# Patient Record
Sex: MALE | Race: WHITE | Employment: OTHER | URBAN - METROPOLITAN AREA
[De-identification: names, ages, dates, MRNs, and addresses within clinical notes are randomized per-mention and may not be internally consistent; named-entity substitution may affect disease eponyms.]

---

## 2017-01-16 DIAGNOSIS — I48.19 PERSISTENT ATRIAL FIBRILLATION (HCC): ICD-10-CM

## 2017-01-16 LAB
INR BLD: 1.9 (ref 0.85–1.16)
PROTHROMBIN TIME: 22 SEC (ref 9.8–13)

## 2017-01-17 ENCOUNTER — ANTI-COAG VISIT (OUTPATIENT)
Dept: INTERNAL MEDICINE | Age: 62
End: 2017-01-17

## 2017-02-08 ENCOUNTER — TELEPHONE (OUTPATIENT)
Dept: INTERNAL MEDICINE | Age: 62
End: 2017-02-08

## 2017-02-08 DIAGNOSIS — I48.19 PERSISTENT ATRIAL FIBRILLATION (HCC): ICD-10-CM

## 2017-02-08 LAB
INR BLD: 1.77 (ref 0.85–1.16)
PROTHROMBIN TIME: 20.4 SEC (ref 9.8–13)

## 2017-02-09 ENCOUNTER — ANTI-COAG VISIT (OUTPATIENT)
Dept: INTERNAL MEDICINE | Age: 62
End: 2017-02-09

## 2017-02-20 ENCOUNTER — OFFICE VISIT (OUTPATIENT)
Dept: CARDIOLOGY CLINIC | Age: 62
End: 2017-02-20

## 2017-02-20 VITALS
SYSTOLIC BLOOD PRESSURE: 118 MMHG | WEIGHT: 251 LBS | BODY MASS INDEX: 31.37 KG/M2 | HEART RATE: 68 BPM | DIASTOLIC BLOOD PRESSURE: 70 MMHG

## 2017-02-20 DIAGNOSIS — R00.2 PALPITATIONS: ICD-10-CM

## 2017-02-20 DIAGNOSIS — I27.20 PULMONARY HYPERTENSION (HCC): ICD-10-CM

## 2017-02-20 DIAGNOSIS — I48.0 PAROXYSMAL ATRIAL FIBRILLATION (HCC): Primary | ICD-10-CM

## 2017-02-20 PROCEDURE — 99214 OFFICE O/P EST MOD 30 MIN: CPT | Performed by: INTERNAL MEDICINE

## 2017-02-28 DIAGNOSIS — I48.19 PERSISTENT ATRIAL FIBRILLATION (HCC): ICD-10-CM

## 2017-02-28 LAB
INR BLD: 1.57 (ref 0.85–1.15)
PROTHROMBIN TIME: 17.7 SEC (ref 9.6–13)

## 2017-03-10 ENCOUNTER — HOSPITAL ENCOUNTER (OUTPATIENT)
Dept: NON INVASIVE DIAGNOSTICS | Age: 62
Discharge: HOME | End: 2017-03-10
Attending: INTERNAL MEDICINE | Admitting: INTERNAL MEDICINE

## 2017-03-10 DIAGNOSIS — I27.29 OTHER SECONDARY PULMONARY HYPERTENSION (HCC): ICD-10-CM

## 2017-03-10 LAB
LV EF: 58 %
LVEF MODALITY: NORMAL

## 2017-03-25 DIAGNOSIS — I48.19 PERSISTENT ATRIAL FIBRILLATION (HCC): ICD-10-CM

## 2017-03-25 LAB
INR BLD: 1.14
INR BLD: 1.14 (ref 0.85–1.15)
PROTHROMBIN TIME: 12.9 SEC (ref 9.6–13)

## 2017-03-28 ENCOUNTER — ANTI-COAG VISIT (OUTPATIENT)
Dept: INTERNAL MEDICINE | Age: 62
End: 2017-03-28

## 2017-03-29 ENCOUNTER — ANTI-COAG VISIT (OUTPATIENT)
Dept: INTERNAL MEDICINE | Age: 62
End: 2017-03-29

## 2017-04-08 LAB
INR BLD: 2.32 (ref 0.85–1.15)
PROTHROMBIN TIME: 26.2 SEC (ref 9.6–13)

## 2017-04-10 ENCOUNTER — ANTI-COAG VISIT (OUTPATIENT)
Dept: INTERNAL MEDICINE | Age: 62
End: 2017-04-10

## 2017-04-27 ENCOUNTER — TELEPHONE (OUTPATIENT)
Dept: INTERNAL MEDICINE | Age: 62
End: 2017-04-27

## 2017-04-27 DIAGNOSIS — Z01.89 ROUTINE LAB DRAW: Primary | ICD-10-CM

## 2017-05-01 DIAGNOSIS — Z01.89 ROUTINE LAB DRAW: ICD-10-CM

## 2017-05-01 LAB
INR BLD: 2 (ref 0.85–1.15)
PROTHROMBIN TIME: 22.6 SEC (ref 9.6–13)

## 2017-05-02 ENCOUNTER — ANTI-COAG VISIT (OUTPATIENT)
Dept: INTERNAL MEDICINE | Age: 62
End: 2017-05-02

## 2017-05-02 LAB
ESTIMATED AVERAGE GLUCOSE: 134.1 MG/DL
HBA1C MFR BLD: 6.3 %

## 2017-05-31 DIAGNOSIS — Z01.89 ROUTINE LAB DRAW: ICD-10-CM

## 2017-05-31 LAB
INR BLD: 3.47 (ref 0.85–1.15)
PROTHROMBIN TIME: 39.2 SEC (ref 9.6–13)

## 2017-06-01 ENCOUNTER — ANTI-COAG VISIT (OUTPATIENT)
Dept: INTERNAL MEDICINE | Age: 62
End: 2017-06-01

## 2017-06-29 ENCOUNTER — TELEPHONE (OUTPATIENT)
Dept: INTERNAL MEDICINE | Age: 62
End: 2017-06-29

## 2017-06-29 DIAGNOSIS — I26.99 OTHER PULMONARY EMBOLISM WITHOUT ACUTE COR PULMONALE, UNSPECIFIED CHRONICITY (HCC): ICD-10-CM

## 2017-06-29 DIAGNOSIS — E78.5 HYPERLIPIDEMIA, UNSPECIFIED HYPERLIPIDEMIA TYPE: ICD-10-CM

## 2017-06-29 DIAGNOSIS — R73.9 HYPERGLYCEMIA: Primary | ICD-10-CM

## 2017-06-29 DIAGNOSIS — Z00.00 ROUTINE GENERAL MEDICAL EXAMINATION AT A HEALTH CARE FACILITY: ICD-10-CM

## 2017-08-04 ENCOUNTER — ANTI-COAG VISIT (OUTPATIENT)
Dept: INTERNAL MEDICINE | Age: 62
End: 2017-08-04

## 2017-08-04 DIAGNOSIS — Z01.89 ROUTINE LAB DRAW: ICD-10-CM

## 2017-08-04 LAB
A/G RATIO: 1.7 (ref 1.1–2.2)
ALBUMIN SERPL-MCNC: 4.4 G/DL (ref 3.4–5)
ALP BLD-CCNC: 38 U/L (ref 40–129)
ALT SERPL-CCNC: 26 U/L (ref 10–40)
ANION GAP SERPL CALCULATED.3IONS-SCNC: 16 MMOL/L (ref 3–16)
AST SERPL-CCNC: 22 U/L (ref 15–37)
BASOPHILS ABSOLUTE: 0 K/UL (ref 0–0.2)
BASOPHILS RELATIVE PERCENT: 0.7 %
BILIRUB SERPL-MCNC: 0.6 MG/DL (ref 0–1)
BUN BLDV-MCNC: 25 MG/DL (ref 7–20)
CALCIUM SERPL-MCNC: 9.8 MG/DL (ref 8.3–10.6)
CHLORIDE BLD-SCNC: 102 MMOL/L (ref 99–110)
CHOLESTEROL, TOTAL: 164 MG/DL (ref 0–199)
CO2: 25 MMOL/L (ref 21–32)
CREAT SERPL-MCNC: 1.1 MG/DL (ref 0.8–1.3)
CREATININE URINE: 378.7 MG/DL (ref 39–259)
EOSINOPHILS ABSOLUTE: 0.2 K/UL (ref 0–0.6)
EOSINOPHILS RELATIVE PERCENT: 2.8 %
ESTIMATED AVERAGE GLUCOSE: 114 MG/DL
GFR AFRICAN AMERICAN: >60
GFR NON-AFRICAN AMERICAN: >60
GLOBULIN: 2.6 G/DL
GLUCOSE BLD-MCNC: 93 MG/DL (ref 70–99)
HBA1C MFR BLD: 5.6 %
HCT VFR BLD CALC: 45.1 % (ref 40.5–52.5)
HDLC SERPL-MCNC: 43 MG/DL (ref 40–60)
HEMOGLOBIN: 14.7 G/DL (ref 13.5–17.5)
INR BLD: 2.19 (ref 0.85–1.15)
LDL CHOLESTEROL CALCULATED: 94 MG/DL
LYMPHOCYTES ABSOLUTE: 1.7 K/UL (ref 1–5.1)
LYMPHOCYTES RELATIVE PERCENT: 26.4 %
MCH RBC QN AUTO: 28.2 PG (ref 26–34)
MCHC RBC AUTO-ENTMCNC: 32.7 G/DL (ref 31–36)
MCV RBC AUTO: 86.2 FL (ref 80–100)
MICROALBUMIN UR-MCNC: 8.1 MG/DL
MICROALBUMIN/CREAT UR-RTO: 21.4 MG/G (ref 0–30)
MONOCYTES ABSOLUTE: 0.6 K/UL (ref 0–1.3)
MONOCYTES RELATIVE PERCENT: 9.6 %
NEUTROPHILS ABSOLUTE: 4 K/UL (ref 1.7–7.7)
NEUTROPHILS RELATIVE PERCENT: 60.5 %
PDW BLD-RTO: 15.2 % (ref 12.4–15.4)
PLATELET # BLD: 251 K/UL (ref 135–450)
PMV BLD AUTO: 9.5 FL (ref 5–10.5)
POTASSIUM SERPL-SCNC: 4.7 MMOL/L (ref 3.5–5.1)
PROTHROMBIN TIME: 24.7 SEC (ref 9.6–13)
RBC # BLD: 5.22 M/UL (ref 4.2–5.9)
SODIUM BLD-SCNC: 143 MMOL/L (ref 136–145)
TOTAL PROTEIN: 7 G/DL (ref 6.4–8.2)
TRIGL SERPL-MCNC: 136 MG/DL (ref 0–150)
TSH SERPL DL<=0.05 MIU/L-ACNC: 2.4 UIU/ML (ref 0.27–4.2)
VLDLC SERPL CALC-MCNC: 27 MG/DL
WBC # BLD: 6.6 K/UL (ref 4–11)

## 2017-08-07 ENCOUNTER — OFFICE VISIT (OUTPATIENT)
Dept: CARDIOLOGY CLINIC | Age: 62
End: 2017-08-07

## 2017-08-07 VITALS
SYSTOLIC BLOOD PRESSURE: 130 MMHG | DIASTOLIC BLOOD PRESSURE: 60 MMHG | BODY MASS INDEX: 30.62 KG/M2 | HEART RATE: 60 BPM | WEIGHT: 245 LBS

## 2017-08-07 DIAGNOSIS — I27.20 PULMONARY HTN (HCC): ICD-10-CM

## 2017-08-07 DIAGNOSIS — I48.0 PAROXYSMAL ATRIAL FIBRILLATION (HCC): Primary | ICD-10-CM

## 2017-08-07 DIAGNOSIS — R00.2 PALPITATIONS: ICD-10-CM

## 2017-08-07 PROCEDURE — 99214 OFFICE O/P EST MOD 30 MIN: CPT | Performed by: INTERNAL MEDICINE

## 2017-08-21 ENCOUNTER — OFFICE VISIT (OUTPATIENT)
Dept: INTERNAL MEDICINE | Age: 62
End: 2017-08-21

## 2017-08-21 VITALS
DIASTOLIC BLOOD PRESSURE: 80 MMHG | WEIGHT: 244 LBS | HEIGHT: 75 IN | SYSTOLIC BLOOD PRESSURE: 132 MMHG | BODY MASS INDEX: 30.34 KG/M2

## 2017-08-21 DIAGNOSIS — I26.99 OTHER PULMONARY EMBOLISM WITHOUT ACUTE COR PULMONALE, UNSPECIFIED CHRONICITY (HCC): ICD-10-CM

## 2017-08-21 DIAGNOSIS — R25.1 TREMOR: ICD-10-CM

## 2017-08-21 DIAGNOSIS — R73.9 HYPERGLYCEMIA: ICD-10-CM

## 2017-08-21 DIAGNOSIS — E78.5 HYPERLIPIDEMIA, UNSPECIFIED HYPERLIPIDEMIA TYPE: ICD-10-CM

## 2017-08-21 DIAGNOSIS — M54.41 CHRONIC RIGHT-SIDED LOW BACK PAIN WITH RIGHT-SIDED SCIATICA: ICD-10-CM

## 2017-08-21 DIAGNOSIS — Z00.00 WELL ADULT EXAM: Primary | ICD-10-CM

## 2017-08-21 DIAGNOSIS — G89.29 CHRONIC RIGHT-SIDED LOW BACK PAIN WITH RIGHT-SIDED SCIATICA: ICD-10-CM

## 2017-08-21 PROCEDURE — 99396 PREV VISIT EST AGE 40-64: CPT | Performed by: INTERNAL MEDICINE

## 2017-08-21 RX ORDER — FENOFIBRATE 145 MG/1
TABLET ORAL
Qty: 90 TABLET | Refills: 3 | Status: SHIPPED | OUTPATIENT
Start: 2017-08-21 | End: 2018-11-21 | Stop reason: SDUPTHER

## 2017-08-21 RX ORDER — METHOCARBAMOL 500 MG/1
500 TABLET, FILM COATED ORAL 4 TIMES DAILY
Qty: 40 TABLET | Refills: 2 | Status: SHIPPED | OUTPATIENT
Start: 2017-08-21 | End: 2017-08-31

## 2017-08-21 RX ORDER — METHOCARBAMOL 500 MG/1
TABLET, FILM COATED ORAL
Qty: 40 TABLET | Refills: 3 | Status: ON HOLD | OUTPATIENT
Start: 2017-08-21 | End: 2018-12-06

## 2017-08-21 RX ORDER — WARFARIN SODIUM 1 MG/1
TABLET ORAL
Qty: 180 TABLET | Refills: 3 | Status: ON HOLD | OUTPATIENT
Start: 2017-08-21 | End: 2018-12-06 | Stop reason: CLARIF

## 2017-08-21 RX ORDER — SUMATRIPTAN 100 MG/1
TABLET, FILM COATED ORAL
Qty: 27 TABLET | Refills: 3 | Status: SHIPPED | OUTPATIENT
Start: 2017-08-21 | End: 2019-11-23 | Stop reason: SDUPTHER

## 2017-08-21 RX ORDER — WARFARIN SODIUM 3 MG/1
TABLET ORAL
Qty: 180 TABLET | Refills: 3 | Status: SHIPPED | OUTPATIENT
Start: 2017-08-21 | End: 2018-09-23 | Stop reason: SDUPTHER

## 2017-08-21 RX ORDER — FLECAINIDE ACETATE 100 MG/1
TABLET ORAL
Qty: 180 TABLET | Refills: 3 | Status: SHIPPED | OUTPATIENT
Start: 2017-08-21 | End: 2018-11-08 | Stop reason: SDUPTHER

## 2017-08-21 RX ORDER — ROSUVASTATIN CALCIUM 40 MG/1
TABLET, COATED ORAL
Qty: 90 TABLET | Refills: 3 | Status: SHIPPED | OUTPATIENT
Start: 2017-08-21 | End: 2018-10-22 | Stop reason: SDUPTHER

## 2017-08-21 RX ORDER — DIVALPROEX SODIUM 250 MG/1
TABLET, DELAYED RELEASE ORAL
Qty: 270 TABLET | Refills: 3 | Status: SHIPPED | OUTPATIENT
Start: 2017-08-21 | End: 2018-07-13 | Stop reason: SDUPTHER

## 2017-08-21 RX ORDER — WARFARIN SODIUM 5 MG/1
TABLET ORAL
Qty: 180 TABLET | Refills: 3 | Status: ON HOLD | OUTPATIENT
Start: 2017-08-21 | End: 2018-12-06 | Stop reason: CLARIF

## 2017-08-21 RX ORDER — METOPROLOL SUCCINATE 50 MG/1
TABLET, EXTENDED RELEASE ORAL
Qty: 90 TABLET | Refills: 3 | Status: SHIPPED | OUTPATIENT
Start: 2017-08-21 | End: 2018-11-08 | Stop reason: SDUPTHER

## 2017-08-21 ASSESSMENT — ENCOUNTER SYMPTOMS
RESPIRATORY NEGATIVE: 1
ABDOMINAL PAIN: 0
SHORTNESS OF BREATH: 0
CHEST TIGHTNESS: 0

## 2017-09-01 ENCOUNTER — OFFICE VISIT (OUTPATIENT)
Dept: INTERNAL MEDICINE | Age: 62
End: 2017-09-01

## 2017-09-01 VITALS
SYSTOLIC BLOOD PRESSURE: 130 MMHG | TEMPERATURE: 98.1 F | HEIGHT: 75 IN | BODY MASS INDEX: 29.97 KG/M2 | WEIGHT: 241 LBS | DIASTOLIC BLOOD PRESSURE: 80 MMHG

## 2017-09-01 DIAGNOSIS — J01.00 ACUTE NON-RECURRENT MAXILLARY SINUSITIS: ICD-10-CM

## 2017-09-01 PROCEDURE — 99213 OFFICE O/P EST LOW 20 MIN: CPT | Performed by: INTERNAL MEDICINE

## 2017-09-01 RX ORDER — CEPHALEXIN 500 MG/1
500 CAPSULE ORAL 3 TIMES DAILY
Qty: 21 CAPSULE | Refills: 0 | Status: SHIPPED | OUTPATIENT
Start: 2017-09-01 | End: 2017-10-19

## 2017-09-06 PROBLEM — J01.00 ACUTE NON-RECURRENT MAXILLARY SINUSITIS: Status: ACTIVE | Noted: 2017-09-06

## 2017-09-06 ASSESSMENT — ENCOUNTER SYMPTOMS
NAUSEA: 0
VOMITING: 0
BACK PAIN: 0
RHINORRHEA: 1
SINUS PRESSURE: 1
SHORTNESS OF BREATH: 0
ABDOMINAL PAIN: 0

## 2017-10-19 ENCOUNTER — OFFICE VISIT (OUTPATIENT)
Dept: INTERNAL MEDICINE | Age: 62
End: 2017-10-19

## 2017-10-19 VITALS
SYSTOLIC BLOOD PRESSURE: 126 MMHG | HEART RATE: 67 BPM | HEIGHT: 75 IN | RESPIRATION RATE: 16 BRPM | WEIGHT: 242 LBS | BODY MASS INDEX: 30.09 KG/M2 | DIASTOLIC BLOOD PRESSURE: 72 MMHG | TEMPERATURE: 98 F

## 2017-10-19 DIAGNOSIS — Z01.89 ROUTINE LAB DRAW: ICD-10-CM

## 2017-10-19 DIAGNOSIS — E78.5 HYPERLIPIDEMIA, UNSPECIFIED HYPERLIPIDEMIA TYPE: ICD-10-CM

## 2017-10-19 DIAGNOSIS — G43.909 MIGRAINE WITHOUT STATUS MIGRAINOSUS, NOT INTRACTABLE, UNSPECIFIED MIGRAINE TYPE: ICD-10-CM

## 2017-10-19 DIAGNOSIS — Z01.818 PREOP EXAM FOR INTERNAL MEDICINE: Primary | ICD-10-CM

## 2017-10-19 DIAGNOSIS — R73.9 HYPERGLYCEMIA: ICD-10-CM

## 2017-10-19 DIAGNOSIS — I48.0 PAROXYSMAL ATRIAL FIBRILLATION (HCC): ICD-10-CM

## 2017-10-19 DIAGNOSIS — H26.9 CATARACT, UNSPECIFIED CATARACT TYPE, UNSPECIFIED LATERALITY: ICD-10-CM

## 2017-10-19 LAB
INR BLD: 4.67 (ref 0.85–1.15)
PROTHROMBIN TIME: 52.8 SEC (ref 9.6–13)

## 2017-10-19 PROCEDURE — 99244 OFF/OP CNSLTJ NEW/EST MOD 40: CPT | Performed by: INTERNAL MEDICINE

## 2017-10-19 ASSESSMENT — ENCOUNTER SYMPTOMS
ABDOMINAL PAIN: 0
SHORTNESS OF BREATH: 0
CHEST TIGHTNESS: 0
STRIDOR: 0
RESPIRATORY NEGATIVE: 1

## 2017-10-19 NOTE — ASSESSMENT & PLAN NOTE
Controlled w current regimen- continue and monitor closely  Needs pt/inr checked before surgery!!  Discussed need to do regularly

## 2017-10-19 NOTE — LETTER
REPEAT IN 2 HOURS IF NEEDED. MAX OF 2 TABLETS PER 24 HOURS** 27 tablet 3    Terbinafine HCl (LAMISIL PO) Take by mouth Indications: Pt unaware of dose      Bimatoprost (LUMIGAN OP) Apply  to eye.  ALLEGRA 180 MG tablet Take one tablet(s) by mouth daily 90 tablet 1    timolol (TIMOPTIC) 0.25 % ophthalmic solution 1 drop 2 times daily.  [DISCONTINUED] cephALEXin (KEFLEX) 500 MG capsule Take 1 capsule by mouth 3 times daily 21 capsule 0     No facility-administered encounter medications on file as of 10/19/2017. Past Medical History:   Diagnosis Date    Allergic rhinitis due to other allergen     Atrial fibrillation ([facility] [address]     Cataract, right 11/30/2016    Hx of blood clots     Hypopotassemia     Other abnormal blood chemistry     Other and unspecified hyperlipidemia     Other pulmonary embolism and infarction     Screening PSA (prostate specific antigen) 12/5/2009    <0.1 ng/mL    Unspecified sleep apnea     Vitreous degeneration         Past Surgical History:   Procedure Laterality Date    ABLATION OF DYSRHYTHMIC FOCUS      2002    COLONOSCOPY  06-    Bharathi Martin / Dr. Sanchez [place]  10/20/2014    recheck 4-5 yrs w ++ h/o adenomatous plyps         Family and Social History non- contributory-see EPIC for details- reviewed and updated    Review of Systems   Constitutional: Negative for appetite change, fatigue and fever. HENT: Negative. Respiratory: Negative. Negative for chest tightness, shortness of breath and stridor. Cardiovascular: Negative for chest pain and palpitations. Gastrointestinal: Negative for abdominal pain. Genitourinary: Negative. Skin: Negative. Neurological: Positive for headaches. Negative for dizziness, facial asymmetry and weakness. Psychiatric/Behavioral: Negative for dysphoric mood and sleep disturbance. The patient is not nervous/anxious.       Vitals:    10/19/17 1512   BP: 126/72   Pulse: 67   Resp: 16

## 2017-10-19 NOTE — COMMUNICATION BODY
Subjective:      Patient ID: Sully Gayle is a 60 y.o. male. In for preop prior to cataract surgery. Feeling well otherwise. UTD w cardiology. PT/INR not checked for more than 2 months but no active bleeding. bp's well controlled. Headaches still sporatic but sometimes severe in the middle of the night. Sugar count has been down and watching carbs carefully. Trying to work o n weight loss also and exercising regularly. Outpatient Encounter Prescriptions as of 10/19/2017   Medication Sig Dispense Refill    metoprolol succinate (TOPROL XL) 50 MG extended release tablet TAKE ONE TABLET BY MOUTH EVERY DAY 90 tablet 3    flecainide (TAMBOCOR) 100 MG tablet TAKE ONE TABLET BY MOUTH TWICE A  tablet 3    TRICOR 145 MG tablet TAKE ONE TABLET BY MOUTH EVERY DAY 90 tablet 3    divalproex (DEPAKOTE) 250 MG DR tablet TAKE ONE TABLET BY MOUTH THREE TIMES A  tablet 3    warfarin (COUMADIN) 5 MG tablet TAKE UP TO 2 TABLETS DAILY AS DIRECTED 180 tablet 3    warfarin (COUMADIN) 3 MG tablet TAKE UP TO TWO TABLETS BY MOUTH DAILY AS DIRECTED 180 tablet 3    warfarin (COUMADIN) 1 MG tablet TAKE ONE TABLET BY MOUTH TWICE A  tablet 3    methocarbamol (ROBAXIN) 500 MG tablet TAKE ONE TABLET BY MOUTH FOUR TIMES A DAY FOR 10 DAYS 40 tablet 3    rosuvastatin (CRESTOR) 40 MG tablet TAKE ONE TABLET BY MOUTH DAILY 90 tablet 3    SUMAtriptan (IMITREX) 100 MG tablet TAKE ONE TABLET ONCE AS NEEDED **MAY REPEAT IN 2 HOURS IF NEEDED. MAX OF 2 TABLETS PER 24 HOURS** 27 tablet 3    Terbinafine HCl (LAMISIL PO) Take by mouth Indications: Pt unaware of dose      Bimatoprost (LUMIGAN OP) Apply  to eye.  ALLEGRA 180 MG tablet Take one tablet(s) by mouth daily 90 tablet 1    timolol (TIMOPTIC) 0.25 % ophthalmic solution 1 drop 2 times daily.       [DISCONTINUED] cephALEXin (KEFLEX) 500 MG capsule Take 1 capsule by mouth 3 times daily 21 capsule 0     No facility-administered encounter medications on file as of 10/19/2017. Past Medical History:   Diagnosis Date    Allergic rhinitis due to other allergen     Atrial fibrillation ([facility] [address]     Cataract, right 11/30/2016    Hx of blood clots     Hypopotassemia     Other abnormal blood chemistry     Other and unspecified hyperlipidemia     Other pulmonary embolism and infarction     Screening PSA (prostate specific antigen) 12/5/2009    <0.1 ng/mL    Unspecified sleep apnea     Vitreous degeneration         Past Surgical History:   Procedure Laterality Date    ABLATION OF DYSRHYTHMIC FOCUS      2002    COLONOSCOPY  06-    Bharathi Martin / Dr. Sandy Nye  10/20/2014    recheck 4-5 yrs w ++ h/o adenomatous plyps         Family and Social History non- contributory-see EPIC for details- reviewed and updated    Review of Systems   Constitutional: Negative for appetite change, fatigue and fever. HENT: Negative. Respiratory: Negative. Negative for chest tightness, shortness of breath and stridor. Cardiovascular: Negative for chest pain and palpitations. Gastrointestinal: Negative for abdominal pain. Genitourinary: Negative. Skin: Negative. Neurological: Positive for headaches. Negative for dizziness, facial asymmetry and weakness. Psychiatric/Behavioral: Negative for dysphoric mood and sleep disturbance. The patient is not nervous/anxious. Vitals:    10/19/17 1512   BP: 126/72   Pulse: 67   Resp: 16   Temp: 98 °F (36.7 °C)       Objective:   Physical Exam   Constitutional: He is oriented to person, place, and time. He appears well-developed and well-nourished. HENT:   Head: Atraumatic. Mouth/Throat: No oropharyngeal exudate. Eyes: Conjunctivae and EOM are normal. Pupils are equal, round, and reactive to light. Neck: Normal range of motion. Neck supple. No thyromegaly present. Cardiovascular: Normal rate, regular rhythm and normal heart sounds. No murmur heard.   Pulmonary/Chest: Effort normal and

## 2017-10-19 NOTE — PROGRESS NOTES
Subjective:      Patient ID: Ricardo Fox is a 60 y.o. male. In for preop prior to cataract surgery. Feeling well otherwise. UTD w cardiology. PT/INR not checked for more than 2 months but no active bleeding. bp's well controlled. Headaches still sporatic but sometimes severe in the middle of the night. Sugar count has been down and watching carbs carefully. Trying to work o n weight loss also and exercising regularly. Outpatient Encounter Prescriptions as of 10/19/2017   Medication Sig Dispense Refill    metoprolol succinate (TOPROL XL) 50 MG extended release tablet TAKE ONE TABLET BY MOUTH EVERY DAY 90 tablet 3    flecainide (TAMBOCOR) 100 MG tablet TAKE ONE TABLET BY MOUTH TWICE A  tablet 3    TRICOR 145 MG tablet TAKE ONE TABLET BY MOUTH EVERY DAY 90 tablet 3    divalproex (DEPAKOTE) 250 MG DR tablet TAKE ONE TABLET BY MOUTH THREE TIMES A  tablet 3    warfarin (COUMADIN) 5 MG tablet TAKE UP TO 2 TABLETS DAILY AS DIRECTED 180 tablet 3    warfarin (COUMADIN) 3 MG tablet TAKE UP TO TWO TABLETS BY MOUTH DAILY AS DIRECTED 180 tablet 3    warfarin (COUMADIN) 1 MG tablet TAKE ONE TABLET BY MOUTH TWICE A  tablet 3    methocarbamol (ROBAXIN) 500 MG tablet TAKE ONE TABLET BY MOUTH FOUR TIMES A DAY FOR 10 DAYS 40 tablet 3    rosuvastatin (CRESTOR) 40 MG tablet TAKE ONE TABLET BY MOUTH DAILY 90 tablet 3    SUMAtriptan (IMITREX) 100 MG tablet TAKE ONE TABLET ONCE AS NEEDED **MAY REPEAT IN 2 HOURS IF NEEDED. MAX OF 2 TABLETS PER 24 HOURS** 27 tablet 3    Terbinafine HCl (LAMISIL PO) Take by mouth Indications: Pt unaware of dose      Bimatoprost (LUMIGAN OP) Apply  to eye.  ALLEGRA 180 MG tablet Take one tablet(s) by mouth daily 90 tablet 1    timolol (TIMOPTIC) 0.25 % ophthalmic solution 1 drop 2 times daily.       [DISCONTINUED] cephALEXin (KEFLEX) 500 MG capsule Take 1 capsule by mouth 3 times daily 21 capsule 0     No facility-administered encounter medications on file as of 10/19/2017. Past Medical History:   Diagnosis Date    Allergic rhinitis due to other allergen     Atrial fibrillation ([facility] [address]     Cataract, right 11/30/2016    Hx of blood clots     Hypopotassemia     Other abnormal blood chemistry     Other and unspecified hyperlipidemia     Other pulmonary embolism and infarction     Screening PSA (prostate specific antigen) 12/5/2009    <0.1 ng/mL    Unspecified sleep apnea     Vitreous degeneration         Past Surgical History:   Procedure Laterality Date    ABLATION OF DYSRHYTHMIC FOCUS      2002    COLONOSCOPY  06-    Bharathi Martin / Dr. Jennifer Quiñones  10/20/2014    recheck 4-5 yrs w ++ h/o adenomatous plyps         Family and Social History non- contributory-see EPIC for details- reviewed and updated    Review of Systems   Constitutional: Negative for appetite change, fatigue and fever. HENT: Negative. Respiratory: Negative. Negative for chest tightness, shortness of breath and stridor. Cardiovascular: Negative for chest pain and palpitations. Gastrointestinal: Negative for abdominal pain. Genitourinary: Negative. Skin: Negative. Neurological: Positive for headaches. Negative for dizziness, facial asymmetry and weakness. Psychiatric/Behavioral: Negative for dysphoric mood and sleep disturbance. The patient is not nervous/anxious. Vitals:    10/19/17 1512   BP: 126/72   Pulse: 67   Resp: 16   Temp: 98 °F (36.7 °C)       Objective:   Physical Exam   Constitutional: He is oriented to person, place, and time. He appears well-developed and well-nourished. HENT:   Head: Atraumatic. Mouth/Throat: No oropharyngeal exudate. Eyes: Conjunctivae and EOM are normal. Pupils are equal, round, and reactive to light. Neck: Normal range of motion. Neck supple. No thyromegaly present. Cardiovascular: Normal rate, regular rhythm and normal heart sounds. No murmur heard.   Pulmonary/Chest: Effort normal and breath sounds normal. No respiratory distress. He has no wheezes. Abdominal: He exhibits no distension. There is no tenderness. Musculoskeletal: He exhibits no edema. Lymphadenopathy:     He has no cervical adenopathy. Neurological: He is alert and oriented to person, place, and time. He has normal reflexes. No cranial nerve deficit. Skin: Skin is warm and dry. Psychiatric: He has a normal mood and affect. His behavior is normal. Judgment and thought content normal.         Assessment and Plan:      Migraine  Cont prn imitrex and avoid triggers altho difficult to identify    Atrial fibrillation (HCC)  Controlled w current regimen- continue and monitor closely  Needs pt/inr checked before surgery!! Discussed need to do regularly    Hyperglycemia  Cont to watch carbs in diet- to isis glyco in a few months but has been better controlled w diet    Hyperlipemia  Controlled w current regimen- continue and monitor closely      Cataract  For surgical excision      Patient has been seen-history and physical performed and is medically cleared for surgery. Encounter Diagnoses   Name Primary?  Migraine without status migrainosus, not intractable, unspecified migraine type     Paroxysmal atrial fibrillation (HCC)     Hyperglycemia     Hyperlipidemia, unspecified hyperlipidemia type     Cataract, unspecified cataract type, unspecified laterality        No orders of the defined types were placed in this encounter.

## 2017-10-20 ENCOUNTER — TELEPHONE (OUTPATIENT)
Dept: INTERNAL MEDICINE | Age: 62
End: 2017-10-20

## 2017-10-20 ENCOUNTER — ANTI-COAG VISIT (OUTPATIENT)
Dept: INTERNAL MEDICINE | Age: 62
End: 2017-10-20

## 2017-10-27 ENCOUNTER — TELEPHONE (OUTPATIENT)
Dept: INTERNAL MEDICINE | Age: 62
End: 2017-10-27

## 2017-10-27 DIAGNOSIS — Z01.89 ROUTINE LAB DRAW: ICD-10-CM

## 2017-10-27 DIAGNOSIS — I48.0 PAROXYSMAL ATRIAL FIBRILLATION (HCC): Primary | ICD-10-CM

## 2017-10-27 LAB
INR BLD: 2.65 (ref 0.85–1.15)
PROTHROMBIN TIME: 30 SEC (ref 9.6–13)

## 2017-11-01 ENCOUNTER — ANTI-COAG VISIT (OUTPATIENT)
Dept: INTERNAL MEDICINE | Age: 62
End: 2017-11-01

## 2018-01-31 ENCOUNTER — ANTI-COAG VISIT (OUTPATIENT)
Dept: INTERNAL MEDICINE | Age: 63
End: 2018-01-31

## 2018-02-07 ENCOUNTER — OFFICE VISIT (OUTPATIENT)
Dept: CARDIOLOGY CLINIC | Age: 63
End: 2018-02-07

## 2018-02-07 VITALS
BODY MASS INDEX: 30.11 KG/M2 | OXYGEN SATURATION: 98 % | WEIGHT: 242.2 LBS | HEART RATE: 60 BPM | RESPIRATION RATE: 14 BRPM | SYSTOLIC BLOOD PRESSURE: 131 MMHG | HEIGHT: 75 IN | DIASTOLIC BLOOD PRESSURE: 70 MMHG

## 2018-02-07 DIAGNOSIS — I48.0 PAROXYSMAL ATRIAL FIBRILLATION (HCC): Primary | ICD-10-CM

## 2018-02-07 DIAGNOSIS — I27.20 PULMONARY HTN (HCC): ICD-10-CM

## 2018-02-07 DIAGNOSIS — R00.2 PALPITATIONS: ICD-10-CM

## 2018-02-07 PROCEDURE — 99214 OFFICE O/P EST MOD 30 MIN: CPT | Performed by: INTERNAL MEDICINE

## 2018-02-07 NOTE — PROGRESS NOTES
Subjective:      Patient ID: Chloe Ge is a 58 y.o. male. HPI:  Ariana Espinoza is being seen for his 6 month follow up appointment for afib and palps. Continues active. No exertional sx. No tachycardia. No palps. No syncope. No edema. No pnd or orthopnea. No chest pain/sob. Past Medical History:   Diagnosis Date    Allergic rhinitis due to other allergen     Atrial fibrillation ([address]     Cataract, right 11/30/2016    Hx of blood clots     Hypopotassemia     Other abnormal blood chemistry     Other and unspecified hyperlipidemia     Other pulmonary embolism and infarction     Screening PSA (prostate specific antigen) 12/5/2009    <0.1 ng/mL    Unspecified sleep apnea     Vitreous degeneration      Past Surgical History:   Procedure Laterality Date    ABLATION OF DYSRHYTHMIC FOCUS      2002    CATARACT REMOVAL Left 2017    COLONOSCOPY  06-    Kendra Junior / Dr. Jerry Amaya    COLONOSCOPY  10/20/2014    recheck 4-5 yrs w ++ h/o adenomatous plyps        No Known Allergies     Social History     Social History    Marital status:      Spouse name: N/A    Number of children: N/A    Years of education: N/A     Occupational History    Not on file. Social History Main Topics    Smoking status: Never Smoker    Smokeless tobacco: Never Used    Alcohol use Yes      Comment: 4 drinks weekly    Drug use: No    Sexual activity: Yes     Partners: Female     Other Topics Concern    Not on file     Social History Narrative    No narrative on file        Patient has a family history includes Diabetes in his father and mother; Uterine Cancer in his mother. Patient  has a past medical history of Allergic rhinitis due to other allergen; Atrial fibrillation ([address] Cataract, right; Hx of blood clots; Hypopotassemia; Other abnormal blood chemistry; Other and unspecified hyperlipidemia; Other pulmonary embolism and infarction; Screening PSA (prostate specific antigen);  Unspecified sleep apnea; and Vitreous degeneration. Current Outpatient Prescriptions   Medication Sig Dispense Refill    metoprolol succinate (TOPROL XL) 50 MG extended release tablet TAKE ONE TABLET BY MOUTH EVERY DAY 90 tablet 3    flecainide (TAMBOCOR) 100 MG tablet TAKE ONE TABLET BY MOUTH TWICE A  tablet 3    TRICOR 145 MG tablet TAKE ONE TABLET BY MOUTH EVERY DAY 90 tablet 3    divalproex (DEPAKOTE) 250 MG DR tablet TAKE ONE TABLET BY MOUTH THREE TIMES A  tablet 3    warfarin (COUMADIN) 5 MG tablet TAKE UP TO 2 TABLETS DAILY AS DIRECTED 180 tablet 3    warfarin (COUMADIN) 3 MG tablet TAKE UP TO TWO TABLETS BY MOUTH DAILY AS DIRECTED 180 tablet 3    warfarin (COUMADIN) 1 MG tablet TAKE ONE TABLET BY MOUTH TWICE A  tablet 3    methocarbamol (ROBAXIN) 500 MG tablet TAKE ONE TABLET BY MOUTH FOUR TIMES A DAY FOR 10 DAYS 40 tablet 3    rosuvastatin (CRESTOR) 40 MG tablet TAKE ONE TABLET BY MOUTH DAILY 90 tablet 3    SUMAtriptan (IMITREX) 100 MG tablet TAKE ONE TABLET ONCE AS NEEDED **MAY REPEAT IN 2 HOURS IF NEEDED. MAX OF 2 TABLETS PER 24 HOURS** 27 tablet 3    Bimatoprost (LUMIGAN OP) Apply  to eye.  ALLEGRA 180 MG tablet Take one tablet(s) by mouth daily 90 tablet 1    timolol (TIMOPTIC) 0.25 % ophthalmic solution 1 drop 2 times daily.  Terbinafine HCl (LAMISIL PO) Take by mouth Indications: Pt unaware of dose       No current facility-administered medications for this visit. Vitals  Weight: 242 lb 3.2 oz (109.9 kg)  Blood Pressure: BP: (131)/(70)    Pulse: 60           Review of Systems   Constitutional: Negative for activity change and fatigue. Respiratory: Negative for apnea, cough, choking, chest tightness and shortness of breath. Cardiovascular: Negative for chest pain, palpitations and leg swelling. [No PND or orthopnea. No tachycardia. Gastrointestinal: Negative for abdominal distention. Musculoskeletal: Negative for myalgias.    Neurological: Negative for dizziness, syncope and light-headedness. Psychiatric/Behavioral: Negative for behavioral problems, confusion and agitation. Other systems reviewed negative as done. Objective:   Physical Exam   Constitutional: He is oriented to person, place, and time. He appears well-developed and well-nourished. No distress. HENT:   Head: Normocephalic and atraumatic. Eyes: EOM are normal. Right eye exhibits no discharge. Left eye exhibits no discharge. Neck: Normal range of motion. Neck supple. No JVD present. Cardiovascular: Normal rate, regular rhythm, S1 normal, S2 normal and normal heart sounds. Exam reveals no gallop. No murmur heard. Pulses:       Radial pulses are 2+ on the right side, and 2+ on the left side. Pulmonary/Chest: Effort normal and breath sounds normal. No respiratory distress. He has no wheezes. He has no rales. Abdominal: Soft. Bowel sounds are normal. He exhibits no distension. No tenderness. Musculoskeletal: Normal range of motion. He exhibits no edema. Neurological: He is alert and oriented to person, place, and time. Skin: Skin is warm and dry. Psychiatric: He has a normal mood and affect. His behavior is normal. Thought content normal.       Assessment:      1. Paroxysmal atrial fibrillation (HCC)     2. Palpitations     3. Pulmonary HTN                   Plan:      CV stable. Rhythm stable. BP is good. No complaints. . Encouraged diet, exercise and wt loss. No changes. Continue to monitor. Reviewed previous records and testing including echo 3/17. Follow up 6 months.

## 2018-02-19 ENCOUNTER — ANTI-COAG VISIT (OUTPATIENT)
Dept: INTERNAL MEDICINE | Age: 63
End: 2018-02-19

## 2018-02-19 DIAGNOSIS — I48.0 PAROXYSMAL ATRIAL FIBRILLATION (HCC): ICD-10-CM

## 2018-02-19 LAB
INR BLD: 2.24 (ref 0.85–1.15)
PROTHROMBIN TIME: 25.3 SEC (ref 9.6–13)

## 2018-02-23 ENCOUNTER — HOSPITAL ENCOUNTER (OUTPATIENT)
Dept: VASCULAR LAB | Age: 63
Discharge: HOME | End: 2018-02-23
Attending: EMERGENCY MEDICINE | Admitting: EMERGENCY MEDICINE

## 2018-02-23 DIAGNOSIS — M79.661 RIGHT CALF PAIN: Primary | ICD-10-CM

## 2018-02-23 DIAGNOSIS — M79.604 PAIN OF RIGHT LEG: ICD-10-CM

## 2018-03-27 ENCOUNTER — ANTI-COAG VISIT (OUTPATIENT)
Dept: INTERNAL MEDICINE | Age: 63
End: 2018-03-27

## 2018-03-27 DIAGNOSIS — R73.9 HYPERGLYCEMIA: ICD-10-CM

## 2018-03-27 DIAGNOSIS — I48.0 PAROXYSMAL ATRIAL FIBRILLATION (HCC): ICD-10-CM

## 2018-03-27 LAB
INR BLD: 3.68 (ref 0.85–1.15)
PROTHROMBIN TIME: 41.6 SEC (ref 9.6–13)

## 2018-03-28 LAB
ESTIMATED AVERAGE GLUCOSE: 119.8 MG/DL
HBA1C MFR BLD: 5.8 %

## 2018-04-17 DIAGNOSIS — I48.0 PAROXYSMAL ATRIAL FIBRILLATION (HCC): ICD-10-CM

## 2018-04-17 LAB
INR BLD: 1.42 (ref 0.85–1.15)
PROTHROMBIN TIME: 16.1 SEC (ref 9.6–13)

## 2018-04-18 ENCOUNTER — ANTI-COAG VISIT (OUTPATIENT)
Dept: INTERNAL MEDICINE | Age: 63
End: 2018-04-18

## 2018-06-01 ENCOUNTER — OFFICE VISIT (OUTPATIENT)
Dept: INTERNAL MEDICINE | Age: 63
End: 2018-06-01

## 2018-06-01 VITALS
SYSTOLIC BLOOD PRESSURE: 130 MMHG | DIASTOLIC BLOOD PRESSURE: 78 MMHG | WEIGHT: 244 LBS | BODY MASS INDEX: 30.34 KG/M2 | HEIGHT: 75 IN

## 2018-06-01 DIAGNOSIS — J06.9 URI, ACUTE: Primary | ICD-10-CM

## 2018-06-01 DIAGNOSIS — I48.0 PAROXYSMAL ATRIAL FIBRILLATION (HCC): ICD-10-CM

## 2018-06-01 LAB
INR BLD: 2.03 (ref 0.86–1.14)
PROTHROMBIN TIME: 23.1 SEC (ref 9.8–13)

## 2018-06-01 PROCEDURE — 99213 OFFICE O/P EST LOW 20 MIN: CPT | Performed by: INTERNAL MEDICINE

## 2018-06-01 RX ORDER — DEXTROMETHORPHAN HYDROBROMIDE AND PROMETHAZINE HYDROCHLORIDE 15; 6.25 MG/5ML; MG/5ML
5 SYRUP ORAL EVERY 4 HOURS PRN
Qty: 240 ML | Refills: 2 | Status: SHIPPED | OUTPATIENT
Start: 2018-06-01 | End: 2018-06-08

## 2018-06-01 RX ORDER — CEFUROXIME AXETIL 250 MG/1
250 TABLET ORAL 2 TIMES DAILY
Qty: 20 TABLET | Refills: 0 | Status: SHIPPED | OUTPATIENT
Start: 2018-06-01 | End: 2018-06-11

## 2018-06-01 ASSESSMENT — ENCOUNTER SYMPTOMS
BACK PAIN: 0
WHEEZING: 1
SHORTNESS OF BREATH: 1
EYE REDNESS: 0
SORE THROAT: 1
RHINORRHEA: 1
COUGH: 1
CHEST TIGHTNESS: 0
NAUSEA: 0
ABDOMINAL PAIN: 0

## 2018-06-01 ASSESSMENT — PATIENT HEALTH QUESTIONNAIRE - PHQ9
SUM OF ALL RESPONSES TO PHQ9 QUESTIONS 1 & 2: 0
2. FEELING DOWN, DEPRESSED OR HOPELESS: 0
SUM OF ALL RESPONSES TO PHQ QUESTIONS 1-9: 0
1. LITTLE INTEREST OR PLEASURE IN DOING THINGS: 0

## 2018-06-05 ENCOUNTER — ANTI-COAG VISIT (OUTPATIENT)
Dept: INTERNAL MEDICINE | Age: 63
End: 2018-06-05

## 2018-06-18 ENCOUNTER — TELEPHONE (OUTPATIENT)
Dept: INTERNAL MEDICINE | Age: 63
End: 2018-06-18

## 2018-07-13 ENCOUNTER — TELEPHONE (OUTPATIENT)
Dept: INTERNAL MEDICINE | Age: 63
End: 2018-07-13

## 2018-07-13 RX ORDER — CIPROFLOXACIN 500 MG/1
500 TABLET, FILM COATED ORAL 2 TIMES DAILY
Qty: 20 TABLET | Refills: 0 | Status: SHIPPED | OUTPATIENT
Start: 2018-07-13 | End: 2018-07-23

## 2018-07-14 ENCOUNTER — HOSPITAL ENCOUNTER (EMERGENCY)
Age: 63
Discharge: HOME | End: 2018-07-14
Attending: EMERGENCY MEDICINE
Payer: COMMERCIAL

## 2018-07-14 ENCOUNTER — APPOINTMENT (OUTPATIENT)
Dept: CT IMAGING | Age: 63
End: 2018-07-14
Payer: COMMERCIAL

## 2018-07-14 VITALS
TEMPERATURE: 97.9 F | HEART RATE: 80 BPM | RESPIRATION RATE: 20 BRPM | DIASTOLIC BLOOD PRESSURE: 76 MMHG | SYSTOLIC BLOOD PRESSURE: 147 MMHG | OXYGEN SATURATION: 99 %

## 2018-07-14 DIAGNOSIS — N30.00 ACUTE CYSTITIS WITHOUT HEMATURIA: ICD-10-CM

## 2018-07-14 DIAGNOSIS — R10.9 LEFT FLANK PAIN: Primary | ICD-10-CM

## 2018-07-14 LAB
ANION GAP SERPL CALCULATED.3IONS-SCNC: 14 MMOL/L (ref 3–16)
BACTERIA: ABNORMAL /HPF
BASOPHILS ABSOLUTE: 0 K/UL (ref 0–0.2)
BASOPHILS RELATIVE PERCENT: 0.5 %
BILIRUBIN URINE: NEGATIVE MG/DL
BLOOD, URINE: NEGATIVE
BUN BLDV-MCNC: 22 MG/DL (ref 7–20)
CALCIUM SERPL-MCNC: 9.6 MG/DL (ref 8.3–10.6)
CHLORIDE BLD-SCNC: 100 MMOL/L (ref 99–110)
CLARITY: ABNORMAL
CO2: 26 MMOL/L (ref 21–32)
COLOR: ABNORMAL
CREAT SERPL-MCNC: 1 MG/DL (ref 0.8–1.3)
EOSINOPHILS ABSOLUTE: 0.2 K/UL (ref 0–0.6)
EOSINOPHILS RELATIVE PERCENT: 4.2 %
GFR AFRICAN AMERICAN: >60
GFR NON-AFRICAN AMERICAN: >60
GLUCOSE BLD-MCNC: 174 MG/DL (ref 70–99)
GLUCOSE URINE: NEGATIVE MG/DL
HCT VFR BLD CALC: 39.8 % (ref 40.5–52.5)
HEMOGLOBIN: 13.3 G/DL (ref 13.5–17.5)
KETONES, URINE: NEGATIVE MG/DL
LEUKOCYTE ESTERASE, URINE: NEGATIVE
LYMPHOCYTES ABSOLUTE: 1.5 K/UL (ref 1–5.1)
LYMPHOCYTES RELATIVE PERCENT: 25.7 %
MCH RBC QN AUTO: 28.7 PG (ref 26–34)
MCHC RBC AUTO-ENTMCNC: 33.5 G/DL (ref 31–36)
MCV RBC AUTO: 85.7 FL (ref 80–100)
MICROSCOPIC EXAMINATION: YES
MONOCYTES ABSOLUTE: 0.6 K/UL (ref 0–1.3)
MONOCYTES RELATIVE PERCENT: 9.5 %
MUCUS: ABNORMAL /LPF
NEUTROPHILS ABSOLUTE: 3.5 K/UL (ref 1.7–7.7)
NEUTROPHILS RELATIVE PERCENT: 60.1 %
NITRITE, URINE: NEGATIVE
PDW BLD-RTO: 14.8 % (ref 12.4–15.4)
PH UA: 7.5
PLATELET # BLD: 251 K/UL (ref 135–450)
PMV BLD AUTO: 9 FL (ref 5–10.5)
POTASSIUM REFLEX MAGNESIUM: 4.3 MMOL/L (ref 3.5–5.1)
PROTEIN UA: ABNORMAL MG/DL
RBC # BLD: 4.65 M/UL (ref 4.2–5.9)
RBC UA: ABNORMAL /HPF (ref 0–2)
SODIUM BLD-SCNC: 140 MMOL/L (ref 136–145)
SPECIFIC GRAVITY UA: 1.02
UROBILINOGEN, URINE: 1 E.U./DL
WBC # BLD: 5.8 K/UL (ref 4–11)
WBC UA: ABNORMAL /HPF (ref 0–5)

## 2018-07-14 PROCEDURE — 99284 EMERGENCY DEPT VISIT MOD MDM: CPT

## 2018-07-14 PROCEDURE — 80048 BASIC METABOLIC PNL TOTAL CA: CPT

## 2018-07-14 PROCEDURE — 85025 COMPLETE CBC W/AUTO DIFF WBC: CPT

## 2018-07-14 PROCEDURE — 74176 CT ABD & PELVIS W/O CONTRAST: CPT

## 2018-07-14 PROCEDURE — 81001 URINALYSIS AUTO W/SCOPE: CPT

## 2018-07-14 ASSESSMENT — PAIN SCALES - GENERAL: PAINLEVEL_OUTOF10: 4

## 2018-07-14 ASSESSMENT — PAIN DESCRIPTION - PAIN TYPE: TYPE: ACUTE PAIN

## 2018-07-14 NOTE — ED PROVIDER NOTES
rhythm today. Pulmonary/Chest: Effort normal and breath sounds normal.   Abdominal: Soft. Bowel sounds are normal. There is no tenderness. Musculoskeletal:   Very mild left-sided flank discomfort. Neurological: He is alert and oriented to person, place, and time. Skin: Skin is warm and dry. Psychiatric: He has a normal mood and affect. Nursing note and vitals reviewed. Diagnostic Results     EKG   n/a    RADIOLOGY:  CT ABDOMEN PELVIS WO CONTRAST Additional Contrast? None   Final Result      1. No acute abnormality in the abdomen or pelvis. Specifically,   no evidence of renal stones. 2.  Hepatic steatosis. 3.  Partially calcified 2 x 2.5 cm lesion in the anterior mid   right kidney, indeterminate.  Recommend follow-up renal mass   protocol CT with and without contrast.             LABS:   Results for orders placed or performed during the hospital encounter of 07/14/18   Microscopic Urinalysis   Result Value Ref Range    Mucus, UA 2+ (A) /LPF    WBC, UA 0-2 0 - 5 /HPF    RBC, UA 0-2 0 - 2 /HPF    Bacteria, UA 1+ (A) /HPF   Basic Metabolic Panel w/ Reflex to MG   Result Value Ref Range    Sodium 140 136 - 145 mmol/L    Potassium reflex Magnesium 4.3 3.5 - 5.1 mmol/L    Chloride 100 99 - 110 mmol/L    CO2 26 21 - 32 mmol/L    Anion Gap 14 3 - 16    Glucose 174 (H) 70 - 99 mg/dL    BUN 22 (H) 7 - 20 mg/dL    CREATININE 1.0 0.8 - 1.3 mg/dL    GFR Non-African American >60 >60    GFR African American >60 >60    Calcium 9.6 8.3 - 10.6 mg/dL   CBC auto differential   Result Value Ref Range    WBC 5.8 4.0 - 11.0 K/uL    RBC 4.65 4.20 - 5.90 M/uL    Hemoglobin 13.3 (L) 13.5 - 17.5 g/dL    Hematocrit 39.8 (L) 40.5 - 52.5 %    MCV 85.7 80.0 - 100.0 fL    MCH 28.7 26.0 - 34.0 pg    MCHC 33.5 31.0 - 36.0 g/dL    RDW 14.8 12.4 - 15.4 %    Platelets 522 828 - 898 K/uL    MPV 9.0 5.0 - 10.5 fL    Neutrophils % 60.1 %    Lymphocytes % 25.7 %    Monocytes % 9.5 %    Eosinophils % 4.2 %    Basophils % 0.5 %

## 2018-07-15 NOTE — ED NOTES
Patient prepared for and ready to be discharged. Patient discharged at this time in no acute distress after verbalizing understanding of discharge instructions. Patient left after receiving After Visit Summary instructions.         Luis F Moore RN  07/14/18 1973

## 2018-07-16 RX ORDER — DIVALPROEX SODIUM 250 MG/1
TABLET, DELAYED RELEASE ORAL
Qty: 270 TABLET | Refills: 2 | Status: SHIPPED | OUTPATIENT
Start: 2018-07-16 | End: 2019-07-04 | Stop reason: SDUPTHER

## 2018-07-30 ENCOUNTER — OFFICE VISIT (OUTPATIENT)
Dept: CARDIOLOGY CLINIC | Age: 63
End: 2018-07-30

## 2018-07-30 VITALS
WEIGHT: 255 LBS | BODY MASS INDEX: 31.87 KG/M2 | SYSTOLIC BLOOD PRESSURE: 160 MMHG | HEART RATE: 60 BPM | DIASTOLIC BLOOD PRESSURE: 90 MMHG

## 2018-07-30 DIAGNOSIS — I48.0 PAROXYSMAL ATRIAL FIBRILLATION (HCC): Primary | ICD-10-CM

## 2018-07-30 DIAGNOSIS — R00.2 PALPITATIONS: ICD-10-CM

## 2018-07-30 DIAGNOSIS — I27.20 PULMONARY HTN (HCC): ICD-10-CM

## 2018-07-30 PROCEDURE — 99214 OFFICE O/P EST MOD 30 MIN: CPT | Performed by: INTERNAL MEDICINE

## 2018-07-30 NOTE — PROGRESS NOTES
Subjective:      Patient ID: Rocky Garcia is a 58 y.o. male. HPI:  Romeo Freedman is being seen for his 6 month follow up appointment for afib and palps. No complaints. Rhythm stable. No exertional sx. No tachycardia. No palps. No syncope. No edema. No pnd or orthopnea. No chest pain/sob. Past Medical History:   Diagnosis Date    Allergic rhinitis due to other allergen     Atrial fibrillation ([address]     Cataract, right 11/30/2016    Hx of blood clots     Hypopotassemia     Other abnormal blood chemistry     Other and unspecified hyperlipidemia     Other pulmonary embolism and infarction     Screening PSA (prostate specific antigen) 12/5/2009    <0.1 ng/mL    Unspecified sleep apnea     Vitreous degeneration      Past Surgical History:   Procedure Laterality Date    ABLATION OF DYSRHYTHMIC FOCUS      2002    CATARACT REMOVAL Left 2017    COLONOSCOPY  06-    Bharathi Martin / Dr. Wendy Hickey    COLONOSCOPY  10/20/2014    recheck 4-5 yrs w ++ h/o adenomatous plyps        No Known Allergies     Social History     Social History    Marital status:      Spouse name: N/A    Number of children: N/A    Years of education: N/A     Occupational History    Not on file. Social History Main Topics    Smoking status: Never Smoker    Smokeless tobacco: Never Used    Alcohol use Yes      Comment: 4 drinks weekly    Drug use: No    Sexual activity: Yes     Partners: Female     Other Topics Concern    Not on file     Social History Narrative    No narrative on file        Patient has a family history includes Diabetes in his father and mother; Uterine Cancer in his mother. Patient  has a past medical history of Allergic rhinitis due to other allergen; Atrial fibrillation ([address] Cataract, right; Hx of blood clots; Hypopotassemia; Other abnormal blood chemistry; Other and unspecified hyperlipidemia;  Other pulmonary embolism and infarction; Screening PSA (prostate specific Negative for dizziness, syncope and light-headedness. Psychiatric/Behavioral: Negative for behavioral problems, confusion and agitation. Other systems reviewed negative as done. Objective:   Physical Exam   Constitutional: He is oriented to person, place, and time. He appears well-developed and well-nourished. No distress. HENT:   Head: Normocephalic and atraumatic. Eyes: EOM are normal. Right eye exhibits no discharge. Left eye exhibits no discharge. Neck: Normal range of motion. Neck supple. No JVD present. Cardiovascular: Normal rate, regular rhythm, S1 normal, S2 normal and normal heart sounds. Exam reveals no gallop. No murmur heard. Pulses:       Radial pulses are 2+ on the right side, and 2+ on the left side. Pulmonary/Chest: Effort normal and breath sounds normal. No respiratory distress. He has no wheezes. He has no rales. Abdominal: Soft. Bowel sounds are normal. He exhibits no distension. No tenderness. Musculoskeletal: Normal range of motion. He exhibits no edema. Neurological: He is alert and oriented to person, place, and time. Skin: Skin is warm and dry. Psychiatric: He has a normal mood and affect. His behavior is normal. Thought content normal.       Assessment:       Diagnosis Orders   1. Paroxysmal atrial fibrillation (HCC)     2. Palpitations     3. Pulmonary HTN               Plan:      CV stable. Rhythm stable. BP is up here but ok at home. I rechecked and it was 148/80. Watch at home. No complaints. . Encouraged diet, exercise and wt loss. No changes. Continue to monitor. Reviewed previous records and testing including echo 3/17. Follow up 6 months.

## 2018-08-07 ENCOUNTER — HOSPITAL ENCOUNTER (OUTPATIENT)
Dept: CT IMAGING | Age: 63
Discharge: HOME | End: 2018-08-07
Payer: COMMERCIAL

## 2018-08-07 ENCOUNTER — ANTI-COAG VISIT (OUTPATIENT)
Dept: INTERNAL MEDICINE | Age: 63
End: 2018-08-07

## 2018-08-07 DIAGNOSIS — I48.0 PAROXYSMAL ATRIAL FIBRILLATION (HCC): ICD-10-CM

## 2018-08-07 DIAGNOSIS — N28.89 RENAL MASS: ICD-10-CM

## 2018-08-07 LAB
BUN BLDV-MCNC: 22 MG/DL (ref 7–20)
CREAT SERPL-MCNC: 0.9 MG/DL (ref 0.8–1.3)
GFR AFRICAN AMERICAN: >60
GFR NON-AFRICAN AMERICAN: >60
INR BLD: 1.39 (ref 0.86–1.14)
PROTHROMBIN TIME: 15.9 SEC (ref 9.8–13)

## 2018-08-07 PROCEDURE — 74170 CT ABD WO CNTRST FLWD CNTRST: CPT

## 2018-08-07 PROCEDURE — 6360000004 HC RX CONTRAST MEDICATION: Performed by: UROLOGY

## 2018-08-07 RX ADMIN — IOHEXOL 50 ML: 240 INJECTION, SOLUTION INTRATHECAL; INTRAVASCULAR; INTRAVENOUS; ORAL at 07:54

## 2018-08-07 RX ADMIN — IOPAMIDOL 80 ML: 755 INJECTION, SOLUTION INTRAVENOUS at 07:54

## 2018-08-20 DIAGNOSIS — I48.0 PAROXYSMAL ATRIAL FIBRILLATION (HCC): ICD-10-CM

## 2018-08-20 LAB
INR BLD: 1.97 (ref 0.86–1.14)
PROTHROMBIN TIME: 22.5 SEC (ref 9.8–13)

## 2018-08-21 ENCOUNTER — ANTI-COAG VISIT (OUTPATIENT)
Dept: INTERNAL MEDICINE | Age: 63
End: 2018-08-21

## 2018-08-23 ENCOUNTER — TELEPHONE (OUTPATIENT)
Dept: INTERNAL MEDICINE | Age: 63
End: 2018-08-23

## 2018-09-12 ENCOUNTER — OFFICE VISIT (OUTPATIENT)
Dept: INTERNAL MEDICINE | Age: 63
End: 2018-09-12

## 2018-09-12 VITALS
SYSTOLIC BLOOD PRESSURE: 116 MMHG | WEIGHT: 246 LBS | BODY MASS INDEX: 30.59 KG/M2 | HEIGHT: 75 IN | DIASTOLIC BLOOD PRESSURE: 80 MMHG

## 2018-09-12 DIAGNOSIS — R73.9 HYPERGLYCEMIA: ICD-10-CM

## 2018-09-12 DIAGNOSIS — G43.909 MIGRAINE WITHOUT STATUS MIGRAINOSUS, NOT INTRACTABLE, UNSPECIFIED MIGRAINE TYPE: ICD-10-CM

## 2018-09-12 DIAGNOSIS — R25.1 TREMOR: ICD-10-CM

## 2018-09-12 DIAGNOSIS — I26.99 OTHER PULMONARY EMBOLISM WITHOUT ACUTE COR PULMONALE, UNSPECIFIED CHRONICITY (HCC): ICD-10-CM

## 2018-09-12 DIAGNOSIS — Z11.4 ENCOUNTER FOR SCREENING FOR HIV: ICD-10-CM

## 2018-09-12 DIAGNOSIS — Z00.00 WELL ADULT EXAM: Primary | ICD-10-CM

## 2018-09-12 DIAGNOSIS — Z12.5 PROSTATE CANCER SCREENING: ICD-10-CM

## 2018-09-12 DIAGNOSIS — N28.89 RENAL MASS: ICD-10-CM

## 2018-09-12 DIAGNOSIS — E78.5 HYPERLIPIDEMIA, UNSPECIFIED HYPERLIPIDEMIA TYPE: ICD-10-CM

## 2018-09-12 DIAGNOSIS — I48.0 PAROXYSMAL ATRIAL FIBRILLATION (HCC): ICD-10-CM

## 2018-09-12 DIAGNOSIS — Z11.59 ENCOUNTER FOR HEPATITIS C SCREENING TEST FOR LOW RISK PATIENT: ICD-10-CM

## 2018-09-12 PROBLEM — J01.00 ACUTE NON-RECURRENT MAXILLARY SINUSITIS: Status: RESOLVED | Noted: 2017-09-06 | Resolved: 2018-09-12

## 2018-09-12 PROCEDURE — 99396 PREV VISIT EST AGE 40-64: CPT | Performed by: INTERNAL MEDICINE

## 2018-09-12 ASSESSMENT — ENCOUNTER SYMPTOMS
ABDOMINAL PAIN: 0
CHEST TIGHTNESS: 0
SHORTNESS OF BREATH: 0
RESPIRATORY NEGATIVE: 1

## 2018-09-12 NOTE — PROGRESS NOTES
Subjective:      Patient ID: Sherrie Shaw is a 58 y.o. male. Chief Complaint   Patient presents with    Annual Exam     yearly/ labs needed    in for physical- still working on diet- seen by urology and found lesion in kidney and going for biopsy Friday-off coumadin for the procedure- bp's have been well controlled- taking cholesterol meds regularly and trying to watch lipids in diet-     Sherrie Shaw  1955    No Known Allergies  Current Outpatient Prescriptions   Medication Sig Dispense Refill    divalproex (DEPAKOTE) 250 MG DR tablet TAKE ONE TABLET BY MOUTH THREE TIMES A  tablet 2    metoprolol succinate (TOPROL XL) 50 MG extended release tablet TAKE ONE TABLET BY MOUTH EVERY DAY 90 tablet 3    flecainide (TAMBOCOR) 100 MG tablet TAKE ONE TABLET BY MOUTH TWICE A  tablet 3    TRICOR 145 MG tablet TAKE ONE TABLET BY MOUTH EVERY DAY 90 tablet 3    warfarin (COUMADIN) 5 MG tablet TAKE UP TO 2 TABLETS DAILY AS DIRECTED 180 tablet 3    warfarin (COUMADIN) 3 MG tablet TAKE UP TO TWO TABLETS BY MOUTH DAILY AS DIRECTED 180 tablet 3    warfarin (COUMADIN) 1 MG tablet TAKE ONE TABLET BY MOUTH TWICE A  tablet 3    methocarbamol (ROBAXIN) 500 MG tablet TAKE ONE TABLET BY MOUTH FOUR TIMES A DAY FOR 10 DAYS 40 tablet 3    rosuvastatin (CRESTOR) 40 MG tablet TAKE ONE TABLET BY MOUTH DAILY 90 tablet 3    SUMAtriptan (IMITREX) 100 MG tablet TAKE ONE TABLET ONCE AS NEEDED **MAY REPEAT IN 2 HOURS IF NEEDED. MAX OF 2 TABLETS PER 24 HOURS** 27 tablet 3    Terbinafine HCl (LAMISIL PO) Take by mouth Indications: Pt unaware of dose      Bimatoprost (LUMIGAN OP) Apply  to eye.  ALLEGRA 180 MG tablet Take one tablet(s) by mouth daily 90 tablet 1    timolol (TIMOPTIC) 0.25 % ophthalmic solution 1 drop 2 times daily. No current facility-administered medications for this visit.         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

## 2018-09-14 ENCOUNTER — HOSPITAL ENCOUNTER (OUTPATIENT)
Dept: GENERAL RADIOLOGY | Age: 63
Discharge: HOME | End: 2018-09-14
Payer: COMMERCIAL

## 2018-09-14 VITALS
HEART RATE: 52 BPM | DIASTOLIC BLOOD PRESSURE: 76 MMHG | RESPIRATION RATE: 16 BRPM | SYSTOLIC BLOOD PRESSURE: 134 MMHG | HEIGHT: 75 IN | TEMPERATURE: 97.9 F | OXYGEN SATURATION: 95 % | BODY MASS INDEX: 30.59 KG/M2 | WEIGHT: 246 LBS

## 2018-09-14 DIAGNOSIS — D41.01 HEMANGIOPERICYTOMA OF KIDNEY, RIGHT: ICD-10-CM

## 2018-09-14 LAB
APTT: 27.5 SEC (ref 26–36)
INR BLD: 0.99 (ref 0.86–1.14)
PLATELET # BLD: 226 K/UL (ref 135–450)
PROTHROMBIN TIME: 11.3 SEC (ref 9.8–13)

## 2018-09-14 PROCEDURE — 88342 IMHCHEM/IMCYTCHM 1ST ANTB: CPT

## 2018-09-14 PROCEDURE — 88305 TISSUE EXAM BY PATHOLOGIST: CPT

## 2018-09-14 PROCEDURE — 7100000010 HC PHASE II RECOVERY - FIRST 15 MIN

## 2018-09-14 PROCEDURE — 6370000000 HC RX 637 (ALT 250 FOR IP): Performed by: RADIOLOGY

## 2018-09-14 PROCEDURE — 7100000011 HC PHASE II RECOVERY - ADDTL 15 MIN

## 2018-09-14 PROCEDURE — 2709999900 CT BIOPSY RENAL

## 2018-09-14 PROCEDURE — 85610 PROTHROMBIN TIME: CPT

## 2018-09-14 PROCEDURE — 88341 IMHCHEM/IMCYTCHM EA ADD ANTB: CPT

## 2018-09-14 PROCEDURE — 36415 COLL VENOUS BLD VENIPUNCTURE: CPT

## 2018-09-14 PROCEDURE — 85730 THROMBOPLASTIN TIME PARTIAL: CPT

## 2018-09-14 PROCEDURE — 85049 AUTOMATED PLATELET COUNT: CPT

## 2018-09-14 RX ORDER — MIDAZOLAM HYDROCHLORIDE 1 MG/ML
1 INJECTION INTRAMUSCULAR; INTRAVENOUS ONCE
Status: DISCONTINUED | OUTPATIENT
Start: 2018-09-14 | End: 2018-09-15 | Stop reason: HOSPADM

## 2018-09-14 RX ORDER — ACETAMINOPHEN 325 MG/1
650 TABLET ORAL EVERY 4 HOURS PRN
Status: DISCONTINUED | OUTPATIENT
Start: 2018-09-14 | End: 2018-09-15 | Stop reason: HOSPADM

## 2018-09-14 RX ORDER — FENTANYL CITRATE 50 UG/ML
50 INJECTION, SOLUTION INTRAMUSCULAR; INTRAVENOUS EVERY 5 MIN PRN
Status: DISCONTINUED | OUTPATIENT
Start: 2018-09-14 | End: 2018-09-15 | Stop reason: HOSPADM

## 2018-09-14 RX ADMIN — ACETAMINOPHEN 650 MG: 325 TABLET ORAL at 14:11

## 2018-09-14 ASSESSMENT — PAIN SCALES - GENERAL
PAINLEVEL_OUTOF10: 0
PAINLEVEL_OUTOF10: 2
PAINLEVEL_OUTOF10: 2

## 2018-09-14 NOTE — PROGRESS NOTES
Arrived SDS 7 s/p right renal biopsy. Tegaderm drsg/gauze mid back dry & intact. SR up x 2, family at bedside. Diet given. Offers n/c of pain or discomfort. Call light w/i reach.
tolerated   [x]No heavy lifting or strenuous activity     [x]No driving today or as instructed by your doctor  []Other     WOUND/DRESSING INSTRUCTIONS:     Always clean your hands before and after caring for the wound. [x]May shower  tomorrow   [x]Do not submerge biopsy site in water :avoid tub baths, swimming pools, hot tubs for 1 week to prevent inection   []Keep dressing dry        [x]Remove dressing  tomorrow or as instructed by your doctor                []Other       MEDICATION INSTRUCTIONS:  [x]Resume medications as listed:   []Prescriptions sent with you. Use as directed. When taking pain medications, you may experience dizziness or drowsiness. Do not drink alcohol or drive when taking these medications. [x]You may take a non-prescription headache remedy, preferably one that does not contain aspirin. [x]Give the list of your medications to your primary care physician on your next visit. Keep your medication list updated and carry it withyou in case of emergencies. FOLLOW-UP CARE:  [x] Call your doctor for follow up appointment and results. Physician Dr Chela García       The above instructions were reviewed with patient/significant other. The following additional patient specific information was reviewed with the patient/significant other:  [x]Procedure/physician specific instructions  []Medication information sheet(s)   []Catheter associated blood stream infections  []Other     I have read and understand the instructions given to me:       ____________________________________________   (Patient/S.O. Signature)           Nurse Reg Jay R.N. Date/time 9/14/2018 1:58 PM     Radiology Department  [phone]   SAME DAY SERVICES:  [phone]        If you smoke STOP. We care about your health!

## 2018-09-24 RX ORDER — WARFARIN SODIUM 3 MG/1
TABLET ORAL
Qty: 180 TABLET | Refills: 2 | Status: ON HOLD | OUTPATIENT
Start: 2018-09-24 | End: 2018-12-06 | Stop reason: CLARIF

## 2018-11-08 RX ORDER — FLECAINIDE ACETATE 100 MG/1
TABLET ORAL
Qty: 180 TABLET | Refills: 2 | Status: SHIPPED | OUTPATIENT
Start: 2018-11-08 | End: 2019-08-27 | Stop reason: SDUPTHER

## 2018-11-08 RX ORDER — METOPROLOL SUCCINATE 50 MG/1
TABLET, EXTENDED RELEASE ORAL
Qty: 90 TABLET | Refills: 2 | Status: SHIPPED | OUTPATIENT
Start: 2018-11-08 | End: 2019-06-05 | Stop reason: SDUPTHER

## 2018-11-13 ENCOUNTER — ANTI-COAG VISIT (OUTPATIENT)
Dept: INTERNAL MEDICINE CLINIC | Age: 63
End: 2018-11-13

## 2018-11-21 ENCOUNTER — OFFICE VISIT (OUTPATIENT)
Dept: INTERNAL MEDICINE CLINIC | Age: 63
End: 2018-11-21
Payer: COMMERCIAL

## 2018-11-21 VITALS
DIASTOLIC BLOOD PRESSURE: 76 MMHG | WEIGHT: 253 LBS | SYSTOLIC BLOOD PRESSURE: 138 MMHG | HEIGHT: 75 IN | OXYGEN SATURATION: 96 % | BODY MASS INDEX: 31.46 KG/M2 | HEART RATE: 67 BPM | RESPIRATION RATE: 14 BRPM

## 2018-11-21 DIAGNOSIS — E78.5 HYPERLIPIDEMIA, UNSPECIFIED HYPERLIPIDEMIA TYPE: ICD-10-CM

## 2018-11-21 DIAGNOSIS — I26.99 OTHER PULMONARY EMBOLISM WITHOUT ACUTE COR PULMONALE, UNSPECIFIED CHRONICITY (HCC): ICD-10-CM

## 2018-11-21 DIAGNOSIS — M54.41 CHRONIC RIGHT-SIDED LOW BACK PAIN WITH RIGHT-SIDED SCIATICA: ICD-10-CM

## 2018-11-21 DIAGNOSIS — G89.29 CHRONIC RIGHT-SIDED LOW BACK PAIN WITH RIGHT-SIDED SCIATICA: ICD-10-CM

## 2018-11-21 DIAGNOSIS — I48.0 PAROXYSMAL ATRIAL FIBRILLATION (HCC): ICD-10-CM

## 2018-11-21 DIAGNOSIS — R73.9 HYPERGLYCEMIA: ICD-10-CM

## 2018-11-21 DIAGNOSIS — N28.89 RENAL MASS: ICD-10-CM

## 2018-11-21 PROCEDURE — 99244 OFF/OP CNSLTJ NEW/EST MOD 40: CPT | Performed by: INTERNAL MEDICINE

## 2018-11-21 ASSESSMENT — PATIENT HEALTH QUESTIONNAIRE - PHQ9
SUM OF ALL RESPONSES TO PHQ QUESTIONS 1-9: 0
SUM OF ALL RESPONSES TO PHQ9 QUESTIONS 1 & 2: 0
2. FEELING DOWN, DEPRESSED OR HOPELESS: 0
1. LITTLE INTEREST OR PLEASURE IN DOING THINGS: 0
SUM OF ALL RESPONSES TO PHQ QUESTIONS 1-9: 0

## 2018-11-21 ASSESSMENT — ENCOUNTER SYMPTOMS
SHORTNESS OF BREATH: 0
ABDOMINAL PAIN: 0
RESPIRATORY NEGATIVE: 1
CHEST TIGHTNESS: 0
BACK PAIN: 1
DIARRHEA: 0

## 2018-11-21 NOTE — COMMUNICATION BODY
0851   BP: 138/76   Site: Left Upper Arm   Position: Sitting   Cuff Size: Large Adult   Pulse: 67   Resp: 14   SpO2: 96%   Weight: 253 lb (114.8 kg)   Height: 6' 3\" (1.905 m)     Body mass index is 31.62 kg/m². Wt Readings from Last 3 Encounters:   11/21/18 253 lb (114.8 kg)   09/14/18 246 lb (111.6 kg)   09/12/18 246 lb (111.6 kg)     BP Readings from Last 3 Encounters:   11/21/18 138/76   09/14/18 134/76   09/12/18 116/80         Immunization History   Administered Date(s) Administered    DTaP 02/29/2008    Hepatitis A 02/29/2008    Influenza Virus Vaccine 09/02/2012, 10/05/2017, 10/01/2018    Tdap (Boostrix, Adacel) 06/06/2014    Zoster Live (Zostavax) 06/06/2014       Past Medical History:   Diagnosis Date    Allergic rhinitis due to other allergen     [facility] [address] [address]     Cataract, right 11/30/2016    Hx of blood clots     Hypopotassemia     Other abnormal blood chemistry     Other and unspecified hyperlipidemia     Other pulmonary embolism and infarction     Screening PSA (prostate specific antigen) 12/5/2009    <0.1 ng/mL    Unspecified sleep apnea     Vitreous degeneration      Past Surgical History:   Procedure Laterality Date    ABLATION OF DYSRHYTHMIC FOCUS      2002    CATARACT REMOVAL Left 2017    COLONOSCOPY  06-    Tristen Postal / Dr. Leila Nicolas    COLONOSCOPY  10/20/2014    recheck 4-5 yrs w ++ h/o adenomatous plyps      Family History   Problem Relation Age of Onset    Diabetes Father     Diabetes Mother     Uterine Cancer Mother      Social History     Social History    Marital status:      Spouse name: N/A    Number of children: N/A    Years of education: N/A     Occupational History    Not on file.      Social History Main Topics    Smoking status: Never Smoker    Smokeless tobacco: Never Used    Alcohol use Yes      Comment: 4 drinks weekly    Drug use: No    Sexual activity: Yes     Partners: Female     Other Topics Concern    Not on file     Social History Narrative    No narrative on file             Review of Systems   Constitutional: Negative for appetite change and fatigue. HENT: Negative. Respiratory: Negative. Negative for chest tightness and shortness of breath. Cardiovascular: Negative for chest pain and palpitations. Gastrointestinal: Negative for abdominal pain and diarrhea. Genitourinary: Negative. Musculoskeletal: Positive for arthralgias and back pain. Skin: Negative. Neurological: Positive for headaches. Negative for weakness. Psychiatric/Behavioral: Negative for dysphoric mood and sleep disturbance. The patient is not nervous/anxious. Objective:   Physical Exam   Constitutional: He is oriented to person, place, and time. He appears well-developed and well-nourished. HENT:   Head: Atraumatic. Mouth/Throat: Oropharynx is clear and moist. No oropharyngeal exudate. Eyes: Pupils are equal, round, and reactive to light. Conjunctivae and EOM are normal.   Neck: Normal range of motion. Neck supple. No thyromegaly present. Cardiovascular: Normal rate, regular rhythm and normal heart sounds. No murmur heard. Pulmonary/Chest: Effort normal and breath sounds normal. No respiratory distress. Abdominal: He exhibits no distension. There is no tenderness. Musculoskeletal: He exhibits tenderness. He exhibits no edema. ++ lbp w hyperextension and decreased rom, some pt tenderness along para lumbar spine     Lymphadenopathy:     He has no cervical adenopathy. Neurological: He is alert and oriented to person, place, and time. He has normal reflexes. No cranial nerve deficit. Skin: Skin is warm and dry. Psychiatric: He has a normal mood and affect. His behavior is normal. Judgment and thought content normal.         Assessment and Plan:      Renal mass  For excision. Pulmonary embolism ([address]  Needs to find out from cardiology if bridging neccesCidra for surgery.

## 2018-11-21 NOTE — LETTER
Problem Relation Age of Onset    Diabetes Father     Diabetes Mother     Uterine Cancer Mother      Social History     Social History    Marital status:      Spouse name: N/A    Number of children: N/A    Years of education: N/A     Occupational History    Not on file. Social History Main Topics    Smoking status: Never Smoker    Smokeless tobacco: Never Used    Alcohol use Yes      Comment: 4 drinks weekly    Drug use: No    Sexual activity: Yes     Partners: Female     Other Topics Concern    Not on file     Social History Narrative    No narrative on file             Review of Systems   Constitutional: Negative for appetite change and fatigue. HENT: Negative. Respiratory: Negative. Negative for chest tightness and shortness of breath. Cardiovascular: Negative for chest pain and palpitations. Gastrointestinal: Negative for abdominal pain and diarrhea. Genitourinary: Negative. Musculoskeletal: Positive for arthralgias and back pain. Skin: Negative. Neurological: Positive for headaches. Negative for weakness. Psychiatric/Behavioral: Negative for dysphoric mood and sleep disturbance. The patient is not nervous/anxious. Objective:   Physical Exam   Constitutional: He is oriented to person, place, and time. He appears well-developed and well-nourished. HENT:   Head: Atraumatic. Mouth/Throat: Oropharynx is clear and moist. No oropharyngeal exudate. Eyes: Pupils are equal, round, and reactive to light. Conjunctivae and EOM are normal.   Neck: Normal range of motion. Neck supple. No thyromegaly present. Cardiovascular: Normal rate, regular rhythm and normal heart sounds. No murmur heard. Pulmonary/Chest: Effort normal and breath sounds normal. No respiratory distress. Abdominal: He exhibits no distension. There is no tenderness. Musculoskeletal: He exhibits tenderness. He exhibits no edema.

## 2018-11-26 RX ORDER — FENOFIBRATE 145 MG/1
TABLET, COATED ORAL
Qty: 90 TABLET | Refills: 2 | Status: SHIPPED | OUTPATIENT
Start: 2018-11-26 | End: 2019-06-05 | Stop reason: SDUPTHER

## 2018-11-27 ENCOUNTER — OFFICE VISIT (OUTPATIENT)
Dept: CARDIOLOGY CLINIC | Age: 63
End: 2018-11-27
Payer: COMMERCIAL

## 2018-11-27 VITALS
BODY MASS INDEX: 31.08 KG/M2 | RESPIRATION RATE: 16 BRPM | SYSTOLIC BLOOD PRESSURE: 135 MMHG | WEIGHT: 250 LBS | HEIGHT: 75 IN | OXYGEN SATURATION: 96 % | HEART RATE: 64 BPM | DIASTOLIC BLOOD PRESSURE: 85 MMHG

## 2018-11-27 DIAGNOSIS — I48.0 PAROXYSMAL ATRIAL FIBRILLATION (HCC): ICD-10-CM

## 2018-11-27 DIAGNOSIS — I27.20 PULMONARY HTN (HCC): ICD-10-CM

## 2018-11-27 DIAGNOSIS — R00.2 PALPITATIONS: ICD-10-CM

## 2018-11-27 DIAGNOSIS — Z01.818 PRE-OP EVALUATION: Primary | ICD-10-CM

## 2018-11-27 PROCEDURE — 99214 OFFICE O/P EST MOD 30 MIN: CPT | Performed by: INTERNAL MEDICINE

## 2018-11-27 PROCEDURE — 93000 ELECTROCARDIOGRAM COMPLETE: CPT | Performed by: INTERNAL MEDICINE

## 2018-11-27 NOTE — PROGRESS NOTES
Subjective:      Patient ID: Gail Ken is a 58 y.o. male. HPI:  Maryann Reveles is being seen for his 6 month follow up appointment for afib and palps. No complaints. Rhythm stable. No exertional sx. No tachycardia. No palps. No syncope. No edema. No pnd or orthopnea. No chest pain/sob. Past Medical History:   Diagnosis Date    Allergic rhinitis due to other allergen     Atrial fibrillation ([address]     Cataract, right 11/30/2016    Hx of blood clots     Hypopotassemia     Other abnormal blood chemistry     Other and unspecified hyperlipidemia     Other pulmonary embolism and infarction     Screening PSA (prostate specific antigen) 12/5/2009    <0.1 ng/mL    Unspecified sleep apnea     Vitreous degeneration      Past Surgical History:   Procedure Laterality Date    ABLATION OF DYSRHYTHMIC FOCUS      2002    CATARACT REMOVAL Left 2017    COLONOSCOPY  06-    Mayra Canales / Dr. Ann Hong    COLONOSCOPY  10/20/2014    recheck 4-5 yrs w ++ h/o adenomatous plyps        No Known Allergies     Social History     Social History    Marital status:      Spouse name: N/A    Number of children: N/A    Years of education: N/A     Occupational History    Not on file. Social History Main Topics    Smoking status: Never Smoker    Smokeless tobacco: Never Used    Alcohol use Yes      Comment: 4 drinks weekly    Drug use: No    Sexual activity: Yes     Partners: Female     Other Topics Concern    Not on file     Social History Narrative    No narrative on file        Patient has a family history includes Diabetes in his father and mother; Uterine Cancer in his mother. Patient  has a past medical history of Allergic rhinitis due to other allergen; Atrial fibrillation ([address] Cataract, right; Hx of blood clots; Hypopotassemia; Other abnormal blood chemistry; Other and unspecified hyperlipidemia;  Other pulmonary embolism and infarction; Screening PSA (prostate specific antigen); Unspecified sleep apnea; and Vitreous degeneration. Current Outpatient Prescriptions   Medication Sig Dispense Refill    fenofibrate (TRICOR) 145 MG tablet TAKE ONE TABLET BY MOUTH DAILY 90 tablet 2    flecainide (TAMBOCOR) 100 MG tablet TAKE ONE TABLET BY MOUTH TWICE A  tablet 2    metoprolol succinate (TOPROL XL) 50 MG extended release tablet TAKE ONE TABLET BY MOUTH DAILY 90 tablet 2    rosuvastatin (CRESTOR) 40 MG tablet TAKE ONE TABLET BY MOUTH DAILY 90 tablet 3    warfarin (COUMADIN) 3 MG tablet TAKE UP TO TWO TABLETS BY MOUTH DAILY AS DIRECTED 180 tablet 2    divalproex (DEPAKOTE) 250 MG DR tablet TAKE ONE TABLET BY MOUTH THREE TIMES A  tablet 2    warfarin (COUMADIN) 5 MG tablet TAKE UP TO 2 TABLETS DAILY AS DIRECTED 180 tablet 3    warfarin (COUMADIN) 1 MG tablet TAKE ONE TABLET BY MOUTH TWICE A  tablet 3    methocarbamol (ROBAXIN) 500 MG tablet TAKE ONE TABLET BY MOUTH FOUR TIMES A DAY FOR 10 DAYS 40 tablet 3    SUMAtriptan (IMITREX) 100 MG tablet TAKE ONE TABLET ONCE AS NEEDED **MAY REPEAT IN 2 HOURS IF NEEDED. MAX OF 2 TABLETS PER 24 HOURS** 27 tablet 3    Terbinafine HCl (LAMISIL PO) Take by mouth Indications: Pt unaware of dose      Bimatoprost (LUMIGAN OP) Apply  to eye.  ALLEGRA 180 MG tablet Take one tablet(s) by mouth daily 90 tablet 1    timolol (TIMOPTIC) 0.25 % ophthalmic solution 1 drop 2 times daily. No current facility-administered medications for this visit. Vitals:    11/27/18 1129   BP: 135/85   Pulse: 64   Resp: 16   SpO2: 96%     Wt 255      Review of Systems   Constitutional: Negative for activity change and fatigue. Respiratory: Negative for apnea, cough, choking, chest tightness and shortness of breath. Cardiovascular: Negative for chest pain, palpitations and leg swelling. [No PND or orthopnea. No tachycardia. Gastrointestinal: Negative for abdominal distention.    Musculoskeletal: Negative for

## 2018-12-05 ENCOUNTER — ANESTHESIA EVENT (OUTPATIENT)
Dept: OPERATING ROOM | Age: 63
DRG: 658 | End: 2018-12-05
Payer: COMMERCIAL

## 2018-12-06 ENCOUNTER — HOSPITAL ENCOUNTER (INPATIENT)
Age: 63
LOS: 3 days | Discharge: HOME | DRG: 658 | End: 2018-12-09
Attending: UROLOGY | Admitting: UROLOGY
Payer: COMMERCIAL

## 2018-12-06 ENCOUNTER — APPOINTMENT (OUTPATIENT)
Dept: GENERAL RADIOLOGY | Age: 63
DRG: 658 | End: 2018-12-06
Attending: UROLOGY
Payer: COMMERCIAL

## 2018-12-06 ENCOUNTER — ANESTHESIA (OUTPATIENT)
Dept: OPERATING ROOM | Age: 63
DRG: 658 | End: 2018-12-06
Payer: COMMERCIAL

## 2018-12-06 VITALS — TEMPERATURE: 96.1 F | DIASTOLIC BLOOD PRESSURE: 61 MMHG | SYSTOLIC BLOOD PRESSURE: 121 MMHG | OXYGEN SATURATION: 100 %

## 2018-12-06 DIAGNOSIS — G89.18 POST-OPERATIVE PAIN: Primary | ICD-10-CM

## 2018-12-06 PROBLEM — C64.1 RENAL CELL CARCINOMA OF RIGHT KIDNEY (HCC): Status: ACTIVE | Noted: 2018-12-06

## 2018-12-06 LAB
ABO/RH: NORMAL
ANTIBODY SCREEN: NORMAL
HCT VFR BLD CALC: 41.1 % (ref 40.5–52.5)
HCT VFR BLD CALC: 41.8 % (ref 40.5–52.5)
HEMOGLOBIN: 13.6 G/DL (ref 13.5–17.5)
HEMOGLOBIN: 13.7 G/DL (ref 13.5–17.5)
INR BLD: 0.93 (ref 0.86–1.14)
PROTHROMBIN TIME: 10.6 SEC (ref 9.8–13)

## 2018-12-06 PROCEDURE — 88307 TISSUE EXAM BY PATHOLOGIST: CPT

## 2018-12-06 PROCEDURE — 2580000003 HC RX 258: Performed by: ANESTHESIOLOGY

## 2018-12-06 PROCEDURE — 6370000000 HC RX 637 (ALT 250 FOR IP)

## 2018-12-06 PROCEDURE — 74019 RADEX ABDOMEN 2 VIEWS: CPT

## 2018-12-06 PROCEDURE — 85014 HEMATOCRIT: CPT

## 2018-12-06 PROCEDURE — 94150 VITAL CAPACITY TEST: CPT

## 2018-12-06 PROCEDURE — 6360000002 HC RX W HCPCS: Performed by: NURSE ANESTHETIST, CERTIFIED REGISTERED

## 2018-12-06 PROCEDURE — 2500000003 HC RX 250 WO HCPCS: Performed by: NURSE ANESTHETIST, CERTIFIED REGISTERED

## 2018-12-06 PROCEDURE — 6370000000 HC RX 637 (ALT 250 FOR IP): Performed by: UROLOGY

## 2018-12-06 PROCEDURE — 6360000002 HC RX W HCPCS: Performed by: UROLOGY

## 2018-12-06 PROCEDURE — 94760 N-INVAS EAR/PLS OXIMETRY 1: CPT

## 2018-12-06 PROCEDURE — 2580000003 HC RX 258: Performed by: UROLOGY

## 2018-12-06 PROCEDURE — 6360000002 HC RX W HCPCS: Performed by: ANESTHESIOLOGY

## 2018-12-06 PROCEDURE — 3600000004 HC SURGERY LEVEL 4 BASE: Performed by: UROLOGY

## 2018-12-06 PROCEDURE — 36415 COLL VENOUS BLD VENIPUNCTURE: CPT

## 2018-12-06 PROCEDURE — 86850 RBC ANTIBODY SCREEN: CPT

## 2018-12-06 PROCEDURE — C9290 INJ, BUPIVACAINE LIPOSOME: HCPCS | Performed by: UROLOGY

## 2018-12-06 PROCEDURE — 88341 IMHCHEM/IMCYTCHM EA ADD ANTB: CPT

## 2018-12-06 PROCEDURE — 3600000014 HC SURGERY LEVEL 4 ADDTL 15MIN: Performed by: UROLOGY

## 2018-12-06 PROCEDURE — 3700000001 HC ADD 15 MINUTES (ANESTHESIA): Performed by: UROLOGY

## 2018-12-06 PROCEDURE — 86901 BLOOD TYPING SEROLOGIC RH(D): CPT

## 2018-12-06 PROCEDURE — 88342 IMHCHEM/IMCYTCHM 1ST ANTB: CPT

## 2018-12-06 PROCEDURE — 85018 HEMOGLOBIN: CPT

## 2018-12-06 PROCEDURE — 2500000003 HC RX 250 WO HCPCS: Performed by: UROLOGY

## 2018-12-06 PROCEDURE — 0TB00ZZ EXCISION OF RIGHT KIDNEY, OPEN APPROACH: ICD-10-PCS | Performed by: UROLOGY

## 2018-12-06 PROCEDURE — 7100000001 HC PACU RECOVERY - ADDTL 15 MIN: Performed by: UROLOGY

## 2018-12-06 PROCEDURE — 94664 DEMO&/EVAL PT USE INHALER: CPT

## 2018-12-06 PROCEDURE — 86900 BLOOD TYPING SEROLOGIC ABO: CPT

## 2018-12-06 PROCEDURE — 1200000000 HC SEMI PRIVATE

## 2018-12-06 PROCEDURE — 85610 PROTHROMBIN TIME: CPT

## 2018-12-06 PROCEDURE — 7100000000 HC PACU RECOVERY - FIRST 15 MIN: Performed by: UROLOGY

## 2018-12-06 PROCEDURE — 2709999900 HC NON-CHARGEABLE SUPPLY: Performed by: UROLOGY

## 2018-12-06 PROCEDURE — 3700000000 HC ANESTHESIA ATTENDED CARE: Performed by: UROLOGY

## 2018-12-06 RX ORDER — SODIUM CHLORIDE, SODIUM LACTATE, POTASSIUM CHLORIDE, CALCIUM CHLORIDE 600; 310; 30; 20 MG/100ML; MG/100ML; MG/100ML; MG/100ML
INJECTION, SOLUTION INTRAVENOUS CONTINUOUS
Status: DISCONTINUED | OUTPATIENT
Start: 2018-12-06 | End: 2018-12-06

## 2018-12-06 RX ORDER — DEXTROSE, SODIUM CHLORIDE, AND POTASSIUM CHLORIDE 5; .45; .15 G/100ML; G/100ML; G/100ML
INJECTION INTRAVENOUS CONTINUOUS
Status: DISCONTINUED | OUTPATIENT
Start: 2018-12-06 | End: 2018-12-07

## 2018-12-06 RX ORDER — FEXOFENADINE HCL 180 MG/1
180 TABLET ORAL DAILY PRN
COMMUNITY

## 2018-12-06 RX ORDER — SUCCINYLCHOLINE/SOD CL,ISO/PF 100 MG/5ML
SYRINGE (ML) INTRAVENOUS PRN
Status: DISCONTINUED | OUTPATIENT
Start: 2018-12-06 | End: 2018-12-06 | Stop reason: SDUPTHER

## 2018-12-06 RX ORDER — PROPOFOL 10 MG/ML
INJECTION, EMULSION INTRAVENOUS PRN
Status: DISCONTINUED | OUTPATIENT
Start: 2018-12-06 | End: 2018-12-06 | Stop reason: SDUPTHER

## 2018-12-06 RX ORDER — ONDANSETRON 2 MG/ML
4 INJECTION INTRAMUSCULAR; INTRAVENOUS
Status: DISCONTINUED | OUTPATIENT
Start: 2018-12-06 | End: 2018-12-06 | Stop reason: HOSPADM

## 2018-12-06 RX ORDER — WARFARIN SODIUM 5 MG/1
6 TABLET ORAL SEE ADMIN INSTRUCTIONS
COMMUNITY
End: 2020-08-26

## 2018-12-06 RX ORDER — ONDANSETRON 2 MG/ML
4 INJECTION INTRAMUSCULAR; INTRAVENOUS EVERY 6 HOURS PRN
Status: DISCONTINUED | OUTPATIENT
Start: 2018-12-06 | End: 2018-12-09 | Stop reason: HOSPADM

## 2018-12-06 RX ORDER — METHOCARBAMOL 500 MG/1
500 TABLET, FILM COATED ORAL 4 TIMES DAILY PRN
COMMUNITY
End: 2019-12-31 | Stop reason: SDUPTHER

## 2018-12-06 RX ORDER — METOPROLOL SUCCINATE 50 MG/1
50 TABLET, EXTENDED RELEASE ORAL DAILY
Status: DISCONTINUED | OUTPATIENT
Start: 2018-12-06 | End: 2018-12-09 | Stop reason: HOSPADM

## 2018-12-06 RX ORDER — GLIMEPIRIDE 2 MG/1
1 TABLET ORAL DAILY
Status: DISCONTINUED | OUTPATIENT
Start: 2018-12-07 | End: 2018-12-09 | Stop reason: HOSPADM

## 2018-12-06 RX ORDER — FENTANYL CITRATE 50 UG/ML
25 INJECTION, SOLUTION INTRAMUSCULAR; INTRAVENOUS EVERY 5 MIN PRN
Status: DISCONTINUED | OUTPATIENT
Start: 2018-12-06 | End: 2018-12-06 | Stop reason: HOSPADM

## 2018-12-06 RX ORDER — DOCUSATE SODIUM 100 MG/1
100 CAPSULE, LIQUID FILLED ORAL 2 TIMES DAILY
Status: DISCONTINUED | OUTPATIENT
Start: 2018-12-06 | End: 2018-12-09 | Stop reason: HOSPADM

## 2018-12-06 RX ORDER — SODIUM CHLORIDE 0.9 % (FLUSH) 0.9 %
10 SYRINGE (ML) INJECTION EVERY 12 HOURS SCHEDULED
Status: DISCONTINUED | OUTPATIENT
Start: 2018-12-06 | End: 2018-12-06 | Stop reason: HOSPADM

## 2018-12-06 RX ORDER — MIDAZOLAM HYDROCHLORIDE 1 MG/ML
INJECTION INTRAMUSCULAR; INTRAVENOUS PRN
Status: DISCONTINUED | OUTPATIENT
Start: 2018-12-06 | End: 2018-12-06 | Stop reason: SDUPTHER

## 2018-12-06 RX ORDER — HYDROCODONE BITARTRATE AND ACETAMINOPHEN 5; 325 MG/1; MG/1
2 TABLET ORAL EVERY 4 HOURS PRN
Status: DISCONTINUED | OUTPATIENT
Start: 2018-12-06 | End: 2018-12-09 | Stop reason: HOSPADM

## 2018-12-06 RX ORDER — CETIRIZINE HYDROCHLORIDE 10 MG/1
10 TABLET ORAL DAILY
Status: DISCONTINUED | OUTPATIENT
Start: 2018-12-06 | End: 2018-12-09 | Stop reason: HOSPADM

## 2018-12-06 RX ORDER — ONDANSETRON 2 MG/ML
INJECTION INTRAMUSCULAR; INTRAVENOUS PRN
Status: DISCONTINUED | OUTPATIENT
Start: 2018-12-06 | End: 2018-12-06 | Stop reason: SDUPTHER

## 2018-12-06 RX ORDER — SODIUM CHLORIDE 0.9 % (FLUSH) 0.9 %
10 SYRINGE (ML) INJECTION PRN
Status: DISCONTINUED | OUTPATIENT
Start: 2018-12-06 | End: 2018-12-09 | Stop reason: HOSPADM

## 2018-12-06 RX ORDER — ROCURONIUM BROMIDE 10 MG/ML
INJECTION, SOLUTION INTRAVENOUS PRN
Status: DISCONTINUED | OUTPATIENT
Start: 2018-12-06 | End: 2018-12-06 | Stop reason: SDUPTHER

## 2018-12-06 RX ORDER — SODIUM CHLORIDE 0.9 % (FLUSH) 0.9 %
10 SYRINGE (ML) INJECTION EVERY 12 HOURS SCHEDULED
Status: DISCONTINUED | OUTPATIENT
Start: 2018-12-06 | End: 2018-12-09 | Stop reason: HOSPADM

## 2018-12-06 RX ORDER — LIDOCAINE HYDROCHLORIDE 20 MG/ML
INJECTION, SOLUTION INFILTRATION; PERINEURAL PRN
Status: DISCONTINUED | OUTPATIENT
Start: 2018-12-06 | End: 2018-12-06 | Stop reason: SDUPTHER

## 2018-12-06 RX ORDER — CEFAZOLIN SODIUM 2 G/50ML
2 SOLUTION INTRAVENOUS EVERY 8 HOURS
Status: COMPLETED | OUTPATIENT
Start: 2018-12-06 | End: 2018-12-07

## 2018-12-06 RX ORDER — MAGNESIUM HYDROXIDE 1200 MG/15ML
LIQUID ORAL CONTINUOUS PRN
Status: COMPLETED | OUTPATIENT
Start: 2018-12-06 | End: 2018-12-06

## 2018-12-06 RX ORDER — HYDROCODONE BITARTRATE AND ACETAMINOPHEN 5; 325 MG/1; MG/1
1 TABLET ORAL EVERY 4 HOURS PRN
Status: DISCONTINUED | OUTPATIENT
Start: 2018-12-06 | End: 2018-12-09 | Stop reason: HOSPADM

## 2018-12-06 RX ORDER — CEFAZOLIN SODIUM 2 G/50ML
2 SOLUTION INTRAVENOUS ONCE
Status: COMPLETED | OUTPATIENT
Start: 2018-12-06 | End: 2018-12-06

## 2018-12-06 RX ORDER — DIVALPROEX SODIUM 250 MG/1
250 TABLET, DELAYED RELEASE ORAL EVERY 12 HOURS SCHEDULED
Status: DISCONTINUED | OUTPATIENT
Start: 2018-12-06 | End: 2018-12-09 | Stop reason: HOSPADM

## 2018-12-06 RX ORDER — ACETAMINOPHEN 325 MG/1
650 TABLET ORAL EVERY 4 HOURS PRN
Status: DISCONTINUED | OUTPATIENT
Start: 2018-12-06 | End: 2018-12-06

## 2018-12-06 RX ORDER — HYDRALAZINE HYDROCHLORIDE 20 MG/ML
5 INJECTION INTRAMUSCULAR; INTRAVENOUS EVERY 10 MIN PRN
Status: DISCONTINUED | OUTPATIENT
Start: 2018-12-06 | End: 2018-12-06 | Stop reason: HOSPADM

## 2018-12-06 RX ORDER — BISACODYL 10 MG
10 SUPPOSITORY, RECTAL RECTAL DAILY PRN
Status: DISCONTINUED | OUTPATIENT
Start: 2018-12-06 | End: 2018-12-09 | Stop reason: HOSPADM

## 2018-12-06 RX ORDER — EPHEDRINE SULFATE 50 MG/ML
INJECTION INTRAVENOUS PRN
Status: DISCONTINUED | OUTPATIENT
Start: 2018-12-06 | End: 2018-12-06 | Stop reason: SDUPTHER

## 2018-12-06 RX ORDER — FIBRINOGEN HUMAN AND THROMBIN HUMAN 90; 500 [IU]/ML; [IU]/ML
SOLUTION TOPICAL PRN
Status: DISCONTINUED | OUTPATIENT
Start: 2018-12-06 | End: 2018-12-06 | Stop reason: HOSPADM

## 2018-12-06 RX ORDER — DEXAMETHASONE SODIUM PHOSPHATE 4 MG/ML
INJECTION, SOLUTION INTRA-ARTICULAR; INTRALESIONAL; INTRAMUSCULAR; INTRAVENOUS; SOFT TISSUE PRN
Status: DISCONTINUED | OUTPATIENT
Start: 2018-12-06 | End: 2018-12-06 | Stop reason: SDUPTHER

## 2018-12-06 RX ORDER — PROMETHAZINE HYDROCHLORIDE 25 MG/ML
6.25 INJECTION, SOLUTION INTRAMUSCULAR; INTRAVENOUS
Status: DISCONTINUED | OUTPATIENT
Start: 2018-12-06 | End: 2018-12-06 | Stop reason: HOSPADM

## 2018-12-06 RX ORDER — LABETALOL HYDROCHLORIDE 5 MG/ML
5 INJECTION, SOLUTION INTRAVENOUS EVERY 10 MIN PRN
Status: DISCONTINUED | OUTPATIENT
Start: 2018-12-06 | End: 2018-12-06 | Stop reason: HOSPADM

## 2018-12-06 RX ORDER — KETOROLAC TROMETHAMINE 15 MG/ML
15 INJECTION, SOLUTION INTRAMUSCULAR; INTRAVENOUS EVERY 6 HOURS PRN
Status: DISCONTINUED | OUTPATIENT
Start: 2018-12-06 | End: 2018-12-08

## 2018-12-06 RX ORDER — FLECAINIDE ACETATE 100 MG/1
100 TABLET ORAL 2 TIMES DAILY
Status: DISCONTINUED | OUTPATIENT
Start: 2018-12-06 | End: 2018-12-09 | Stop reason: HOSPADM

## 2018-12-06 RX ORDER — LATANOPROST 50 UG/ML
1 SOLUTION/ DROPS OPHTHALMIC NIGHTLY
Status: DISCONTINUED | OUTPATIENT
Start: 2018-12-06 | End: 2018-12-09 | Stop reason: HOSPADM

## 2018-12-06 RX ORDER — SODIUM CHLORIDE 0.9 % (FLUSH) 0.9 %
10 SYRINGE (ML) INJECTION PRN
Status: DISCONTINUED | OUTPATIENT
Start: 2018-12-06 | End: 2018-12-06 | Stop reason: HOSPADM

## 2018-12-06 RX ORDER — MORPHINE SULFATE 2 MG/ML
2 INJECTION, SOLUTION INTRAMUSCULAR; INTRAVENOUS
Status: DISCONTINUED | OUTPATIENT
Start: 2018-12-06 | End: 2018-12-07

## 2018-12-06 RX ORDER — HYDROMORPHONE HCL 110MG/55ML
PATIENT CONTROLLED ANALGESIA SYRINGE INTRAVENOUS PRN
Status: DISCONTINUED | OUTPATIENT
Start: 2018-12-06 | End: 2018-12-06 | Stop reason: SDUPTHER

## 2018-12-06 RX ORDER — LIDOCAINE HYDROCHLORIDE 10 MG/ML
1 INJECTION, SOLUTION EPIDURAL; INFILTRATION; INTRACAUDAL; PERINEURAL
Status: DISCONTINUED | OUTPATIENT
Start: 2018-12-06 | End: 2018-12-06 | Stop reason: HOSPADM

## 2018-12-06 RX ORDER — FENTANYL CITRATE 50 UG/ML
INJECTION, SOLUTION INTRAMUSCULAR; INTRAVENOUS PRN
Status: DISCONTINUED | OUTPATIENT
Start: 2018-12-06 | End: 2018-12-06 | Stop reason: SDUPTHER

## 2018-12-06 RX ORDER — MEPERIDINE HYDROCHLORIDE 25 MG/ML
12.5 INJECTION INTRAMUSCULAR; INTRAVENOUS; SUBCUTANEOUS EVERY 5 MIN PRN
Status: DISCONTINUED | OUTPATIENT
Start: 2018-12-06 | End: 2018-12-06 | Stop reason: HOSPADM

## 2018-12-06 RX ORDER — ROSUVASTATIN CALCIUM 40 MG/1
1 TABLET, COATED ORAL DAILY
Status: DISCONTINUED | OUTPATIENT
Start: 2018-12-06 | End: 2018-12-06 | Stop reason: CLARIF

## 2018-12-06 RX ORDER — ATORVASTATIN CALCIUM 80 MG/1
80 TABLET, FILM COATED ORAL NIGHTLY
Status: DISCONTINUED | OUTPATIENT
Start: 2018-12-06 | End: 2018-12-09 | Stop reason: HOSPADM

## 2018-12-06 RX ORDER — HYDRALAZINE HYDROCHLORIDE 20 MG/ML
10 INJECTION INTRAMUSCULAR; INTRAVENOUS EVERY 6 HOURS PRN
Status: DISCONTINUED | OUTPATIENT
Start: 2018-12-06 | End: 2018-12-09 | Stop reason: HOSPADM

## 2018-12-06 RX ORDER — MORPHINE SULFATE 2 MG/ML
4 INJECTION, SOLUTION INTRAMUSCULAR; INTRAVENOUS
Status: DISCONTINUED | OUTPATIENT
Start: 2018-12-06 | End: 2018-12-07

## 2018-12-06 RX ADMIN — EPHEDRINE SULFATE 5 MG: 50 INJECTION INTRAVENOUS at 08:40

## 2018-12-06 RX ADMIN — ONDANSETRON 4 MG: 2 INJECTION INTRAMUSCULAR; INTRAVENOUS at 10:15

## 2018-12-06 RX ADMIN — LIDOCAINE HYDROCHLORIDE 50 MG: 20 INJECTION, SOLUTION INFILTRATION; PERINEURAL at 07:35

## 2018-12-06 RX ADMIN — CEFAZOLIN SODIUM 2 G: 2 SOLUTION INTRAVENOUS at 07:35

## 2018-12-06 RX ADMIN — MIDAZOLAM HYDROCHLORIDE 2 MG: 1 INJECTION INTRAMUSCULAR; INTRAVENOUS at 07:28

## 2018-12-06 RX ADMIN — ROCURONIUM BROMIDE 5 MG: 10 INJECTION, SOLUTION INTRAVENOUS at 07:35

## 2018-12-06 RX ADMIN — FENTANYL CITRATE 50 MCG: 50 INJECTION INTRAMUSCULAR; INTRAVENOUS at 07:35

## 2018-12-06 RX ADMIN — SODIUM CHLORIDE, SODIUM LACTATE, POTASSIUM CHLORIDE, AND CALCIUM CHLORIDE: 600; 310; 30; 20 INJECTION, SOLUTION INTRAVENOUS at 10:00

## 2018-12-06 RX ADMIN — DOCUSATE SODIUM 100 MG: 100 CAPSULE, LIQUID FILLED ORAL at 21:15

## 2018-12-06 RX ADMIN — Medication 100 MG: at 07:35

## 2018-12-06 RX ADMIN — HYDROMORPHONE HYDROCHLORIDE 0.5 MG: 1 INJECTION, SOLUTION INTRAMUSCULAR; INTRAVENOUS; SUBCUTANEOUS at 14:41

## 2018-12-06 RX ADMIN — HYDROMORPHONE HYDROCHLORIDE 0.5 MG: 2 INJECTION, SOLUTION INTRAMUSCULAR; INTRAVENOUS; SUBCUTANEOUS at 09:20

## 2018-12-06 RX ADMIN — EPHEDRINE SULFATE 10 MG: 50 INJECTION INTRAVENOUS at 10:35

## 2018-12-06 RX ADMIN — HYDROMORPHONE HYDROCHLORIDE 0.5 MG: 1 INJECTION, SOLUTION INTRAMUSCULAR; INTRAVENOUS; SUBCUTANEOUS at 13:51

## 2018-12-06 RX ADMIN — SODIUM CHLORIDE, SODIUM LACTATE, POTASSIUM CHLORIDE, AND CALCIUM CHLORIDE: 600; 310; 30; 20 INJECTION, SOLUTION INTRAVENOUS at 06:45

## 2018-12-06 RX ADMIN — HYDROCODONE BITARTRATE AND ACETAMINOPHEN 2 TABLET: 5; 325 TABLET ORAL at 20:01

## 2018-12-06 RX ADMIN — SUGAMMADEX 400 MG: 100 INJECTION, SOLUTION INTRAVENOUS at 11:38

## 2018-12-06 RX ADMIN — EPHEDRINE SULFATE 5 MG: 50 INJECTION INTRAVENOUS at 08:20

## 2018-12-06 RX ADMIN — EPHEDRINE SULFATE 5 MG: 50 INJECTION INTRAVENOUS at 08:15

## 2018-12-06 RX ADMIN — ROCURONIUM BROMIDE 45 MG: 10 INJECTION, SOLUTION INTRAVENOUS at 07:45

## 2018-12-06 RX ADMIN — Medication 10 ML: at 22:25

## 2018-12-06 RX ADMIN — HYDROMORPHONE HYDROCHLORIDE 0.5 MG: 2 INJECTION, SOLUTION INTRAMUSCULAR; INTRAVENOUS; SUBCUTANEOUS at 09:10

## 2018-12-06 RX ADMIN — FLECAINIDE ACETATE 100 MG: 100 TABLET ORAL at 21:18

## 2018-12-06 RX ADMIN — HYDROMORPHONE HYDROCHLORIDE 0.5 MG: 1 INJECTION, SOLUTION INTRAMUSCULAR; INTRAVENOUS; SUBCUTANEOUS at 13:16

## 2018-12-06 RX ADMIN — HYDROMORPHONE HYDROCHLORIDE 0.5 MG: 1 INJECTION, SOLUTION INTRAMUSCULAR; INTRAVENOUS; SUBCUTANEOUS at 15:10

## 2018-12-06 RX ADMIN — DIVALPROEX SODIUM 250 MG: 250 TABLET, DELAYED RELEASE ORAL at 21:15

## 2018-12-06 RX ADMIN — HYDROMORPHONE HYDROCHLORIDE 0.5 MG: 2 INJECTION, SOLUTION INTRAMUSCULAR; INTRAVENOUS; SUBCUTANEOUS at 09:00

## 2018-12-06 RX ADMIN — DEXAMETHASONE SODIUM PHOSPHATE 8 MG: 4 INJECTION, SOLUTION INTRAMUSCULAR; INTRAVENOUS at 10:15

## 2018-12-06 RX ADMIN — EPHEDRINE SULFATE 10 MG: 50 INJECTION INTRAVENOUS at 10:40

## 2018-12-06 RX ADMIN — DEXTROSE MONOHYDRATE, SODIUM CHLORIDE, AND POTASSIUM CHLORIDE: 50; 4.5; 1.49 INJECTION, SOLUTION INTRAVENOUS at 18:22

## 2018-12-06 RX ADMIN — PROPOFOL 200 MG: 10 INJECTION, EMULSION INTRAVENOUS at 07:35

## 2018-12-06 RX ADMIN — ROCURONIUM BROMIDE 20 MG: 10 INJECTION, SOLUTION INTRAVENOUS at 10:20

## 2018-12-06 RX ADMIN — ATORVASTATIN CALCIUM 80 MG: 80 TABLET, FILM COATED ORAL at 21:15

## 2018-12-06 RX ADMIN — FENTANYL CITRATE 50 MCG: 50 INJECTION INTRAMUSCULAR; INTRAVENOUS at 08:50

## 2018-12-06 RX ADMIN — EPHEDRINE SULFATE 5 MG: 50 INJECTION INTRAVENOUS at 08:35

## 2018-12-06 RX ADMIN — CEFAZOLIN SODIUM 2 G: 2 SOLUTION INTRAVENOUS at 11:33

## 2018-12-06 RX ADMIN — HYDROMORPHONE HYDROCHLORIDE 0.5 MG: 2 INJECTION, SOLUTION INTRAMUSCULAR; INTRAVENOUS; SUBCUTANEOUS at 09:40

## 2018-12-06 RX ADMIN — EPHEDRINE SULFATE 5 MG: 50 INJECTION INTRAVENOUS at 08:10

## 2018-12-06 RX ADMIN — ROCURONIUM BROMIDE 10 MG: 10 INJECTION, SOLUTION INTRAVENOUS at 09:35

## 2018-12-06 RX ADMIN — EPHEDRINE SULFATE 5 MG: 50 INJECTION INTRAVENOUS at 10:10

## 2018-12-06 RX ADMIN — ROCURONIUM BROMIDE 20 MG: 10 INJECTION, SOLUTION INTRAVENOUS at 08:55

## 2018-12-06 RX ADMIN — SODIUM CHLORIDE, SODIUM LACTATE, POTASSIUM CHLORIDE, AND CALCIUM CHLORIDE: 600; 310; 30; 20 INJECTION, SOLUTION INTRAVENOUS at 08:20

## 2018-12-06 RX ADMIN — FENTANYL CITRATE 50 MCG: 50 INJECTION INTRAMUSCULAR; INTRAVENOUS at 08:28

## 2018-12-06 RX ADMIN — LATANOPROST 1 DROP: 50 SOLUTION OPHTHALMIC at 21:15

## 2018-12-06 RX ADMIN — CEFAZOLIN SODIUM 2 G: 2 SOLUTION INTRAVENOUS at 18:21

## 2018-12-06 RX ADMIN — FENTANYL CITRATE 50 MCG: 50 INJECTION INTRAMUSCULAR; INTRAVENOUS at 07:40

## 2018-12-06 RX ADMIN — KETOROLAC TROMETHAMINE 15 MG: 15 INJECTION, SOLUTION INTRAMUSCULAR; INTRAVENOUS at 14:43

## 2018-12-06 RX ADMIN — ROCURONIUM BROMIDE 20 MG: 10 INJECTION, SOLUTION INTRAVENOUS at 08:25

## 2018-12-06 ASSESSMENT — PULMONARY FUNCTION TESTS
PIF_VALUE: 19
PIF_VALUE: 18
PIF_VALUE: 19
PIF_VALUE: 18
PIF_VALUE: 18
PIF_VALUE: 19
PIF_VALUE: 19
PIF_VALUE: 18
PIF_VALUE: 19
PIF_VALUE: 18
PIF_VALUE: 17
PIF_VALUE: 19
PIF_VALUE: 15
PIF_VALUE: 18
PIF_VALUE: 18
PIF_VALUE: 19
PIF_VALUE: 21
PIF_VALUE: 16
PIF_VALUE: 20
PIF_VALUE: 19
PIF_VALUE: 19
PIF_VALUE: 18
PIF_VALUE: 17
PIF_VALUE: 19
PIF_VALUE: 17
PIF_VALUE: 18
PIF_VALUE: 19
PIF_VALUE: 18
PIF_VALUE: 19
PIF_VALUE: 17
PIF_VALUE: 19
PIF_VALUE: 18
PIF_VALUE: 19
PIF_VALUE: 18
PIF_VALUE: 19
PIF_VALUE: 18
PIF_VALUE: 17
PIF_VALUE: 8
PIF_VALUE: 17
PIF_VALUE: 19
PIF_VALUE: 18
PIF_VALUE: 1
PIF_VALUE: 18
PIF_VALUE: 18
PIF_VALUE: 19
PIF_VALUE: 15
PIF_VALUE: 19
PIF_VALUE: 17
PIF_VALUE: 21
PIF_VALUE: 18
PIF_VALUE: 17
PIF_VALUE: 18
PIF_VALUE: 18
PIF_VALUE: 19
PIF_VALUE: 17
PIF_VALUE: 23
PIF_VALUE: 22
PIF_VALUE: 1
PIF_VALUE: 19
PIF_VALUE: 18
PIF_VALUE: 19
PIF_VALUE: 18
PIF_VALUE: 19
PIF_VALUE: 17
PIF_VALUE: 18
PIF_VALUE: 19
PIF_VALUE: 18
PIF_VALUE: 18
PIF_VALUE: 16
PIF_VALUE: 17
PIF_VALUE: 19
PIF_VALUE: 17
PIF_VALUE: 18
PIF_VALUE: 19
PIF_VALUE: 18
PIF_VALUE: 19
PIF_VALUE: 19
PIF_VALUE: 18
PIF_VALUE: 17
PIF_VALUE: 19
PIF_VALUE: 22
PIF_VALUE: 19
PIF_VALUE: 19
PIF_VALUE: 17
PIF_VALUE: 18
PIF_VALUE: 17
PIF_VALUE: 19
PIF_VALUE: 20
PIF_VALUE: 0
PIF_VALUE: 15
PIF_VALUE: 19
PIF_VALUE: 19
PIF_VALUE: 18
PIF_VALUE: 2
PIF_VALUE: 19
PIF_VALUE: 18
PIF_VALUE: 18
PIF_VALUE: 17
PIF_VALUE: 19
PIF_VALUE: 19
PIF_VALUE: 17
PIF_VALUE: 19
PIF_VALUE: 18
PIF_VALUE: 17
PIF_VALUE: 18
PIF_VALUE: 19
PIF_VALUE: 18
PIF_VALUE: 19
PIF_VALUE: 19
PIF_VALUE: 17
PIF_VALUE: 18
PIF_VALUE: 18
PIF_VALUE: 17
PIF_VALUE: 18
PIF_VALUE: 18
PIF_VALUE: 16
PIF_VALUE: 19
PIF_VALUE: 17
PIF_VALUE: 19
PIF_VALUE: 18
PIF_VALUE: 19
PIF_VALUE: 19
PIF_VALUE: 17
PIF_VALUE: 20
PIF_VALUE: 18
PIF_VALUE: 17
PIF_VALUE: 19
PIF_VALUE: 18
PIF_VALUE: 16
PIF_VALUE: 18
PIF_VALUE: 19
PIF_VALUE: 19
PIF_VALUE: 26
PIF_VALUE: 18
PIF_VALUE: 18
PIF_VALUE: 19
PIF_VALUE: 18
PIF_VALUE: 18
PIF_VALUE: 19
PIF_VALUE: 19
PIF_VALUE: 15
PIF_VALUE: 18
PIF_VALUE: 14
PIF_VALUE: 20
PIF_VALUE: 17
PIF_VALUE: 21
PIF_VALUE: 17
PIF_VALUE: 18
PIF_VALUE: 18
PIF_VALUE: 19
PIF_VALUE: 17
PIF_VALUE: 19
PIF_VALUE: 20
PIF_VALUE: 18
PIF_VALUE: 19
PIF_VALUE: 19
PIF_VALUE: 18
PIF_VALUE: 19
PIF_VALUE: 16
PIF_VALUE: 18
PIF_VALUE: 18
PIF_VALUE: 19
PIF_VALUE: 17
PIF_VALUE: 19
PIF_VALUE: 17
PIF_VALUE: 18
PIF_VALUE: 19
PIF_VALUE: 18
PIF_VALUE: 0
PIF_VALUE: 18
PIF_VALUE: 17
PIF_VALUE: 18
PIF_VALUE: 18
PIF_VALUE: 19
PIF_VALUE: 17
PIF_VALUE: 18
PIF_VALUE: 19
PIF_VALUE: 16
PIF_VALUE: 18
PIF_VALUE: 18
PIF_VALUE: 17
PIF_VALUE: 18
PIF_VALUE: 17
PIF_VALUE: 0
PIF_VALUE: 33
PIF_VALUE: 21
PIF_VALUE: 17
PIF_VALUE: 18
PIF_VALUE: 19
PIF_VALUE: 18
PIF_VALUE: 19
PIF_VALUE: 19
PIF_VALUE: 21
PIF_VALUE: 16
PIF_VALUE: 19
PIF_VALUE: 1
PIF_VALUE: 18
PIF_VALUE: 18
PIF_VALUE: 16
PIF_VALUE: 19
PIF_VALUE: 19
PIF_VALUE: 20
PIF_VALUE: 18
PIF_VALUE: 18
PIF_VALUE: 19
PIF_VALUE: 18
PIF_VALUE: 22
PIF_VALUE: 19
PIF_VALUE: 20
PIF_VALUE: 20
PIF_VALUE: 19
PIF_VALUE: 20
PIF_VALUE: 19
PIF_VALUE: 18
PIF_VALUE: 18
PIF_VALUE: 19
PIF_VALUE: 18
PIF_VALUE: 19
PIF_VALUE: 21
PIF_VALUE: 19
PIF_VALUE: 19
PIF_VALUE: 18
PIF_VALUE: 18
PIF_VALUE: 0
PIF_VALUE: 17
PIF_VALUE: 17
PIF_VALUE: 19
PIF_VALUE: 19
PIF_VALUE: 18
PIF_VALUE: 17
PIF_VALUE: 18
PIF_VALUE: 19
PIF_VALUE: 22
PIF_VALUE: 16
PIF_VALUE: 19
PIF_VALUE: 19
PIF_VALUE: 18
PIF_VALUE: 19

## 2018-12-06 ASSESSMENT — PAIN DESCRIPTION - ORIENTATION: ORIENTATION: LOWER;OUTER

## 2018-12-06 ASSESSMENT — PAIN DESCRIPTION - DESCRIPTORS: DESCRIPTORS: CONSTANT;DISCOMFORT;SHARP

## 2018-12-06 ASSESSMENT — PAIN SCALES - GENERAL
PAINLEVEL_OUTOF10: 0
PAINLEVEL_OUTOF10: 8
PAINLEVEL_OUTOF10: 5
PAINLEVEL_OUTOF10: 7
PAINLEVEL_OUTOF10: 5

## 2018-12-06 ASSESSMENT — PAIN DESCRIPTION - ONSET: ONSET: GRADUAL

## 2018-12-06 ASSESSMENT — PAIN DESCRIPTION - DIRECTION: RADIATING_TOWARDS: BACK

## 2018-12-06 ASSESSMENT — PAIN DESCRIPTION - PAIN TYPE: TYPE: SURGICAL PAIN

## 2018-12-06 ASSESSMENT — PAIN DESCRIPTION - PROGRESSION: CLINICAL_PROGRESSION: GRADUALLY WORSENING

## 2018-12-06 ASSESSMENT — PAIN - FUNCTIONAL ASSESSMENT: PAIN_FUNCTIONAL_ASSESSMENT: 0-10

## 2018-12-06 ASSESSMENT — ACTIVITIES OF DAILY LIVING (ADL): EFFECT OF PAIN ON DAILY ACTIVITIES: DISCOMFORT

## 2018-12-06 ASSESSMENT — PAIN DESCRIPTION - FREQUENCY: FREQUENCY: CONTINUOUS

## 2018-12-06 ASSESSMENT — PAIN DESCRIPTION - LOCATION: LOCATION: FLANK;BACK

## 2018-12-06 NOTE — PROGRESS NOTES
Patient admitted from PACU into room 5304. Patient is alert and oriented times 4, VSS, patient denies pain and NV. IV fluids infusing, kendall draining to gravity. Bed is in lowest position, bed alarm on, patient on bed rest, call light in place, family at bedside.

## 2018-12-06 NOTE — ANESTHESIA PRE PROCEDURE
Department of Anesthesiology  Preprocedure Note       Name:  Kelly Winston   Age:  58 y.o.  :  1955                                          MRN:  [id number]         Date:  2018      Surgeon: Johnny Tejada):  Fred Gandhi MD    Procedure: OPEN RIGHT PARTIAL NEPHRECTOMY (FLANK INCISION) (Right )    Medications prior to admission:   Prior to Admission medications    Medication Sig Start Date End Date Taking? Authorizing Provider   fenofibrate (TRICOR) 145 MG tablet TAKE ONE TABLET BY MOUTH DAILY 18  Yes Miguel Ochoa MD   flecainide (TAMBOCOR) 100 MG tablet TAKE ONE TABLET BY MOUTH TWICE A DAY 18  Yes Miguel Ochoa MD   metoprolol succinate (TOPROL XL) 50 MG extended release tablet TAKE ONE TABLET BY MOUTH DAILY 18  Yes Miguel Ochoa MD   rosuvastatin (CRESTOR) 40 MG tablet TAKE ONE TABLET BY MOUTH DAILY 10/23/18  Yes Miguel Ochoa MD   divalproex (DEPAKOTE) 250 MG DR tablet TAKE ONE TABLET BY MOUTH THREE TIMES A DAY 18  Yes Miguel Ochoa MD   warfarin (COUMADIN) 1 MG tablet TAKE ONE TABLET BY MOUTH TWICE A DAY 17  Yes Miguel Ochoa MD   methocarbamol (ROBAXIN) 500 MG tablet TAKE ONE TABLET BY MOUTH FOUR TIMES A DAY FOR 10 DAYS 17  Yes Miguel Ochoa MD   SUMAtriptan (IMITREX) 100 MG tablet TAKE ONE TABLET ONCE AS NEEDED **MAY REPEAT IN 2 HOURS IF NEEDED.   MAX OF 2 TABLETS PER 24 HOURS** 17  Yes Miguel Ochoa MD   Bimatoprost (LUMIGAN OP) Place 1 drop into both eyes daily    Yes Historical Provider, MD   ALLEGRA 180 MG tablet Take one tablet(s) by mouth daily 11  Yes Miguel Ochoa MD   timolol (TIMOPTIC) 0.25 % ophthalmic solution Place 1 drop into both eyes daily    Yes Historical Provider, MD   warfarin (COUMADIN) 3 MG tablet TAKE UP TO TWO TABLETS BY MOUTH DAILY AS DIRECTED 18   Miguel Ochoa MD   warfarin (COUMADIN) 5 MG tablet TAKE UP TO 2 TABLETS DAILY AS DIRECTED 17   Miguel Ochoa MD       Current medications:    Current Facility-Administered Medications   Medication Dose Route Frequency Provider Last Rate Last Dose    ceFAZolin (ANCEF) 2 g in dextrose 3 % 50 mL IVPB (duplex)  2 g Intravenous Once Avani Michael MD        lactated ringers infusion   Intravenous Continuous Benedict Kapoor MD        sodium chloride flush 0.9 % injection 10 mL  10 mL Intravenous 2 times per day Benedict Kapoor MD        sodium chloride flush 0.9 % injection 10 mL  10 mL Intravenous PRN Benedict Kapoor MD        lidocaine PF 1 % injection 1 mL  1 mL Intradermal Once PRN Benedict Kapoor MD           Allergies:  No Known Allergies    Problem List:    Patient Active Problem List   Diagnosis Code    Atrial fibrillation ([facility] [address] I48.91    Pulmonary embolism (HCC) I26.99    Hyperlipemia E78.5    Vitreous degeneration H43.819    Sleep apnea G47.30    Migraine G43.909    Hx of colonic polyps Z86.010    Glaucoma H40.9    Chronic right-sided low back pain with sciatica M54.40, G89.29    Tremor R25.1    Cataract H26.9    Hyperglycemia R73.9    Renal mass N28.89       Past Medical History:        Diagnosis Date    Allergic rhinitis due to other allergen     Atrial fibrillation ([facility] [address]     Hx of blood clots     pulmonary embolism 2002 shortly after ablation    Hypopotassemia     Migraines     Other abnormal blood chemistry     Other and unspecified hyperlipidemia     Screening PSA (prostate specific antigen) 12/5/2009    <0.1 ng/mL    Sleep apnea     does not use cpap    Tremor     minor    Unspecified sleep apnea     Vitreous degeneration        Past Surgical History:        Procedure Laterality Date    ABLATION OF DYSRHYTHMIC FOCUS      2002    CATARACT REMOVAL Bilateral     COLONOSCOPY  06-    Bharathi Martin / Dr. Pollack Crest    COLONOSCOPY  10/20/2014    recheck 4-5 yrs w ++ h/o adenomatous plyps        Social History:    Social History   Substance Use Topics    Smoking status: Never Smoker    Smokeless Type & Screen (If Applicable):  No results found for: [place], [address]    Anesthesia Evaluation  Patient summary reviewed and Nursing notes reviewed no history of anesthetic complications:   Airway: Mallampati: II  TM distance: >3 FB   Neck ROM: full  Mouth opening: > = 3 FB Dental: normal exam         Pulmonary:normal exam    (+) sleep apnea:                             Cardiovascular:    (+) dysrhythmias: atrial fibrillation,                   Neuro/Psych:   (+) neuromuscular disease:, headaches:,             GI/Hepatic/Renal:            ROS comment: Right renal tumor. Endo/Other: Negative Endo/Other ROS                    Abdominal:           Vascular: negative vascular ROS. Anesthesia Plan      general     ASA 3       Induction: intravenous. MIPS: Postoperative opioids intended. Anesthetic plan and risks discussed with spouse and patient. Plan discussed with CRNA.     Attending anesthesiologist reviewed and agrees with Pre Eval content              Kunal Fields MD   12/6/2018

## 2018-12-06 NOTE — PROGRESS NOTES
PACU Transfer Note    Vitals:    12/06/18 1500   BP: 129/61   Pulse: 64   Resp: (!) 0   Temp: 97.2 °F (36.2 °C)   SpO2: 97%       In: 415 [I. V.:415]  Out: 240 [Drains:240]    Pain assessment: Pain Level: 5    Report given to Receiving unit RN. Family sent to room [phone]    12/6/2018 3:31 PM

## 2018-12-06 NOTE — H&P
Omari Guan    [id number]    [facility] Same Day Surgery Update H & P  Department of General Surgery   Surgical Service   Physician Assistant Pre-operative History and Physical  Last H & P within the last 30 days. 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     Past Medical History:        Diagnosis Date    Allergic rhinitis due to other allergen     Atrial fibrillation ([address]     Hx of blood clots     pulmonary embolism 2002 shortly after ablation    Hypopotassemia     Migraines     Other abnormal blood chemistry     Other and unspecified hyperlipidemia     Screening PSA (prostate specific antigen) 12/5/2009    <0.1 ng/mL    Sleep apnea     does not use cpap    Tremor     minor    Unspecified sleep apnea     Vitreous degeneration      Past Surgical History:        Procedure Laterality Date    ABLATION OF DYSRHYTHMIC FOCUS      2002    CATARACT REMOVAL Bilateral     COLONOSCOPY  06-    Allie Jung / Dr. Filomena Zazueta    COLONOSCOPY  10/20/2014    recheck 4-5 yrs w ++ h/o adenomatous plyps      Past Social History:  Social History     Social History    Marital status:      Spouse name: N/A    Number of children: N/A    Years of education: N/A     Social History Main Topics    Smoking status: Never Smoker    Smokeless tobacco: Never Used    Alcohol use Yes      Comment: 4 drinks weekly    Drug use: No    Sexual activity: Yes     Partners: Female     Other Topics Concern    None     Social History Narrative    None         Medications Prior to Admission:      Prior to Admission medications    Medication Sig Start Date End Date Taking?  Authorizing Provider   fenofibrate (TRICOR) 145 MG tablet TAKE ONE TABLET BY MOUTH DAILY exam done today- no new changes since last physical exam on 11-    2. Access to ancillary services are available per request of the provider.     Jayjay Bartholomew, [id number]     12/6/2018

## 2018-12-06 NOTE — BRIEF OP NOTE
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

## 2018-12-07 LAB
ANION GAP SERPL CALCULATED.3IONS-SCNC: 12 MMOL/L (ref 3–16)
BUN BLDV-MCNC: 21 MG/DL (ref 7–20)
CALCIUM SERPL-MCNC: 8.2 MG/DL (ref 8.3–10.6)
CHLORIDE BLD-SCNC: 100 MMOL/L (ref 99–110)
CO2: 23 MMOL/L (ref 21–32)
CREAT SERPL-MCNC: 1.2 MG/DL (ref 0.8–1.3)
GFR AFRICAN AMERICAN: >60
GFR NON-AFRICAN AMERICAN: >60
GLUCOSE BLD-MCNC: 160 MG/DL (ref 70–99)
HCT VFR BLD CALC: 37.6 % (ref 40.5–52.5)
HEMOGLOBIN: 12.2 G/DL (ref 13.5–17.5)
MCH RBC QN AUTO: 28.1 PG (ref 26–34)
MCHC RBC AUTO-ENTMCNC: 32.3 G/DL (ref 31–36)
MCV RBC AUTO: 86.7 FL (ref 80–100)
PDW BLD-RTO: 14.8 % (ref 12.4–15.4)
PLATELET # BLD: 200 K/UL (ref 135–450)
PMV BLD AUTO: 8.8 FL (ref 5–10.5)
POTASSIUM SERPL-SCNC: 4.6 MMOL/L (ref 3.5–5.1)
RBC # BLD: 4.34 M/UL (ref 4.2–5.9)
SODIUM BLD-SCNC: 135 MMOL/L (ref 136–145)
WBC # BLD: 8.9 K/UL (ref 4–11)

## 2018-12-07 PROCEDURE — 1200000000 HC SEMI PRIVATE

## 2018-12-07 PROCEDURE — 6360000002 HC RX W HCPCS: Performed by: UROLOGY

## 2018-12-07 PROCEDURE — 80048 BASIC METABOLIC PNL TOTAL CA: CPT

## 2018-12-07 PROCEDURE — 6360000002 HC RX W HCPCS: Performed by: STUDENT IN AN ORGANIZED HEALTH CARE EDUCATION/TRAINING PROGRAM

## 2018-12-07 PROCEDURE — 2580000003 HC RX 258: Performed by: STUDENT IN AN ORGANIZED HEALTH CARE EDUCATION/TRAINING PROGRAM

## 2018-12-07 PROCEDURE — 36415 COLL VENOUS BLD VENIPUNCTURE: CPT

## 2018-12-07 PROCEDURE — 6370000000 HC RX 637 (ALT 250 FOR IP): Performed by: UROLOGY

## 2018-12-07 PROCEDURE — 85027 COMPLETE CBC AUTOMATED: CPT

## 2018-12-07 PROCEDURE — 2500000003 HC RX 250 WO HCPCS: Performed by: UROLOGY

## 2018-12-07 PROCEDURE — 6370000000 HC RX 637 (ALT 250 FOR IP): Performed by: STUDENT IN AN ORGANIZED HEALTH CARE EDUCATION/TRAINING PROGRAM

## 2018-12-07 RX ORDER — SIMETHICONE 20 MG/.3ML
40 EMULSION ORAL EVERY 6 HOURS PRN
Status: DISCONTINUED | OUTPATIENT
Start: 2018-12-07 | End: 2018-12-09 | Stop reason: HOSPADM

## 2018-12-07 RX ORDER — DEXTROSE AND SODIUM CHLORIDE 5; .9 G/100ML; G/100ML
INJECTION, SOLUTION INTRAVENOUS CONTINUOUS
Status: DISCONTINUED | OUTPATIENT
Start: 2018-12-07 | End: 2018-12-09

## 2018-12-07 RX ADMIN — CEFAZOLIN SODIUM 2 G: 2 SOLUTION INTRAVENOUS at 00:09

## 2018-12-07 RX ADMIN — FLECAINIDE ACETATE 100 MG: 100 TABLET ORAL at 19:49

## 2018-12-07 RX ADMIN — DIVALPROEX SODIUM 250 MG: 250 TABLET, DELAYED RELEASE ORAL at 19:49

## 2018-12-07 RX ADMIN — ENOXAPARIN SODIUM 40 MG: 40 INJECTION SUBCUTANEOUS at 08:19

## 2018-12-07 RX ADMIN — HYDROCODONE BITARTRATE AND ACETAMINOPHEN 2 TABLET: 5; 325 TABLET ORAL at 18:42

## 2018-12-07 RX ADMIN — METOPROLOL SUCCINATE 50 MG: 50 TABLET, EXTENDED RELEASE ORAL at 08:19

## 2018-12-07 RX ADMIN — HYDROCODONE BITARTRATE AND ACETAMINOPHEN 2 TABLET: 5; 325 TABLET ORAL at 10:07

## 2018-12-07 RX ADMIN — DOCUSATE SODIUM 100 MG: 100 CAPSULE, LIQUID FILLED ORAL at 19:49

## 2018-12-07 RX ADMIN — ENOXAPARIN SODIUM 30 MG: 40 INJECTION SUBCUTANEOUS at 19:51

## 2018-12-07 RX ADMIN — DEXTROSE MONOHYDRATE, SODIUM CHLORIDE, AND POTASSIUM CHLORIDE: 50; 4.5; 1.49 INJECTION, SOLUTION INTRAVENOUS at 03:34

## 2018-12-07 RX ADMIN — DEXTROSE AND SODIUM CHLORIDE: 5; 900 INJECTION, SOLUTION INTRAVENOUS at 18:39

## 2018-12-07 RX ADMIN — HYDROCODONE BITARTRATE AND ACETAMINOPHEN 2 TABLET: 5; 325 TABLET ORAL at 14:43

## 2018-12-07 RX ADMIN — LATANOPROST 1 DROP: 50 SOLUTION OPHTHALMIC at 19:50

## 2018-12-07 RX ADMIN — DEXTROSE MONOHYDRATE, SODIUM CHLORIDE, AND POTASSIUM CHLORIDE: 50; 4.5; 1.49 INJECTION, SOLUTION INTRAVENOUS at 11:10

## 2018-12-07 RX ADMIN — HYDROCODONE BITARTRATE AND ACETAMINOPHEN 2 TABLET: 5; 325 TABLET ORAL at 06:09

## 2018-12-07 RX ADMIN — DIVALPROEX SODIUM 250 MG: 250 TABLET, DELAYED RELEASE ORAL at 08:19

## 2018-12-07 RX ADMIN — FLECAINIDE ACETATE 100 MG: 100 TABLET ORAL at 08:19

## 2018-12-07 RX ADMIN — SIMETHICONE 40 MG: 20 SUSPENSION/ DROPS ORAL at 19:49

## 2018-12-07 RX ADMIN — DOCUSATE SODIUM 100 MG: 100 CAPSULE, LIQUID FILLED ORAL at 08:18

## 2018-12-07 RX ADMIN — ATORVASTATIN CALCIUM 80 MG: 80 TABLET, FILM COATED ORAL at 19:49

## 2018-12-07 RX ADMIN — HYDROCODONE BITARTRATE AND ACETAMINOPHEN 2 TABLET: 5; 325 TABLET ORAL at 00:08

## 2018-12-07 RX ADMIN — CETIRIZINE HYDROCHLORIDE 10 MG: 10 TABLET, FILM COATED ORAL at 08:19

## 2018-12-07 RX ADMIN — HYDROCODONE BITARTRATE AND ACETAMINOPHEN 2 TABLET: 5; 325 TABLET ORAL at 23:43

## 2018-12-07 RX ADMIN — TIMOLOL MALEATE 1 DROP: 2.5 SOLUTION OPHTHALMIC at 08:18

## 2018-12-07 ASSESSMENT — PAIN DESCRIPTION - DESCRIPTORS
DESCRIPTORS: ACHING;SHARP
DESCRIPTORS: ACHING;CONSTANT
DESCRIPTORS: ACHING;SHARP
DESCRIPTORS: DISCOMFORT;CONSTANT

## 2018-12-07 ASSESSMENT — PAIN DESCRIPTION - ONSET
ONSET: AWAKENED FROM SLEEP
ONSET: ON-GOING

## 2018-12-07 ASSESSMENT — PAIN DESCRIPTION - PROGRESSION
CLINICAL_PROGRESSION: NOT CHANGED
CLINICAL_PROGRESSION: GRADUALLY WORSENING
CLINICAL_PROGRESSION: NOT CHANGED
CLINICAL_PROGRESSION: NOT CHANGED

## 2018-12-07 ASSESSMENT — PAIN DESCRIPTION - DIRECTION: RADIATING_TOWARDS: BACK

## 2018-12-07 ASSESSMENT — PAIN SCALES - GENERAL
PAINLEVEL_OUTOF10: 3
PAINLEVEL_OUTOF10: 5
PAINLEVEL_OUTOF10: 8
PAINLEVEL_OUTOF10: 7
PAINLEVEL_OUTOF10: 8
PAINLEVEL_OUTOF10: 5
PAINLEVEL_OUTOF10: 8
PAINLEVEL_OUTOF10: 5
PAINLEVEL_OUTOF10: 8
PAINLEVEL_OUTOF10: 8
PAINLEVEL_OUTOF10: 7

## 2018-12-07 ASSESSMENT — PAIN DESCRIPTION - LOCATION
LOCATION: FLANK;BACK

## 2018-12-07 ASSESSMENT — PAIN DESCRIPTION - ORIENTATION
ORIENTATION: LOWER;OUTER

## 2018-12-07 ASSESSMENT — PAIN DESCRIPTION - PAIN TYPE
TYPE: SURGICAL PAIN

## 2018-12-07 ASSESSMENT — PAIN DESCRIPTION - FREQUENCY
FREQUENCY: CONTINUOUS

## 2018-12-07 ASSESSMENT — ACTIVITIES OF DAILY LIVING (ADL): EFFECT OF PAIN ON DAILY ACTIVITIES: DISCOMFORT

## 2018-12-07 NOTE — PROGRESS NOTES
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 Denies nausea or emesis. Tolerating clears. Aldana in place. ROS: A 14 point review of systems was conducted, significant findings as noted in HPI. All other systems negative. OBJECTIVE:     Exam:   Vitals:    12/06/18 1956 12/06/18 2140 12/06/18 2343 12/07/18 0333   BP: (!) 156/78  130/76 123/69   Pulse: 71  71 69   Resp: 16 16 16 16   Temp: 97.4 °F (36.3 °C)  97 °F (36.1 °C) 98.3 °F (36.8 °C)   TempSrc: Oral  Oral Oral   SpO2: 95% 96% 97% 97%   Weight:       Height:           General appearance: alert, no acute distress, grooming appropriate  Chest/Lungs: CTAB, no crackles/rales, wheezes/rhonchi, normal effort, on room air  Cardiovascular: RRR, no murmurs/gallops/rubs  Abdomen: soft, appropriately-tender, non-distended, no guarding/rigidity, incisions - C/D/I, well approximated. EVELYN drain - serosanguinous   : Aldana in place - clear yellow urine  Extremities: no edema, no cyanosis  Neuro: A&Ox3, no focal deficits, sensation intact      ASSESSMENT/PLAN:   This is a 58 y.o. male s/p Open right partial nephrectomy POD #1.    - Diet: clears advance to general diet  - Hgb stable  - ok to get OOB, ambulate  - encourage IS and deep breathing  - ppx: SCDs and lovenox  - will discuss with staff     Roger Ji MD  Gen.  Surgery Resident PGY1  12/07/18  6:24 AM  [phone]

## 2018-12-07 NOTE — PLAN OF CARE
Problem: Pain:  Goal: Control of acute pain  Control of acute pain   Outcome: Ongoing  Patient rating his pain '7-8' out of 0-10 pain scale. Patient medicated with Norco 2 tabs per patient request and per doctor orders. Patient rates his pain down to a '5' out of 0-10 pain scale after the pain medication and states it makes it tolerable. Will continue to monitor the patient.

## 2018-12-08 LAB
ALBUMIN SERPL-MCNC: 3.1 G/DL (ref 3.4–5)
ANION GAP SERPL CALCULATED.3IONS-SCNC: 10 MMOL/L (ref 3–16)
BASOPHILS ABSOLUTE: 0 K/UL (ref 0–0.2)
BASOPHILS RELATIVE PERCENT: 0.3 %
BUN BLDV-MCNC: 17 MG/DL (ref 7–20)
CALCIUM SERPL-MCNC: 8.7 MG/DL (ref 8.3–10.6)
CHLORIDE BLD-SCNC: 103 MMOL/L (ref 99–110)
CO2: 25 MMOL/L (ref 21–32)
CREAT SERPL-MCNC: 1 MG/DL (ref 0.8–1.3)
CREATININE FLUID: 0.9 MG/DL
EOSINOPHILS ABSOLUTE: 0 K/UL (ref 0–0.6)
EOSINOPHILS RELATIVE PERCENT: 0.3 %
FLUID TYPE: NORMAL
GFR AFRICAN AMERICAN: >60
GFR NON-AFRICAN AMERICAN: >60
GLUCOSE BLD-MCNC: 125 MG/DL (ref 70–99)
HCT VFR BLD CALC: 34.9 % (ref 40.5–52.5)
HEMOGLOBIN: 11.5 G/DL (ref 13.5–17.5)
LYMPHOCYTES ABSOLUTE: 1 K/UL (ref 1–5.1)
LYMPHOCYTES RELATIVE PERCENT: 11.3 %
MAGNESIUM: 1.8 MG/DL (ref 1.8–2.4)
MCH RBC QN AUTO: 28.6 PG (ref 26–34)
MCHC RBC AUTO-ENTMCNC: 33 G/DL (ref 31–36)
MCV RBC AUTO: 86.6 FL (ref 80–100)
MONOCYTES ABSOLUTE: 0.9 K/UL (ref 0–1.3)
MONOCYTES RELATIVE PERCENT: 10.5 %
NEUTROPHILS ABSOLUTE: 6.7 K/UL (ref 1.7–7.7)
NEUTROPHILS RELATIVE PERCENT: 77.6 %
PDW BLD-RTO: 15 % (ref 12.4–15.4)
PHOSPHORUS: 2.2 MG/DL (ref 2.5–4.9)
PLATELET # BLD: 189 K/UL (ref 135–450)
PMV BLD AUTO: 8.6 FL (ref 5–10.5)
POTASSIUM SERPL-SCNC: 4.3 MMOL/L (ref 3.5–5.1)
RBC # BLD: 4.04 M/UL (ref 4.2–5.9)
SODIUM BLD-SCNC: 138 MMOL/L (ref 136–145)
WBC # BLD: 8.6 K/UL (ref 4–11)

## 2018-12-08 PROCEDURE — 6370000000 HC RX 637 (ALT 250 FOR IP): Performed by: SURGERY

## 2018-12-08 PROCEDURE — 80069 RENAL FUNCTION PANEL: CPT

## 2018-12-08 PROCEDURE — 2580000003 HC RX 258: Performed by: UROLOGY

## 2018-12-08 PROCEDURE — 1200000000 HC SEMI PRIVATE

## 2018-12-08 PROCEDURE — 6360000002 HC RX W HCPCS: Performed by: STUDENT IN AN ORGANIZED HEALTH CARE EDUCATION/TRAINING PROGRAM

## 2018-12-08 PROCEDURE — 83735 ASSAY OF MAGNESIUM: CPT

## 2018-12-08 PROCEDURE — 6370000000 HC RX 637 (ALT 250 FOR IP): Performed by: STUDENT IN AN ORGANIZED HEALTH CARE EDUCATION/TRAINING PROGRAM

## 2018-12-08 PROCEDURE — 85025 COMPLETE CBC W/AUTO DIFF WBC: CPT

## 2018-12-08 PROCEDURE — 6370000000 HC RX 637 (ALT 250 FOR IP): Performed by: UROLOGY

## 2018-12-08 PROCEDURE — 36415 COLL VENOUS BLD VENIPUNCTURE: CPT

## 2018-12-08 PROCEDURE — 2580000003 HC RX 258: Performed by: STUDENT IN AN ORGANIZED HEALTH CARE EDUCATION/TRAINING PROGRAM

## 2018-12-08 PROCEDURE — 82570 ASSAY OF URINE CREATININE: CPT

## 2018-12-08 RX ADMIN — FLECAINIDE ACETATE 100 MG: 100 TABLET ORAL at 08:14

## 2018-12-08 RX ADMIN — Medication 10 ML: at 21:04

## 2018-12-08 RX ADMIN — METOPROLOL SUCCINATE 50 MG: 50 TABLET, EXTENDED RELEASE ORAL at 08:13

## 2018-12-08 RX ADMIN — ATORVASTATIN CALCIUM 80 MG: 80 TABLET, FILM COATED ORAL at 21:04

## 2018-12-08 RX ADMIN — DEXTROSE AND SODIUM CHLORIDE: 5; 900 INJECTION, SOLUTION INTRAVENOUS at 12:52

## 2018-12-08 RX ADMIN — Medication 200 MG: at 08:12

## 2018-12-08 RX ADMIN — DEXTROSE AND SODIUM CHLORIDE: 5; 900 INJECTION, SOLUTION INTRAVENOUS at 03:55

## 2018-12-08 RX ADMIN — HYDROCODONE BITARTRATE AND ACETAMINOPHEN 2 TABLET: 5; 325 TABLET ORAL at 16:57

## 2018-12-08 RX ADMIN — TIMOLOL MALEATE 1 DROP: 2.5 SOLUTION OPHTHALMIC at 08:19

## 2018-12-08 RX ADMIN — SIMETHICONE 40 MG: 20 SUSPENSION/ DROPS ORAL at 03:56

## 2018-12-08 RX ADMIN — DOCUSATE SODIUM 100 MG: 100 CAPSULE, LIQUID FILLED ORAL at 21:04

## 2018-12-08 RX ADMIN — HYDROCODONE BITARTRATE AND ACETAMINOPHEN 2 TABLET: 5; 325 TABLET ORAL at 03:55

## 2018-12-08 RX ADMIN — DOCUSATE SODIUM 100 MG: 100 CAPSULE, LIQUID FILLED ORAL at 08:12

## 2018-12-08 RX ADMIN — HYDROCODONE BITARTRATE AND ACETAMINOPHEN 2 TABLET: 5; 325 TABLET ORAL at 12:51

## 2018-12-08 RX ADMIN — HYDROCODONE BITARTRATE AND ACETAMINOPHEN 2 TABLET: 5; 325 TABLET ORAL at 08:14

## 2018-12-08 RX ADMIN — Medication 10 ML: at 08:18

## 2018-12-08 RX ADMIN — ENOXAPARIN SODIUM 30 MG: 40 INJECTION SUBCUTANEOUS at 21:04

## 2018-12-08 RX ADMIN — LATANOPROST 1 DROP: 50 SOLUTION OPHTHALMIC at 21:12

## 2018-12-08 RX ADMIN — FLECAINIDE ACETATE 100 MG: 100 TABLET ORAL at 21:11

## 2018-12-08 RX ADMIN — ENOXAPARIN SODIUM 30 MG: 40 INJECTION SUBCUTANEOUS at 08:14

## 2018-12-08 RX ADMIN — DIBASIC SODIUM PHOSPHATE, MONOBASIC POTASSIUM PHOSPHATE AND MONOBASIC SODIUM PHOSPHATE 2 TABLET: 852; 155; 130 TABLET ORAL at 08:12

## 2018-12-08 RX ADMIN — DIVALPROEX SODIUM 250 MG: 250 TABLET, DELAYED RELEASE ORAL at 21:04

## 2018-12-08 RX ADMIN — DIVALPROEX SODIUM 250 MG: 250 TABLET, DELAYED RELEASE ORAL at 08:13

## 2018-12-08 RX ADMIN — CETIRIZINE HYDROCHLORIDE 10 MG: 10 TABLET, FILM COATED ORAL at 08:13

## 2018-12-08 RX ADMIN — HYDROCODONE BITARTRATE AND ACETAMINOPHEN 2 TABLET: 5; 325 TABLET ORAL at 21:02

## 2018-12-08 ASSESSMENT — PAIN DESCRIPTION - ORIENTATION
ORIENTATION: RIGHT
ORIENTATION: RIGHT;OUTER
ORIENTATION: RIGHT;ANTERIOR

## 2018-12-08 ASSESSMENT — PAIN SCALES - GENERAL
PAINLEVEL_OUTOF10: 7
PAINLEVEL_OUTOF10: 4
PAINLEVEL_OUTOF10: 5
PAINLEVEL_OUTOF10: 4
PAINLEVEL_OUTOF10: 7
PAINLEVEL_OUTOF10: 7
PAINLEVEL_OUTOF10: 5
PAINLEVEL_OUTOF10: 7
PAINLEVEL_OUTOF10: 7

## 2018-12-08 ASSESSMENT — PAIN DESCRIPTION - PAIN TYPE
TYPE: SURGICAL PAIN

## 2018-12-08 ASSESSMENT — PAIN DESCRIPTION - PROGRESSION: CLINICAL_PROGRESSION: NOT CHANGED

## 2018-12-08 ASSESSMENT — PAIN DESCRIPTION - FREQUENCY
FREQUENCY: CONTINUOUS

## 2018-12-08 ASSESSMENT — PAIN DESCRIPTION - DIRECTION: RADIATING_TOWARDS: BACK

## 2018-12-08 ASSESSMENT — PAIN DESCRIPTION - LOCATION
LOCATION: ABDOMEN;FLANK
LOCATION: FLANK;ABDOMEN
LOCATION: FLANK
LOCATION: ABDOMEN;FLANK
LOCATION: ABDOMEN;FLANK

## 2018-12-08 ASSESSMENT — ACTIVITIES OF DAILY LIVING (ADL): EFFECT OF PAIN ON DAILY ACTIVITIES: DISCOMFORT

## 2018-12-08 ASSESSMENT — PAIN DESCRIPTION - DESCRIPTORS
DESCRIPTORS: ACHING
DESCRIPTORS: ACHING;DISCOMFORT
DESCRIPTORS: ACHING

## 2018-12-08 ASSESSMENT — PAIN DESCRIPTION - ONSET: ONSET: ON-GOING

## 2018-12-08 NOTE — OP NOTE
[address]                [address]                                OPERATIVE REPORT    PATIENT NAME: Carlee Anne                     :        1955  MED REC NO:   [id number]                          ROOM:       [id number]  ACCOUNT NO:   [de-identified]                           ADMIT DATE: 2018  PROVIDER:     Andry Rudd MD    DATE OF PROCEDURE:  2018    PREOPERATIVE DIAGNOSIS:  Right renal cell carcinoma. POSTOPERATIVE DIAGNOSIS:  Right renal cell carcinoma. PROCEDURE PERFORMED:  Open right partial nephrectomy. SURGEON:  Andry Rudd M.D. COSURGEON:  Kelvin Ghosh M.D.    :  WALKER Morales.    INDICATION FOR PROCEDURE:  The patient is a very pleasant 58-year-old  gentleman that presented with hematuria. A CT scan was done showing a  2.5 cm right renal mass. This mass is in a very difficult position and  therefore, I sent him to Interventional Radiology to do a biopsy of this  mass, which came back consistent with renal cell carcinoma. Due to the  difficult location of the tumor, we have elected to do it as an open  procedure. Additionally, it is so complex that I have asked one of my  partners, Dr. Maxie Meigs to help me with the operation. DESCRIPTION OF PROCEDURE:  After informed consent, the patient was taken  to the operating room. He was prepped and draped in sterile fashion,  given a dose of preoperative antibiotics. We began the procedure by  injecting some Exparel into the subcutaneous area. We now approach the  mass through a flank incision. We dissect down through the layers and  probably will make an 8 to 9 cm incision. We go off the tip of 11 and  we get down as far as we can go. We do get into the intraperitoneal  cavity very briefly, which we close with a 3-0 chromic. We now spent  the next hour isolating the entire kidney.   We took down the upper lobe  and then used Tisseel on the partial  nephrectomy bed. We then copiously irrigated and dropped the kidney  back in its normal anatomic position. Again, there was absolutely no  bleeding. Some of the fat was reapproximated over the nephrectomy site. We then placed a drain inferiorly below the incision on the right side. We now closed in three separate layers with 2-0 Vicryl layers and then  the fascial layers closed with a looped 0 PDS. We then copiously  irrigated, placed the last of the Exparel solution as a block at the  eleventh rib and the patient is now awakened and taken to the recovery  room in stable condition. Due to the complex nature of the case, this  took approximately 40% longer even with the assistance of Dr. Amy Tohmas and  Dr. Neelam Greer. The incision was closed with staples and the patient is now  awakened and taken to the recovery room in stable condition. ESTIMATED BLOOD LOSS:  200 mL. COMPLICATIONS:  None.     ANESTHESIA:  General endotracheal plus local.        Ana Sears MD    D: 12/07/2018 18:04:10       T: 12/08/2018 3:26:02     ROCK/RANDALL_AGUSTIN  Job#: [id number]     Doc#: [id number]    CC:

## 2018-12-08 NOTE — PROGRESS NOTES
Surgery Post-Op Day #1 Note    CC: NEOPLASM OF UNCERTAIN BEHAVIOR OF RIGHT KIDNEY    Procedure: Open right partial nephrectomy    SUBJECTIVE:     Patient's pain well controlled. He has been in chair for much of the night. No nausea or vomiting. ROS: A 14 point review of systems was conducted, significant findings as noted in HPI. All other systems negative. OBJECTIVE:     Exam:   Vitals:    12/07/18 1559 12/07/18 1946 12/07/18 2342 12/08/18 0354   BP: 119/69 120/71 (!) 143/84 (!) 152/93   Pulse: 68 64 70 71   Resp: 16 18 18 18   Temp: 98.4 °F (36.9 °C) 98.7 °F (37.1 °C) 97.9 °F (36.6 °C) 98.7 °F (37.1 °C)   TempSrc: Oral Oral Oral Oral   SpO2: 97% 95% 94% 94%   Weight:       Height:           General appearance: alert, no acute distress, grooming appropriate  Chest/Lungs: CTAB, no crackles/rales, wheezes/rhonchi, normal effort, on room air  Cardiovascular: RRR, no murmurs/gallops/rubs  Abdomen: soft, appropriately-tender, non-distended, no guarding/rigidity, incisions - C/D/I, well approximated.  EVELYN drain - serosanguinous   Extremities: no edema, no cyanosis  Neuro: A&Ox3, no focal deficits, sensation intact      ASSESSMENT/PLAN:   This is a 58 y.o. male s/p Open right partial nephrectomy POD #2.    - Diet: general diet  - discontinue EVELYN as EVELYN creatinine is same as serum  - ok to get OOB, ambulate  - encourage IS and deep breathing  - possible discharge later today if tolerating diet, ambulating, having normal bowel function  - ppx: SCDs and lovenox  - will discuss with staff     Moody Gamble MD PGY-1  General Surgery Resident  12/08/18  7:23 AM  [phone]

## 2018-12-09 VITALS
OXYGEN SATURATION: 94 % | RESPIRATION RATE: 18 BRPM | HEIGHT: 75 IN | HEART RATE: 67 BPM | BODY MASS INDEX: 31.08 KG/M2 | TEMPERATURE: 98.5 F | DIASTOLIC BLOOD PRESSURE: 76 MMHG | SYSTOLIC BLOOD PRESSURE: 149 MMHG | WEIGHT: 250 LBS

## 2018-12-09 LAB
ALBUMIN SERPL-MCNC: 3.1 G/DL (ref 3.4–5)
ANION GAP SERPL CALCULATED.3IONS-SCNC: 11 MMOL/L (ref 3–16)
BASOPHILS ABSOLUTE: 0 K/UL (ref 0–0.2)
BASOPHILS RELATIVE PERCENT: 0.6 %
BUN BLDV-MCNC: 17 MG/DL (ref 7–20)
CALCIUM SERPL-MCNC: 8.8 MG/DL (ref 8.3–10.6)
CHLORIDE BLD-SCNC: 104 MMOL/L (ref 99–110)
CO2: 26 MMOL/L (ref 21–32)
CREAT SERPL-MCNC: 1 MG/DL (ref 0.8–1.3)
EOSINOPHILS ABSOLUTE: 0.1 K/UL (ref 0–0.6)
EOSINOPHILS RELATIVE PERCENT: 1.3 %
GFR AFRICAN AMERICAN: >60
GFR NON-AFRICAN AMERICAN: >60
GLUCOSE BLD-MCNC: 101 MG/DL (ref 70–99)
HCT VFR BLD CALC: 34.9 % (ref 40.5–52.5)
HEMOGLOBIN: 11.4 G/DL (ref 13.5–17.5)
LYMPHOCYTES ABSOLUTE: 1.3 K/UL (ref 1–5.1)
LYMPHOCYTES RELATIVE PERCENT: 17 %
MAGNESIUM: 1.8 MG/DL (ref 1.8–2.4)
MCH RBC QN AUTO: 28.4 PG (ref 26–34)
MCHC RBC AUTO-ENTMCNC: 32.8 G/DL (ref 31–36)
MCV RBC AUTO: 86.6 FL (ref 80–100)
MONOCYTES ABSOLUTE: 0.8 K/UL (ref 0–1.3)
MONOCYTES RELATIVE PERCENT: 10.4 %
NEUTROPHILS ABSOLUTE: 5.5 K/UL (ref 1.7–7.7)
NEUTROPHILS RELATIVE PERCENT: 70.7 %
PDW BLD-RTO: 14.7 % (ref 12.4–15.4)
PHOSPHORUS: 2.1 MG/DL (ref 2.5–4.9)
PLATELET # BLD: 195 K/UL (ref 135–450)
PMV BLD AUTO: 8.6 FL (ref 5–10.5)
POTASSIUM SERPL-SCNC: 4.3 MMOL/L (ref 3.5–5.1)
RBC # BLD: 4.03 M/UL (ref 4.2–5.9)
SODIUM BLD-SCNC: 141 MMOL/L (ref 136–145)
WBC # BLD: 7.8 K/UL (ref 4–11)

## 2018-12-09 PROCEDURE — 6360000002 HC RX W HCPCS: Performed by: STUDENT IN AN ORGANIZED HEALTH CARE EDUCATION/TRAINING PROGRAM

## 2018-12-09 PROCEDURE — 6370000000 HC RX 637 (ALT 250 FOR IP): Performed by: STUDENT IN AN ORGANIZED HEALTH CARE EDUCATION/TRAINING PROGRAM

## 2018-12-09 PROCEDURE — 80069 RENAL FUNCTION PANEL: CPT

## 2018-12-09 PROCEDURE — 6370000000 HC RX 637 (ALT 250 FOR IP): Performed by: UROLOGY

## 2018-12-09 PROCEDURE — 2580000003 HC RX 258: Performed by: UROLOGY

## 2018-12-09 PROCEDURE — 85025 COMPLETE CBC W/AUTO DIFF WBC: CPT

## 2018-12-09 PROCEDURE — 83735 ASSAY OF MAGNESIUM: CPT

## 2018-12-09 PROCEDURE — 36415 COLL VENOUS BLD VENIPUNCTURE: CPT

## 2018-12-09 RX ORDER — DOCUSATE SODIUM 100 MG/1
100 CAPSULE, LIQUID FILLED ORAL 2 TIMES DAILY
Qty: 30 CAPSULE | Refills: 0 | Status: SHIPPED | OUTPATIENT
Start: 2018-12-09 | End: 2018-12-09

## 2018-12-09 RX ORDER — BISACODYL 10 MG
10 SUPPOSITORY, RECTAL RECTAL ONCE
Status: COMPLETED | OUTPATIENT
Start: 2018-12-09 | End: 2018-12-09

## 2018-12-09 RX ORDER — DOCUSATE SODIUM 100 MG/1
100 CAPSULE, LIQUID FILLED ORAL 2 TIMES DAILY
Qty: 30 CAPSULE | Refills: 0 | Status: SHIPPED | OUTPATIENT
Start: 2018-12-09 | End: 2018-12-23

## 2018-12-09 RX ORDER — HYDROCODONE BITARTRATE AND ACETAMINOPHEN 5; 325 MG/1; MG/1
1 TABLET ORAL EVERY 6 HOURS PRN
Qty: 28 TABLET | Refills: 0 | Status: SHIPPED | OUTPATIENT
Start: 2018-12-09 | End: 2018-12-09

## 2018-12-09 RX ORDER — MAGNESIUM SULFATE IN WATER 40 MG/ML
2 INJECTION, SOLUTION INTRAVENOUS ONCE
Status: COMPLETED | OUTPATIENT
Start: 2018-12-09 | End: 2018-12-09

## 2018-12-09 RX ORDER — HYDROCODONE BITARTRATE AND ACETAMINOPHEN 5; 325 MG/1; MG/1
1 TABLET ORAL EVERY 6 HOURS PRN
Qty: 28 TABLET | Refills: 0 | Status: SHIPPED | OUTPATIENT
Start: 2018-12-09 | End: 2018-12-16

## 2018-12-09 RX ORDER — METHOCARBAMOL 500 MG/1
500 TABLET, FILM COATED ORAL 4 TIMES DAILY PRN
Status: DISCONTINUED | OUTPATIENT
Start: 2018-12-09 | End: 2018-12-09 | Stop reason: HOSPADM

## 2018-12-09 RX ADMIN — METOPROLOL SUCCINATE 50 MG: 50 TABLET, EXTENDED RELEASE ORAL at 08:22

## 2018-12-09 RX ADMIN — MAGNESIUM SULFATE HEPTAHYDRATE 2 G: 40 INJECTION, SOLUTION INTRAVENOUS at 07:25

## 2018-12-09 RX ADMIN — DOCUSATE SODIUM 100 MG: 100 CAPSULE, LIQUID FILLED ORAL at 08:23

## 2018-12-09 RX ADMIN — METHOCARBAMOL 500 MG: 500 TABLET ORAL at 09:24

## 2018-12-09 RX ADMIN — Medication 10 ML: at 08:27

## 2018-12-09 RX ADMIN — DIVALPROEX SODIUM 250 MG: 250 TABLET, DELAYED RELEASE ORAL at 08:22

## 2018-12-09 RX ADMIN — HYDROCODONE BITARTRATE AND ACETAMINOPHEN 2 TABLET: 5; 325 TABLET ORAL at 12:56

## 2018-12-09 RX ADMIN — BISACODYL 10 MG: 10 SUPPOSITORY RECTAL at 10:12

## 2018-12-09 RX ADMIN — HYDROCODONE BITARTRATE AND ACETAMINOPHEN 2 TABLET: 5; 325 TABLET ORAL at 06:30

## 2018-12-09 RX ADMIN — HYDROCODONE BITARTRATE AND ACETAMINOPHEN 2 TABLET: 5; 325 TABLET ORAL at 02:30

## 2018-12-09 RX ADMIN — ENOXAPARIN SODIUM 30 MG: 40 INJECTION SUBCUTANEOUS at 08:22

## 2018-12-09 RX ADMIN — FLECAINIDE ACETATE 100 MG: 100 TABLET ORAL at 08:25

## 2018-12-09 RX ADMIN — TIMOLOL MALEATE 1 DROP: 2.5 SOLUTION OPHTHALMIC at 08:23

## 2018-12-09 RX ADMIN — CETIRIZINE HYDROCHLORIDE 10 MG: 10 TABLET, FILM COATED ORAL at 08:23

## 2018-12-09 ASSESSMENT — PAIN DESCRIPTION - ORIENTATION
ORIENTATION: RIGHT;OUTER
ORIENTATION: RIGHT;OUTER

## 2018-12-09 ASSESSMENT — PAIN DESCRIPTION - LOCATION
LOCATION: ABDOMEN
LOCATION: ABDOMEN

## 2018-12-09 ASSESSMENT — PAIN DESCRIPTION - PROGRESSION
CLINICAL_PROGRESSION: NOT CHANGED
CLINICAL_PROGRESSION: NOT CHANGED

## 2018-12-09 ASSESSMENT — PAIN DESCRIPTION - FREQUENCY
FREQUENCY: CONTINUOUS
FREQUENCY: CONTINUOUS

## 2018-12-09 ASSESSMENT — PAIN SCALES - GENERAL
PAINLEVEL_OUTOF10: 8
PAINLEVEL_OUTOF10: 7
PAINLEVEL_OUTOF10: 4

## 2018-12-09 ASSESSMENT — PAIN DESCRIPTION - PAIN TYPE
TYPE: SURGICAL PAIN
TYPE: SURGICAL PAIN

## 2018-12-09 ASSESSMENT — PAIN DESCRIPTION - DESCRIPTORS
DESCRIPTORS: ACHING;DISCOMFORT
DESCRIPTORS: ACHING;DISCOMFORT

## 2018-12-09 ASSESSMENT — ACTIVITIES OF DAILY LIVING (ADL): EFFECT OF PAIN ON DAILY ACTIVITIES: DISCOMFORT

## 2018-12-09 ASSESSMENT — PAIN DESCRIPTION - DIRECTION: RADIATING_TOWARDS: BACK

## 2018-12-09 ASSESSMENT — PAIN DESCRIPTION - ONSET: ONSET: AWAKENED FROM SLEEP

## 2018-12-10 ENCOUNTER — TELEPHONE (OUTPATIENT)
Dept: INTERNAL MEDICINE CLINIC | Age: 63
End: 2018-12-10

## 2018-12-13 ENCOUNTER — HOSPITAL ENCOUNTER (EMERGENCY)
Age: 63
Discharge: HOME | End: 2018-12-13
Attending: EMERGENCY MEDICINE
Payer: COMMERCIAL

## 2018-12-13 VITALS
OXYGEN SATURATION: 97 % | TEMPERATURE: 98.4 F | HEART RATE: 59 BPM | RESPIRATION RATE: 19 BRPM | DIASTOLIC BLOOD PRESSURE: 75 MMHG | SYSTOLIC BLOOD PRESSURE: 128 MMHG

## 2018-12-13 DIAGNOSIS — S86.811A STRAIN OF CALF MUSCLE, RIGHT, INITIAL ENCOUNTER: Primary | ICD-10-CM

## 2018-12-13 LAB
ANION GAP SERPL CALCULATED.3IONS-SCNC: 14 MMOL/L (ref 3–16)
BASOPHILS ABSOLUTE: 0.1 K/UL (ref 0–0.2)
BASOPHILS RELATIVE PERCENT: 0.8 %
BUN BLDV-MCNC: 20 MG/DL (ref 7–20)
CALCIUM SERPL-MCNC: 9.7 MG/DL (ref 8.3–10.6)
CHLORIDE BLD-SCNC: 101 MMOL/L (ref 99–110)
CO2: 23 MMOL/L (ref 21–32)
CREAT SERPL-MCNC: 1.1 MG/DL (ref 0.8–1.3)
EOSINOPHILS ABSOLUTE: 0.2 K/UL (ref 0–0.6)
EOSINOPHILS RELATIVE PERCENT: 2.7 %
GFR AFRICAN AMERICAN: >60
GFR NON-AFRICAN AMERICAN: >60
GLUCOSE BLD-MCNC: 160 MG/DL (ref 70–99)
HCT VFR BLD CALC: 40.1 % (ref 40.5–52.5)
HEMOGLOBIN: 13 G/DL (ref 13.5–17.5)
INR BLD: 1.32 (ref 0.86–1.14)
LYMPHOCYTES ABSOLUTE: 1.5 K/UL (ref 1–5.1)
LYMPHOCYTES RELATIVE PERCENT: 18.2 %
MCH RBC QN AUTO: 28.1 PG (ref 26–34)
MCHC RBC AUTO-ENTMCNC: 32.6 G/DL (ref 31–36)
MCV RBC AUTO: 86.4 FL (ref 80–100)
MONOCYTES ABSOLUTE: 0.7 K/UL (ref 0–1.3)
MONOCYTES RELATIVE PERCENT: 7.7 %
NEUTROPHILS ABSOLUTE: 6 K/UL (ref 1.7–7.7)
NEUTROPHILS RELATIVE PERCENT: 70.6 %
PDW BLD-RTO: 15 % (ref 12.4–15.4)
PLATELET # BLD: 385 K/UL (ref 135–450)
PMV BLD AUTO: 7.9 FL (ref 5–10.5)
POTASSIUM REFLEX MAGNESIUM: 5.1 MMOL/L (ref 3.5–5.1)
PROTHROMBIN TIME: 15.1 SEC (ref 9.8–13)
RBC # BLD: 4.64 M/UL (ref 4.2–5.9)
SODIUM BLD-SCNC: 138 MMOL/L (ref 136–145)
WBC # BLD: 8.5 K/UL (ref 4–11)

## 2018-12-13 PROCEDURE — 85025 COMPLETE CBC W/AUTO DIFF WBC: CPT

## 2018-12-13 PROCEDURE — 96374 THER/PROPH/DIAG INJ IV PUSH: CPT

## 2018-12-13 PROCEDURE — 85610 PROTHROMBIN TIME: CPT

## 2018-12-13 PROCEDURE — 6370000000 HC RX 637 (ALT 250 FOR IP): Performed by: EMERGENCY MEDICINE

## 2018-12-13 PROCEDURE — 99283 EMERGENCY DEPT VISIT LOW MDM: CPT

## 2018-12-13 PROCEDURE — 6360000002 HC RX W HCPCS: Performed by: EMERGENCY MEDICINE

## 2018-12-13 PROCEDURE — 80048 BASIC METABOLIC PNL TOTAL CA: CPT

## 2018-12-13 RX ORDER — LIDOCAINE 4 G/G
1 PATCH TOPICAL ONCE
Status: DISCONTINUED | OUTPATIENT
Start: 2018-12-13 | End: 2018-12-13 | Stop reason: HOSPADM

## 2018-12-13 RX ADMIN — HYDROMORPHONE HYDROCHLORIDE 1 MG: 1 INJECTION, SOLUTION INTRAMUSCULAR; INTRAVENOUS; SUBCUTANEOUS at 19:55

## 2018-12-13 ASSESSMENT — ENCOUNTER SYMPTOMS
CHEST TIGHTNESS: 0
SHORTNESS OF BREATH: 0
BACK PAIN: 0

## 2018-12-13 ASSESSMENT — PAIN SCALES - GENERAL
PAINLEVEL_OUTOF10: 8
PAINLEVEL_OUTOF10: 4
PAINLEVEL_OUTOF10: 8

## 2018-12-14 NOTE — ED PROVIDER NOTES
(LUMIGAN OP)    Place 1 drop into both eyes daily     DIVALPROEX (DEPAKOTE) 250 MG DR TABLET    TAKE ONE TABLET BY MOUTH THREE TIMES A DAY    DOCUSATE SODIUM (COLACE) 100 MG CAPSULE    Take 1 capsule by mouth 2 times daily for 14 days Please take while taking narcotic pain medicine. If you develop loose or watery stools, then stop taking. FENOFIBRATE (TRICOR) 145 MG TABLET    TAKE ONE TABLET BY MOUTH DAILY    FEXOFENADINE (ALLEGRA ALLERGY) 180 MG TABLET    Take 180 mg by mouth daily as needed    FLECAINIDE (TAMBOCOR) 100 MG TABLET    TAKE ONE TABLET BY MOUTH TWICE A DAY    HYDROCODONE-ACETAMINOPHEN (NORCO) 5-325 MG PER TABLET    Take 1 tablet by mouth every 6 hours as needed for Pain for up to 7 days. Paulene Search METHOCARBAMOL (ROBAXIN) 500 MG TABLET    Take 500 mg by mouth 4 times daily as needed    METOPROLOL SUCCINATE (TOPROL XL) 50 MG EXTENDED RELEASE TABLET    TAKE ONE TABLET BY MOUTH DAILY    ROSUVASTATIN (CRESTOR) 40 MG TABLET    TAKE ONE TABLET BY MOUTH DAILY    SUMATRIPTAN (IMITREX) 100 MG TABLET    TAKE ONE TABLET ONCE AS NEEDED **MAY REPEAT IN 2 HOURS IF NEEDED. MAX OF 2 TABLETS PER 24 HOURS**    TIMOLOL (TIMOPTIC) 0.25 % OPHTHALMIC SOLUTION    Place 1 drop into both eyes daily     WARFARIN (COUMADIN) 5 MG TABLET    Take 6 mg by mouth See Admin Instructions Take 6 mg on MWF, 7 mg on all other days       Allergies     He has No Known Allergies. Physical Exam     INITIAL VITALS: BP: (!) 152/70, Temp: 98.4 °F (36.9 °C), Pulse: 59, Resp: 19, SpO2: 99 %   Physical Exam   Constitutional: He is oriented to person, place, and time. He appears well-developed and well-nourished. No distress. HENT:   Head: Normocephalic and atraumatic. Pulmonary/Chest: Effort normal. No respiratory distress. Abdominal: Soft. Large oblique scar over the right lower back and flank. Staples are in place. No drainage from the wound. No significant erythema. Musculoskeletal: Normal range of motion.  He exhibits tenderness (to the Left calf    HYDROmorphone (DILAUDID) injection 1 mg       CONSULTS:  None    MEDICAL DECISIONMAKING / ASSESSMENT / PLAN     Mignon Sicard is a 58 y.o. male who presents with acute onset of right calf pain while walking. No injuries to suggest underlying bony fracture. No bruising or edema to suggest ruptured gastrocnemius or ruptured Baker's cyst. He was treated for pain symptomatically with some relief. He was able to stand up on this leg. Likely a strain of the muscle. Incidentally he was noted to have low INR level. Advised to take a full 8 mg through the weekend and have his level rechecked on Monday. Clinical Impression     1.  Strain of calf muscle, right, initial encounter        Disposition     PATIENT REFERRED TO:  Jaquan Brantley MD  [facility] [address]  [phone]      As needed      DISCHARGE MEDICATIONS:  New Prescriptions    No medications on file       DISPOSITION Decision To Discharge 12/13/2018 08:46:37 PM         Wing Downs MD  12/13/18 0902

## 2018-12-17 DIAGNOSIS — I48.0 PAROXYSMAL ATRIAL FIBRILLATION (HCC): Primary | ICD-10-CM

## 2018-12-17 LAB
INR BLD: 2.25 (ref 0.86–1.14)
PROTHROMBIN TIME: 25.6 SEC (ref 9.8–13)

## 2018-12-18 ENCOUNTER — ANTI-COAG VISIT (OUTPATIENT)
Dept: INTERNAL MEDICINE CLINIC | Age: 63
End: 2018-12-18

## 2018-12-19 ENCOUNTER — OFFICE VISIT (OUTPATIENT)
Dept: INTERNAL MEDICINE CLINIC | Age: 63
End: 2018-12-19
Payer: COMMERCIAL

## 2018-12-19 VITALS
BODY MASS INDEX: 28.97 KG/M2 | DIASTOLIC BLOOD PRESSURE: 78 MMHG | WEIGHT: 233 LBS | SYSTOLIC BLOOD PRESSURE: 138 MMHG | HEIGHT: 75 IN

## 2018-12-19 DIAGNOSIS — R73.9 HYPERGLYCEMIA: ICD-10-CM

## 2018-12-19 DIAGNOSIS — S86.811A STRAIN OF CALF MUSCLE, RIGHT, INITIAL ENCOUNTER: ICD-10-CM

## 2018-12-19 DIAGNOSIS — I48.0 PAROXYSMAL ATRIAL FIBRILLATION (HCC): ICD-10-CM

## 2018-12-19 DIAGNOSIS — Z09 HOSPITAL DISCHARGE FOLLOW-UP: Primary | ICD-10-CM

## 2018-12-19 DIAGNOSIS — M72.2 PLANTAR FASCIITIS: ICD-10-CM

## 2018-12-19 DIAGNOSIS — C64.1 RENAL CELL CARCINOMA OF RIGHT KIDNEY (HCC): ICD-10-CM

## 2018-12-19 PROBLEM — S86.819A STRAIN OF CALF MUSCLE: Status: ACTIVE | Noted: 2018-12-19

## 2018-12-19 PROBLEM — N28.89 RENAL MASS: Status: RESOLVED | Noted: 2018-09-12 | Resolved: 2018-12-19

## 2018-12-19 PROCEDURE — 1111F DSCHRG MED/CURRENT MED MERGE: CPT | Performed by: INTERNAL MEDICINE

## 2018-12-19 PROCEDURE — 99495 TRANSJ CARE MGMT MOD F2F 14D: CPT | Performed by: INTERNAL MEDICINE

## 2018-12-19 ASSESSMENT — ENCOUNTER SYMPTOMS
RESPIRATORY NEGATIVE: 1
CHEST TIGHTNESS: 0
SHORTNESS OF BREATH: 0
ABDOMINAL PAIN: 1

## 2019-01-04 NOTE — DISCHARGE SUMMARY
Discharge Summary      Patient:  Brittany Chery Date: 12/6/2018  5:48 AM  Discharge Date: 12/9/2018     Admitting Physician: Mariaa Hauser MD     Discharge Physician: Same    Admitting Diagnosis:  Renal cell carcinoma of right kidney [facility])    Discharge Diagnosis: Same    Consults:     HPI:   Wendy Richards is a 77-year-old gentleman that presented with hematuria. A CT scan was done showing a 2.5 cm right renal mass. This mass is in a very difficult position and therefore, he was sent to Interventional Radiology to do a biopsy of this mass, which came back consistent with renal cell carcinoma. Due to the difficult location of the tumor, an open procedure was performed. Hospital Course:   Patient underwent planned open right partial nephrectomy on hospital day 1. Patient recovered well in the PACU and was transferred to the med/surg floor. Patient had no post-operative complications, only had slow return of bowel function which was marked by regular passage of flatus by POD3 with return of appetite and tolerance of PO intake. Patient was discharged home of POD3 with instructions to follow-up as an out-patient.      Disposition:  Home    Condition at discharge:  Stable    Discharge Instructions:  See separate form    Moris Elliott MD, MPH  PGY1 General Surgery  01/04/19  1:11 PM  [phone]

## 2019-01-05 DIAGNOSIS — I48.0 PAROXYSMAL ATRIAL FIBRILLATION (HCC): ICD-10-CM

## 2019-01-05 LAB
INR BLD: 2.91 (ref 0.86–1.14)
PROTHROMBIN TIME: 33.2 SEC (ref 9.8–13)

## 2019-01-08 ENCOUNTER — ANTI-COAG VISIT (OUTPATIENT)
Dept: INTERNAL MEDICINE CLINIC | Age: 64
End: 2019-01-08

## 2019-01-16 ENCOUNTER — OFFICE VISIT (OUTPATIENT)
Dept: INTERNAL MEDICINE CLINIC | Age: 64
End: 2019-01-16
Payer: COMMERCIAL

## 2019-01-16 ENCOUNTER — TELEPHONE (OUTPATIENT)
Dept: INTERNAL MEDICINE CLINIC | Age: 64
End: 2019-01-16

## 2019-01-16 VITALS
DIASTOLIC BLOOD PRESSURE: 80 MMHG | BODY MASS INDEX: 29.84 KG/M2 | SYSTOLIC BLOOD PRESSURE: 122 MMHG | HEIGHT: 75 IN | WEIGHT: 240 LBS

## 2019-01-16 DIAGNOSIS — C64.1 RENAL CELL CARCINOMA OF RIGHT KIDNEY (HCC): ICD-10-CM

## 2019-01-16 DIAGNOSIS — R10.31 CHRONIC RLQ PAIN: Primary | ICD-10-CM

## 2019-01-16 DIAGNOSIS — G89.29 CHRONIC RLQ PAIN: Primary | ICD-10-CM

## 2019-01-16 DIAGNOSIS — R10.12 LUQ PAIN: ICD-10-CM

## 2019-01-16 DIAGNOSIS — R73.9 HYPERGLYCEMIA: ICD-10-CM

## 2019-01-16 DIAGNOSIS — I26.99 OTHER PULMONARY EMBOLISM WITHOUT ACUTE COR PULMONALE, UNSPECIFIED CHRONICITY (HCC): ICD-10-CM

## 2019-01-16 DIAGNOSIS — R10.84 GENERALIZED ABDOMINAL PAIN: ICD-10-CM

## 2019-01-16 PROCEDURE — 99214 OFFICE O/P EST MOD 30 MIN: CPT | Performed by: INTERNAL MEDICINE

## 2019-01-16 ASSESSMENT — ENCOUNTER SYMPTOMS
ABDOMINAL PAIN: 1
RESPIRATORY NEGATIVE: 1
NAUSEA: 0
ABDOMINAL DISTENTION: 1
BLOOD IN STOOL: 0
CONSTIPATION: 0
CHEST TIGHTNESS: 0
SHORTNESS OF BREATH: 0
DIARRHEA: 0

## 2019-01-16 ASSESSMENT — PATIENT HEALTH QUESTIONNAIRE - PHQ9
SUM OF ALL RESPONSES TO PHQ QUESTIONS 1-9: 1
2. FEELING DOWN, DEPRESSED OR HOPELESS: 0
1. LITTLE INTEREST OR PLEASURE IN DOING THINGS: 1
SUM OF ALL RESPONSES TO PHQ QUESTIONS 1-9: 1
SUM OF ALL RESPONSES TO PHQ9 QUESTIONS 1 & 2: 1

## 2019-01-29 ENCOUNTER — HOSPITAL ENCOUNTER (OUTPATIENT)
Dept: CT IMAGING | Age: 64
Discharge: HOME | End: 2019-01-29
Payer: COMMERCIAL

## 2019-01-29 DIAGNOSIS — R10.31 CHRONIC RLQ PAIN: ICD-10-CM

## 2019-01-29 DIAGNOSIS — R10.12 LUQ PAIN: ICD-10-CM

## 2019-01-29 DIAGNOSIS — G89.29 CHRONIC RLQ PAIN: ICD-10-CM

## 2019-01-29 LAB
GFR AFRICAN AMERICAN: >60
GFR NON-AFRICAN AMERICAN: 55
PERFORMED ON: ABNORMAL
POC CREATININE: 1.3 MG/DL (ref 0.8–1.3)
POC SAMPLE TYPE: ABNORMAL

## 2019-01-29 PROCEDURE — 82565 ASSAY OF CREATININE: CPT

## 2019-01-29 PROCEDURE — 74178 CT ABD&PLV WO CNTR FLWD CNTR: CPT

## 2019-01-29 PROCEDURE — 6360000004 HC RX CONTRAST MEDICATION: Performed by: INTERNAL MEDICINE

## 2019-01-29 RX ADMIN — IOHEXOL 50 ML: 240 INJECTION, SOLUTION INTRATHECAL; INTRAVASCULAR; INTRAVENOUS; ORAL at 16:41

## 2019-01-29 RX ADMIN — IOPAMIDOL 80 ML: 755 INJECTION, SOLUTION INTRAVENOUS at 16:40

## 2019-01-31 DIAGNOSIS — I48.0 PAROXYSMAL ATRIAL FIBRILLATION (HCC): ICD-10-CM

## 2019-01-31 LAB
INR BLD: 2.31 (ref 0.86–1.14)
PROTHROMBIN TIME: 26.3 SEC (ref 9.8–13)

## 2019-02-01 ENCOUNTER — ANTI-COAG VISIT (OUTPATIENT)
Dept: INTERNAL MEDICINE CLINIC | Age: 64
End: 2019-02-01

## 2019-02-18 RX ORDER — WARFARIN SODIUM 1 MG/1
TABLET ORAL
Qty: 180 TABLET | Refills: 2 | Status: SHIPPED | OUTPATIENT
Start: 2019-02-18 | End: 2020-04-14

## 2019-04-23 ENCOUNTER — ANTI-COAG VISIT (OUTPATIENT)
Dept: INTERNAL MEDICINE CLINIC | Age: 64
End: 2019-04-23

## 2019-05-29 ENCOUNTER — OFFICE VISIT (OUTPATIENT)
Dept: CARDIOLOGY CLINIC | Age: 64
End: 2019-05-29
Payer: COMMERCIAL

## 2019-05-29 VITALS
WEIGHT: 234 LBS | DIASTOLIC BLOOD PRESSURE: 80 MMHG | SYSTOLIC BLOOD PRESSURE: 138 MMHG | BODY MASS INDEX: 29.25 KG/M2 | HEART RATE: 68 BPM

## 2019-05-29 DIAGNOSIS — R00.2 PALPITATIONS: ICD-10-CM

## 2019-05-29 DIAGNOSIS — I48.0 PAROXYSMAL ATRIAL FIBRILLATION (HCC): Primary | ICD-10-CM

## 2019-05-29 DIAGNOSIS — I27.20 PULMONARY HTN (HCC): ICD-10-CM

## 2019-05-29 PROCEDURE — 99214 OFFICE O/P EST MOD 30 MIN: CPT | Performed by: INTERNAL MEDICINE

## 2019-05-29 NOTE — PROGRESS NOTES
Comment: 4 drinks weekly    Drug use: No    Sexual activity: Yes     Partners: Female   Lifestyle    Physical activity:     Days per week: Not on file     Minutes per session: Not on file    Stress: Not on file   Relationships    Social connections:     Talks on phone: Not on file     Gets together: Not on file     Attends Rastafari service: Not on file     Active member of club or organization: Not on file     Attends meetings of clubs or organizations: Not on file     Relationship status: Not on file    Intimate partner violence:     Fear of current or ex partner: Not on file     Emotionally abused: Not on file     Physically abused: Not on file     Forced sexual activity: Not on file   Other Topics Concern    Not on file   Social History Narrative    Not on file        Patient has a family history includes Diabetes in his father and mother; Uterine Cancer in his mother. Patient  has a past medical history of Allergic rhinitis due to other allergen, Atrial fibrillation ([address] Cancer ([facility] [address] Hx of blood clots, Hypopotassemia, Migraines, Other abnormal blood chemistry, Other and unspecified hyperlipidemia, Screening PSA (prostate specific antigen), Sleep apnea, Tremor, Unspecified sleep apnea, and Vitreous degeneration.      Current Outpatient Medications   Medication Sig Dispense Refill    warfarin (COUMADIN) 1 MG tablet TAKE ONE TABLET BY MOUTH TWICE A  tablet 2    fexofenadine (ALLEGRA ALLERGY) 180 MG tablet Take 180 mg by mouth daily as needed      methocarbamol (ROBAXIN) 500 MG tablet Take 500 mg by mouth 4 times daily as needed      warfarin (COUMADIN) 5 MG tablet Take 6 mg by mouth See Admin Instructions Take 6 mg on MWF, 7 mg on all other days      fenofibrate (TRICOR) 145 MG tablet TAKE ONE TABLET BY MOUTH DAILY 90 tablet 2    flecainide (TAMBOCOR) 100 MG tablet TAKE ONE TABLET BY MOUTH TWICE A  tablet 2    metoprolol succinate (TOPROL XL) 50 MG extended release tablet TAKE ONE TABLET BY MOUTH DAILY 90 tablet 2    rosuvastatin (CRESTOR) 40 MG tablet TAKE ONE TABLET BY MOUTH DAILY 90 tablet 3    divalproex (DEPAKOTE) 250 MG DR tablet TAKE ONE TABLET BY MOUTH THREE TIMES A  tablet 2    SUMAtriptan (IMITREX) 100 MG tablet TAKE ONE TABLET ONCE AS NEEDED **MAY REPEAT IN 2 HOURS IF NEEDED. MAX OF 2 TABLETS PER 24 HOURS** 27 tablet 3    Bimatoprost (LUMIGAN OP) Place 1 drop into both eyes daily       timolol (TIMOPTIC) 0.25 % ophthalmic solution Place 1 drop into both eyes daily        No current facility-administered medications for this visit. Vitals:    05/29/19 1525   BP: 138/80   Pulse: 68     Wt 234      Review of Systems   Constitutional: Negative for activity change and fatigue. Respiratory: Negative for apnea, cough, choking, chest tightness and shortness of breath. Cardiovascular: Negative for chest pain, palpitations and leg swelling. [No PND or orthopnea. No tachycardia. Gastrointestinal: Negative for abdominal distention. Musculoskeletal: Negative for myalgias. Neurological: Negative for dizziness, syncope and light-headedness. Psychiatric/Behavioral: Negative for behavioral problems, confusion and agitation. Other systems reviewed negative as done. Objective:   Physical Exam   Constitutional: He is oriented to person, place, and time. He appears well-developed and well-nourished. No distress. HENT:   Head: Normocephalic and atraumatic. Eyes: EOM are normal. Right eye exhibits no discharge. Left eye exhibits no discharge. Neck: Normal range of motion. Neck supple. No JVD present. Cardiovascular: Normal rate, regular rhythm, S1 normal, S2 normal and normal heart sounds. Exam reveals no gallop. No murmur heard. Pulses:       Radial pulses are 2+ on the right side, and 2+ on the left side. Pulmonary/Chest: Effort normal and breath sounds normal. No respiratory distress. He has no wheezes. He has no rales. Abdominal: Soft.

## 2019-06-03 DIAGNOSIS — I48.0 PAROXYSMAL ATRIAL FIBRILLATION (HCC): ICD-10-CM

## 2019-06-03 LAB
INR BLD: 2.64 (ref 0.86–1.14)
PROTHROMBIN TIME: 30.1 SEC (ref 9.8–13)

## 2019-06-04 ENCOUNTER — ANTI-COAG VISIT (OUTPATIENT)
Dept: INTERNAL MEDICINE CLINIC | Age: 64
End: 2019-06-04

## 2019-06-06 RX ORDER — METOPROLOL SUCCINATE 50 MG/1
TABLET, EXTENDED RELEASE ORAL
Qty: 90 TABLET | Refills: 1 | Status: SHIPPED | OUTPATIENT
Start: 2019-06-06 | End: 2020-03-31

## 2019-06-06 RX ORDER — FENOFIBRATE 145 MG/1
TABLET, COATED ORAL
Qty: 90 TABLET | Refills: 1 | Status: SHIPPED | OUTPATIENT
Start: 2019-06-06 | End: 2020-03-31

## 2019-07-05 RX ORDER — DIVALPROEX SODIUM 250 MG/1
TABLET, DELAYED RELEASE ORAL
Qty: 270 TABLET | Refills: 1 | Status: SHIPPED | OUTPATIENT
Start: 2019-07-05 | End: 2020-01-16

## 2019-07-05 RX ORDER — WARFARIN SODIUM 5 MG/1
TABLET ORAL
Qty: 180 TABLET | Refills: 2 | Status: SHIPPED | OUTPATIENT
Start: 2019-07-05 | End: 2020-08-26

## 2019-08-28 RX ORDER — FLECAINIDE ACETATE 100 MG/1
TABLET ORAL
Qty: 180 TABLET | Refills: 1 | Status: SHIPPED | OUTPATIENT
Start: 2019-08-28 | End: 2020-03-13

## 2019-09-09 DIAGNOSIS — I48.0 PAROXYSMAL ATRIAL FIBRILLATION (HCC): ICD-10-CM

## 2019-09-09 LAB
INR BLD: 2.56 (ref 0.86–1.14)
PROTHROMBIN TIME: 29.2 SEC (ref 9.8–13)

## 2019-09-10 ENCOUNTER — ANTI-COAG VISIT (OUTPATIENT)
Dept: INTERNAL MEDICINE CLINIC | Age: 64
End: 2019-09-10

## 2019-10-14 ENCOUNTER — TELEPHONE (OUTPATIENT)
Dept: INTERNAL MEDICINE CLINIC | Age: 64
End: 2019-10-14

## 2019-10-14 DIAGNOSIS — E78.00 PURE HYPERCHOLESTEROLEMIA: ICD-10-CM

## 2019-10-14 DIAGNOSIS — I48.0 PAROXYSMAL ATRIAL FIBRILLATION (HCC): ICD-10-CM

## 2019-10-14 DIAGNOSIS — G43.909 MIGRAINE WITHOUT STATUS MIGRAINOSUS, NOT INTRACTABLE, UNSPECIFIED MIGRAINE TYPE: ICD-10-CM

## 2019-10-14 DIAGNOSIS — C64.1 RENAL CELL CARCINOMA OF RIGHT KIDNEY (HCC): Primary | ICD-10-CM

## 2019-10-14 DIAGNOSIS — R73.9 HYPERGLYCEMIA: ICD-10-CM

## 2019-10-14 DIAGNOSIS — E78.5 HYPERLIPIDEMIA, UNSPECIFIED HYPERLIPIDEMIA TYPE: ICD-10-CM

## 2019-10-14 DIAGNOSIS — R53.83 FATIGUE, UNSPECIFIED TYPE: ICD-10-CM

## 2019-10-14 DIAGNOSIS — C64.1 RENAL CELL CARCINOMA OF RIGHT KIDNEY (HCC): ICD-10-CM

## 2019-10-14 LAB
A/G RATIO: 2.6 (ref 1.1–2.2)
ALBUMIN SERPL-MCNC: 4.7 G/DL (ref 3.4–5)
ALP BLD-CCNC: 34 U/L (ref 40–129)
ALT SERPL-CCNC: 23 U/L (ref 10–40)
ANION GAP SERPL CALCULATED.3IONS-SCNC: 14 MMOL/L (ref 3–16)
AST SERPL-CCNC: 21 U/L (ref 15–37)
BASOPHILS ABSOLUTE: 0 K/UL (ref 0–0.2)
BASOPHILS RELATIVE PERCENT: 0.7 %
BILIRUB SERPL-MCNC: 0.4 MG/DL (ref 0–1)
BUN BLDV-MCNC: 25 MG/DL (ref 7–20)
CALCIUM SERPL-MCNC: 9.6 MG/DL (ref 8.3–10.6)
CHLORIDE BLD-SCNC: 104 MMOL/L (ref 99–110)
CHOLESTEROL, TOTAL: 159 MG/DL (ref 0–199)
CO2: 25 MMOL/L (ref 21–32)
CREAT SERPL-MCNC: 1.1 MG/DL (ref 0.8–1.3)
CREATININE URINE: 124.6 MG/DL (ref 39–259)
EOSINOPHILS ABSOLUTE: 0.1 K/UL (ref 0–0.6)
EOSINOPHILS RELATIVE PERCENT: 2.2 %
GFR AFRICAN AMERICAN: >60
GFR NON-AFRICAN AMERICAN: >60
GLOBULIN: 1.8 G/DL
GLUCOSE BLD-MCNC: 78 MG/DL (ref 70–99)
HCT VFR BLD CALC: 42 % (ref 40.5–52.5)
HDLC SERPL-MCNC: 40 MG/DL (ref 40–60)
HEMOGLOBIN: 13.8 G/DL (ref 13.5–17.5)
LDL CHOLESTEROL CALCULATED: 91 MG/DL
LYMPHOCYTES ABSOLUTE: 1.8 K/UL (ref 1–5.1)
LYMPHOCYTES RELATIVE PERCENT: 29.5 %
MCH RBC QN AUTO: 28.2 PG (ref 26–34)
MCHC RBC AUTO-ENTMCNC: 32.8 G/DL (ref 31–36)
MCV RBC AUTO: 86.1 FL (ref 80–100)
MICROALBUMIN UR-MCNC: 4.3 MG/DL
MICROALBUMIN/CREAT UR-RTO: 34.5 MG/G (ref 0–30)
MONOCYTES ABSOLUTE: 0.4 K/UL (ref 0–1.3)
MONOCYTES RELATIVE PERCENT: 6.9 %
NEUTROPHILS ABSOLUTE: 3.8 K/UL (ref 1.7–7.7)
NEUTROPHILS RELATIVE PERCENT: 60.7 %
PDW BLD-RTO: 15.3 % (ref 12.4–15.4)
PLATELET # BLD: 235 K/UL (ref 135–450)
PMV BLD AUTO: 9.7 FL (ref 5–10.5)
POTASSIUM SERPL-SCNC: 4.7 MMOL/L (ref 3.5–5.1)
RBC # BLD: 4.88 M/UL (ref 4.2–5.9)
SODIUM BLD-SCNC: 143 MMOL/L (ref 136–145)
TOTAL PROTEIN: 6.5 G/DL (ref 6.4–8.2)
TRIGL SERPL-MCNC: 138 MG/DL (ref 0–150)
TSH SERPL DL<=0.05 MIU/L-ACNC: 3.44 UIU/ML (ref 0.27–4.2)
VLDLC SERPL CALC-MCNC: 28 MG/DL
WBC # BLD: 6.2 K/UL (ref 4–11)

## 2019-10-15 LAB
ESTIMATED AVERAGE GLUCOSE: 125.5 MG/DL
HBA1C MFR BLD: 6 %

## 2019-10-16 ENCOUNTER — OFFICE VISIT (OUTPATIENT)
Dept: INTERNAL MEDICINE CLINIC | Age: 64
End: 2019-10-16
Payer: COMMERCIAL

## 2019-10-16 VITALS
DIASTOLIC BLOOD PRESSURE: 80 MMHG | HEIGHT: 75 IN | WEIGHT: 240 LBS | SYSTOLIC BLOOD PRESSURE: 124 MMHG | BODY MASS INDEX: 29.84 KG/M2

## 2019-10-16 DIAGNOSIS — H43.819 VITREOUS DEGENERATION, UNSPECIFIED LATERALITY: ICD-10-CM

## 2019-10-16 DIAGNOSIS — R73.9 HYPERGLYCEMIA: ICD-10-CM

## 2019-10-16 DIAGNOSIS — C64.1 RENAL CELL CARCINOMA OF RIGHT KIDNEY (HCC): ICD-10-CM

## 2019-10-16 DIAGNOSIS — I26.99 OTHER PULMONARY EMBOLISM WITHOUT ACUTE COR PULMONALE, UNSPECIFIED CHRONICITY (HCC): ICD-10-CM

## 2019-10-16 DIAGNOSIS — G43.909 MIGRAINE WITHOUT STATUS MIGRAINOSUS, NOT INTRACTABLE, UNSPECIFIED MIGRAINE TYPE: ICD-10-CM

## 2019-10-16 DIAGNOSIS — E78.5 HYPERLIPIDEMIA, UNSPECIFIED HYPERLIPIDEMIA TYPE: ICD-10-CM

## 2019-10-16 DIAGNOSIS — I48.0 PAROXYSMAL ATRIAL FIBRILLATION (HCC): ICD-10-CM

## 2019-10-16 DIAGNOSIS — Z12.5 PROSTATE CANCER SCREENING: Primary | ICD-10-CM

## 2019-10-16 PROBLEM — S86.819A STRAIN OF CALF MUSCLE: Status: RESOLVED | Noted: 2018-12-19 | Resolved: 2019-10-16

## 2019-10-16 PROCEDURE — 99397 PER PM REEVAL EST PAT 65+ YR: CPT | Performed by: INTERNAL MEDICINE

## 2019-10-16 ASSESSMENT — ENCOUNTER SYMPTOMS
SHORTNESS OF BREATH: 0
ABDOMINAL PAIN: 0
CHEST TIGHTNESS: 0
RESPIRATORY NEGATIVE: 1

## 2019-10-16 ASSESSMENT — PATIENT HEALTH QUESTIONNAIRE - PHQ9
SUM OF ALL RESPONSES TO PHQ QUESTIONS 1-9: 0
SUM OF ALL RESPONSES TO PHQ QUESTIONS 1-9: 0
SUM OF ALL RESPONSES TO PHQ9 QUESTIONS 1 & 2: 0
1. LITTLE INTEREST OR PLEASURE IN DOING THINGS: 0
2. FEELING DOWN, DEPRESSED OR HOPELESS: 0

## 2019-10-24 RX ORDER — ROSUVASTATIN CALCIUM 40 MG/1
TABLET, COATED ORAL
Qty: 90 TABLET | Refills: 2 | Status: SHIPPED | OUTPATIENT
Start: 2019-10-24 | End: 2020-08-12 | Stop reason: SDUPTHER

## 2019-12-03 ENCOUNTER — OFFICE VISIT (OUTPATIENT)
Dept: CARDIOLOGY CLINIC | Age: 64
End: 2019-12-03
Payer: COMMERCIAL

## 2019-12-03 VITALS
WEIGHT: 240 LBS | BODY MASS INDEX: 30 KG/M2 | DIASTOLIC BLOOD PRESSURE: 70 MMHG | SYSTOLIC BLOOD PRESSURE: 114 MMHG | HEART RATE: 60 BPM

## 2019-12-03 DIAGNOSIS — R00.2 PALPITATIONS: ICD-10-CM

## 2019-12-03 DIAGNOSIS — I27.20 PULMONARY HTN (HCC): ICD-10-CM

## 2019-12-03 DIAGNOSIS — I48.0 PAROXYSMAL ATRIAL FIBRILLATION (HCC): Primary | ICD-10-CM

## 2019-12-03 PROCEDURE — 99214 OFFICE O/P EST MOD 30 MIN: CPT | Performed by: INTERNAL MEDICINE

## 2019-12-26 ENCOUNTER — TELEPHONE (OUTPATIENT)
Dept: INTERNAL MEDICINE CLINIC | Age: 64
End: 2019-12-26

## 2019-12-26 ENCOUNTER — ANTI-COAG VISIT (OUTPATIENT)
Dept: INTERNAL MEDICINE CLINIC | Age: 64
End: 2019-12-26

## 2019-12-26 ENCOUNTER — HOSPITAL ENCOUNTER (OUTPATIENT)
Dept: NON INVASIVE DIAGNOSTICS | Age: 64
Discharge: HOME | End: 2019-12-26
Payer: COMMERCIAL

## 2019-12-26 DIAGNOSIS — I48.0 PAROXYSMAL ATRIAL FIBRILLATION (HCC): ICD-10-CM

## 2019-12-26 DIAGNOSIS — Z12.5 PROSTATE CANCER SCREENING: ICD-10-CM

## 2019-12-26 DIAGNOSIS — I27.20 PULMONARY HTN (HCC): ICD-10-CM

## 2019-12-26 LAB
INR BLD: 5.4 (ref 0.86–1.14)
LV EF: 58 %
LVEF MODALITY: NORMAL
PROSTATE SPECIFIC ANTIGEN: 1.11 NG/ML (ref 0–4)
PROTHROMBIN TIME: 63.7 SEC (ref 10–13.2)

## 2019-12-26 PROCEDURE — 93306 TTE W/DOPPLER COMPLETE: CPT

## 2019-12-31 ENCOUNTER — HOSPITAL ENCOUNTER (OUTPATIENT)
Dept: GENERAL RADIOLOGY | Age: 64
Discharge: HOME | End: 2019-12-31
Payer: COMMERCIAL

## 2019-12-31 ENCOUNTER — OFFICE VISIT (OUTPATIENT)
Dept: INTERNAL MEDICINE CLINIC | Age: 64
End: 2019-12-31
Payer: COMMERCIAL

## 2019-12-31 ENCOUNTER — HOSPITAL ENCOUNTER (OUTPATIENT)
Age: 64
Discharge: HOME | End: 2019-12-31
Payer: COMMERCIAL

## 2019-12-31 VITALS
BODY MASS INDEX: 30.46 KG/M2 | DIASTOLIC BLOOD PRESSURE: 88 MMHG | WEIGHT: 245 LBS | HEIGHT: 75 IN | SYSTOLIC BLOOD PRESSURE: 140 MMHG

## 2019-12-31 DIAGNOSIS — S67.01XA CRUSHING INJURY OF RIGHT THUMB, INITIAL ENCOUNTER: ICD-10-CM

## 2019-12-31 DIAGNOSIS — G89.29 CHRONIC LEFT SHOULDER PAIN: ICD-10-CM

## 2019-12-31 DIAGNOSIS — I26.99 OTHER PULMONARY EMBOLISM WITHOUT ACUTE COR PULMONALE, UNSPECIFIED CHRONICITY (HCC): ICD-10-CM

## 2019-12-31 DIAGNOSIS — M25.512 CHRONIC LEFT SHOULDER PAIN: ICD-10-CM

## 2019-12-31 DIAGNOSIS — I10 ESSENTIAL HYPERTENSION: ICD-10-CM

## 2019-12-31 DIAGNOSIS — I48.0 PAROXYSMAL ATRIAL FIBRILLATION (HCC): ICD-10-CM

## 2019-12-31 DIAGNOSIS — R73.9 HYPERGLYCEMIA: ICD-10-CM

## 2019-12-31 PROCEDURE — 99214 OFFICE O/P EST MOD 30 MIN: CPT | Performed by: INTERNAL MEDICINE

## 2019-12-31 PROCEDURE — 73140 X-RAY EXAM OF FINGER(S): CPT

## 2019-12-31 PROCEDURE — 73030 X-RAY EXAM OF SHOULDER: CPT

## 2019-12-31 RX ORDER — METHOCARBAMOL 500 MG/1
500 TABLET, FILM COATED ORAL 4 TIMES DAILY PRN
Qty: 60 TABLET | Refills: 3 | Status: SHIPPED | OUTPATIENT
Start: 2019-12-31 | End: 2020-08-26 | Stop reason: SDUPTHER

## 2020-01-16 RX ORDER — DIVALPROEX SODIUM 250 MG/1
TABLET, DELAYED RELEASE ORAL
Qty: 270 TABLET | Refills: 0 | Status: SHIPPED | OUTPATIENT
Start: 2020-01-16 | End: 2020-04-14

## 2020-01-27 ENCOUNTER — TELEPHONE (OUTPATIENT)
Dept: INTERNAL MEDICINE CLINIC | Age: 65
End: 2020-01-27

## 2020-01-27 NOTE — TELEPHONE ENCOUNTER
Patient called stating he went Saturday to have his pro time checked and was told he no longer had a standing order. They said he needs a new one. Please call to advise.

## 2020-01-30 ENCOUNTER — OFFICE VISIT (OUTPATIENT)
Dept: ORTHOPEDIC SURGERY | Age: 65
End: 2020-01-30
Payer: COMMERCIAL

## 2020-01-30 VITALS — WEIGHT: 240 LBS | HEIGHT: 75 IN | BODY MASS INDEX: 29.84 KG/M2

## 2020-01-30 PROCEDURE — 99203 OFFICE O/P NEW LOW 30 MIN: CPT | Performed by: ORTHOPAEDIC SURGERY

## 2020-01-30 PROCEDURE — L3924 HFO WITHOUT JOINTS PRE OTS: HCPCS | Performed by: ORTHOPAEDIC SURGERY

## 2020-02-03 NOTE — PROGRESS NOTES
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 This has become very sharp and stabbing discomfort especially when grasping objects. The patient denies any numbness or tingling. The patient denies any recent injury . Pain isintermittent, typically mild in intensity. Symptoms are slowly worsening over time. No history of gout rheumatoid arthritis or other inflammatory arthropathy  He denies any specific event or change in activity leading to the onset  No history of rheumatoid arthritis or other inflammatory arthropathy  He mainly notices this intermittently with certain pinching and gripping activities that occur quite randomly but have become more frequent     he was referred by:  Dr. Lalo Mcdaniels. ROS:  Pertinent items are noted in HPI  Review of systems reviewed from Patient History Form dated on 1/30/20 and available in the patient's chart under the Media tab. PHYSICAL EXAMINATION:   Gen/Psych: Examination reveals a pleasant individual in no acute distress. The patient is oriented to time, place and person. The patient's mood and affect are appropriate. Lymph: The lymphatic examination bilaterally reveals all areas to be without enlargement or induration. Skin intact without lymphadenopathy, discoloration, or abnormal temperature. Vascular: There is intact, symmetric circulation in both upper extremities. Musculoskeletal       Cervical spine, shoulders, elbows, wrist:  satisfactory pain-free range of motion,                                                                           strength, and stability        Right thumb(s): Mild tenderness is elicited with CMC grind          There is pain on firm pressure over the trapeziometacarpal joint.  Satisfactory stability          exists at the

## 2020-02-29 LAB
INR BLD: 2.23 (ref 0.86–1.14)
PROTHROMBIN TIME: 26.1 SEC (ref 10–13.2)

## 2020-03-02 ENCOUNTER — ANTI-COAG VISIT (OUTPATIENT)
Dept: INTERNAL MEDICINE CLINIC | Age: 65
End: 2020-03-02

## 2020-03-13 RX ORDER — FLECAINIDE ACETATE 100 MG/1
TABLET ORAL
Qty: 180 TABLET | Refills: 0 | Status: SHIPPED | OUTPATIENT
Start: 2020-03-13 | End: 2020-06-23

## 2020-03-31 RX ORDER — METOPROLOL SUCCINATE 50 MG/1
TABLET, EXTENDED RELEASE ORAL
Qty: 90 TABLET | Refills: 1 | Status: SHIPPED | OUTPATIENT
Start: 2020-03-31 | End: 2020-08-26 | Stop reason: SDUPTHER

## 2020-03-31 RX ORDER — FENOFIBRATE 145 MG/1
TABLET, COATED ORAL
Qty: 90 TABLET | Refills: 1 | Status: SHIPPED | OUTPATIENT
Start: 2020-03-31 | End: 2020-08-26 | Stop reason: SDUPTHER

## 2020-04-14 RX ORDER — DIVALPROEX SODIUM 250 MG/1
TABLET, DELAYED RELEASE ORAL
Qty: 270 TABLET | Refills: 0 | Status: SHIPPED | OUTPATIENT
Start: 2020-04-14 | End: 2020-07-28 | Stop reason: SDUPTHER

## 2020-04-14 RX ORDER — WARFARIN SODIUM 1 MG/1
TABLET ORAL
Qty: 180 TABLET | Refills: 1 | Status: SHIPPED | OUTPATIENT
Start: 2020-04-14 | End: 2020-07-28 | Stop reason: SDUPTHER

## 2020-06-23 RX ORDER — FLECAINIDE ACETATE 100 MG/1
TABLET ORAL
Qty: 180 TABLET | Refills: 0 | Status: SHIPPED | OUTPATIENT
Start: 2020-06-23 | End: 2020-08-26 | Stop reason: SDUPTHER

## 2020-07-01 ENCOUNTER — OFFICE VISIT (OUTPATIENT)
Dept: CARDIOLOGY CLINIC | Age: 65
End: 2020-07-01
Payer: MEDICARE

## 2020-07-01 VITALS
HEIGHT: 75 IN | WEIGHT: 248.2 LBS | DIASTOLIC BLOOD PRESSURE: 72 MMHG | HEART RATE: 72 BPM | TEMPERATURE: 97.1 F | BODY MASS INDEX: 30.86 KG/M2 | SYSTOLIC BLOOD PRESSURE: 130 MMHG

## 2020-07-01 PROCEDURE — G8417 CALC BMI ABV UP PARAM F/U: HCPCS | Performed by: INTERNAL MEDICINE

## 2020-07-01 PROCEDURE — 3017F COLORECTAL CA SCREEN DOC REV: CPT | Performed by: INTERNAL MEDICINE

## 2020-07-01 PROCEDURE — 99214 OFFICE O/P EST MOD 30 MIN: CPT | Performed by: INTERNAL MEDICINE

## 2020-07-01 PROCEDURE — 1036F TOBACCO NON-USER: CPT | Performed by: INTERNAL MEDICINE

## 2020-07-01 PROCEDURE — 4040F PNEUMOC VAC/ADMIN/RCVD: CPT | Performed by: INTERNAL MEDICINE

## 2020-07-01 PROCEDURE — 1123F ACP DISCUSS/DSCN MKR DOCD: CPT | Performed by: INTERNAL MEDICINE

## 2020-07-01 PROCEDURE — G8428 CUR MEDS NOT DOCUMENT: HCPCS | Performed by: INTERNAL MEDICINE

## 2020-07-01 NOTE — PROGRESS NOTES
Subjective:      Patient ID: Xochilt Chavez is a 76 y.o. male. HPI:  Nikolay Mina is being seen for his 6 month follow up for afib and palps. No complaints. Rhythm stable. Active. No exertional sx. No tachycardia. No palps. No syncope. No edema. No pnd or orthopnea. No chest pain/sob. Past Medical History:   Diagnosis Date    Allergic rhinitis due to other allergen     Atrial fibrillation ([facility] [address]     Cancer ([facility] [address]     right kidney    Cataract 11/30/2016    Hx of blood clots     pulmonary embolism 2002 shortly after ablation    Hypopotassemia     Migraines     Other abnormal blood chemistry     Other and unspecified hyperlipidemia     Screening PSA (prostate specific antigen) 12/5/2009    <0.1 ng/mL    Sleep apnea     does not use cpap    Strain of calf muscle 12/19/2018    Tremor     minor    Unspecified sleep apnea     Vitreous degeneration      Past Surgical History:   Procedure Laterality Date    ABLATION OF DYSRHYTHMIC FOCUS      2002    CATARACT REMOVAL Bilateral     COLONOSCOPY  06-    [facility] Carey / Dr. Leeanne Waldron    COLONOSCOPY  10/20/2014    recheck 4-5 yrs w ++ h/o adenomatous plyps     PARTIAL NEPHRECTOMY Right 12/6/2018    OPEN RIGHT PARTIAL NEPHRECTOMY (FLANK INCISION) WITH INTRAOPERATIVE ULTRASOUND performed by Chayito Degroot MD at [address]       No Known Allergies     Social History     Socioeconomic History    Marital status:      Spouse name: Not on file    Number of children: Not on file    Years of education: Not on file    Highest education level: Not on file   Occupational History    Not on file   Social Needs    Financial resource strain: Not on file    Food insecurity     Worry: Not on file     Inability: Not on file    Transportation needs     Medical: Not on file     Non-medical: Not on file   Tobacco Use    Smoking status: Never Smoker    Smokeless tobacco: Never Used   Substance and Sexual Activity    Alcohol use:  Yes Comment: 4 drinks weekly    Drug use: No    Sexual activity: Yes     Partners: Female   Lifestyle    Physical activity     Days per week: Not on file     Minutes per session: Not on file    Stress: Not on file   Relationships    Social connections     Talks on phone: Not on file     Gets together: Not on file     Attends Islam service: Not on file     Active member of club or organization: Not on file     Attends meetings of clubs or organizations: Not on file     Relationship status: Not on file    Intimate partner violence     Fear of current or ex partner: Not on file     Emotionally abused: Not on file     Physically abused: Not on file     Forced sexual activity: Not on file   Other Topics Concern    Not on file   Social History Narrative    Not on file        Patient has a family history includes Diabetes in his father and mother; Uterine Cancer in his mother. Patient  has a past medical history of Allergic rhinitis due to other allergen, Atrial fibrillation ([address] Cancer ([facility] [address] Cataract, Hx of blood clots, Hypopotassemia, Migraines, Other abnormal blood chemistry, Other and unspecified hyperlipidemia, Screening PSA (prostate specific antigen), Sleep apnea, Strain of calf muscle, Tremor, Unspecified sleep apnea, and Vitreous degeneration.      Current Outpatient Medications   Medication Sig Dispense Refill    flecainide (TAMBOCOR) 100 MG tablet TAKE ONE TABLET BY MOUTH TWICE A  tablet 0    divalproex (DEPAKOTE) 250 MG DR tablet TAKE ONE TABLET BY MOUTH THREE TIMES A  tablet 0    warfarin (COUMADIN) 1 MG tablet TAKE ONE TABLET BY MOUTH TWICE A  tablet 1    fenofibrate (TRICOR) 145 MG tablet TAKE ONE TABLET BY MOUTH DAILY 90 tablet 1    metoprolol succinate (TOPROL XL) 50 MG extended release tablet TAKE ONE TABLET BY MOUTH DAILY 90 tablet 1    methocarbamol (ROBAXIN) 500 MG tablet Take 1 tablet by mouth 4 times daily as needed (muscle spasm) 60 tablet 3    SUMAtriptan

## 2020-07-16 ENCOUNTER — OFFICE VISIT (OUTPATIENT)
Dept: ORTHOPEDIC SURGERY | Age: 65
End: 2020-07-16
Payer: MEDICARE

## 2020-07-16 VITALS — HEIGHT: 75 IN | BODY MASS INDEX: 30.84 KG/M2 | WEIGHT: 248 LBS

## 2020-07-16 PROCEDURE — 1123F ACP DISCUSS/DSCN MKR DOCD: CPT | Performed by: ORTHOPAEDIC SURGERY

## 2020-07-16 PROCEDURE — 3017F COLORECTAL CA SCREEN DOC REV: CPT | Performed by: ORTHOPAEDIC SURGERY

## 2020-07-16 PROCEDURE — G8427 DOCREV CUR MEDS BY ELIG CLIN: HCPCS | Performed by: ORTHOPAEDIC SURGERY

## 2020-07-16 PROCEDURE — 4040F PNEUMOC VAC/ADMIN/RCVD: CPT | Performed by: ORTHOPAEDIC SURGERY

## 2020-07-16 PROCEDURE — G8417 CALC BMI ABV UP PARAM F/U: HCPCS | Performed by: ORTHOPAEDIC SURGERY

## 2020-07-16 PROCEDURE — 99213 OFFICE O/P EST LOW 20 MIN: CPT | Performed by: ORTHOPAEDIC SURGERY

## 2020-07-16 PROCEDURE — 1036F TOBACCO NON-USER: CPT | Performed by: ORTHOPAEDIC SURGERY

## 2020-07-16 PROCEDURE — 20600 DRAIN/INJ JOINT/BURSA W/O US: CPT | Performed by: ORTHOPAEDIC SURGERY

## 2020-07-16 RX ORDER — TRIAMCINOLONE ACETONIDE 40 MG/ML
40 INJECTION, SUSPENSION INTRA-ARTICULAR; INTRAMUSCULAR ONCE
Status: COMPLETED | OUTPATIENT
Start: 2020-07-16 | End: 2020-07-16

## 2020-07-16 RX ADMIN — TRIAMCINOLONE ACETONIDE 40 MG: 40 INJECTION, SUSPENSION INTRA-ARTICULAR; INTRAMUSCULAR at 16:03

## 2020-07-17 ENCOUNTER — HOSPITAL ENCOUNTER (OUTPATIENT)
Dept: CT IMAGING | Age: 65
Discharge: HOME | End: 2020-07-17
Payer: MEDICARE

## 2020-07-17 ENCOUNTER — HOSPITAL ENCOUNTER (OUTPATIENT)
Dept: GENERAL RADIOLOGY | Age: 65
Discharge: HOME | End: 2020-07-17
Payer: MEDICARE

## 2020-07-17 LAB
GFR AFRICAN AMERICAN: >60
GFR NON-AFRICAN AMERICAN: >60
PERFORMED ON: NORMAL
POC CREATININE: 1.1 MG/DL (ref 0.8–1.3)
POC SAMPLE TYPE: NORMAL

## 2020-07-17 PROCEDURE — 74178 CT ABD&PLV WO CNTR FLWD CNTR: CPT

## 2020-07-17 PROCEDURE — 6360000004 HC RX CONTRAST MEDICATION: Performed by: UROLOGY

## 2020-07-17 PROCEDURE — 82565 ASSAY OF CREATININE: CPT

## 2020-07-17 PROCEDURE — 71046 X-RAY EXAM CHEST 2 VIEWS: CPT

## 2020-07-17 RX ADMIN — IOPAMIDOL 80 ML: 755 INJECTION, SOLUTION INTRAVENOUS at 10:11

## 2020-07-17 RX ADMIN — IOHEXOL 50 ML: 240 INJECTION, SOLUTION INTRATHECAL; INTRAVASCULAR; INTRAVENOUS; ORAL at 10:11

## 2020-07-28 ENCOUNTER — TELEPHONE (OUTPATIENT)
Dept: INTERNAL MEDICINE CLINIC | Age: 65
End: 2020-07-28

## 2020-07-28 RX ORDER — WARFARIN SODIUM 1 MG/1
TABLET ORAL
Qty: 180 TABLET | Refills: 1 | Status: SHIPPED | OUTPATIENT
Start: 2020-07-28 | End: 2020-08-12 | Stop reason: SDUPTHER

## 2020-07-28 RX ORDER — DIVALPROEX SODIUM 250 MG/1
TABLET, DELAYED RELEASE ORAL
Qty: 270 TABLET | Refills: 0 | Status: SHIPPED | OUTPATIENT
Start: 2020-07-28 | End: 2020-08-26 | Stop reason: SDUPTHER

## 2020-07-28 NOTE — TELEPHONE ENCOUNTER
Pt called to request a refill of the following meds:    divalproex (DEPAKOTE) 250 MG DR tablet  TAKE ONE TABLET BY MOUTH THREE TIMES A DAY    warfarin (COUMADIN) 5 MG tablet, TAKE UP TO TWO TABLETS BY MOUTH DAILY AS DIRECTED      Requesting that the meds be sent to:    [address]: [phone]    States they sent over faxes last week or the week before. Please advise.

## 2020-08-11 DIAGNOSIS — I48.0 PAROXYSMAL ATRIAL FIBRILLATION (HCC): ICD-10-CM

## 2020-08-11 LAB
INR BLD: 1.28 (ref 0.86–1.14)
PROTHROMBIN TIME: 14.9 SEC (ref 10–13.2)

## 2020-08-11 NOTE — TELEPHONE ENCOUNTER
med refill request     rosuvastatin (CRESTOR) 40 MG tablet     warfarin (COUMADIN) 5 MG tablet     [facility]/pharm[facility] #[id number]- [place], [place] - [address] - P [phone] - f [phone]

## 2020-08-12 RX ORDER — ROSUVASTATIN CALCIUM 40 MG/1
TABLET, COATED ORAL
Qty: 90 TABLET | Refills: 0 | Status: SHIPPED | OUTPATIENT
Start: 2020-08-12 | End: 2020-08-26 | Stop reason: SDUPTHER

## 2020-08-12 RX ORDER — WARFARIN SODIUM 1 MG/1
TABLET ORAL
Qty: 180 TABLET | Refills: 0 | Status: SHIPPED | OUTPATIENT
Start: 2020-08-12 | End: 2020-08-26 | Stop reason: SDUPTHER

## 2020-08-18 ENCOUNTER — TELEPHONE (OUTPATIENT)
Dept: INTERNAL MEDICINE CLINIC | Age: 65
End: 2020-08-18

## 2020-08-18 RX ORDER — WARFARIN SODIUM 5 MG/1
5 TABLET ORAL DAILY
Qty: 30 TABLET | Refills: 3 | Status: SHIPPED | OUTPATIENT
Start: 2020-08-18 | End: 2020-08-26 | Stop reason: SDUPTHER

## 2020-08-26 ENCOUNTER — OFFICE VISIT (OUTPATIENT)
Dept: INTERNAL MEDICINE CLINIC | Age: 65
End: 2020-08-26
Payer: MEDICARE

## 2020-08-26 ENCOUNTER — TELEPHONE (OUTPATIENT)
Dept: INTERNAL MEDICINE CLINIC | Age: 65
End: 2020-08-26

## 2020-08-26 VITALS
HEIGHT: 73 IN | BODY MASS INDEX: 33 KG/M2 | DIASTOLIC BLOOD PRESSURE: 74 MMHG | TEMPERATURE: 98.8 F | WEIGHT: 249 LBS | SYSTOLIC BLOOD PRESSURE: 126 MMHG

## 2020-08-26 PROBLEM — M18.11 ARTHRITIS OF CARPOMETACARPAL (CMC) JOINT OF RIGHT THUMB: Status: ACTIVE | Noted: 2019-12-31

## 2020-08-26 PROCEDURE — 99214 OFFICE O/P EST MOD 30 MIN: CPT | Performed by: INTERNAL MEDICINE

## 2020-08-26 PROCEDURE — 1036F TOBACCO NON-USER: CPT | Performed by: INTERNAL MEDICINE

## 2020-08-26 PROCEDURE — 90732 PPSV23 VACC 2 YRS+ SUBQ/IM: CPT | Performed by: INTERNAL MEDICINE

## 2020-08-26 PROCEDURE — 4040F PNEUMOC VAC/ADMIN/RCVD: CPT | Performed by: INTERNAL MEDICINE

## 2020-08-26 PROCEDURE — G0009 ADMIN PNEUMOCOCCAL VACCINE: HCPCS | Performed by: INTERNAL MEDICINE

## 2020-08-26 PROCEDURE — 3017F COLORECTAL CA SCREEN DOC REV: CPT | Performed by: INTERNAL MEDICINE

## 2020-08-26 PROCEDURE — G8427 DOCREV CUR MEDS BY ELIG CLIN: HCPCS | Performed by: INTERNAL MEDICINE

## 2020-08-26 PROCEDURE — G8417 CALC BMI ABV UP PARAM F/U: HCPCS | Performed by: INTERNAL MEDICINE

## 2020-08-26 PROCEDURE — 1123F ACP DISCUSS/DSCN MKR DOCD: CPT | Performed by: INTERNAL MEDICINE

## 2020-08-26 PROCEDURE — G0402 INITIAL PREVENTIVE EXAM: HCPCS | Performed by: INTERNAL MEDICINE

## 2020-08-26 RX ORDER — WARFARIN SODIUM 1 MG/1
TABLET ORAL
Qty: 180 TABLET | Refills: 3 | Status: SHIPPED | OUTPATIENT
Start: 2020-08-26 | End: 2021-09-07

## 2020-08-26 RX ORDER — ROSUVASTATIN CALCIUM 40 MG/1
TABLET, COATED ORAL
Qty: 90 TABLET | Refills: 3 | Status: SHIPPED | OUTPATIENT
Start: 2020-08-26 | End: 2021-11-12

## 2020-08-26 RX ORDER — FLECAINIDE ACETATE 100 MG/1
TABLET ORAL
Qty: 180 TABLET | Refills: 3 | Status: SHIPPED | OUTPATIENT
Start: 2020-08-26 | End: 2021-08-18

## 2020-08-26 RX ORDER — FENOFIBRATE 145 MG/1
TABLET, COATED ORAL
Qty: 90 TABLET | Refills: 3 | Status: SHIPPED | OUTPATIENT
Start: 2020-08-26 | End: 2021-09-07

## 2020-08-26 RX ORDER — DIVALPROEX SODIUM 250 MG/1
TABLET, DELAYED RELEASE ORAL
Qty: 270 TABLET | Refills: 3 | Status: SHIPPED | OUTPATIENT
Start: 2020-08-26 | End: 2021-11-12

## 2020-08-26 RX ORDER — WARFARIN SODIUM 5 MG/1
5 TABLET ORAL DAILY
Qty: 90 TABLET | Refills: 3 | Status: SHIPPED | OUTPATIENT
Start: 2020-08-26 | End: 2021-10-08

## 2020-08-26 RX ORDER — METOPROLOL SUCCINATE 50 MG/1
TABLET, EXTENDED RELEASE ORAL
Qty: 90 TABLET | Refills: 3 | Status: SHIPPED | OUTPATIENT
Start: 2020-08-26 | End: 2021-09-07

## 2020-08-26 RX ORDER — METHOCARBAMOL 500 MG/1
500 TABLET, FILM COATED ORAL 4 TIMES DAILY PRN
Qty: 60 TABLET | Refills: 3 | Status: SHIPPED | OUTPATIENT
Start: 2020-08-26 | End: 2022-05-10

## 2020-08-26 RX ORDER — GLIMEPIRIDE 2 MG/1
1 TABLET ORAL DAILY
Qty: 3 BOTTLE | Refills: 3 | Status: SHIPPED | OUTPATIENT
Start: 2020-08-26 | End: 2020-11-10

## 2020-08-26 SDOH — ECONOMIC STABILITY: FOOD INSECURITY: WITHIN THE PAST 12 MONTHS, YOU WORRIED THAT YOUR FOOD WOULD RUN OUT BEFORE YOU GOT MONEY TO BUY MORE.: NEVER TRUE

## 2020-08-26 SDOH — ECONOMIC STABILITY: FOOD INSECURITY: WITHIN THE PAST 12 MONTHS, THE FOOD YOU BOUGHT JUST DIDN'T LAST AND YOU DIDN'T HAVE MONEY TO GET MORE.: NEVER TRUE

## 2020-08-26 SDOH — ECONOMIC STABILITY: TRANSPORTATION INSECURITY
IN THE PAST 12 MONTHS, HAS THE LACK OF TRANSPORTATION KEPT YOU FROM MEDICAL APPOINTMENTS OR FROM GETTING MEDICATIONS?: YES

## 2020-08-26 SDOH — ECONOMIC STABILITY: TRANSPORTATION INSECURITY
IN THE PAST 12 MONTHS, HAS LACK OF TRANSPORTATION KEPT YOU FROM MEETINGS, WORK, OR FROM GETTING THINGS NEEDED FOR DAILY LIVING?: YES

## 2020-08-26 ASSESSMENT — LIFESTYLE VARIABLES
HOW OFTEN DURING THE LAST YEAR HAVE YOU BEEN UNABLE TO REMEMBER WHAT HAPPENED THE NIGHT BEFORE BECAUSE YOU HAD BEEN DRINKING: 0
HOW OFTEN DURING THE LAST YEAR HAVE YOU HAD A FEELING OF GUILT OR REMORSE AFTER DRINKING: 0
HAS A RELATIVE, FRIEND, DOCTOR, OR ANOTHER HEALTH PROFESSIONAL EXPRESSED CONCERN ABOUT YOUR DRINKING OR SUGGESTED YOU CUT DOWN: 0
HOW OFTEN DURING THE LAST YEAR HAVE YOU FAILED TO DO WHAT WAS NORMALLY EXPECTED FROM YOU BECAUSE OF DRINKING: 0
HOW OFTEN DO YOU HAVE A DRINK CONTAINING ALCOHOL: 3
HOW MANY STANDARD DRINKS CONTAINING ALCOHOL DO YOU HAVE ON A TYPICAL DAY: 0
HOW OFTEN DO YOU HAVE SIX OR MORE DRINKS ON ONE OCCASION: 0
HOW OFTEN DURING THE LAST YEAR HAVE YOU NEEDED AN ALCOHOLIC DRINK FIRST THING IN THE MORNING TO GET YOURSELF GOING AFTER A NIGHT OF HEAVY DRINKING: 0
AUDIT TOTAL SCORE: 3
HOW OFTEN DURING THE LAST YEAR HAVE YOU FOUND THAT YOU WERE NOT ABLE TO STOP DRINKING ONCE YOU HAD STARTED: 0
AUDIT-C TOTAL SCORE: 3
HAVE YOU OR SOMEONE ELSE BEEN INJURED AS A RESULT OF YOUR DRINKING: 0

## 2020-08-26 ASSESSMENT — PATIENT HEALTH QUESTIONNAIRE - PHQ9
SUM OF ALL RESPONSES TO PHQ QUESTIONS 1-9: 0
SUM OF ALL RESPONSES TO PHQ QUESTIONS 1-9: 0

## 2020-08-26 ASSESSMENT — ENCOUNTER SYMPTOMS
CHEST TIGHTNESS: 0
BACK PAIN: 1
SHORTNESS OF BREATH: 0
ABDOMINAL PAIN: 0
RESPIRATORY NEGATIVE: 1

## 2020-08-26 NOTE — PROGRESS NOTES
Subjective:      Patient ID: Giana Iverson is a 76 y.o. male. Chief Complaint   Patient presents with    Medicare AWV     yearly/labs needed,pne(23),refills needed       Did reinjure back helping friend moving furniture and has been in pain- has been doing some small stretches and taking MR's- bp's are well controlled- eye pressures are up lately so to be seen again in 3 mo but had run out of his drops- cardiac symptoms controlled and utd w Dr. Santos Sample pain better after injection-no progress w wt loss  Review of Systems   Constitutional: Negative for appetite change and fatigue. HENT: Negative. Respiratory: Negative. Negative for chest tightness and shortness of breath. Cardiovascular: Negative for chest pain and palpitations. Gastrointestinal: Negative for abdominal pain. Genitourinary: Negative. Musculoskeletal: Positive for arthralgias, back pain, gait problem and myalgias. Skin: Negative. Neurological: Negative for weakness. Psychiatric/Behavioral: Negative for dysphoric mood and sleep disturbance. The patient is not nervous/anxious. Objective:   Physical Exam  Constitutional:       General: He is not in acute distress. Appearance: He is well-developed. HENT:      Head: Normocephalic and atraumatic. Right Ear: External ear normal.      Left Ear: External ear normal.   Eyes:      Conjunctiva/sclera: Conjunctivae normal.      Pupils: Pupils are equal, round, and reactive to light. Neck:      Musculoskeletal: Normal range of motion and neck supple. Thyroid: No thyroid mass or thyromegaly. Vascular: No carotid bruit. Cardiovascular:      Rate and Rhythm: Normal rate and regular rhythm. Pulses: Normal pulses. Heart sounds: Normal heart sounds. No murmur. No gallop. Comments: No carotid bruits noted bilaterally  Pulmonary:      Effort: Pulmonary effort is normal. No respiratory distress. Breath sounds: Normal breath sounds.  No wheezing, rhonchi or rales. Abdominal:      General: Bowel sounds are normal.      Palpations: Abdomen is soft. There is no hepatomegaly, splenomegaly or mass. Tenderness: There is no abdominal tenderness. There is no guarding or rebound. Musculoskeletal: Normal range of motion. General: Tenderness present. Comments: ++ lbp w hyperextension and decreased rom, some pt tenderness along para lumbar spine     Lymphadenopathy:      Cervical: No cervical adenopathy. Upper Body:      Right upper body: No supraclavicular adenopathy. Left upper body: No supraclavicular adenopathy. Skin:     General: Skin is warm and dry. Findings: No rash. Neurological:      General: No focal deficit present. Mental Status: He is alert and oriented to person, place, and time. Cranial Nerves: No cranial nerve deficit. Sensory: No sensory deficit. Deep Tendon Reflexes: Reflexes are normal and symmetric. Psychiatric:         Mood and Affect: Mood normal.         Speech: Speech normal.         Behavior: Behavior normal.         Thought Content: Thought content normal.         Judgment: Judgment normal.           Assessment and Plan:      Renal cell carcinoma of right kidney (HCC)  Doing well    Hyperlipemia  Cont meds and follow     Chronic right-sided low back pain with sciatica  See physical therapy     Essential hypertension  Controlled w current regimen- continue and monitor closely      Arthritis of carpometacarpal [place]) joint of right thumb  utd w ortho     Glaucoma  To return soon for isis     Atrial fibrillation ([facility] [address]  Controlled w current regimen- continue and monitor closely      Hyperglycemia  Recheck labs        Encounter Diagnoses   Name Primary?     Hyperglycemia Yes    Renal cell carcinoma of right kidney (HCC)     Other pulmonary embolism without acute cor pulmonale, unspecified chronicity (HCC)     Hyperlipidemia, unspecified hyperlipidemia type     Chronic right-sided low back pain with right-sided sciatica     Essential hypertension     Arthritis of carpometacarpal (CMC) joint of right thumb     Routine general medical examination at a health care facility     Glaucoma, unspecified glaucoma type, unspecified laterality     Paroxysmal atrial fibrillation ([facility] [address]        Orders Placed This Encounter   Procedures    Comprehensive Metabolic Panel     Standing Status:   Future     Standing Expiration Date:   8/26/2021    TSH without Reflex     Standing Status:   Future     Standing Expiration Date:   8/26/2021    CBC Auto Differential     Standing Status:   Future     Standing Expiration Date:   8/26/2021    Hemoglobin A1C     Standing Status:   Future     Standing Expiration Date:   8/26/2021    Microalbumin / Creatinine Urine Ratio     Standing Status:   Future     Standing Expiration Date:   8/26/2021    Lipid Panel     Standing Status:   Future     Standing Expiration Date:   8/26/2021     Order Specific Question:   Is Patient Fasting?/# of Hours     Answer:   yes/10    External Referral To Physical Therapy     Referral Priority:   Routine     Referral Type:   Eval and Treat     Referral Reason:   Specialty Services Required     Requested Specialty:   Physical Therapy     Number of Visits Requested:   1

## 2020-08-26 NOTE — PROGRESS NOTES
Medicare Annual Wellness Visit  Name: Emma Gipson Date: 2020   MRN: [id number] Sex: Male   Age: 76 y.o. Ethnicity: Non-/Non    : 1955 Race: [facility] is here for Medicare AWV (yearly/labs needed,pne(23),refills needed)    Screenings for behavioral, psychosocial and functional/safety risks, and cognitive dysfunction are all negative except as indicated below. These results, as well as other patient data from the [address] form, are documented in Flowsheets linked to this Encounter. No Known Allergies    Prior to Visit Medications    Medication Sig Taking? Authorizing Provider   warfarin (COUMADIN) 5 MG tablet Take 1 tablet by mouth daily Yes Nela Hernandez MD   warfarin (COUMADIN) 1 MG tablet TAKE ONE TABLET BY MOUTH TWICE A DAY Yes Nela Hernandez MD   rosuvastatin (CRESTOR) 40 MG tablet TAKE ONE TABLET BY MOUTH DAILY Yes Nela Hernandez MD   divalproex (DEPAKOTE) 250 MG DR tablet TAKE ONE TABLET BY MOUTH THREE TIMES A DAY Yes Nela Hernandez MD   flecainide (TAMBOCOR) 100 MG tablet TAKE ONE TABLET BY MOUTH TWICE A DAY Yes Nela Hernandez MD   fenofibrate (TRICOR) 145 MG tablet TAKE ONE TABLET BY MOUTH DAILY Yes Nela Hernandez MD   metoprolol succinate (TOPROL XL) 50 MG extended release tablet TAKE ONE TABLET BY MOUTH DAILY Yes Nela Hernandez MD   methocarbamol (ROBAXIN) 500 MG tablet Take 1 tablet by mouth 4 times daily as needed (muscle spasm) Yes Nela Hernandez MD   SUMAtriptan (IMITREX) 100 MG tablet TAKE ONE TABLET BY MOUTH ONCE AS NEEDED; MAY REPEAT IN 2 HOURS IF NEEDED. DO NOT EXCEED TWO TABLETS IN 24 HOURS.  Yes Nela Hernandez MD   warfarin (COUMADIN) 5 MG tablet TAKE UP TO TWO TABLETS BY MOUTH DAILY AS DIRECTED Yes Nela Hernandez MD   fexofenadine (ALLEGRA ALLERGY) 180 MG tablet Take 180 mg by mouth daily as needed Yes Historical Provider, MD   warfarin (COUMADIN) 5 MG tablet Take 6 mg by mouth See Admin Instructions Take 6 mg on MWF, 7 mg on Height: 6' 1\" (1.854 m)     Body mass index is 32.85 kg/m². Based upon direct observation of the patient, evaluation of cognition reveals recent and remote memory intact. Patient's complete Health Risk Assessment and screening values have been reviewed and are found in Flowsheets. The following problems were reviewed today and where indicated follow up appointments were made and/or referrals ordered. Positive Risk Factor Screenings with Interventions:     General Health:  General  In general, how would you say your health is?: Very Good  In the past 7 days, have you experienced any of the following? New or Increased Pain, New or Increased Fatigue, Loneliness, Social Isolation, Stress or Anger?: (!) New or Increased Pain  Do you get the social and emotional support that you need?: Yes  Do you have a Living Will?: Yes  General Health Risk Interventions:  · see note    Health Habits/Nutrition:  Health Habits/Nutrition  Do you exercise for at least 20 minutes 2-3 times per week?: (!) No  Have you lost any weight without trying in the past 3 months?: No  Do you eat fewer than 2 meals per day?: No  Have you seen a dentist within the past year?: Yes  Body mass index is 32.85 kg/m². Health Habits/Nutrition Interventions:  · Inadequate physical activity:  patient agrees to exercise for at least 150 minutes/week    Hearing/Vision:  No exam data present  Hearing/Vision  Do you or your family notice any trouble with your hearing?: (!) Yes  Do you have difficulty driving, watching TV, or doing any of your daily activities because of your eyesight?: No  Have you had an eye exam within the past year?: Yes  Hearing/Vision Interventions:  · Hearing concerns:  patient declines any further evaluation/treatment for hearing issues    ADL:  ADLs  In the past 7 days, did you need help from others to perform any of the following everyday activities?  Eating, dressing, grooming, bathing, toileting, or walking/balance?: (!) Walking/Balance  In the past 7 days, did you need help from others to take care of any of the following? Laundry, housekeeping, banking/finances, shopping, telephone use, food preparation, transportation, or taking medications?: None  ADL Interventions:  · Patient declines any further evaluation/treatment for this issue    Personalized Preventive Plan   Current Health Maintenance Status  Immunization History   Administered Date(s) Administered    DTaP 02/29/2008    Hepatitis A 02/29/2008    Influenza Virus Vaccine 09/02/2012, 10/05/2017, 10/01/2018    Tdap (Boostrix, Adacel) 06/06/2014    Zoster Live (Zostavax) 06/06/2014        Health Maintenance   Topic Date Due    Shingles Vaccine (2 of 3) 08/01/2014    Pneumococcal 65+ years Vaccine (1 of 1 - PPSV23) 01/21/2019    Annual Wellness Visit (AWV)  07/16/2020    Flu vaccine (1) 09/01/2020    A1C test (Diabetic or Prediabetic)  10/14/2020    Lipid screen  10/14/2020    Potassium monitoring  10/14/2020    Creatinine monitoring  10/14/2020    DTaP/Tdap/Td vaccine (3 - Td) 06/06/2024    Colon cancer screen colonoscopy  10/29/2028    Hepatitis C screen  Completed    Hepatitis A vaccine  Aged Out    Hepatitis B vaccine  Aged Out    Hib vaccine  Aged Out    Meningococcal (ACWY) vaccine  Aged Out     Recommendations for [employer] Due: see orders and patient instructions/AVS.  . Recommended screening schedule for the next 5-10 years is provided to the patient in written form: see Patient Instructions/AVS.    There are no diagnoses linked to this encounter.

## 2020-08-26 NOTE — PATIENT INSTRUCTIONS
Personalized Preventive Plan for Rosa Wilson III - 8/26/2020  Medicare offers a range of preventive health benefits. Some of the tests and screenings are paid in full while other may be subject to a deductible, co-insurance, and/or copay. Some of these benefits include a comprehensive review of your medical history including lifestyle, illnesses that may run in your family, and various assessments and screenings as appropriate. After reviewing your medical record and screening and assessments performed today your provider may have ordered immunizations, labs, imaging, and/or referrals for you. A list of these orders (if applicable) as well as your Preventive Care list are included within your After Visit Summary for your review. Other Preventive Recommendations:    · A preventive eye exam performed by an eye specialist is recommended every 1-2 years to screen for glaucoma; cataracts, macular degeneration, and other eye disorders. · A preventive dental visit is recommended every 6 months. · Try to get at least 150 minutes of exercise per week or 10,000 steps per day on a pedometer . · Order or download the FREE \"Exercise & Physical Activity: Your Everyday Guide\" from [employer]. Call 4-765.842.7370 or search [employer] online. · You need 7444-7976 mg of calcium and 3097-5600 IU of vitamin D per day. It is possible to meet your calcium requirement with diet alone, but a vitamin D supplement is usually necessary to meet this goal.  · When exposed to the sun, use a sunscreen that protects against both UVA and UVB radiation with an SPF of 30 or greater. Reapply every 2 to 3 hours or after sweating, drying off with a towel, or swimming. · Always wear a seat belt when traveling in a car. Always wear a helmet when riding a bicycle or motorcycle.

## 2020-08-27 ENCOUNTER — ANTI-COAG VISIT (OUTPATIENT)
Dept: INTERNAL MEDICINE CLINIC | Age: 65
End: 2020-08-27

## 2020-09-15 DIAGNOSIS — G89.29 CHRONIC RIGHT-SIDED LOW BACK PAIN WITH RIGHT-SIDED SCIATICA: ICD-10-CM

## 2020-09-15 DIAGNOSIS — I26.99 OTHER PULMONARY EMBOLISM WITHOUT ACUTE COR PULMONALE, UNSPECIFIED CHRONICITY (HCC): ICD-10-CM

## 2020-09-15 DIAGNOSIS — M54.41 CHRONIC RIGHT-SIDED LOW BACK PAIN WITH RIGHT-SIDED SCIATICA: ICD-10-CM

## 2020-09-15 DIAGNOSIS — R73.9 HYPERGLYCEMIA: ICD-10-CM

## 2020-09-15 DIAGNOSIS — C64.1 RENAL CELL CARCINOMA OF RIGHT KIDNEY (HCC): ICD-10-CM

## 2020-09-15 DIAGNOSIS — E78.5 HYPERLIPIDEMIA, UNSPECIFIED HYPERLIPIDEMIA TYPE: ICD-10-CM

## 2020-09-15 DIAGNOSIS — I48.0 PAROXYSMAL ATRIAL FIBRILLATION (HCC): ICD-10-CM

## 2020-09-15 DIAGNOSIS — I10 ESSENTIAL HYPERTENSION: ICD-10-CM

## 2020-09-15 DIAGNOSIS — M18.11 ARTHRITIS OF CARPOMETACARPAL (CMC) JOINT OF RIGHT THUMB: ICD-10-CM

## 2020-09-15 LAB
A/G RATIO: 1.9 (ref 1.1–2.2)
ALBUMIN SERPL-MCNC: 4.3 G/DL (ref 3.4–5)
ALP BLD-CCNC: 34 U/L (ref 40–129)
ALT SERPL-CCNC: 30 U/L (ref 10–40)
ANION GAP SERPL CALCULATED.3IONS-SCNC: 12 MMOL/L (ref 3–16)
AST SERPL-CCNC: 23 U/L (ref 15–37)
BASOPHILS ABSOLUTE: 0 K/UL (ref 0–0.2)
BASOPHILS RELATIVE PERCENT: 0.4 %
BILIRUB SERPL-MCNC: 0.4 MG/DL (ref 0–1)
BUN BLDV-MCNC: 21 MG/DL (ref 7–20)
CALCIUM SERPL-MCNC: 9.4 MG/DL (ref 8.3–10.6)
CHLORIDE BLD-SCNC: 102 MMOL/L (ref 99–110)
CHOLESTEROL, TOTAL: 174 MG/DL (ref 0–199)
CO2: 27 MMOL/L (ref 21–32)
CREAT SERPL-MCNC: 1.2 MG/DL (ref 0.8–1.3)
CREATININE URINE: 197.6 MG/DL (ref 39–259)
EOSINOPHILS ABSOLUTE: 0.2 K/UL (ref 0–0.6)
EOSINOPHILS RELATIVE PERCENT: 3.4 %
GFR AFRICAN AMERICAN: >60
GFR NON-AFRICAN AMERICAN: >60
GLOBULIN: 2.3 G/DL
GLUCOSE BLD-MCNC: 123 MG/DL (ref 70–99)
HCT VFR BLD CALC: 44 % (ref 40.5–52.5)
HDLC SERPL-MCNC: 35 MG/DL (ref 40–60)
HEMOGLOBIN: 14.5 G/DL (ref 13.5–17.5)
INR BLD: 3.14 (ref 0.86–1.14)
LDL CHOLESTEROL CALCULATED: 91 MG/DL
LYMPHOCYTES ABSOLUTE: 1.5 K/UL (ref 1–5.1)
LYMPHOCYTES RELATIVE PERCENT: 29 %
MCH RBC QN AUTO: 28.6 PG (ref 26–34)
MCHC RBC AUTO-ENTMCNC: 32.9 G/DL (ref 31–36)
MCV RBC AUTO: 87 FL (ref 80–100)
MICROALBUMIN UR-MCNC: 6.7 MG/DL
MICROALBUMIN/CREAT UR-RTO: 33.9 MG/G (ref 0–30)
MONOCYTES ABSOLUTE: 0.4 K/UL (ref 0–1.3)
MONOCYTES RELATIVE PERCENT: 6.9 %
NEUTROPHILS ABSOLUTE: 3.2 K/UL (ref 1.7–7.7)
NEUTROPHILS RELATIVE PERCENT: 60.3 %
PDW BLD-RTO: 15.3 % (ref 12.4–15.4)
PLATELET # BLD: 242 K/UL (ref 135–450)
PMV BLD AUTO: 9.4 FL (ref 5–10.5)
POTASSIUM SERPL-SCNC: 4.7 MMOL/L (ref 3.5–5.1)
PROTHROMBIN TIME: 36.8 SEC (ref 10–13.2)
RBC # BLD: 5.06 M/UL (ref 4.2–5.9)
SODIUM BLD-SCNC: 141 MMOL/L (ref 136–145)
TOTAL PROTEIN: 6.6 G/DL (ref 6.4–8.2)
TRIGL SERPL-MCNC: 238 MG/DL (ref 0–150)
TSH SERPL DL<=0.05 MIU/L-ACNC: 1.85 UIU/ML (ref 0.27–4.2)
VLDLC SERPL CALC-MCNC: 48 MG/DL
WBC # BLD: 5.3 K/UL (ref 4–11)

## 2020-09-16 ENCOUNTER — TELEPHONE (OUTPATIENT)
Dept: INTERNAL MEDICINE CLINIC | Age: 65
End: 2020-09-16

## 2020-09-16 ENCOUNTER — ANTI-COAG VISIT (OUTPATIENT)
Dept: INTERNAL MEDICINE CLINIC | Age: 65
End: 2020-09-16

## 2020-09-16 LAB
ESTIMATED AVERAGE GLUCOSE: 142.7 MG/DL
HBA1C MFR BLD: 6.6 %

## 2020-09-16 NOTE — TELEPHONE ENCOUNTER
Pt/inr sl up- lets tweek it a bit and do the 6 mg 4 days a week and 7 mg mon weds Friday-recheck 1 mo -see labs result note too and do coag visit

## 2020-11-10 RX ORDER — GLIMEPIRIDE 2 MG/1
TABLET ORAL
Qty: 5 ML | Refills: 3 | Status: SHIPPED | OUTPATIENT
Start: 2020-11-10 | End: 2022-03-02 | Stop reason: ALTCHOICE

## 2020-11-19 ENCOUNTER — OFFICE VISIT (OUTPATIENT)
Dept: ORTHOPEDIC SURGERY | Age: 65
End: 2020-11-19
Payer: MEDICARE

## 2020-11-19 PROCEDURE — G8427 DOCREV CUR MEDS BY ELIG CLIN: HCPCS | Performed by: ORTHOPAEDIC SURGERY

## 2020-11-19 PROCEDURE — 1036F TOBACCO NON-USER: CPT | Performed by: ORTHOPAEDIC SURGERY

## 2020-11-19 PROCEDURE — 99213 OFFICE O/P EST LOW 20 MIN: CPT | Performed by: ORTHOPAEDIC SURGERY

## 2020-11-19 PROCEDURE — G8417 CALC BMI ABV UP PARAM F/U: HCPCS | Performed by: ORTHOPAEDIC SURGERY

## 2020-11-19 PROCEDURE — 1123F ACP DISCUSS/DSCN MKR DOCD: CPT | Performed by: ORTHOPAEDIC SURGERY

## 2020-11-19 PROCEDURE — 4040F PNEUMOC VAC/ADMIN/RCVD: CPT | Performed by: ORTHOPAEDIC SURGERY

## 2020-11-19 PROCEDURE — G8484 FLU IMMUNIZE NO ADMIN: HCPCS | Performed by: ORTHOPAEDIC SURGERY

## 2020-11-19 PROCEDURE — 3017F COLORECTAL CA SCREEN DOC REV: CPT | Performed by: ORTHOPAEDIC SURGERY

## 2020-11-19 PROCEDURE — L3924 HFO WITHOUT JOINTS PRE OTS: HCPCS | Performed by: ORTHOPAEDIC SURGERY

## 2020-11-19 PROCEDURE — 20600 DRAIN/INJ JOINT/BURSA W/O US: CPT | Performed by: ORTHOPAEDIC SURGERY

## 2020-11-19 RX ORDER — LIDOCAINE HYDROCHLORIDE 10 MG/ML
20 INJECTION, SOLUTION INFILTRATION; PERINEURAL ONCE
Status: COMPLETED | OUTPATIENT
Start: 2020-11-19 | End: 2020-11-19

## 2020-11-19 RX ORDER — TRIAMCINOLONE ACETONIDE 40 MG/ML
40 INJECTION, SUSPENSION INTRA-ARTICULAR; INTRAMUSCULAR ONCE
Status: COMPLETED | OUTPATIENT
Start: 2020-11-19 | End: 2020-11-19

## 2020-11-19 RX ADMIN — LIDOCAINE HYDROCHLORIDE 20 ML: 10 INJECTION, SOLUTION INFILTRATION; PERINEURAL at 14:51

## 2020-11-19 RX ADMIN — LIDOCAINE HYDROCHLORIDE 20 ML: 10 INJECTION, SOLUTION INFILTRATION; PERINEURAL at 14:50

## 2020-11-19 RX ADMIN — TRIAMCINOLONE ACETONIDE 40 MG: 40 INJECTION, SUSPENSION INTRA-ARTICULAR; INTRAMUSCULAR at 14:51

## 2020-11-19 RX ADMIN — TRIAMCINOLONE ACETONIDE 40 MG: 40 INJECTION, SUSPENSION INTRA-ARTICULAR; INTRAMUSCULAR at 14:52

## 2020-11-19 NOTE — PROGRESS NOTES
injection and conservative management with approximately 3 months of relief but he feels that the symptoms have returned. He is also noting that he has been having some left thumb base pain more recently as well but he does not feel like the severity is as bad as the right side. He is having some trouble with gripping and lifting objects and other day-to-day activities as well    Review of Systems  Pertinent items are noted in HPI  Review of systems reviewed from Patient History Form dated on 1/30/2020  and available in the patient's chart under the Media tab. Vital Signs  There were no vitals filed for this visit. Physical Exam  Constitutional:  Patient is well-nourished and demonstrates normal hygiene. Mental Status:  Patient is alert and oriented to person, place and time. Skin:  Intact, no rashes or lesions. Musculoskeletal       Cervical spine, shoulders, elbows, wrist:  satisfactory pain-free range of motion,                                                                           strength, and stability         Right thumb(s):  Moderate tenderness is elicited with CMC grind          There is pain on firm pressure over the trapeziometacarpal joint. Satisfactory stability          exists at the MP joint.  No tenderness at thumb MP joint  No triggering catching or locking throughout the right thumb or hand  No thenar atrophy or intrinsic wasting, no skin changes throughout the remainder of the thumb or hand  Appropriate thumb tenodesis and cascade with 5 out of 5 strength EPL FPL and thumb abduction              Left thumb: Mild tenderness with CMC grind or firm pressure over                                  trapeziometacarpal joint.  Satisfactory stability exists at MP joint and no pain at the thumb MP joint.   No thenar atrophy or intrinsic muscle wasting  5-5 EPL FPL thumb adduction  Capillary refill brisk all fingers, symmetric  Gross sensation intact to light touch median/ulnar/radial nerves  Sensation intact to radial/ulnar aspect of fingertip        Radiology:    X-rays obtained and reviewed in office:  Views 3  Location left thumb  Impression degenerative changes noted at the base of the left thumb at the [address] joint with osteophyte formation and joint space narrowing  There appears to be no adjacent degenerative changes specifically no evidence of STT degenerative changes. MP joint appears without evidence of arthritis or additional osseous abnormality      Additional Diagnostic Test Findings:    Office Procedures:  Orders Placed This Encounter   Procedures    XR FINGER LEFT (MIN 2 VIEWS)           Augustina Hairston MD  Orthopaedic Surgeon, [id number] [facility]    Contact Information:  Rishiry Ferguson: [phone] Clinical )    This dictation was performed with a verbal recognition program [facility] [employer] [facility]) and it was checked for errors. It is possible that there are still dictated errors within this office note. If so, please bring any errors to my attention for an addendum. All efforts were made to ensure that this office note is accurate.

## 2020-12-30 ENCOUNTER — TELEPHONE (OUTPATIENT)
Dept: INTERNAL MEDICINE CLINIC | Age: 65
End: 2020-12-30

## 2020-12-30 NOTE — TELEPHONE ENCOUNTER
SPOKE WITH PATIENT ADVISED HIM TO   [address] [place] [address] DEPT.  OR CALL/ASK PERSON GIVING INJ.

## 2020-12-30 NOTE — TELEPHONE ENCOUNTER
Patient is filling out form to receive Covid Vaccine and it says he needs to consult with his doctor if he takes blood thinners.      Please Advise and call back [phone]

## 2021-01-07 DIAGNOSIS — I26.99 OTHER PULMONARY EMBOLISM WITHOUT ACUTE COR PULMONALE, UNSPECIFIED CHRONICITY (HCC): ICD-10-CM

## 2021-01-07 DIAGNOSIS — R73.9 HYPERGLYCEMIA: Primary | ICD-10-CM

## 2021-01-07 DIAGNOSIS — R73.9 HYPERGLYCEMIA: ICD-10-CM

## 2021-01-07 DIAGNOSIS — C64.1 RENAL CELL CARCINOMA OF RIGHT KIDNEY (HCC): ICD-10-CM

## 2021-01-07 DIAGNOSIS — I48.0 PAROXYSMAL ATRIAL FIBRILLATION (HCC): ICD-10-CM

## 2021-01-07 LAB
INR BLD: 2 (ref 0.86–1.14)
PROTHROMBIN TIME: 23.4 SEC (ref 10–13.2)

## 2021-01-08 LAB
ESTIMATED AVERAGE GLUCOSE: 142.7 MG/DL
HBA1C MFR BLD: 6.6 %

## 2021-01-14 ENCOUNTER — OFFICE VISIT (OUTPATIENT)
Dept: ORTHOPEDIC SURGERY | Age: 66
End: 2021-01-14
Payer: MEDICARE

## 2021-01-14 ENCOUNTER — OFFICE VISIT (OUTPATIENT)
Dept: CARDIOLOGY CLINIC | Age: 66
End: 2021-01-14
Payer: MEDICARE

## 2021-01-14 VITALS
TEMPERATURE: 96.9 F | SYSTOLIC BLOOD PRESSURE: 124 MMHG | DIASTOLIC BLOOD PRESSURE: 76 MMHG | WEIGHT: 252.4 LBS | HEART RATE: 72 BPM | BODY MASS INDEX: 31.38 KG/M2 | HEIGHT: 75 IN

## 2021-01-14 DIAGNOSIS — I48.0 PAROXYSMAL ATRIAL FIBRILLATION (HCC): Primary | ICD-10-CM

## 2021-01-14 DIAGNOSIS — R00.2 PALPITATION: ICD-10-CM

## 2021-01-14 DIAGNOSIS — M18.11 ARTHRITIS OF CARPOMETACARPAL (CMC) JOINT OF RIGHT THUMB: Primary | ICD-10-CM

## 2021-01-14 DIAGNOSIS — I27.20 PULMONARY HTN (HCC): ICD-10-CM

## 2021-01-14 DIAGNOSIS — M18.12 ARTHRITIS OF CARPOMETACARPAL (CMC) JOINT OF LEFT THUMB: ICD-10-CM

## 2021-01-14 PROCEDURE — 1036F TOBACCO NON-USER: CPT | Performed by: INTERNAL MEDICINE

## 2021-01-14 PROCEDURE — 3017F COLORECTAL CA SCREEN DOC REV: CPT | Performed by: ORTHOPAEDIC SURGERY

## 2021-01-14 PROCEDURE — 3017F COLORECTAL CA SCREEN DOC REV: CPT | Performed by: INTERNAL MEDICINE

## 2021-01-14 PROCEDURE — G8417 CALC BMI ABV UP PARAM F/U: HCPCS | Performed by: INTERNAL MEDICINE

## 2021-01-14 PROCEDURE — 4040F PNEUMOC VAC/ADMIN/RCVD: CPT | Performed by: ORTHOPAEDIC SURGERY

## 2021-01-14 PROCEDURE — 1123F ACP DISCUSS/DSCN MKR DOCD: CPT | Performed by: INTERNAL MEDICINE

## 2021-01-14 PROCEDURE — 99214 OFFICE O/P EST MOD 30 MIN: CPT | Performed by: INTERNAL MEDICINE

## 2021-01-14 PROCEDURE — 99213 OFFICE O/P EST LOW 20 MIN: CPT | Performed by: ORTHOPAEDIC SURGERY

## 2021-01-14 PROCEDURE — G8417 CALC BMI ABV UP PARAM F/U: HCPCS | Performed by: ORTHOPAEDIC SURGERY

## 2021-01-14 PROCEDURE — G8484 FLU IMMUNIZE NO ADMIN: HCPCS | Performed by: INTERNAL MEDICINE

## 2021-01-14 PROCEDURE — 1123F ACP DISCUSS/DSCN MKR DOCD: CPT | Performed by: ORTHOPAEDIC SURGERY

## 2021-01-14 PROCEDURE — G8427 DOCREV CUR MEDS BY ELIG CLIN: HCPCS | Performed by: INTERNAL MEDICINE

## 2021-01-14 PROCEDURE — G8484 FLU IMMUNIZE NO ADMIN: HCPCS | Performed by: ORTHOPAEDIC SURGERY

## 2021-01-14 PROCEDURE — 4040F PNEUMOC VAC/ADMIN/RCVD: CPT | Performed by: INTERNAL MEDICINE

## 2021-01-14 PROCEDURE — G8427 DOCREV CUR MEDS BY ELIG CLIN: HCPCS | Performed by: ORTHOPAEDIC SURGERY

## 2021-01-14 PROCEDURE — 1036F TOBACCO NON-USER: CPT | Performed by: ORTHOPAEDIC SURGERY

## 2021-01-14 NOTE — PROGRESS NOTES
Assessment: 59-year-old male presenting with history of now approximately 1 year of right thumb pain  1.  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 Treatment Plan: The patient has had persistent pain mainly in his right thumb despite conservative management with injections and bracing. He has had transient benefit in the past from conservative management for right thumb CMC arthritis. Clinically he demonstrates continued pain from his thumb base. We discussed the spectrum of treatment options today including both operative and nonoperative intervention including surgical intervention with right thumb CMC arthroplasty including trapeziectomy with ligament reconstruction using allograft tendon. Risks benefits alternatives were discussed with patient today including but not limited to infection bleeding damage tendon nerve or blood vessel need for additional procedures continued pain risks of anesthesia include stroke heart attack blood clot death subsidence of his arthroplasty as well as continued pain in  After thorough discussion the patient is understanding would like to proceed consent obtained in clinic today preoperative medical work-up initiated    No follow-ups on file. History of Present Illness  Mario Beltran III is a 76 y.o. male here today for a follow-up for history of bilateral thumb pain consistent with CMC arthritis  He is here mainly today for his right thumb as he has been having some pain here more recently  He has had conservative treatment in the past with now a total of 2 injections one performed in July 2020 and the last back in November 2020. He has had some transient benefit from bracing injections and activity modification but symptoms have been persistent  He is here today to discuss additional treatment options for his right thumb base pain.   No other pain now described today    Review of Systems  Pertinent items are noted in HPI  Review of systems reviewed from osseous abnormalities    Additional Diagnostic Test Findings:    Office Procedures:  No orders of the defined types were placed in this encounter. Nayana Estes MD  Orthopaedic Surgeon, Hand & Upper Extremity   [facility]    Contact Information:  Acosta Dawson: [phone] Clinical )    This dictation was performed with a verbal recognition program [facility]) and it was checked for errors. It is possible that there are still dictated errors within this office note. If so, please bring any errors to my attention for an addendum. All efforts were made to ensure that this office note is accurate.

## 2021-01-14 NOTE — PROGRESS NOTES
Subjective:      Patient ID: Christy Harper is a 76 y.o. male. HPI:  Silvia Najjar is being seen for hisfollow up for afib and palps. No complaints. Feeling good. Rhythm stable. Active. No exertional sx. No tachycardia. No palps. No syncope. No edema. No pnd or orthopnea. No chest pain/sob.      Past Medical History:   Diagnosis Date    Allergic rhinitis due to other allergen     Atrial fibrillation ([facility] [address]     Cancer ([facility] [address]     right kidney    Cataract 11/30/2016    Hx of blood clots     pulmonary embolism 2002 shortly after ablation    Hypopotassemia     Migraines     Other abnormal blood chemistry     Other and unspecified hyperlipidemia     Screening PSA (prostate specific antigen) 12/5/2009    <0.1 ng/mL    Sleep apnea     does not use cpap    Strain of calf muscle 12/19/2018    Tremor     minor    Unspecified sleep apnea     Vitreous degeneration      Past Surgical History:   Procedure Laterality Date    ABLATION OF DYSRHYTHMIC FOCUS      2002    CATARACT REMOVAL Bilateral     COLONOSCOPY  06-    Leelee Kirkpatrick / Dr. Rebeca Parker    COLONOSCOPY  10/20/2014    recheck 4-5 yrs w ++ h/o adenomatous plyps     PARTIAL NEPHRECTOMY Right 12/6/2018    OPEN RIGHT PARTIAL NEPHRECTOMY (FLANK INCISION) WITH INTRAOPERATIVE ULTRASOUND performed by Randall Clark MD at [address]       No Known Allergies     Social History     Socioeconomic History    Marital status:      Spouse name: Not on file    Number of children: Not on file    Years of education: Not on file    Highest education level: Not on file   Occupational History    Not on file   Social Needs    Financial resource strain: Not on file    Food insecurity     Worry: Never true     Inability: Never true   [employer] needs     Medical: Yes     Non-medical: Yes   Tobacco Use    Smoking status: Never Smoker    Smokeless tobacco: Never Used   Substance and Sexual Activity    Alcohol use: Yes     Comment: TABLET BY MOUTH TWICE A  tablet 3    fenofibrate (TRICOR) 145 MG tablet TAKE ONE TABLET BY MOUTH DAILY 90 tablet 3    metoprolol succinate (TOPROL XL) 50 MG extended release tablet TAKE ONE TABLET BY MOUTH DAILY 90 tablet 3    SUMAtriptan (IMITREX) 100 MG tablet TAKE ONE TABLET BY MOUTH ONCE AS NEEDED; MAY REPEAT IN 2 HOURS IF NEEDED. DO NOT EXCEED TWO TABLETS IN 24 HOURS. 27 tablet 3    fexofenadine (ALLEGRA ALLERGY) 180 MG tablet Take 180 mg by mouth daily as needed       No current facility-administered medications for this visit. Vitals:    01/14/21 1022   BP: 124/76   Pulse: 72   Temp: 96.9 °F (36.1 °C)       Wt 252      Review of Systems   Constitutional: Negative for activity change and fatigue. Respiratory: Negative for apnea, cough, choking, chest tightness and shortness of breath. Cardiovascular: Negative for chest pain, palpitations and leg swelling. [No PND or orthopnea. No tachycardia. Gastrointestinal: Negative for abdominal distention. Musculoskeletal: Negative for myalgias. Neurological: Negative for dizziness, syncope and light-headedness. Psychiatric/Behavioral: Negative for behavioral problems, confusion and agitation. All other systems reviewed negative as done. Objective:   Physical Exam   Constitutional: He is oriented to person, place, and time. He appears well-developed and well-nourished. No distress. HENT:   Head: Normocephalic and atraumatic. Eyes: EOM are normal. Right eye exhibits no discharge. Left eye exhibits no discharge. Neck: Normal range of motion. Neck supple. No JVD present. Cardiovascular: Normal rate, regular rhythm, S1 normal, S2 normal and normal heart sounds. Exam reveals no gallop. No murmur heard. Pulses:       Radial pulses are 2+ on the right side, and 2+ on the left side. Pulmonary/Chest: Effort normal and breath sounds normal. No respiratory distress. He has no wheezes. He has no rales. Abdominal: Soft. Bowel sounds are normal. He exhibits no distension. No tenderness. Musculoskeletal: Normal range of motion. He exhibits no edema. Neurological: He is alert and oriented to person, place, and time. Skin: Skin is warm and dry. Psychiatric: He has a normal mood and affect. His behavior is normal. Thought content normal.       Assessment:       Diagnosis Orders   1. Paroxysmal atrial fibrillation (HCC)     2. Palpitation     3. Pulmonary HTN ([facility] [address]             Plan:      CV stable. Now retired. No sob/chest pain. Rhythm stable. BP is stable. Ac with no bleeding issues. Encouraged diet, exercise and wt loss. No changes. Continue to monitor. Reviewed previous records and testing including echo 12/19. Follow up 6 months.

## 2021-01-14 NOTE — LETTER
[facility]  Surgery Precert & Billing Form:    DEMOGRAPHICS:                                                                                                       Patient Name:  Jonathon Tong III  Patient :  1955   Patient SS#:      Patient Phone:  [phone] (home) [phone] (work) Alt.  Patient Phone:    Patient Address:  [address] [place] [address]    PCP:  Sherrie Salinas MD  Insurance: Medicare    DIAGNOSIS & PROCEDURE:                                                                                      Diagnosis: Arthritis of carpometacarpal joint of Right thumb  Operation: right    SURGERY  INFORMATION  Date of Surgery:   2021   Location:    [facility]  Type:    Outpatient  23 hour hold:  No  Surgeon:        Maycol Calderon MD.       1/15/21     BILLING INFORMATION:                                                                                                Physician Procedure                                            CPT Codes                      PA, or Fellow Procedure                                      CPT Codes                      Precert information:

## 2021-01-19 ENCOUNTER — TELEPHONE (OUTPATIENT)
Dept: CARDIOLOGY CLINIC | Age: 66
End: 2021-01-19

## 2021-01-19 ENCOUNTER — OFFICE VISIT (OUTPATIENT)
Dept: INTERNAL MEDICINE CLINIC | Age: 66
End: 2021-01-19
Payer: MEDICARE

## 2021-01-19 VITALS
TEMPERATURE: 97.3 F | SYSTOLIC BLOOD PRESSURE: 128 MMHG | BODY MASS INDEX: 31.08 KG/M2 | DIASTOLIC BLOOD PRESSURE: 68 MMHG | HEIGHT: 75 IN | WEIGHT: 250 LBS

## 2021-01-19 DIAGNOSIS — E78.5 HYPERLIPIDEMIA, UNSPECIFIED HYPERLIPIDEMIA TYPE: ICD-10-CM

## 2021-01-19 DIAGNOSIS — R73.9 HYPERGLYCEMIA: ICD-10-CM

## 2021-01-19 DIAGNOSIS — Z01.818 PRE-OP EXAMINATION: Primary | ICD-10-CM

## 2021-01-19 DIAGNOSIS — I48.0 PAROXYSMAL ATRIAL FIBRILLATION (HCC): ICD-10-CM

## 2021-01-19 DIAGNOSIS — G43.909 MIGRAINE WITHOUT STATUS MIGRAINOSUS, NOT INTRACTABLE, UNSPECIFIED MIGRAINE TYPE: ICD-10-CM

## 2021-01-19 DIAGNOSIS — I10 ESSENTIAL HYPERTENSION: ICD-10-CM

## 2021-01-19 DIAGNOSIS — I26.99 OTHER PULMONARY EMBOLISM WITHOUT ACUTE COR PULMONALE, UNSPECIFIED CHRONICITY (HCC): ICD-10-CM

## 2021-01-19 DIAGNOSIS — M18.11 ARTHRITIS OF CARPOMETACARPAL (CMC) JOINT OF RIGHT THUMB: ICD-10-CM

## 2021-01-19 DIAGNOSIS — C64.1 RENAL CELL CARCINOMA OF RIGHT KIDNEY (HCC): ICD-10-CM

## 2021-01-19 PROCEDURE — 93000 ELECTROCARDIOGRAM COMPLETE: CPT | Performed by: INTERNAL MEDICINE

## 2021-01-19 PROCEDURE — G8427 DOCREV CUR MEDS BY ELIG CLIN: HCPCS | Performed by: INTERNAL MEDICINE

## 2021-01-19 PROCEDURE — 1123F ACP DISCUSS/DSCN MKR DOCD: CPT | Performed by: INTERNAL MEDICINE

## 2021-01-19 PROCEDURE — 4040F PNEUMOC VAC/ADMIN/RCVD: CPT | Performed by: INTERNAL MEDICINE

## 2021-01-19 PROCEDURE — G8417 CALC BMI ABV UP PARAM F/U: HCPCS | Performed by: INTERNAL MEDICINE

## 2021-01-19 PROCEDURE — 3017F COLORECTAL CA SCREEN DOC REV: CPT | Performed by: INTERNAL MEDICINE

## 2021-01-19 PROCEDURE — 99215 OFFICE O/P EST HI 40 MIN: CPT | Performed by: INTERNAL MEDICINE

## 2021-01-19 PROCEDURE — 1036F TOBACCO NON-USER: CPT | Performed by: INTERNAL MEDICINE

## 2021-01-19 PROCEDURE — G8484 FLU IMMUNIZE NO ADMIN: HCPCS | Performed by: INTERNAL MEDICINE

## 2021-01-19 ASSESSMENT — ENCOUNTER SYMPTOMS
SHORTNESS OF BREATH: 0
ABDOMINAL PAIN: 0
RESPIRATORY NEGATIVE: 1
CHEST TIGHTNESS: 0

## 2021-01-19 ASSESSMENT — PATIENT HEALTH QUESTIONNAIRE - PHQ9
SUM OF ALL RESPONSES TO PHQ9 QUESTIONS 1 & 2: 0
SUM OF ALL RESPONSES TO PHQ QUESTIONS 1-9: 0
SUM OF ALL RESPONSES TO PHQ QUESTIONS 1-9: 0

## 2021-01-19 NOTE — LETTER
[address] Cardiology [address]  Phone: [phone]  Fax: [phone]    Ted Tang MD        January 26, 2021        Demetrice Horton YOB: 1955 is cleared from a cardiac standpoint and is considered to be an acceptable risk for their planned surgical procedure. He is ok to stop coumadin 5 days prior to procedure and restart when it's ok to do so per surgeon's approval. If there are any questions, please feel free to contact my office at [phone].       Sincerely,        Ted Tang MD

## 2021-01-19 NOTE — PROGRESS NOTES
Subjective:      Patient ID: Owen Gilliam is a 76 y.o. male. Chief Complaint   Patient presents with    Pre-op Exam     hand surgery, 2/8/21, Dr. Leopoldo Martino     For repair of severe OA of right thumb. Pain has been very severe and treatment for past 16 mo has not resolved - otherwise feels well. Coumadin dose has been well controlled. Also has gained weight since detention. bp's well controlled. Taking crestor regularly. Did see cardiology and yet did not get cardiac clearance for the procedure. Amber [place] III  1955    No Known Allergies  Current Outpatient Medications   Medication Sig Dispense Refill    timolol (TIMOPTIC) 0.25 % ophthalmic solution INSTILL 1 DROP INTO BOTH EYES EVERY DAY 5 mL 3    warfarin (COUMADIN) 5 MG tablet Take 1 tablet by mouth daily 90 tablet 3    methocarbamol (ROBAXIN) 500 MG tablet Take 1 tablet by mouth 4 times daily as needed (muscle spasm) 60 tablet 3    warfarin (COUMADIN) 1 MG tablet TAKE ONE TABLET BY MOUTH TWICE A  tablet 3    rosuvastatin (CRESTOR) 40 MG tablet TAKE ONE TABLET BY MOUTH DAILY 90 tablet 3    divalproex (DEPAKOTE) 250 MG DR tablet TAKE ONE TABLET BY MOUTH THREE TIMES A  tablet 3    flecainide (TAMBOCOR) 100 MG tablet TAKE ONE TABLET BY MOUTH TWICE A  tablet 3    fenofibrate (TRICOR) 145 MG tablet TAKE ONE TABLET BY MOUTH DAILY 90 tablet 3    metoprolol succinate (TOPROL XL) 50 MG extended release tablet TAKE ONE TABLET BY MOUTH DAILY 90 tablet 3    SUMAtriptan (IMITREX) 100 MG tablet TAKE ONE TABLET BY MOUTH ONCE AS NEEDED; MAY REPEAT IN 2 HOURS IF NEEDED. DO NOT EXCEED TWO TABLETS IN 24 HOURS. 27 tablet 3    fexofenadine (ALLEGRA ALLERGY) 180 MG tablet Take 180 mg by mouth daily as needed       No current facility-administered medications for this visit.         Vitals:    01/19/21 1309   BP: 128/68   Site: Right Upper Arm   Temp: 97.3 °F (36.3 °C)   Weight: 250 lb (113.4 kg)   Height: 6' 3\" (1.905 m)

## 2021-01-19 NOTE — TELEPHONE ENCOUNTER
CARDIAC RISK ASSESSMENT    What type of procedure are you having? Thumb and wrist surgery    Which physician is performing your procedure? Dr. Marc Gorman    When is your procedure scheduled for?    2/12/21    Where are you having this procedure? [address]    Are you taking Blood Thinners? Yes   If so what? (Name/dose/frequesncy)    Coumadin     Does the surgeon want you to stop your blood thinner? If so for how long? Yes - five days prior     Phone Number and Contact Name for Physicians office:    Fax number to send information:      Patient will call back with phone numbers. Patient can be reached at [phone].

## 2021-01-19 NOTE — LETTER
[facility] [address]  [address]  Phone: [phone]  Fax: [phone]    Dr. Luciana Payne         January 19, 2021       Patient: Missy Dugan   MR Number: [id number]   YOB: 1955   Date of Visit: 1/19/2021       Dear Dr. Matt Dubon: Thank you for the request for consultation for Jhon Jessica to me for preoperative evaluation prior to thumb surgery. Below are the relevant portions of my assessment and plan of care. Subjective:      Patient ID: Missy Dugan is a 76 y.o. male. Chief Complaint   Patient presents with    Pre-op Exam     hand surgery, 2/8/21, Dr. Paty Dasilva     For repair of severe OA of right thumb. Pain has been very severe and treatment for past 16 mo has not resolved - otherwise feels well. Coumadin dose has been well controlled. Also has gained weight since [facility]. bp's well controlled. Taking crestor regularly. Did see cardiology and yet did not get cardiac clearance for the procedure.      Darreld Highland III  1955    No Known Allergies  Current Outpatient Medications   Medication Sig Dispense Refill    timolol (TIMOPTIC) 0.25 % ophthalmic solution INSTILL 1 DROP INTO BOTH EYES EVERY DAY 5 mL 3    warfarin (COUMADIN) 5 MG tablet Take 1 tablet by mouth daily 90 tablet 3    methocarbamol (ROBAXIN) 500 MG tablet Take 1 tablet by mouth 4 times daily as needed (muscle spasm) 60 tablet 3    warfarin (COUMADIN) 1 MG tablet TAKE ONE TABLET BY MOUTH TWICE A  tablet 3    rosuvastatin (CRESTOR) 40 MG tablet TAKE ONE TABLET BY MOUTH DAILY 90 tablet 3    divalproex (DEPAKOTE) 250 MG DR tablet TAKE ONE TABLET BY MOUTH THREE TIMES A  tablet 3    flecainide (TAMBOCOR) 100 MG tablet TAKE ONE TABLET BY MOUTH TWICE A  tablet 3    fenofibrate (TRICOR) 145 MG tablet TAKE ONE TABLET BY MOUTH DAILY 90 tablet 3  metoprolol succinate (TOPROL XL) 50 MG extended release tablet TAKE ONE TABLET BY MOUTH DAILY 90 tablet 3    SUMAtriptan (IMITREX) 100 MG tablet TAKE ONE TABLET BY MOUTH ONCE AS NEEDED; MAY REPEAT IN 2 HOURS IF NEEDED. DO NOT EXCEED TWO TABLETS IN 24 HOURS. 27 tablet 3    fexofenadine (ALLEGRA ALLERGY) 180 MG tablet Take 180 mg by mouth daily as needed       No current facility-administered medications for this visit. Vitals:    01/19/21 1309   BP: 128/68   Site: Right Upper Arm   Temp: 97.3 °F (36.3 °C)   Weight: 250 lb (113.4 kg)   Height: 6' 3\" (1.905 m)     Body mass index is 31.25 kg/m².      Wt Readings from Last 3 Encounters:   01/19/21 250 lb (113.4 kg)   01/14/21 252 lb 6.4 oz (114.5 kg)   08/26/20 249 lb (112.9 kg)     BP Readings from Last 3 Encounters:   01/19/21 128/68   01/14/21 124/76   08/26/20 126/74         Immunization History   Administered Date(s) Administered    DTaP 02/29/2008    Hepatitis A 02/29/2008    Influenza Virus Vaccine 09/02/2012, 10/05/2017, 10/01/2018    Pneumococcal Polysaccharide (Jubbbamhg50) 08/26/2020    Tdap (Boostrix, Adacel) 06/06/2014    Zoster Live (Zostavax) 06/06/2014       Past Medical History:   Diagnosis Date    Allergic rhinitis due to other allergen     Atrial fibrillation (HCC)     Cancer (HCC)     right kidney    Cataract 11/30/2016    Hx of blood clots     pulmonary embolism 2002 shortly after ablation    Hypopotassemia     Migraines     Other abnormal blood chemistry     Other and unspecified hyperlipidemia     Screening PSA (prostate specific antigen) 12/5/2009    <0.1 ng/mL    Sleep apnea     does not use cpap    Strain of calf muscle 12/19/2018    Tremor     minor    Unspecified sleep apnea     Vitreous degeneration      Past Surgical History:   Procedure Laterality Date    ABLATION OF DYSRHYTHMIC FOCUS      2002    CATARACT REMOVAL Bilateral     COLONOSCOPY  06-    Sam Arcos / Dr. Veras Right  COLONOSCOPY  10/20/2014    recheck 4-5 yrs w ++ h/o adenomatous plyps     PARTIAL NEPHRECTOMY Right 12/6/2018    OPEN RIGHT PARTIAL NEPHRECTOMY (FLANK INCISION) WITH INTRAOPERATIVE ULTRASOUND performed by Quince Carrel, MD at [address] OR     Family History   Problem Relation Age of Onset    Diabetes Father     Diabetes Mother     Uterine Cancer Mother      Social History     Socioeconomic History    Marital status:      Spouse name: Not on file    Number of children: Not on file    Years of education: Not on file    Highest education level: Not on file   Occupational History    Not on file   Social Needs    Financial resource strain: Not on file    Food insecurity     Worry: Never true     Inability: Never true   [employer] needs     Medical: Yes     Non-medical: Yes   Tobacco Use    Smoking status: Never Smoker    Smokeless tobacco: Never Used   Substance and Sexual Activity    Alcohol use: Yes     Comment: 4 drinks weekly    Drug use: No    Sexual activity: Yes     Partners: Female   Lifestyle    Physical activity     Days per week: Not on file     Minutes per session: Not on file    Stress: Not on file   Relationships    Social connections     Talks on phone: Not on file     Gets together: Not on file     Attends [employer] service: Not on file     Active member of club or organization: Not on file     Attends meetings of clubs or organizations: Not on file     Relationship status: Not on file    Intimate partner violence     Fear of current or ex partner: Not on file     Emotionally abused: Not on file     Physically abused: Not on file     Forced sexual activity: Not on file   Other Topics Concern    Not on file   Social History Narrative    Not on file             Review of Systems   Constitutional: Negative for appetite change and fatigue. HENT: Negative. Respiratory: Negative. Negative for chest tightness and shortness of breath. Cardiovascular: Negative for chest pain, palpitations and leg swelling. Gastrointestinal: Negative for abdominal pain. Genitourinary: Negative. Skin: Negative. Neurological: Negative for speech difficulty, weakness and light-headedness. Psychiatric/Behavioral: Negative for dysphoric mood and sleep disturbance. The patient is not nervous/anxious. Objective:   Physical Exam  Constitutional:       Appearance: He is well-developed. HENT:      Head: Atraumatic. Mouth/Throat:      Pharynx: No oropharyngeal exudate. Eyes:      Conjunctiva/sclera: Conjunctivae normal.      Pupils: Pupils are equal, round, and reactive to light. Neck:      Musculoskeletal: Normal range of motion and neck supple. Thyroid: No thyromegaly. Cardiovascular:      Rate and Rhythm: Normal rate and regular rhythm. Heart sounds: Normal heart sounds. No murmur. Pulmonary:      Effort: Pulmonary effort is normal. No respiratory distress. Breath sounds: Normal breath sounds. Abdominal:      General: There is no distension. Tenderness: There is no abdominal tenderness. Musculoskeletal:         General: Tenderness present. Comments: Severe pain right thumb at MC-P joint    Lymphadenopathy:      Cervical: No cervical adenopathy. Skin:     General: Skin is warm and dry. Neurological:      Mental Status: He is alert and oriented to person, place, and time. Cranial Nerves: No cranial nerve deficit. Deep Tendon Reflexes: Reflexes are normal and symmetric. Psychiatric:         Behavior: Behavior normal.         Thought Content:  Thought content normal.         Judgment: Judgment normal.           Assessment and Plan:      Renal cell carcinoma of right kidney (HCC)  Stable and utd w urology update    Essential hypertension  Controlled w current regimen- continue and monitor closely      Migraine  Fair control     Hyperlipemia  Cont statin and follow     Atrial fibrillation ([address] Will ask 's office re: when to stop coumadin prior to procedure     Pulmonary embolism ([address]  No recurrence. Hyperglycemia  Cont to watch diet     Arthritis of carpometacarpal [place]) joint of right thumb  For surgical correction per           Patient has been seen-history and physical performed and is medically cleared for surgery. Pt will contact Dr. Janneth Marsh office for cardiac clearance and coumadin instructions. If you have questions, please do not hesitate to call me. I look forward to following Tyrone Fillers along with you.     Sincerely,        Melyssa Leonardo MD    CC providers:    No Recipients

## 2021-01-22 ENCOUNTER — TELEPHONE (OUTPATIENT)
Dept: ORTHOPEDIC SURGERY | Age: 66
End: 2021-01-22

## 2021-01-22 NOTE — TELEPHONE ENCOUNTER
Called patient to let him know of his surgery time change. Surgery is set for 8:30am with arrival time at 6:15am on 2/12/2021.

## 2021-01-26 NOTE — TELEPHONE ENCOUNTER
Per  ok to hold coumadin 5 days prior and restart when the surgeons says it's ok to do so. Letter in [address] [place] [address] Rd.

## 2021-01-26 NOTE — PROGRESS NOTES
The [facility] [employer] [facility] [employer] [place], [address]    Acknowledgment of Informed Consent for Surgical or Medical Procedure and Sedation  I agree to allow doctor(s) [facility] and his/her associates or assistants, including residents and/or other qualified medical practitioner to perform the following medical treatment or procedure and to administer or direct the administration of sedation as necessary:  Procedure(s): RIGHT THUMB CARPOMETACARPAL JOINT ARTHROPLASTY INCLUDING TRAPEZIECTOMY  AND AUTOGRAFT, LIGAMENT RECONSTRUCTION WITH FLEXOR CARPI RADIALIS TENDON; ANY INDICATED PROCEDURES   My doctor has explained the following regarding the proposed procedure:   the explanation of the procedure   the benefits of the procedure   the potential problems that might occur during recuperation   the risks and side effects of the procedure which could include but are not limited to severe blood loss, infection, stroke or death   the benefits, risks and side effect of alternative procedures including the consequences of declining this procedure or any alternative procedures   the likelihood of achieving satisfactory results. I acknowledge no guarantee or assurance has been made to me regarding the results. I understand that during the course of this treatment/procedure, unforeseen conditions can occur which require an additional or different procedure. I agree to allow my physician or assistants to perform such extension of the original procedure as they may find necessary. I understand that sedation will often result in temporary impairment of memory and fine motor skills and that sedation can occasionally progress to a state of deep sedation or general anesthesia.     I understand the risks of anesthesia for surgery include, but are not limited to, sore throat, hoarseness, injury to face, mouth, or teeth; nausea; headache; injury to blood vessels or nerves; death, brain damage, or paralysis. I understand that if I have a Limitation of Treatment order in effect during my hospitalization, the order may or may not be in effect during this procedure. I give my doctor permission to give me blood or blood products. I understand that there are risks with receiving blood such as hepatitis, AIDS, fever, or allergic reaction. I acknowledge that the risks, benefits, and alternatives of this treatment have been explained to me and that no express or implied warranty has been given by the hospital, any blood bank, or any person or entity as to the blood or blood components transfused. At the discretion of my doctor, I agree to allow observers, equipment/product representatives and allow photographing, and/or televising of the procedure, provided my name or identity is maintained confidentially. I agree the hospital may dispose of or use for scientific or educational purposes any tissue, fluid, or body parts which may be removed.     ________________________________Date________Time______ am/pm  ([facility])  Patient or Signature of Closest Relative or Legal Guardian    ________________________________Date________Time______am/pm      Page 1 of  1  Witness

## 2021-02-08 ENCOUNTER — OFFICE VISIT (OUTPATIENT)
Dept: PRIMARY CARE CLINIC | Age: 66
End: 2021-02-08
Payer: MEDICARE

## 2021-02-08 DIAGNOSIS — Z01.818 PREOP EXAMINATION: Primary | ICD-10-CM

## 2021-02-08 PROCEDURE — G8417 CALC BMI ABV UP PARAM F/U: HCPCS | Performed by: NURSE PRACTITIONER

## 2021-02-08 PROCEDURE — 99211 OFF/OP EST MAY X REQ PHY/QHP: CPT | Performed by: NURSE PRACTITIONER

## 2021-02-08 PROCEDURE — G8428 CUR MEDS NOT DOCUMENT: HCPCS | Performed by: NURSE PRACTITIONER

## 2021-02-08 NOTE — PROGRESS NOTES
[facility] patients having surgery or anesthesia are required to be Covid tested. You will need to quarantine from the time you are tested until your surgery. PRIOR TO PROCEDURE DATE:        1. PLEASE FOLLOW ANY  GUIDELINES/ INSTRUCTIONS PRIOR TO YOUR PROCEDURE AS ADVISED BY YOUR SURGEON. 2. Arrange for someone to drive you home and be with you for the first 24 hours after discharge for your safety after your procedure for which you received sedation. Ensure it is someone we can share information with regarding your discharge. 3. You must contact your surgeon for instructions IF:   You are taking any blood thinners, aspirin, anti-inflammatory or vitamin E.   There is a change in your physical condition such as a cold, fever, rash, cuts, sores or any other infection, especially near your surgical site. 4. Do not drink alcohol the day before or day of your procedure. 5. A Pre-op History and Physical for surgery MUST be completed by your Physician or Urgent Care within 30 days of your procedure date. Please bring a copy with you on the day of your procedure and along with any other testing performed. THE DAY OF YOUR PROCEDURE:  1. Follow instructions for ARRIVAL TIME as DIRECTED BY YOUR SURGEON. I    1. Enter the MAIN entrance from [address] and follow the signs to the free [employer] or [employer] parking (offered free of charge 6am-5pm). 2. Enter the Main Entrance of the hospital (do not enter from the lower level of the parking garage). Upon entrance, check in with the  at the main desk on your left. If no one is available at the desk, proceed into the [facility] Waiting Room and go through the door directly into the [facility]. There is a Check-in desk ACROSS from Room 5 (marked with a sign hanging from the ceiling).  The phone number for the surgery center is However, if your insurance requires a co-pay, you may want to bring a method of payment, i.e. Check or credit card, if you wish to pay your co-pay the day of surgery. 12. If you are to stay overnight, you may bring a bag with personal items. Please have any large items you may need brought in by your family after your arrival to your hospital room. 15. If you have a Living Will or Durable Power of , please bring a copy on the day of your procedure. 15. With your permission, one family member may accompany you while you are being prepared for surgery. Once you are ready, additional family members may join you. HOW WE KEEP YOU SAFE and WORK TO PREVENT SURGICAL SITE INFECTIONS:  1. Health care workers should always check your ID bracelet to verify your name and birth date. You will be asked many times to state your name, date of birth, and allergies. 2. Health care workers should always clean their hands with soap or alcohol gel before providing care to you. It is okay to ask anyone if they cleaned their hands before they touch you. 3. You will be actively involved in verifying the type of procedure you are having and ensuring the correct surgical site. This will be confirmed multiple times prior to your procedure. Do NOT dominga your surgery site UNLESS instructed to by your surgeon. 4. Do not shave or wax for 72 hours prior to procedure near your operative site. Shaving with a razor can irritate your skin and make it easier to develop an infection. On the day of your procedure, any hair that needs to be removed near the surgical site will be clipped by a healthcare worker using a special clippers designed to avoid skin irritation. 5. When you are in the operating room, your surgical site will be cleansed with a special soap, and in most cases, you will be given an antibiotic before the surgery begins. What to expect AFTER YOUR PROCEDURE:  1.  Immediately following your

## 2021-02-09 LAB — SARS-COV-2: NOT DETECTED

## 2021-02-11 ENCOUNTER — ANESTHESIA EVENT (OUTPATIENT)
Dept: OPERATING ROOM | Age: 66
End: 2021-02-11
Payer: MEDICARE

## 2021-02-12 ENCOUNTER — ANESTHESIA (OUTPATIENT)
Dept: OPERATING ROOM | Age: 66
End: 2021-02-12
Payer: MEDICARE

## 2021-02-12 ENCOUNTER — HOSPITAL ENCOUNTER (OUTPATIENT)
Age: 66
Setting detail: OUTPATIENT SURGERY
Discharge: HOME | End: 2021-02-12
Attending: ORTHOPAEDIC SURGERY | Admitting: ORTHOPAEDIC SURGERY
Payer: MEDICARE

## 2021-02-12 VITALS
RESPIRATION RATE: 12 BRPM | SYSTOLIC BLOOD PRESSURE: 109 MMHG | OXYGEN SATURATION: 99 % | TEMPERATURE: 97.5 F | DIASTOLIC BLOOD PRESSURE: 57 MMHG

## 2021-02-12 VITALS
HEART RATE: 65 BPM | HEIGHT: 75 IN | WEIGHT: 250 LBS | TEMPERATURE: 97.6 F | RESPIRATION RATE: 16 BRPM | BODY MASS INDEX: 31.08 KG/M2 | DIASTOLIC BLOOD PRESSURE: 67 MMHG | OXYGEN SATURATION: 93 % | SYSTOLIC BLOOD PRESSURE: 137 MMHG

## 2021-02-12 DIAGNOSIS — M18.11 ARTHRITIS OF CARPOMETACARPAL (CMC) JOINT OF RIGHT THUMB: Primary | ICD-10-CM

## 2021-02-12 LAB
APTT: 26.9 SEC (ref 24.2–36.2)
GLUCOSE BLD-MCNC: 109 MG/DL (ref 70–99)
INR BLD: 1.03 (ref 0.86–1.14)
PERFORMED ON: ABNORMAL
PROTHROMBIN TIME: 11.9 SEC (ref 10–13.2)

## 2021-02-12 PROCEDURE — 3700000001 HC ADD 15 MINUTES (ANESTHESIA): Performed by: ORTHOPAEDIC SURGERY

## 2021-02-12 PROCEDURE — 2709999900 HC NON-CHARGEABLE SUPPLY: Performed by: ORTHOPAEDIC SURGERY

## 2021-02-12 PROCEDURE — 6360000002 HC RX W HCPCS: Performed by: NURSE ANESTHETIST, CERTIFIED REGISTERED

## 2021-02-12 PROCEDURE — 64415 NJX AA&/STRD BRCH PLXS IMG: CPT | Performed by: ANESTHESIOLOGY

## 2021-02-12 PROCEDURE — 3600000004 HC SURGERY LEVEL 4 BASE: Performed by: ORTHOPAEDIC SURGERY

## 2021-02-12 PROCEDURE — 2580000003 HC RX 258: Performed by: ANESTHESIOLOGY

## 2021-02-12 PROCEDURE — 3600000014 HC SURGERY LEVEL 4 ADDTL 15MIN: Performed by: ORTHOPAEDIC SURGERY

## 2021-02-12 PROCEDURE — 7100000010 HC PHASE II RECOVERY - FIRST 15 MIN: Performed by: ORTHOPAEDIC SURGERY

## 2021-02-12 PROCEDURE — 3700000000 HC ANESTHESIA ATTENDED CARE: Performed by: ORTHOPAEDIC SURGERY

## 2021-02-12 PROCEDURE — 2580000003 HC RX 258: Performed by: ORTHOPAEDIC SURGERY

## 2021-02-12 PROCEDURE — 2500000003 HC RX 250 WO HCPCS: Performed by: NURSE ANESTHETIST, CERTIFIED REGISTERED

## 2021-02-12 PROCEDURE — 6360000002 HC RX W HCPCS: Performed by: ANESTHESIOLOGY

## 2021-02-12 PROCEDURE — 7100000001 HC PACU RECOVERY - ADDTL 15 MIN: Performed by: ORTHOPAEDIC SURGERY

## 2021-02-12 PROCEDURE — 85610 PROTHROMBIN TIME: CPT

## 2021-02-12 PROCEDURE — 7100000011 HC PHASE II RECOVERY - ADDTL 15 MIN: Performed by: ORTHOPAEDIC SURGERY

## 2021-02-12 PROCEDURE — 6370000000 HC RX 637 (ALT 250 FOR IP): Performed by: ORTHOPAEDIC SURGERY

## 2021-02-12 PROCEDURE — 85730 THROMBOPLASTIN TIME PARTIAL: CPT

## 2021-02-12 PROCEDURE — 6360000002 HC RX W HCPCS: Performed by: ORTHOPAEDIC SURGERY

## 2021-02-12 PROCEDURE — 7100000000 HC PACU RECOVERY - FIRST 15 MIN: Performed by: ORTHOPAEDIC SURGERY

## 2021-02-12 RX ORDER — OXYCODONE HYDROCHLORIDE 5 MG/1
10 TABLET ORAL PRN
Status: DISCONTINUED | OUTPATIENT
Start: 2021-02-12 | End: 2021-02-12 | Stop reason: HOSPADM

## 2021-02-12 RX ORDER — METOCLOPRAMIDE HYDROCHLORIDE 5 MG/ML
10 INJECTION INTRAMUSCULAR; INTRAVENOUS
Status: DISCONTINUED | OUTPATIENT
Start: 2021-02-12 | End: 2021-02-12 | Stop reason: HOSPADM

## 2021-02-12 RX ORDER — MIDAZOLAM HYDROCHLORIDE 1 MG/ML
2 INJECTION INTRAMUSCULAR; INTRAVENOUS ONCE
Status: DISCONTINUED | OUTPATIENT
Start: 2021-02-12 | End: 2021-02-12 | Stop reason: HOSPADM

## 2021-02-12 RX ORDER — FENTANYL CITRATE 50 UG/ML
100 INJECTION, SOLUTION INTRAMUSCULAR; INTRAVENOUS ONCE
Status: DISCONTINUED | OUTPATIENT
Start: 2021-02-12 | End: 2021-02-12 | Stop reason: HOSPADM

## 2021-02-12 RX ORDER — LIDOCAINE HYDROCHLORIDE 20 MG/ML
INJECTION, SOLUTION INTRAVENOUS PRN
Status: DISCONTINUED | OUTPATIENT
Start: 2021-02-12 | End: 2021-02-12 | Stop reason: SDUPTHER

## 2021-02-12 RX ORDER — ROPIVACAINE HYDROCHLORIDE 5 MG/ML
30 INJECTION, SOLUTION EPIDURAL; INFILTRATION; PERINEURAL ONCE
Status: DISCONTINUED | OUTPATIENT
Start: 2021-02-12 | End: 2021-02-12 | Stop reason: HOSPADM

## 2021-02-12 RX ORDER — HYDRALAZINE HYDROCHLORIDE 20 MG/ML
5 INJECTION INTRAMUSCULAR; INTRAVENOUS EVERY 10 MIN PRN
Status: DISCONTINUED | OUTPATIENT
Start: 2021-02-12 | End: 2021-02-12 | Stop reason: HOSPADM

## 2021-02-12 RX ORDER — PROMETHAZINE HYDROCHLORIDE 25 MG/ML
6.25 INJECTION, SOLUTION INTRAMUSCULAR; INTRAVENOUS
Status: DISCONTINUED | OUTPATIENT
Start: 2021-02-12 | End: 2021-02-12 | Stop reason: HOSPADM

## 2021-02-12 RX ORDER — CEFAZOLIN SODIUM 2 G/50ML
2000 SOLUTION INTRAVENOUS ONCE
Status: COMPLETED | OUTPATIENT
Start: 2021-02-12 | End: 2021-02-12

## 2021-02-12 RX ORDER — GINSENG 100 MG
CAPSULE ORAL PRN
Status: DISCONTINUED | OUTPATIENT
Start: 2021-02-12 | End: 2021-02-12 | Stop reason: ALTCHOICE

## 2021-02-12 RX ORDER — MORPHINE SULFATE 4 MG/ML
1 INJECTION, SOLUTION INTRAMUSCULAR; INTRAVENOUS EVERY 5 MIN PRN
Status: DISCONTINUED | OUTPATIENT
Start: 2021-02-12 | End: 2021-02-12 | Stop reason: HOSPADM

## 2021-02-12 RX ORDER — FENTANYL CITRATE 50 UG/ML
INJECTION, SOLUTION INTRAMUSCULAR; INTRAVENOUS PRN
Status: DISCONTINUED | OUTPATIENT
Start: 2021-02-12 | End: 2021-02-12 | Stop reason: SDUPTHER

## 2021-02-12 RX ORDER — MAGNESIUM HYDROXIDE 1200 MG/15ML
LIQUID ORAL CONTINUOUS PRN
Status: COMPLETED | OUTPATIENT
Start: 2021-02-12 | End: 2021-02-12

## 2021-02-12 RX ORDER — HYDROCODONE BITARTRATE AND ACETAMINOPHEN 5; 325 MG/1; MG/1
1 TABLET ORAL EVERY 6 HOURS PRN
Qty: 18 TABLET | Refills: 0 | Status: SHIPPED | OUTPATIENT
Start: 2021-02-12 | End: 2021-02-17

## 2021-02-12 RX ORDER — EPHEDRINE SULFATE 50 MG/ML
INJECTION INTRAVENOUS PRN
Status: DISCONTINUED | OUTPATIENT
Start: 2021-02-12 | End: 2021-02-12 | Stop reason: SDUPTHER

## 2021-02-12 RX ORDER — DEXAMETHASONE SODIUM PHOSPHATE 4 MG/ML
INJECTION, SOLUTION INTRA-ARTICULAR; INTRALESIONAL; INTRAMUSCULAR; INTRAVENOUS; SOFT TISSUE PRN
Status: DISCONTINUED | OUTPATIENT
Start: 2021-02-12 | End: 2021-02-12 | Stop reason: SDUPTHER

## 2021-02-12 RX ORDER — PROPOFOL 10 MG/ML
INJECTION, EMULSION INTRAVENOUS PRN
Status: DISCONTINUED | OUTPATIENT
Start: 2021-02-12 | End: 2021-02-12 | Stop reason: SDUPTHER

## 2021-02-12 RX ORDER — SODIUM CHLORIDE, SODIUM LACTATE, POTASSIUM CHLORIDE, CALCIUM CHLORIDE 600; 310; 30; 20 MG/100ML; MG/100ML; MG/100ML; MG/100ML
INJECTION, SOLUTION INTRAVENOUS CONTINUOUS
Status: DISCONTINUED | OUTPATIENT
Start: 2021-02-12 | End: 2021-02-12 | Stop reason: HOSPADM

## 2021-02-12 RX ORDER — ONDANSETRON 2 MG/ML
INJECTION INTRAMUSCULAR; INTRAVENOUS PRN
Status: DISCONTINUED | OUTPATIENT
Start: 2021-02-12 | End: 2021-02-12 | Stop reason: SDUPTHER

## 2021-02-12 RX ORDER — BIMATOPROST 0.01 %
1 DROPS OPHTHALMIC (EYE) NIGHTLY
COMMUNITY
Start: 2021-01-21

## 2021-02-12 RX ORDER — DIPHENHYDRAMINE HYDROCHLORIDE 50 MG/ML
12.5 INJECTION INTRAMUSCULAR; INTRAVENOUS
Status: DISCONTINUED | OUTPATIENT
Start: 2021-02-12 | End: 2021-02-12 | Stop reason: HOSPADM

## 2021-02-12 RX ORDER — OXYCODONE HYDROCHLORIDE 5 MG/1
5 TABLET ORAL PRN
Status: DISCONTINUED | OUTPATIENT
Start: 2021-02-12 | End: 2021-02-12 | Stop reason: HOSPADM

## 2021-02-12 RX ORDER — MIDAZOLAM HYDROCHLORIDE 1 MG/ML
INJECTION INTRAMUSCULAR; INTRAVENOUS PRN
Status: DISCONTINUED | OUTPATIENT
Start: 2021-02-12 | End: 2021-02-12 | Stop reason: SDUPTHER

## 2021-02-12 RX ORDER — LABETALOL HYDROCHLORIDE 5 MG/ML
5 INJECTION, SOLUTION INTRAVENOUS EVERY 10 MIN PRN
Status: DISCONTINUED | OUTPATIENT
Start: 2021-02-12 | End: 2021-02-12 | Stop reason: HOSPADM

## 2021-02-12 RX ORDER — MEPERIDINE HYDROCHLORIDE 25 MG/ML
12.5 INJECTION INTRAMUSCULAR; INTRAVENOUS; SUBCUTANEOUS EVERY 5 MIN PRN
Status: DISCONTINUED | OUTPATIENT
Start: 2021-02-12 | End: 2021-02-12 | Stop reason: HOSPADM

## 2021-02-12 RX ADMIN — FENTANYL CITRATE 25 MCG: 50 INJECTION, SOLUTION INTRAMUSCULAR; INTRAVENOUS at 09:54

## 2021-02-12 RX ADMIN — FENTANYL CITRATE 25 MCG: 50 INJECTION, SOLUTION INTRAMUSCULAR; INTRAVENOUS at 10:11

## 2021-02-12 RX ADMIN — FENTANYL CITRATE 25 MCG: 50 INJECTION, SOLUTION INTRAMUSCULAR; INTRAVENOUS at 10:39

## 2021-02-12 RX ADMIN — FENTANYL CITRATE 100 MCG: 50 INJECTION, SOLUTION INTRAMUSCULAR; INTRAVENOUS at 08:10

## 2021-02-12 RX ADMIN — EPHEDRINE SULFATE 25 MG: 50 INJECTION INTRAVENOUS at 09:03

## 2021-02-12 RX ADMIN — SODIUM CHLORIDE, POTASSIUM CHLORIDE, SODIUM LACTATE AND CALCIUM CHLORIDE: 600; 310; 30; 20 INJECTION, SOLUTION INTRAVENOUS at 10:09

## 2021-02-12 RX ADMIN — EPHEDRINE SULFATE 25 MG: 50 INJECTION INTRAVENOUS at 08:42

## 2021-02-12 RX ADMIN — LIDOCAINE HYDROCHLORIDE 100 MG: 20 INJECTION, SOLUTION INTRAVENOUS at 08:36

## 2021-02-12 RX ADMIN — SODIUM CHLORIDE, POTASSIUM CHLORIDE, SODIUM LACTATE AND CALCIUM CHLORIDE: 600; 310; 30; 20 INJECTION, SOLUTION INTRAVENOUS at 07:08

## 2021-02-12 RX ADMIN — FENTANYL CITRATE 25 MCG: 50 INJECTION, SOLUTION INTRAMUSCULAR; INTRAVENOUS at 08:56

## 2021-02-12 RX ADMIN — ONDANSETRON 4 MG: 2 INJECTION INTRAMUSCULAR; INTRAVENOUS at 08:51

## 2021-02-12 RX ADMIN — DEXAMETHASONE SODIUM PHOSPHATE 8 MG: 4 INJECTION, SOLUTION INTRAMUSCULAR; INTRAVENOUS at 08:51

## 2021-02-12 RX ADMIN — PROPOFOL 200 MG: 10 INJECTION, EMULSION INTRAVENOUS at 08:36

## 2021-02-12 RX ADMIN — CEFAZOLIN SODIUM 2 G: 2 SOLUTION INTRAVENOUS at 08:42

## 2021-02-12 RX ADMIN — MIDAZOLAM HYDROCHLORIDE 2 MG: 2 INJECTION, SOLUTION INTRAMUSCULAR; INTRAVENOUS at 08:10

## 2021-02-12 ASSESSMENT — PULMONARY FUNCTION TESTS
PIF_VALUE: 10
PIF_VALUE: 15
PIF_VALUE: 5
PIF_VALUE: 10
PIF_VALUE: 5
PIF_VALUE: 13
PIF_VALUE: 7
PIF_VALUE: 4
PIF_VALUE: 7
PIF_VALUE: 4
PIF_VALUE: 5
PIF_VALUE: 6
PIF_VALUE: 4
PIF_VALUE: 6
PIF_VALUE: 4
PIF_VALUE: 5
PIF_VALUE: 4
PIF_VALUE: 9
PIF_VALUE: 5
PIF_VALUE: 6
PIF_VALUE: 5
PIF_VALUE: 5
PIF_VALUE: 10
PIF_VALUE: 6
PIF_VALUE: 5
PIF_VALUE: 6
PIF_VALUE: 9
PIF_VALUE: 4
PIF_VALUE: 6
PIF_VALUE: 5
PIF_VALUE: 4
PIF_VALUE: 6
PIF_VALUE: 9
PIF_VALUE: 6
PIF_VALUE: 6
PIF_VALUE: 0
PIF_VALUE: 4
PIF_VALUE: 5
PIF_VALUE: 4
PIF_VALUE: 5
PIF_VALUE: 6
PIF_VALUE: 5
PIF_VALUE: 5
PIF_VALUE: 7
PIF_VALUE: 4
PIF_VALUE: 5
PIF_VALUE: 1
PIF_VALUE: 5
PIF_VALUE: 10
PIF_VALUE: 5
PIF_VALUE: 5
PIF_VALUE: 4
PIF_VALUE: 1
PIF_VALUE: 5
PIF_VALUE: 5
PIF_VALUE: 2
PIF_VALUE: 4
PIF_VALUE: 4
PIF_VALUE: 5
PIF_VALUE: 5
PIF_VALUE: 10
PIF_VALUE: 6

## 2021-02-12 ASSESSMENT — PAIN - FUNCTIONAL ASSESSMENT: PAIN_FUNCTIONAL_ASSESSMENT: 0-10

## 2021-02-12 ASSESSMENT — PAIN DESCRIPTION - DESCRIPTORS: DESCRIPTORS: ACHING

## 2021-02-12 ASSESSMENT — PAIN DESCRIPTION - ONSET: ONSET: ON-GOING

## 2021-02-12 ASSESSMENT — PAIN DESCRIPTION - LOCATION: LOCATION: WRIST

## 2021-02-12 NOTE — H&P
I have reviewed the history and physical and examined the patient and find no relevant changes. I have reviewed with the patient and/or family the risks, benefits, and alternatives to the procedure.     Kyle Barraza MD  2/12/2021

## 2021-02-12 NOTE — ANESTHESIA PROCEDURE NOTES
Peripheral Block    Patient location during procedure: pre-op  Start time: 2/12/2021 8:10 AM  End time: 2/12/2021 8:18 AM  Staffing  Performed: anesthesiologist   Anesthesiologist: Major Miller MD  Preanesthetic Checklist  Completed: patient identified, IV checked, site marked, risks and benefits discussed, surgical consent, monitors and equipment checked, pre-op evaluation, timeout performed, anesthesia consent given, oxygen available and patient being monitored  Peripheral Block  Patient position: sitting  Prep: ChloraPrep  Patient monitoring: cardiac monitor, continuous pulse ox, frequent blood pressure checks and IV access  Block type: Brachial plexus and Axillary  Laterality: right  Injection technique: single-shot  Guidance: ultrasound guided  Provider prep: mask and sterile gloves  Needle  Needle type: short-bevel   Needle gauge: 22 G  Needle length: 8 cm  Needle localization: ultrasound guidance and nerve stimulator  Assessment  Injection assessment: negative aspiration for heme, no paresthesia on injection and local visualized surrounding nerve on ultrasound  Paresthesia pain: none  Slow fractionated injection: yes  Hemodynamics: stable  Additional Notes  Immediately prior to procedure a \"time out\" was called to verify the correct patient, allergies, laterality, procedure and equipment. Time out performed with RN. Local Anesthetic: See below    Biceps, Triceps and Coracobrachialis muscles, Axillary artery and Axillary vein, Radial, ulnar and median nerve are identified; the tip of the needle and the spread of the local anesthetic around the Radial, ulnar and median nerves appeared to be anatomically normal and there were no abnormal pathologically findings seen. Ultrasound-Guided Right Axillary Block Note    Indication: Postoperative analgesia upon request of the attending surgeon. Procedure: Patient supine. Right arm abducted ninety degrees and externally rotated.   Forearm flexed ninety degrees. Axillary artery identified using ultrasound and a 22G 80mm insulated regional block needle was advanced into the axillary sheath under direct ultrasound visualization. Ropivacaine 0.5% was injected under ultrasound visualization with good spread noted throughout the sheath-30cc total volume injected. The patient tolerated the procedure well and there were no apparent complications at the time of the procedure. Reason for block: post-op pain management and at surgeon's request            Stephen Jones.  Josephine Mcghee MD  February 12, 2021 9:11 AM

## 2021-02-12 NOTE — PROGRESS NOTES
PACU Transfer to [facility]    Vitals:    02/12/21 1200   BP: 136/73   Pulse: 67   Resp: 9   Temp: 97.0   SpO2: 92%         Intake/Output Summary (Last 24 hours) at 2/12/2021 1218  Last data filed at 2/12/2021 1201  Gross per 24 hour   Intake 1295 ml   Output --   Net 1295 ml       Pain assessment:  present - adequately treated  Pain Level: 02    Patient transferred to care of ROSSY RN.    2/12/2021 12:18 PM

## 2021-02-12 NOTE — PROGRESS NOTES
States left house after covid testing  Anesthesia here to do block. O2 placed. Start time:0820  Stop HDPB:1488  Tolerated Block without problems. See Dr Vin Trevioz' notes    See flow sheet for vital signs.

## 2021-02-12 NOTE — PROGRESS NOTES
Ambulatory Surgery/Procedure Discharge Note    Vitals:    02/12/21 1237   BP: 137/67   Pulse: 65   Resp: 16   Temp: 97.6 °F (36.4 °C)   SpO2: 93%       In: 1295 [P.O.:120; I.V.:1175]  Out: -     Restroom use offered before discharge. Yes, denies need to void    Pain assessment:  States minimal pain; using sling and ice. Aware of need to keep hand elevated. Fingers warm, minimal movement of hand, aware to take pain pill when block starts wearing off. Pain Level: 1        Patient discharged to home/self care.  Patient discharged via wheel chair by transporter to waiting family/S.O.       2/12/2021 1:06 PM

## 2021-02-12 NOTE — ANESTHESIA PRE PROCEDURE
Department of Anesthesiology  Preprocedure Note       Name:  Ines Crawford   Age:  78 y.o.  :  1955                                          MRN:  [id number]         Date:  2021      Surgeon: Suzan Johnson):  Solis Doherty MD    Procedure: Procedure(s):  RIGHT THUMB CARPOMETACARPAL JOINT ARTHROPLASTY INCLUDING TRAPEZIECTOMY AND AUTOGRAFT, LIGAMENT RECONSTRUCTION WITH FLEXOR CARPI RADIALIS TENODN; ANY INDICATED PROCEDURES    Medications prior to admission:   Prior to Admission medications    Medication Sig Start Date End Date Taking? Authorizing Provider   LUMIGAN 0.01 % SOLN ophthalmic drops Place 1 drop into both eyes nightly 21  Yes Historical Provider, MD   timolol (TIMOPTIC) 0.25 % ophthalmic solution INSTILL 1 DROP INTO BOTH EYES EVERY DAY 11/10/20  Yes Micki Noonan MD   rosuvastatin (CRESTOR) 40 MG tablet TAKE ONE TABLET BY MOUTH DAILY 20  Yes Micki Noonan MD   divalproex (DEPAKOTE) 250 MG DR tablet TAKE ONE TABLET BY MOUTH THREE TIMES A DAY 20  Yes Micki Noonan MD   flecainide (TAMBOCOR) 100 MG tablet TAKE ONE TABLET BY MOUTH TWICE A DAY 20  Yes Micki Noonan MD   fenofibrate (TRICOR) 145 MG tablet TAKE ONE TABLET BY MOUTH DAILY 20  Yes Micki Noonan MD   metoprolol succinate (TOPROL XL) 50 MG extended release tablet TAKE ONE TABLET BY MOUTH DAILY 20  Yes Micki Noonan MD   fexofenadine (ALLEGRA ALLERGY) 180 MG tablet Take 180 mg by mouth daily as needed   Yes Historical Provider, MD   warfarin (COUMADIN) 5 MG tablet Take 1 tablet by mouth daily 20   Micki Noonan MD   methocarbamol (ROBAXIN) 500 MG tablet Take 1 tablet by mouth 4 times daily as needed (muscle spasm) 20   Micki Noonan MD   warfarin (COUMADIN) 1 MG tablet TAKE ONE TABLET BY MOUTH TWICE A DAY 20   Micki Noonan MD   SUMAtriptan (IMITREX) 100 MG tablet TAKE ONE TABLET BY MOUTH ONCE AS NEEDED; MAY REPEAT IN 2 HOURS IF NEEDED.  DO NOT EXCEED TWO TABLETS IN 24 HOURS. 19   Cheli Rob Barrientos MD       Current medications:    Current Facility-Administered Medications   Medication Dose Route Frequency Provider Last Rate Last Admin    ceFAZolin (ANCEF) 2000 mg in dextrose 3 % 50 mL IVPB (duplex)  2,000 mg Intravenous Once Butch Way MD        lactated ringers infusion   Intravenous Continuous Carrasquillo Erichsen,  mL/hr at 02/12/21 0708 New Bag at 02/12/21 0708    HYDROmorphone (DILAUDID) injection 0.25 mg  0.25 mg Intravenous Q5 Min PRN Raheem Leone MD        HYDROmorphone (DILAUDID) injection 0.5 mg  0.5 mg Intravenous Q5 Min PRN Raheem Leone MD        morphine injection 1 mg  1 mg Intravenous Q5 Min PRN Raheem Leoen MD        HYDROmorphone (DILAUDID) injection 0.5 mg  0.5 mg Intravenous Q5 Min PRN Raheem Leone MD        oxyCODONE (ROXICODONE) immediate release tablet 5 mg  5 mg Oral PRN Raheem Leone MD        Or    oxyCODONE (ROXICODONE) immediate release tablet 10 mg  10 mg Oral PRN Raheem Leone MD        diphenhydrAMINE (BENADRYL) injection 12.5 mg  12.5 mg Intravenous Once PRN Raheem Leone MD        metoclopramide (REGLAN) injection 10 mg  10 mg Intravenous Once PRN Raheem Leone MD        promethazine (PHENERGAN) injection 6.25 mg  6.25 mg Intravenous Once PRN Raheem Leone MD        labetalol (NORMODYNE;TRANDATE) injection 5 mg  5 mg Intravenous Q10 Min PRN Raheem Leone MD        hydrALAZINE (APRESOLINE) injection 5 mg  5 mg Intravenous Q10 Min PRN Raheem Leone MD        meperidine (DEMEROL) injection 12.5 mg  12.5 mg Intravenous Q5 Min PRN Raheem Leone MD           Allergies:  No Known Allergies    Problem List:    Patient Active Problem List   Diagnosis Code    Atrial fibrillation ([facility] [address] I48.91    Pulmonary embolism (HCC) I26.99    Hyperlipemia E78.5    Vitreous degeneration H43.819    Sleep apnea G47.30    Migraine G43.909    Hx of colonic polyps Z86.010    Glaucoma H40.9    Chronic right-sided low back pain with sciatica M54.40, G89.29    Tremor R25.1    Hyperglycemia R73.9    Renal cell carcinoma of right kidney (HCC) C64.1    Plantar fasciitis M72.2    Arthritis of carpometacarpal (CMC) joint of right thumb M18.11    Chronic left shoulder pain M25.512, G89.29    Essential hypertension I10       Past Medical History:        Diagnosis Date    Allergic rhinitis due to other allergen     Atrial fibrillation (HCC)     Cancer (HCC)     right kidney    Cataract 11/30/2016    Glaucoma     both eyes    Hx of blood clots     pulmonary embolism 2002 shortly after ablation    Hypopotassemia     Migraines     PHILLIP (obstructive sleep apnea)     Other abnormal blood chemistry     Other and unspecified hyperlipidemia     Screening PSA (prostate specific antigen) 12/5/2009    <0.1 ng/mL    Sleep apnea     does not use cpap    Strain of calf muscle 12/19/2018    Tremor     minor    Unspecified sleep apnea     Vitreous degeneration        Past Surgical History:        Procedure Laterality Date    ABLATION OF DYSRHYTHMIC FOCUS      2002    CATARACT REMOVAL Bilateral     COLONOSCOPY  06-    Bharathi Martin / Dr. Lakisha Lopez    COLONOSCOPY  10/20/2014    recheck 4-5 yrs w ++ h/o adenomatous plyps     PARTIAL NEPHRECTOMY Right 12/6/2018    OPEN RIGHT PARTIAL NEPHRECTOMY (FLANK INCISION) WITH INTRAOPERATIVE ULTRASOUND performed by Octavia Hidalgo MD at [facility] [facility] History:    Social History     Tobacco Use    Smoking status: Never Smoker    Smokeless tobacco: Never Used   Substance Use Topics    Alcohol use: Yes     Comment: 4 drinks weekly                                Counseling given: Not Answered      Vital Signs (Current):   Vitals:    02/08/21 0841 02/12/21 0634   BP:  130/69   Pulse:  61   Resp:  16   Temp:  98.1 °F (36.7 °C)   TempSrc:  Oral   SpO2:  94%   Weight: 250 lb (113.4 kg) 250 lb (113.4 kg)   Height: 6' 3\" (1.905 m) 6' 3\" (1.905 m) BP Readings from Last 3 Encounters:   02/12/21 130/69   01/19/21 128/68   01/14/21 124/76       NPO Status: Time of last liquid consumption: 0530(sip with med)                        Time of last solid consumption: 2000                        Date of last liquid consumption: 02/12/21                        Date of last solid food consumption: 02/11/21    BMI:   Wt Readings from Last 3 Encounters:   02/12/21 250 lb (113.4 kg)   01/19/21 250 lb (113.4 kg)   01/14/21 252 lb 6.4 oz (114.5 kg)     Body mass index is 31.25 kg/m².     CBC:   Lab Results   Component Value Date    WBC 5.3 09/15/2020    RBC 5.06 09/15/2020    HGB 14.5 09/15/2020    HCT 44.0 09/15/2020    MCV 87.0 09/15/2020    RDW 15.3 09/15/2020     09/15/2020       CMP:   Lab Results   Component Value Date     09/15/2020    K 4.7 09/15/2020    K 5.1 12/13/2018     09/15/2020    CO2 27 09/15/2020    BUN 21 09/15/2020    CREATININE 1.2 09/15/2020    GFRAA >60 09/15/2020    GFRAA >60 05/25/2013    AGRATIO 1.9 09/15/2020    LABGLOM >60 09/15/2020    LABGLOM >60 05/17/2010    GLUCOSE 123 09/15/2020    PROT 6.6 09/15/2020    PROT 7.4 02/17/2012    CALCIUM 9.4 09/15/2020    BILITOT 0.4 09/15/2020    ALKPHOS 34 09/15/2020    AST 23 09/15/2020    ALT 30 09/15/2020       POC Tests:   Recent Labs     02/12/21  0711   POCGLU 109*       Coags:   Lab Results   Component Value Date    PROTIME 11.9 02/12/2021    PROTIME 24.9 08/23/2011    INR 1.03 02/12/2021    APTT 26.9 02/12/2021       HCG (If Applicable): No results found for: PREGTESTUR, PREGSERUM, HCG, HCGQUANT     ABGs: No results found for: PHART, PO2ART, LLU2EON, YNM6OQZ, BEART, H0OPJVVY     Type & Screen (If Applicable):  No results found for: LABABO, LABRH    Drug/Infectious Status (If Applicable):  No results found for: HIV, HEPCAB    COVID-19 Screening (If Applicable):   Lab Results   Component Value Date    COVID19 Not Detected 02/08/2021         Anesthesia Evaluation  Patient summary reviewed and Nursing notes reviewed  Airway: Mallampati: II  TM distance: >3 FB   Neck ROM: full  Mouth opening: > = 3 FB Dental:          Pulmonary:   (+) sleep apnea:                             Cardiovascular:    (+) hypertension:, dysrhythmias: atrial fibrillation, hyperlipidemia                  Neuro/Psych:   (+) neuromuscular disease:, headaches:,             GI/Hepatic/Renal:   (+) renal disease:,           Endo/Other:                     Abdominal:           Vascular:   + PE. Anesthesia Plan      general     ASA 1    (25-year-old male presents for RIGHT THUMB CARPOMETACARPAL JOINT ARTHROPLASTY INCLUDING TRAPEZIECTOMY AND AUTOGRAFT, LIGAMENT RECONSTRUCTION WITH FLEXOR CARPI RADIALIS TENODN; ANY INDICATED PROCEDURES (Right ). Plan general anesthesia with ASA standard monitors; axillary brachial plexus block for postoperative analgesia. Questions answered. Patient agreeable with anesthetic plan.  )  Induction: intravenous. Plan discussed with CRNA.                   Niyah Ruiz MD   2/12/2021

## 2021-02-12 NOTE — OP NOTE
[facility]  Procedure Note  [facility]  Date of procedure: 2/12/2021    Pre-operative Diagnosis:Right Thumb Carpometacarpal Arthritis    Post-operative Diagnosis: same    Procedure:    1. Right Thumb Carpometacarpal Arthroplasty   2. Flexor Carpi Radialis tendon interposition transfer to [facility] joint   3.  Intraoperative interpretation 3 views of the Right hand    Surgeon: David Solomon    Anesthesia:  General/Regional    Complications:  None immediate apparent    Implants: none    Specimens: none    Estimated blood loss: 10ml    Drains: none    INDICATIONS FOR PROCEDURE: The patient has degenerative arthritis of the [facility] joint of the thumb and has failed appropriate conservative care.  The patient understands the relevant risks, benefits, limitations, alternatives, and healing process of the procedure.      The right thumb and hand  was marked in preop holding by Dr Ana M Bingham discussing the surgical procedure once again with the patient.  All questions and concerns were addressed.  Informed consent was on the chart.  The patient was brought to the operating and room and placed in the supine position.  After the induction of anesthesia a standard time-out was performed.       Description of the Procedure:     We verified that antibiotics had been given.  The right upper extremity was prepped and draped in normal sterile fashion.  Final timeout was held.  The upper extremity was exsanguinated and the tourniquet was inflated to 250 mmHg.       A standard chevron incision over the [facility] joint of the  thumb dorsally was made.  We then carefully dissected through skin and  subcutaneous tissue protecting underlying neurovascular structures  including cutaneous nerve branches.  Radial artery was also identified  within the wound and was protected throughout the procedure.  We  identified the [facility] joint confirming on fluoroscopy of the trapezium and  basal thumb joint.  After this, a longitudinal capsulotomy was performed  and the trapezium was then carefully dissected and flaps from the  capsule were elevated.  There was a significant amount of synovial  inflammation that was debrided using combination of a rongeur as well as  a [place]  blade which was then used to resect the trapezium.  There was a  significant amount of deformity and osteophyte formation of the  trapezium.  There was also significant degenerative changes at the thumb  metacarpal base.  We then excised the trapezium piecemeal using an  osteotome and then removing with a rongeur.  All fragments were removed  as well as debridement of the synovium surrounding the area.  .  Flexor carpi radialis was  noted at the base of the wound, healthy appearing tendon with no injury.   After removal of the osteophytes, we then confirmed with  imaging.  It demonstrated complete trapeziectomy with removal of the  osteophytes.  Two incisions were then made proximally at the junction  between the mid and distal forearm, and the FCR tendon was located  within these incisions and protected.  The tendon sheath was incised and  a looped 22-gauge wire was placed from proximal to distal exiting  at the FCR sheath distally.  The wire loop was then threaded through  wire that had been through the second wire that was placed in a  longitudinal split through the FCR tendon identified more distally end  of the distal wound with approximately 40% of FCR tendon used as a  slip for our reconstruction.  The tendon was then split as the wire was  pulled proximally and the tendon was cut proximally and pulled through  the proximal wound to act as  our support with the remaining FCR tendon  kept intact as the sling.  We then prepared our basal thumb joint for  basal thumb metacarpal base before CMC arthroplasty.  A 2.8 mm drill bit was  used to create two drill holes both in the axial plane of the thumb  joint as well as on the dorsal radial aspect of the base of the joint and widened with a curette and [place] blade with good bone bridge between the drill holes. The FCR tendon after being harvested, the free tendon was then brought  through the distal incision and ligament reconstruction was performed by  placing the split ligament through our previously placed metacarpal  tunnels and leaving these to create a suspension with remaining intact  FCR.  It was sutured with a 2.0  Ethibond and 2.0 vicryl with   traction and appropriate thumb position was applied while suturing and  applying the appropriate traction and abduction.  C-arm images, three view of the right  hand demonstrated appropriate positioning of the thumb.  There was no  evidence of subsidence or collapse with the sturdy repair noted.  The  tendon was then wrapped around the remaining FCR tendon multiple times  and a combination of 2.0 Ethibond and  to secure  this in place.  .  Gelfoam was also packed into the remaining void as well.  At this point,  the tourniquet was deflated and hemostasis was achieved.  The wound was  copiously irrigated and images were again obtained.     We performed a closure of the wound after irrigation.  Layered closure  using 4.0 vicryl  for capsular closure, 4-0 Vicryl for subcutaneous closure,  and 4-0 nylon for skin closure.  The wounds in the forearm were also  closed in the similar fashion.  The fingers including the thumb had  excellent capillary refill. Lesli Abed is a palpable radial pulse.    Hemostasis was then achieved.  The wound was then placed in the sterile  dressing and sterile over wrap and a well padded thumb spica splint in  appropriate position of the thumb .  The patient was awoken and taken to  PACU in stable condition with no obvious complications.     POSTOPERATIVE PLAN:  The patient  was given Norco  as needed as prescribed for pain.  Keep the splint clean, dry, and  intact until followup.  Follow up in clinic in approximately 7-10 days  for a wound check.  Possible suture removal at time of follow up  Finger range of motion as tolerated except thumb.  The patient will be  Placed into a thumb spica orthosis at first postop visit  and will begin appropriate motion of the thumb as dictated by  the protocol for [facility] arthroplasty.     ADDENDUM:     It should be noted that three views of the right thumb were  performed with mini C-arm fluoroscopy for the right thumb.  AP, lateral,  and oblique views demonstrates satisfactory alignment of the  arthroplasty.           Sravanthi Solorio MD    Hand & Upper Extremity Surgery  [place]  Orthopaedics & Sports medicine      [facility]

## 2021-02-12 NOTE — BRIEF OP NOTE
Brief Postoperative Note      Patient: Saint Shi  YOB: 1955  MRN: [id number]    Date of Procedure: 2/12/2021    Pre-Op Diagnosis: Arthritis of carpometacarpal [place]) joint of right thumb [M18.11]    Post-Op Diagnosis: Same       Procedure:   1. Right thumb CMC arthroplasty including trapeziectomy with ligament reconstruction using FCR autograft tendon  2.  Interpretation of fluoroscopy 3 views right thumb    Surgeon(s):  Fifi Suárez MD    Assistant:  Surgical Assistant: Keshav Mcfarlane    Anesthesia: General, regional    Estimated Blood Loss (mL): 71RG    Complications: None immediate apparent    Specimens:   none  Implants:  none    Drains: none    Findings: see fully dictated operative report    Electronically signed by Ted Hinkle MD on 2/12/2021 at 10:59 AM

## 2021-02-12 NOTE — PROGRESS NOTES
S/p Right thumb CMC arthroplasty including trapeziectomy with ligament reconstruction using FCR autograft tendon. Admitted to PACU bed 12 from OR. Arrived at 1101 . Attached to PACU monitoring system. Alarms and parameters set. Report received from anesthesia personnel. No complication reported intraoperatively. Pt on nasal cannula with oxygen at 2 liters. Athrombic wraps in place. VSS; will continue to monitor.

## 2021-02-12 NOTE — H&P
[address]    [id number]    [facility] Same Day Surgery Update H & P  Department of General Surgery   Surgical Service   Pre-operative History and Physical  Last H & P within the last 30 days. DIAGNOSIS:   Arthritis of carpometacarpal (CMC) joint of right thumb [M18.11]    Procedure(s):  RIGHT THUMB CARPOMETACARPAL JOINT ARTHROPLASTY INCLUDING TRAPEZIECTOMY AND AUTOGRAFT, LIGAMENT RECONSTRUCTION WITH FLEXOR CARPI RADIALIS TENODN; ANY INDICATED PROCEDURES     HISTORY OF PRESENT ILLNESS:   Patient with right thumb pain and limited ROM in the setting of CMC arthrosis. The symptoms have been recalcitrant to conservative treatment and the patient presents today for the above procedure. Covid 19:  Patient denies fever, chills, cough or known exposure to Covid-19.       Past Medical History:        Diagnosis Date    Allergic rhinitis due to other allergen     Atrial fibrillation ([address]     Cancer ([facility] [address]     right kidney    Cataract 11/30/2016    Hx of blood clots     pulmonary embolism 2002 shortly after ablation    Hypopotassemia     Migraines     PHILLIP (obstructive sleep apnea)     Other abnormal blood chemistry     Other and unspecified hyperlipidemia     Screening PSA (prostate specific antigen) 12/5/2009    <0.1 ng/mL    Sleep apnea     does not use cpap    Strain of calf muscle 12/19/2018    Tremor     minor    Unspecified sleep apnea     Vitreous degeneration      Past Surgical History:        Procedure Laterality Date    ABLATION OF DYSRHYTHMIC FOCUS      2002    CATARACT REMOVAL Bilateral     COLONOSCOPY  06-    Bharathi Martin / Dr. Adalberto Griffith    COLONOSCOPY  10/20/2014    recheck 4-5 yrs w ++ h/o adenomatous plyps     PARTIAL NEPHRECTOMY Right 12/6/2018    OPEN RIGHT PARTIAL NEPHRECTOMY (FLANK INCISION) WITH INTRAOPERATIVE ULTRASOUND performed by Quince Carrel, MD at [facility] OR     Past Social History:  Social History     Socioeconomic History    Marital status:      Spouse name: None    Number of children: None    Years of education: None    Highest education level: None   Occupational History    None   Social Needs    Financial resource strain: None    Food insecurity     Worry: Never true     Inability: Never true    Transportation needs     Medical: Yes     Non-medical: Yes   Tobacco Use    Smoking status: Never Smoker    Smokeless tobacco: Never Used   Substance and Sexual Activity    Alcohol use: Yes     Comment: 4 drinks weekly    Drug use: No    Sexual activity: Yes     Partners: Female   Lifestyle    Physical activity     Days per week: None     Minutes per session: None    Stress: None   Relationships    Social connections     Talks on phone: None     Gets together: None     Attends [employer] service: None     Active member of club or organization: None     Attends meetings of clubs or organizations: None     Relationship status: None    Intimate partner violence     Fear of current or ex partner: None     Emotionally abused: None     Physically abused: None     Forced sexual activity: None   Other Topics Concern    None   Social History Narrative    None         Medications Prior to Admission:      Prior to Admission medications    Medication Sig Start Date End Date Taking?  Authorizing Provider   LUMIGAN 0.01 % SOLN ophthalmic drops Place 1 drop into both eyes nightly 1/21/21  Yes Historical Provider, MD   timolol (TIMOPTIC) 0.25 % ophthalmic solution INSTILL 1 DROP INTO BOTH EYES EVERY DAY 11/10/20  Yes Dante Diaz MD   rosuvastatin (CRESTOR) 40 MG tablet TAKE ONE TABLET BY MOUTH DAILY 8/26/20  Yes Dante Diaz MD   divalproex (DEPAKOTE) 250 MG DR tablet TAKE ONE TABLET BY MOUTH THREE TIMES A DAY 8/26/20  Yes Dante Diaz MD   flecainide (TAMBOCOR) 100 MG tablet TAKE ONE TABLET BY MOUTH TWICE A DAY 8/26/20  Yes Dante Diaz MD   fenofibrate (TRICOR) 145 MG tablet TAKE ONE TABLET BY MOUTH DAILY 8/26/20  Yes Dante Diaz MD metoprolol succinate (TOPROL XL) 50 MG extended release tablet TAKE ONE TABLET BY MOUTH DAILY 8/26/20  Yes Aundrea Lema MD   fexofenadine (ALLEGRA ALLERGY) 180 MG tablet Take 180 mg by mouth daily as needed   Yes Historical Provider, MD   warfarin (COUMADIN) 5 MG tablet Take 1 tablet by mouth daily 8/26/20   Aundrea Lema MD   methocarbamol (ROBAXIN) 500 MG tablet Take 1 tablet by mouth 4 times daily as needed (muscle spasm) 8/26/20   Aundrea Lmea MD   warfarin (COUMADIN) 1 MG tablet TAKE ONE TABLET BY MOUTH TWICE A DAY 8/26/20   Aundrea Lema MD   SUMAtriptan (IMITREX) 100 MG tablet TAKE ONE TABLET BY MOUTH ONCE AS NEEDED; MAY REPEAT IN 2 HOURS IF NEEDED. DO NOT EXCEED TWO TABLETS IN 24 HOURS. 11/25/19   Aundrea Lema MD         Allergies:  Patient has no known allergies. PHYSICAL EXAM:      /69   Pulse 61   Temp 98.1 °F (36.7 °C) (Oral)   Resp 16   Ht 6' 3\" (1.905 m)   Wt 250 lb (113.4 kg)   SpO2 94%   BMI 31.25 kg/m²      Airway:  Airway patent with no audible stridor    Heart:  Regular rate and rhythm, No murmur noted    Lungs:  No increased work of breathing, good air exchange, clear to auscultation bilaterally, no crackles or wheezing    Abdomen:  Soft, non-distended, non-tender, normal active bowel sounds, no masses palpated    ASSESSMENT AND PLAN    Patient is a 78 y.o. male with above specified procedure planned. 1.  Patient seen and focused exam done today- no new changes since last physical exam on 1/19/21    2. Access to ancillary services are available per request of the provider.     Jesus Marcus, EARNEST - CNP     2/12/2021

## 2021-02-22 ENCOUNTER — HOSPITAL ENCOUNTER (OUTPATIENT)
Dept: OCCUPATIONAL THERAPY | Age: 66
Setting detail: THERAPIES SERIES
Discharge: HOME | End: 2021-02-22
Payer: MEDICARE

## 2021-02-22 DIAGNOSIS — I26.99 OTHER PULMONARY EMBOLISM WITHOUT ACUTE COR PULMONALE, UNSPECIFIED CHRONICITY (HCC): ICD-10-CM

## 2021-02-22 DIAGNOSIS — R73.9 HYPERGLYCEMIA: ICD-10-CM

## 2021-02-22 DIAGNOSIS — C64.1 RENAL CELL CARCINOMA OF RIGHT KIDNEY (HCC): ICD-10-CM

## 2021-02-22 LAB
ALBUMIN SERPL-MCNC: 4.4 G/DL (ref 3.4–5)
ANION GAP SERPL CALCULATED.3IONS-SCNC: 16 MMOL/L (ref 3–16)
BUN BLDV-MCNC: 26 MG/DL (ref 7–20)
CALCIUM SERPL-MCNC: 9.7 MG/DL (ref 8.3–10.6)
CHLORIDE BLD-SCNC: 103 MMOL/L (ref 99–110)
CO2: 22 MMOL/L (ref 21–32)
CREAT SERPL-MCNC: 1.2 MG/DL (ref 0.8–1.3)
GFR AFRICAN AMERICAN: >60
GFR NON-AFRICAN AMERICAN: >60
GLUCOSE BLD-MCNC: 112 MG/DL (ref 70–99)
INR BLD: 1.37 (ref 0.86–1.14)
PHOSPHORUS: 2.9 MG/DL (ref 2.5–4.9)
POTASSIUM SERPL-SCNC: 5.1 MMOL/L (ref 3.5–5.1)
PROTHROMBIN TIME: 16 SEC (ref 10–13.2)
SODIUM BLD-SCNC: 141 MMOL/L (ref 136–145)

## 2021-02-22 PROCEDURE — L3808 WHFO, RIGID W/O JOINTS: HCPCS | Performed by: OCCUPATIONAL THERAPIST

## 2021-02-22 NOTE — PLAN OF CARE
The [facility] and [facility]  ________________________________________________________________________    Patient: Nory Lagunas   : 1955  MRN: [id number]  Referring Physician: Referring Practitioner: Dr. 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 Marc Gorman,  The following patient has been evaluated for occupational therapy services for fabrication of a FBTSS brace. Insurance requires the referring physician to review the treatment plan. Please review the attached evaluation and/or summary of the patient's plan of care, and verify that you agree by signing the attached document and sending it back to our office. Plan of Care/Treatment to date:  [x] Fabrication of custom FBTSS splint       [x] Instruction on splint use, care and wearing schedule        [] Follow up as needed for splint modifications          Frequency/Duration:  [x] One time visit for splint fabrication and instructions. Follow up as needed for splint modifcations        [] Splint fabricated, patient to return for full evaluation. Rehab Potential: [] good [] fair  [] poor         SPLINT EVALUATION  Date: 2021  Name: Nory Lagunas            : 1955      Medical/Treatment Diagnosis Information:  · Diagnosis: s/p R CMC arthroplasty (R23.70)  Insurance/Certification information:  OT Insurance Information: Medicare  Physician Information:  Referring Practitioner: Dr. Marc Gorman       Next MD Appointment: 4 weeks    Subjective  History of Injury/ Mechanism of Injury: Long term OA  Surgery Date: 21   Dominant Hand:    [] Right [x]Left  Occupational/Vocational Status: retired  Progress of any previous OT/PT: the patient []has/ [x]has not received OT/PT previously for this diagnosis.   Pain: 3/10    Objective Findings: steristrips in place   Type of splint:   [facility]  Splint protocol utilization:   Full time use except bathing, at rrest  Splint Purpose: [x]Immobilize or protect [x]Promote healing of sx   []Relieve pain  [x]Provide support for improved hand function []Maximize joint motion    Treatment:   [x]Splint provided ([x]Customized/ []Prefabricated), and splint rationale explained. [x]Patient instructed in [x]wear/ []care of splint and educated regarding diagnosis. [x]Patient instructed in symptom reduction techniques   []HEP instruction    []Discussed ADL assistive device    Written Information Distributed: []HEP  []Splint care and wearing protocol    Patient response to evaluation and instructions:  [x]Attentive/interested   [x]Asked questions/ retained info  []Appeared disinterested  []Poor retention of information  []Appeared anxious/ fearful    Assessment and Plan:  Goals: [x]Patient will be able to verbalize rationale for, and demonstrate proper wearing     of splint. [x]Splint will provide proper fit and function. []Patient will be able to verbalize 2-3 ways to prevent further symptoms. [x]Patient will be able to don and doff independently. []Patient will be independent with HEP    Goals met:  [x]yes []no    Plan:  []Splint completed with good fit and function. Hand Therapy to follow up for     splint modifications as needed    [x]Splint completed; OT/PT evaluation initiated. Patient to return for further     treatment.     Nilson Ramires OTR/L, [facility]

## 2021-02-23 ENCOUNTER — HOSPITAL ENCOUNTER (OUTPATIENT)
Dept: OCCUPATIONAL THERAPY | Age: 66
Setting detail: THERAPIES SERIES
Discharge: HOME | End: 2021-02-23
Payer: MEDICARE

## 2021-02-23 LAB
ESTIMATED AVERAGE GLUCOSE: 134.1 MG/DL
HBA1C MFR BLD: 6.3 %

## 2021-02-23 NOTE — PROGRESS NOTES
Splint adj today for some tightness at wrist; minor adj needed, splint reported as comfortable after. Also issued stockinette, as tensogrip at times too tight around thumb. To see following doctor in a few weeks for AROM.

## 2021-02-25 ENCOUNTER — ANTI-COAG VISIT (OUTPATIENT)
Dept: INTERNAL MEDICINE CLINIC | Age: 66
End: 2021-02-25

## 2021-03-17 DIAGNOSIS — I48.0 PAROXYSMAL ATRIAL FIBRILLATION (HCC): ICD-10-CM

## 2021-03-17 DIAGNOSIS — I48.0 PAROXYSMAL ATRIAL FIBRILLATION (HCC): Primary | ICD-10-CM

## 2021-03-17 DIAGNOSIS — Z01.89 ROUTINE LAB DRAW: Primary | ICD-10-CM

## 2021-03-17 LAB
INR BLD: 2.31 (ref 0.86–1.14)
PROTHROMBIN TIME: 27 SEC (ref 10–13.2)

## 2021-05-03 LAB
INR BLD: 1.72 (ref 0.86–1.14)
PROTHROMBIN TIME: 20.1 SEC (ref 10–13.2)

## 2021-05-05 ENCOUNTER — ANTI-COAG VISIT (OUTPATIENT)
Dept: INTERNAL MEDICINE CLINIC | Age: 66
End: 2021-05-05

## 2021-05-05 NOTE — PROGRESS NOTES
Increase to 7mg daily and isis 1 mo - he should be on 6 mg mon thurs 7 other days now so just double check-he is just a little low

## 2021-05-06 ENCOUNTER — TELEPHONE (OUTPATIENT)
Dept: INTERNAL MEDICINE CLINIC | Age: 66
End: 2021-05-06

## 2021-08-11 ENCOUNTER — HOSPITAL ENCOUNTER (OUTPATIENT)
Dept: GENERAL RADIOLOGY | Age: 66
Discharge: HOME | End: 2021-08-11
Payer: MEDICARE

## 2021-08-11 ENCOUNTER — HOSPITAL ENCOUNTER (OUTPATIENT)
Dept: CT IMAGING | Age: 66
Discharge: HOME | End: 2021-08-11
Payer: MEDICARE

## 2021-08-11 DIAGNOSIS — C64.1 MALIGNANT NEOPLASM OF RIGHT KIDNEY, EXCEPT RENAL PELVIS (HCC): ICD-10-CM

## 2021-08-11 LAB
GFR AFRICAN AMERICAN: 52
GFR NON-AFRICAN AMERICAN: 43
PERFORMED ON: ABNORMAL
POC CREATININE: 1.6 MG/DL (ref 0.8–1.3)
POC SAMPLE TYPE: ABNORMAL

## 2021-08-11 PROCEDURE — 6360000004 HC RX CONTRAST MEDICATION: Performed by: NURSE PRACTITIONER

## 2021-08-11 PROCEDURE — 74178 CT ABD&PLV WO CNTR FLWD CNTR: CPT

## 2021-08-11 PROCEDURE — 82565 ASSAY OF CREATININE: CPT

## 2021-08-11 PROCEDURE — 71046 X-RAY EXAM CHEST 2 VIEWS: CPT

## 2021-08-11 RX ADMIN — IOPAMIDOL 80 ML: 755 INJECTION, SOLUTION INTRAVENOUS at 17:15

## 2021-08-17 ENCOUNTER — OFFICE VISIT (OUTPATIENT)
Dept: CARDIOLOGY CLINIC | Age: 66
End: 2021-08-17
Payer: MEDICARE

## 2021-08-17 VITALS
SYSTOLIC BLOOD PRESSURE: 130 MMHG | BODY MASS INDEX: 32.12 KG/M2 | DIASTOLIC BLOOD PRESSURE: 80 MMHG | WEIGHT: 257 LBS | HEART RATE: 72 BPM

## 2021-08-17 DIAGNOSIS — I48.0 PAROXYSMAL ATRIAL FIBRILLATION (HCC): Primary | ICD-10-CM

## 2021-08-17 DIAGNOSIS — I27.20 PULMONARY HTN (HCC): ICD-10-CM

## 2021-08-17 DIAGNOSIS — R00.2 PALPITATION: ICD-10-CM

## 2021-08-17 PROCEDURE — G8427 DOCREV CUR MEDS BY ELIG CLIN: HCPCS | Performed by: INTERNAL MEDICINE

## 2021-08-17 PROCEDURE — 1123F ACP DISCUSS/DSCN MKR DOCD: CPT | Performed by: INTERNAL MEDICINE

## 2021-08-17 PROCEDURE — 4040F PNEUMOC VAC/ADMIN/RCVD: CPT | Performed by: INTERNAL MEDICINE

## 2021-08-17 PROCEDURE — 1036F TOBACCO NON-USER: CPT | Performed by: INTERNAL MEDICINE

## 2021-08-17 PROCEDURE — G8417 CALC BMI ABV UP PARAM F/U: HCPCS | Performed by: INTERNAL MEDICINE

## 2021-08-17 PROCEDURE — 3017F COLORECTAL CA SCREEN DOC REV: CPT | Performed by: INTERNAL MEDICINE

## 2021-08-17 PROCEDURE — 99213 OFFICE O/P EST LOW 20 MIN: CPT | Performed by: INTERNAL MEDICINE

## 2021-08-17 NOTE — PROGRESS NOTES
Subjective:      Patient ID: Adrian Araujo is a 78 y.o. male. HPI:  Marina Pena is being seen for hisfollow up for afib and palps. No complaints. Feeling good. Rhythm stable. Active. No exertional sx. No tachycardia. No palps. No syncope. No edema. No pnd or orthopnea. No chest pain/sob.      Past Medical History:   Diagnosis Date    Allergic rhinitis due to other allergen     Atrial fibrillation ([address]     Cancer ([address]     right kidney    Cataract 11/30/2016    Glaucoma     both eyes    Hx of blood clots     pulmonary embolism 2002 shortly after ablation    Hypopotassemia     Migraines     PHILLIP (obstructive sleep apnea)     Other abnormal blood chemistry     Other and unspecified hyperlipidemia     Screening PSA (prostate specific antigen) 12/5/2009    <0.1 ng/mL    Sleep apnea     does not use cpap    Strain of calf muscle 12/19/2018    Tremor     minor    Unspecified sleep apnea     Vitreous degeneration      Past Surgical History:   Procedure Laterality Date    ABLATION OF DYSRHYTHMIC FOCUS      2002    CATARACT REMOVAL Bilateral     COLONOSCOPY  06-    [facility] / Dr. Corey Collins    COLONOSCOPY  10/20/2014    recheck 4-5 yrs w ++ h/o adenomatous plyps     HAND TENDON SURGERY Right 2/12/2021    RIGHT THUMB CARPOMETACARPAL JOINT ARTHROPLASTY INCLUDING TRAPEZIECTOMY AND AUTOGRAFT, LIGAMENT RECONSTRUCTION WITH FLEXOR CARPI RADIALIS TENODN; ANY INDICATED PROCEDURES performed by Renetta Billingsley MD at [place] [address] Right 12/6/2018    OPEN RIGHT PARTIAL NEPHRECTOMY (FLANK INCISION) WITH INTRAOPERATIVE ULTRASOUND performed by Jb Delaney MD at [address]       No Known Allergies     Social History     Socioeconomic History    Marital status:      Spouse name: Not on file    Number of children: Not on file    Years of education: Not on file    Highest education level: Not on file   Occupational History    Not on file   Tobacco Use    Smoking status: Never Smoker    Smokeless tobacco: Never Used   Vaping Use    Vaping Use: Never used   Substance and Sexual Activity    Alcohol use: Yes     Comment: 4 drinks weekly    Drug use: No    Sexual activity: Yes     Partners: Female   Other Topics Concern    Not on file   Social History Narrative    Not on file     Social Determinants of Health     Financial Resource Strain:     Difficulty of Paying Living Expenses:    Food Insecurity: No Food Insecurity    Worried About Running Out of Food in the Last Year: Never true    Judy of Food in the Last Year: Never true   Transportation Needs: Unmet Transportation Needs    Lack of Transportation (Medical): Yes    Lack of Transportation (Non-Medical): Yes   Physical Activity:     Days of Exercise per Week:     Minutes of Exercise per Session:    Stress:     Feeling of Stress :    Social Connections:     Frequency of Communication with Friends and Family:     Frequency of Social Gatherings with Friends and Family:     Attends Ca[employer] Services:     Active Member of Clubs or Organizations:     Attends Club or Organization Meetings:     Marital Status:    Intimate Partner Violence:     Fear of Current or Ex-Partner:     Emotionally Abused:     Physically Abused:     Sexually Abused:         Patient has a family history includes Diabetes in his father and mother; Uterine Cancer in his mother. Patient  has a past medical history of Allergic rhinitis due to other allergen, Atrial fibrillation ([address] [address] Cataract, Glaucoma, Hx of blood clots, Hypopotassemia, Migraines, PHILLIP (obstructive sleep apnea), Other abnormal blood chemistry, Other and unspecified hyperlipidemia, Screening PSA (prostate specific antigen), Sleep apnea, Strain of calf muscle, Tremor, Unspecified sleep apnea, and Vitreous degeneration.      Current Outpatient Medications   Medication Sig Dispense Refill    LUMIGAN 0.01 % SOLN ophthalmic drops Place 1 drop into both eyes nightly      timolol (TIMOPTIC) 0.25 % ophthalmic solution INSTILL 1 DROP INTO BOTH EYES EVERY DAY 5 mL 3    warfarin (COUMADIN) 5 MG tablet Take 1 tablet by mouth daily 90 tablet 3    methocarbamol (ROBAXIN) 500 MG tablet Take 1 tablet by mouth 4 times daily as needed (muscle spasm) 60 tablet 3    warfarin (COUMADIN) 1 MG tablet TAKE ONE TABLET BY MOUTH TWICE A  tablet 3    rosuvastatin (CRESTOR) 40 MG tablet TAKE ONE TABLET BY MOUTH DAILY 90 tablet 3    divalproex (DEPAKOTE) 250 MG DR tablet TAKE ONE TABLET BY MOUTH THREE TIMES A  tablet 3    flecainide (TAMBOCOR) 100 MG tablet TAKE ONE TABLET BY MOUTH TWICE A  tablet 3    fenofibrate (TRICOR) 145 MG tablet TAKE ONE TABLET BY MOUTH DAILY 90 tablet 3    metoprolol succinate (TOPROL XL) 50 MG extended release tablet TAKE ONE TABLET BY MOUTH DAILY 90 tablet 3    SUMAtriptan (IMITREX) 100 MG tablet TAKE ONE TABLET BY MOUTH ONCE AS NEEDED; MAY REPEAT IN 2 HOURS IF NEEDED. DO NOT EXCEED TWO TABLETS IN 24 HOURS. 27 tablet 3    fexofenadine (ALLEGRA ALLERGY) 180 MG tablet Take 180 mg by mouth daily as needed       No current facility-administered medications for this visit. Vitals:    08/17/21 1327   BP: 130/80   Pulse: 72       Wt 257      Review of Systems   Constitutional: Negative for activity change and fatigue. Respiratory: Negative for apnea, cough, choking, chest tightness and shortness of breath. Cardiovascular: Negative for chest pain, palpitations and leg swelling. [No PND or orthopnea. No tachycardia. Gastrointestinal: Negative for abdominal distention. Musculoskeletal: Negative for myalgias. Neurological: Negative for dizziness, syncope and light-headedness. Psychiatric/Behavioral: Negative for behavioral problems, confusion and agitation. All other systems reviewed negative as done. Objective:   Physical Exam   Constitutional: He is oriented to person, place, and time.  He appears well-developed and well-nourished. No distress. HENT:   Head: Normocephalic and atraumatic. Eyes: EOM are normal. Right eye exhibits no discharge. Left eye exhibits no discharge. Neck: Normal range of motion. Neck supple. No JVD present. Cardiovascular: Normal rate, regular rhythm, S1 normal, S2 normal and normal heart sounds. Exam reveals no gallop. No murmur heard. Pulses:       Radial pulses are 2+ on the right side, and 2+ on the left side. Pulmonary/Chest: Effort normal and breath sounds normal. No respiratory distress. He has no wheezes. He has no rales. Abdominal: Soft. Bowel sounds are normal. He exhibits no distension. No tenderness. Musculoskeletal: Normal range of motion. He exhibits no edema. Neurological: He is alert and oriented to person, place, and time. Skin: Skin is warm and dry. Psychiatric: He has a normal mood and affect. His behavior is normal. Thought content normal.       Assessment:       Diagnosis Orders   1. Paroxysmal atrial fibrillation (HCC)     2. Palpitation     3. Pulmonary HTN ([address]             Plan:      CV stable. assisted good. No sob/chest pain. Rhythm stable. BP is stable. Ac with no bleeding issues. Encouraged diet, exercise and wt loss. No changes. Continue to monitor. Reviewed previous records and testing including echo 12/19. Follow up 6 months.

## 2021-08-18 RX ORDER — FLECAINIDE ACETATE 100 MG/1
TABLET ORAL
Qty: 180 TABLET | Refills: 3 | Status: SHIPPED | OUTPATIENT
Start: 2021-08-18 | End: 2022-08-01 | Stop reason: SDUPTHER

## 2021-09-07 RX ORDER — FENOFIBRATE 145 MG/1
TABLET, COATED ORAL
Qty: 90 TABLET | Refills: 3 | Status: SHIPPED | OUTPATIENT
Start: 2021-09-07 | End: 2022-11-01 | Stop reason: SDUPTHER

## 2021-09-07 RX ORDER — METOPROLOL SUCCINATE 50 MG/1
TABLET, EXTENDED RELEASE ORAL
Qty: 90 TABLET | Refills: 3 | Status: SHIPPED | OUTPATIENT
Start: 2021-09-07 | End: 2022-11-01 | Stop reason: SDUPTHER

## 2021-09-09 RX ORDER — WARFARIN SODIUM 1 MG/1
TABLET ORAL
Qty: 180 TABLET | Refills: 0 | Status: SHIPPED | OUTPATIENT
Start: 2021-09-09 | End: 2021-10-08 | Stop reason: SDUPTHER

## 2021-10-08 RX ORDER — WARFARIN SODIUM 1 MG/1
TABLET ORAL
Qty: 180 TABLET | Refills: 3 | Status: SHIPPED | OUTPATIENT
Start: 2021-10-08 | End: 2021-12-23 | Stop reason: SDUPTHER

## 2021-10-08 RX ORDER — WARFARIN SODIUM 5 MG/1
TABLET ORAL
Qty: 90 TABLET | Refills: 3 | Status: SHIPPED | OUTPATIENT
Start: 2021-10-08 | End: 2021-12-23 | Stop reason: SDUPTHER

## 2021-10-23 NOTE — PROGRESS NOTES
Jer Ng    Afebrile  Pain controlled  No N/V  Abdomen soft; mild epigastric tenderness    IMP:  Post-ERCP pancreatitis    REC:  Continue NPO  Recheck lipase tomorrow  Hopefully, we can begin to advance po intake then consistent with CMC arthritis. The patient has had discomfort now for over 1 year. He was last seen in the clinic in January 2020 and we spoke regarding treatment options including conservative management with bracing activity modification and injection  At that time the patient elected for bracing and activity modification. He has begun to have more progressive pain with gripping and pinching activities. He feels that the wrap has been helpful most of the time and is here today to discuss corticosteroid injection    Review of Systems  Pertinent items are noted in HPI  Review of systems reviewed from Patient History Form dated on 1/30/20  and available in the patient's chart under the Media tab. Vital Signs  There were no vitals filed for this visit. Physical Exam  Constitutional:  Patient is well-nourished and demonstrates normal hygiene. Mental Status:  Patient is alert and oriented to person, place and time. Skin:  Intact, no rashes or lesions. Musculoskeletal       Cervical spine, shoulders, elbows, wrist:  satisfactory pain-free range of motion,                                                                           strength, and stability         Right thumb(s): Moderate tenderness is elicited with CMC grind          There is pain on firm pressure over the trapeziometacarpal joint. Satisfactory stability          exists at the MP joint. No tenderness at thumb MP joint  No triggering catching or locking throughout the right thumb or hand  No thenar atrophy or intrinsic wasting, no skin changes throughout the remainder of the thumb or hand  Appropriate thumb tenodesis and cascade with 5 out of 5 strength EPL FPL and thumb abduction              Contralateral thumb:  No tenderness with CMC gring or firm pressure over                                  trapeziometacarpal joint. Satisfactory stability exists at MP joint.     Capillary refill brisk all fingers, symmetric  Gross sensation intact to light touch median/ulnar/radial nerves  Sensation intact to radial/ulnar aspect of fingertip        Radiology:    X-rays obtained and reviewed in office:  No new images obtained today    Additional Diagnostic Test Findings:    Office Procedures:  No orders of the defined types were placed in this encounter. Aliyah Barrientos MD  Orthopaedic Surgeon, Hand & Upper Extremity   [facility]    Contact Information:  Setphania Niño: [phone] Clinical )    This dictation was performed with a verbal recognition program [facility]) and it was checked for errors. It is possible that there are still dictated errors within this office note. If so, please bring any errors to my attention for an addendum. All efforts were made to ensure that this office note is accurate.

## 2021-10-26 DIAGNOSIS — Z12.5 PROSTATE CANCER SCREENING: ICD-10-CM

## 2021-10-26 DIAGNOSIS — E55.9 VITAMIN D DEFICIENCY: ICD-10-CM

## 2021-10-26 DIAGNOSIS — G43.909 MIGRAINE WITHOUT STATUS MIGRAINOSUS, NOT INTRACTABLE, UNSPECIFIED MIGRAINE TYPE: ICD-10-CM

## 2021-10-26 DIAGNOSIS — R73.9 HYPERGLYCEMIA: ICD-10-CM

## 2021-10-26 DIAGNOSIS — E78.5 HYPERLIPIDEMIA, UNSPECIFIED HYPERLIPIDEMIA TYPE: Primary | ICD-10-CM

## 2021-10-26 DIAGNOSIS — I10 ESSENTIAL HYPERTENSION: ICD-10-CM

## 2021-10-26 DIAGNOSIS — C64.1 RENAL CELL CARCINOMA OF RIGHT KIDNEY (HCC): ICD-10-CM

## 2021-10-26 DIAGNOSIS — I48.0 PAROXYSMAL ATRIAL FIBRILLATION (HCC): ICD-10-CM

## 2021-11-04 DIAGNOSIS — R73.9 HYPERGLYCEMIA: ICD-10-CM

## 2021-11-04 DIAGNOSIS — C64.1 RENAL CELL CARCINOMA OF RIGHT KIDNEY (HCC): ICD-10-CM

## 2021-11-04 DIAGNOSIS — Z12.5 PROSTATE CANCER SCREENING: ICD-10-CM

## 2021-11-04 DIAGNOSIS — E55.9 VITAMIN D DEFICIENCY: ICD-10-CM

## 2021-11-04 DIAGNOSIS — G43.909 MIGRAINE WITHOUT STATUS MIGRAINOSUS, NOT INTRACTABLE, UNSPECIFIED MIGRAINE TYPE: ICD-10-CM

## 2021-11-04 DIAGNOSIS — I48.0 PAROXYSMAL ATRIAL FIBRILLATION (HCC): ICD-10-CM

## 2021-11-04 DIAGNOSIS — E78.5 HYPERLIPIDEMIA, UNSPECIFIED HYPERLIPIDEMIA TYPE: ICD-10-CM

## 2021-11-04 DIAGNOSIS — Z01.89 ROUTINE LAB DRAW: ICD-10-CM

## 2021-11-04 DIAGNOSIS — I10 ESSENTIAL HYPERTENSION: ICD-10-CM

## 2021-11-04 LAB
A/G RATIO: 2 (ref 1.1–2.2)
ALBUMIN SERPL-MCNC: 4.3 G/DL (ref 3.4–5)
ALP BLD-CCNC: 36 U/L (ref 40–129)
ALT SERPL-CCNC: 40 U/L (ref 10–40)
ANION GAP SERPL CALCULATED.3IONS-SCNC: 16 MMOL/L (ref 3–16)
AST SERPL-CCNC: 27 U/L (ref 15–37)
BASOPHILS ABSOLUTE: 0 K/UL (ref 0–0.2)
BASOPHILS RELATIVE PERCENT: 0.8 %
BILIRUB SERPL-MCNC: 0.3 MG/DL (ref 0–1)
BUN BLDV-MCNC: 18 MG/DL (ref 7–20)
CALCIUM SERPL-MCNC: 9.4 MG/DL (ref 8.3–10.6)
CHLORIDE BLD-SCNC: 106 MMOL/L (ref 99–110)
CHOLESTEROL, TOTAL: 183 MG/DL (ref 0–199)
CO2: 23 MMOL/L (ref 21–32)
CREAT SERPL-MCNC: 1.3 MG/DL (ref 0.8–1.3)
CREATININE URINE: 317.8 MG/DL (ref 39–259)
EOSINOPHILS ABSOLUTE: 0.3 K/UL (ref 0–0.6)
EOSINOPHILS RELATIVE PERCENT: 5.9 %
GFR AFRICAN AMERICAN: >60
GFR NON-AFRICAN AMERICAN: 55
GLUCOSE BLD-MCNC: 130 MG/DL (ref 70–99)
HCT VFR BLD CALC: 43 % (ref 40.5–52.5)
HDLC SERPL-MCNC: 30 MG/DL (ref 40–60)
HEMOGLOBIN: 14 G/DL (ref 13.5–17.5)
INR BLD: 2.24 (ref 0.88–1.12)
LDL CHOLESTEROL CALCULATED: 108 MG/DL
LYMPHOCYTES ABSOLUTE: 1.7 K/UL (ref 1–5.1)
LYMPHOCYTES RELATIVE PERCENT: 33.8 %
MCH RBC QN AUTO: 28.5 PG (ref 26–34)
MCHC RBC AUTO-ENTMCNC: 32.6 G/DL (ref 31–36)
MCV RBC AUTO: 87.4 FL (ref 80–100)
MICROALBUMIN UR-MCNC: 14.3 MG/DL
MICROALBUMIN/CREAT UR-RTO: 45 MG/G (ref 0–30)
MONOCYTES ABSOLUTE: 0.4 K/UL (ref 0–1.3)
MONOCYTES RELATIVE PERCENT: 7.9 %
NEUTROPHILS ABSOLUTE: 2.7 K/UL (ref 1.7–7.7)
NEUTROPHILS RELATIVE PERCENT: 51.6 %
PDW BLD-RTO: 15.2 % (ref 12.4–15.4)
PLATELET # BLD: 247 K/UL (ref 135–450)
PMV BLD AUTO: 9.6 FL (ref 5–10.5)
POTASSIUM SERPL-SCNC: 4.8 MMOL/L (ref 3.5–5.1)
PROSTATE SPECIFIC ANTIGEN: 1.77 NG/ML (ref 0–4)
PROTHROMBIN TIME: 26.2 SEC (ref 9.9–12.7)
RBC # BLD: 4.91 M/UL (ref 4.2–5.9)
SODIUM BLD-SCNC: 145 MMOL/L (ref 136–145)
TOTAL PROTEIN: 6.5 G/DL (ref 6.4–8.2)
TRIGL SERPL-MCNC: 224 MG/DL (ref 0–150)
TSH SERPL DL<=0.05 MIU/L-ACNC: 1.93 UIU/ML (ref 0.27–4.2)
VITAMIN D 25-HYDROXY: 22.8 NG/ML
VLDLC SERPL CALC-MCNC: 45 MG/DL
WBC # BLD: 5.2 K/UL (ref 4–11)

## 2021-11-05 LAB
ESTIMATED AVERAGE GLUCOSE: 154.2 MG/DL
HBA1C MFR BLD: 7 %

## 2021-11-12 RX ORDER — DIVALPROEX SODIUM 250 MG/1
TABLET, DELAYED RELEASE ORAL
Qty: 270 TABLET | Refills: 3 | Status: SHIPPED | OUTPATIENT
Start: 2021-11-12 | End: 2022-11-01 | Stop reason: SDUPTHER

## 2021-11-12 RX ORDER — ROSUVASTATIN CALCIUM 40 MG/1
TABLET, COATED ORAL
Qty: 90 TABLET | Refills: 3 | Status: SHIPPED | OUTPATIENT
Start: 2021-11-12 | End: 2022-11-01 | Stop reason: SDUPTHER

## 2021-11-16 ENCOUNTER — OFFICE VISIT (OUTPATIENT)
Dept: INTERNAL MEDICINE CLINIC | Age: 66
End: 2021-11-16
Payer: MEDICARE

## 2021-11-16 VITALS
DIASTOLIC BLOOD PRESSURE: 88 MMHG | HEIGHT: 75 IN | BODY MASS INDEX: 30.96 KG/M2 | SYSTOLIC BLOOD PRESSURE: 138 MMHG | TEMPERATURE: 97.3 F | WEIGHT: 249 LBS

## 2021-11-16 DIAGNOSIS — Z86.010 HX OF COLONIC POLYPS: ICD-10-CM

## 2021-11-16 DIAGNOSIS — E11.9 TYPE 2 DIABETES MELLITUS WITHOUT COMPLICATION, WITHOUT LONG-TERM CURRENT USE OF INSULIN (HCC): ICD-10-CM

## 2021-11-16 DIAGNOSIS — E55.9 VITAMIN D DEFICIENCY: ICD-10-CM

## 2021-11-16 DIAGNOSIS — I10 ESSENTIAL HYPERTENSION: ICD-10-CM

## 2021-11-16 DIAGNOSIS — H91.93 BILATERAL HEARING LOSS, UNSPECIFIED HEARING LOSS TYPE: ICD-10-CM

## 2021-11-16 DIAGNOSIS — H40.9 GLAUCOMA, UNSPECIFIED GLAUCOMA TYPE, UNSPECIFIED LATERALITY: ICD-10-CM

## 2021-11-16 DIAGNOSIS — G47.33 OBSTRUCTIVE SLEEP APNEA SYNDROME: ICD-10-CM

## 2021-11-16 DIAGNOSIS — C64.1 RENAL CELL CARCINOMA OF RIGHT KIDNEY (HCC): Chronic | ICD-10-CM

## 2021-11-16 DIAGNOSIS — I26.99 OTHER PULMONARY EMBOLISM WITHOUT ACUTE COR PULMONALE, UNSPECIFIED CHRONICITY (HCC): ICD-10-CM

## 2021-11-16 DIAGNOSIS — Z00.00 ROUTINE GENERAL MEDICAL EXAMINATION AT A HEALTH CARE FACILITY: ICD-10-CM

## 2021-11-16 DIAGNOSIS — I48.0 PAROXYSMAL ATRIAL FIBRILLATION (HCC): ICD-10-CM

## 2021-11-16 DIAGNOSIS — E66.09 CLASS 1 OBESITY DUE TO EXCESS CALORIES WITH SERIOUS COMORBIDITY AND BODY MASS INDEX (BMI) OF 31.0 TO 31.9 IN ADULT: ICD-10-CM

## 2021-11-16 DIAGNOSIS — Z23 NEED FOR INFLUENZA VACCINATION: Primary | ICD-10-CM

## 2021-11-16 DIAGNOSIS — E78.5 HYPERLIPIDEMIA, UNSPECIFIED HYPERLIPIDEMIA TYPE: Chronic | ICD-10-CM

## 2021-11-16 PROBLEM — E66.811 CLASS 1 OBESITY DUE TO EXCESS CALORIES WITH SERIOUS COMORBIDITY AND BODY MASS INDEX (BMI) OF 31.0 TO 31.9 IN ADULT: Status: ACTIVE | Noted: 2021-11-16

## 2021-11-16 PROBLEM — H91.90 HEARING LOSS: Status: ACTIVE | Noted: 2021-11-16

## 2021-11-16 PROCEDURE — G0008 ADMIN INFLUENZA VIRUS VAC: HCPCS | Performed by: INTERNAL MEDICINE

## 2021-11-16 PROCEDURE — 90694 VACC AIIV4 NO PRSRV 0.5ML IM: CPT | Performed by: INTERNAL MEDICINE

## 2021-11-16 PROCEDURE — G0439 PPPS, SUBSEQ VISIT: HCPCS | Performed by: INTERNAL MEDICINE

## 2021-11-16 PROCEDURE — 3051F HG A1C>EQUAL 7.0%<8.0%: CPT | Performed by: INTERNAL MEDICINE

## 2021-11-16 PROCEDURE — 99214 OFFICE O/P EST MOD 30 MIN: CPT | Performed by: INTERNAL MEDICINE

## 2021-11-16 RX ORDER — MELATONIN
2000 DAILY
Qty: 30 TABLET | Refills: 0 | COMMUNITY
Start: 2021-11-16 | End: 2022-11-01 | Stop reason: SDUPTHER

## 2021-11-16 RX ORDER — DORZOLAMIDE HCL 20 MG/ML
2 SOLUTION/ DROPS OPHTHALMIC 3 TIMES DAILY
COMMUNITY

## 2021-11-16 SDOH — ECONOMIC STABILITY: FOOD INSECURITY: WITHIN THE PAST 12 MONTHS, YOU WORRIED THAT YOUR FOOD WOULD RUN OUT BEFORE YOU GOT MONEY TO BUY MORE.: NEVER TRUE

## 2021-11-16 SDOH — ECONOMIC STABILITY: FOOD INSECURITY: WITHIN THE PAST 12 MONTHS, THE FOOD YOU BOUGHT JUST DIDN'T LAST AND YOU DIDN'T HAVE MONEY TO GET MORE.: NEVER TRUE

## 2021-11-16 ASSESSMENT — PATIENT HEALTH QUESTIONNAIRE - PHQ9
1. LITTLE INTEREST OR PLEASURE IN DOING THINGS: 0
SUM OF ALL RESPONSES TO PHQ QUESTIONS 1-9: 0
SUM OF ALL RESPONSES TO PHQ QUESTIONS 1-9: 0
SUM OF ALL RESPONSES TO PHQ9 QUESTIONS 1 & 2: 0
2. FEELING DOWN, DEPRESSED OR HOPELESS: 0
SUM OF ALL RESPONSES TO PHQ QUESTIONS 1-9: 0

## 2021-11-16 ASSESSMENT — LIFESTYLE VARIABLES
HOW OFTEN DO YOU HAVE A DRINK CONTAINING ALCOHOL: 2
HOW OFTEN DO YOU HAVE SIX OR MORE DRINKS ON ONE OCCASION: 0
HOW MANY STANDARD DRINKS CONTAINING ALCOHOL DO YOU HAVE ON A TYPICAL DAY: 0
AUDIT-C TOTAL SCORE: 2

## 2021-11-16 ASSESSMENT — SOCIAL DETERMINANTS OF HEALTH (SDOH): HOW HARD IS IT FOR YOU TO PAY FOR THE VERY BASICS LIKE FOOD, HOUSING, MEDICAL CARE, AND HEATING?: NOT HARD AT ALL

## 2021-11-16 NOTE — PATIENT INSTRUCTIONS
Check bp 3-4 times a week preferably when you get home from work and other days in am after sitting for 5 mins but no caffeine or exercise for 30 mins prior-  and in a chair w a back and both feet down-recheck in another 5 mins  Goal is under 130/80  Call if not coming down below this regularly in the next 2-3 weeks  Really really cut the carbs  Take 2000 u otc vit d daily  Add benefiber daily 1 capful for starters  Do labs and see me in 3-4 mos   Personalized Preventive Plan for Ynes Biggs III - 11/16/2021  Medicare offers a range of preventive health benefits. Some of the tests and screenings are paid in full while other may be subject to a deductible, co-insurance, and/or copay. Some of these benefits include a comprehensive review of your medical history including lifestyle, illnesses that may run in your family, and various assessments and screenings as appropriate. After reviewing your medical record and screening and assessments performed today your provider may have ordered immunizations, labs, imaging, and/or referrals for you. A list of these orders (if applicable) as well as your Preventive Care list are included within your After Visit Summary for your review. Other Preventive Recommendations:    · A preventive eye exam performed by an eye specialist is recommended every 1-2 years to screen for glaucoma; cataracts, macular degeneration, and other eye disorders. · A preventive dental visit is recommended every 6 months. · Try to get at least 150 minutes of exercise per week or 10,000 steps per day on a pedometer . · Order or download the FREE \"Exercise & Physical Activity: Your Everyday Guide\" from [employer]. Call 0-827.991.8991 or search [employer] online. · You need 3053-4516 mg of calcium and 6888-2278 IU of vitamin D per day.  It is possible to meet your calcium requirement with diet alone, but a vitamin D supplement is usually necessary to meet this goal.  · When exposed to the sun, use a sunscreen that protects against both UVA and UVB radiation with an SPF of 30 or greater. Reapply every 2 to 3 hours or after sweating, drying off with a towel, or swimming. · Always wear a seat belt when traveling in a car. Always wear a helmet when riding a bicycle or motorcycle.

## 2021-11-16 NOTE — PROGRESS NOTES
Subjective:      Patient ID: Indigo Garcia is a 78 y.o. male. Chief Complaint   Patient presents with    Medicare AWV     yearly/refills needed     Feels ok but having issues trying to loose weight- has not tolerated the cpap in the past but still snores and definitely had sleep apnea-doubtfully gone since pt's weight is up-   bp sl up today- has not been checking at home much- needs hearing aides- hearing is severely impaired and interfering w daily activities-no recent palpitations and utd w cardiology- on coumadin regularly     Review of Systems   Constitutional: Negative for appetite change and fatigue. HENT: Positive for hearing loss. Respiratory: Negative. Negative for chest tightness and shortness of breath. Cardiovascular: Negative for chest pain and palpitations. Gastrointestinal: Negative for abdominal pain. Genitourinary: Negative. Skin: Negative. Neurological: Negative for weakness. Psychiatric/Behavioral: Negative for dysphoric mood. The patient is not nervous/anxious.         Family History   Problem Relation Age of Onset    Diabetes Father     Diabetes Mother     Uterine Cancer Mother     Sleep Apnea Brother      Social History     Socioeconomic History    Marital status:      Spouse name: Not on file    Number of children: Not on file    Years of education: Not on file    Highest education level: Not on file   Occupational History    Not on file   Tobacco Use    Smoking status: Never Smoker    Smokeless tobacco: Never Used   Vaping Use    Vaping Use: Never used   Substance and Sexual Activity    Alcohol use: Yes     Comment: 4 drinks weekly    Drug use: No    Sexual activity: Yes     Partners: Female   Other Topics Concern    Not on file   Social History Narrative    Not on file     Social Determinants of Health     Financial Resource Strain: Low Risk     Difficulty of Paying Living Expenses: Not hard at all   Food Insecurity: No Food Insecurity  Worried About Running Out of Food in the Last Year: Never true    Judy of Food in the Last Year: Never true   Transportation Needs:     Lack of Transportation (Medical): Not on file    Lack of Transportation (Non-Medical): Not on file   Physical Activity:     Days of Exercise per Week: Not on file    Minutes of Exercise per Session: Not on file   Stress:     Feeling of Stress : Not on file   Social Connections:     Frequency of Communication with Friends and Family: Not on file    Frequency of Social Gatherings with Friends and Family: Not on file    Attends Bahai Services: Not on file    Active Member of [address] [place] [address] [employer] or Organizations: Not on file    Attends Club or Organization Meetings: Not on file    Marital Status: Not on file   Intimate Partner Violence:     Fear of Current or Ex-Partner: Not on file    Emotionally Abused: Not on file    Physically Abused: Not on file    Sexually Abused: Not on file   Housing Stability:     Unable to Pay for Housing in the Last Year: Not on file    Number of [place] in the Last Year: Not on file    Unstable Housing in the Last Year: Not on file         Objective:   Physical Exam  Constitutional:       General: He is not in acute distress. Appearance: He is well-developed. HENT:      Head: Normocephalic and atraumatic. Right Ear: External ear normal.      Left Ear: External ear normal.   Eyes:      Conjunctiva/sclera: Conjunctivae normal.      Pupils: Pupils are equal, round, and reactive to light. Neck:      Thyroid: No thyroid mass or thyromegaly. Vascular: No carotid bruit. Cardiovascular:      Rate and Rhythm: Normal rate and regular rhythm. Pulses: Normal pulses. Heart sounds: Normal heart sounds. No murmur heard. No gallop. Comments: No carotid bruits noted bilaterally  Pulmonary:      Effort: Pulmonary effort is normal. No respiratory distress. Breath sounds: Normal breath sounds.  No wheezing, rhonchi or rales. Chest:   Breasts:      Right: No supraclavicular adenopathy. Left: No supraclavicular adenopathy. Abdominal:      General: Bowel sounds are normal.      Palpations: Abdomen is soft. There is no hepatomegaly, splenomegaly or mass. Tenderness: There is no abdominal tenderness. There is no guarding or rebound. Musculoskeletal:         General: Normal range of motion. Cervical back: Normal range of motion and neck supple. Lymphadenopathy:      Cervical: No cervical adenopathy. Upper Body:      Right upper body: No supraclavicular adenopathy. Left upper body: No supraclavicular adenopathy. Skin:     General: Skin is warm and dry. Findings: No rash. Neurological:      Mental Status: He is alert and oriented to person, place, and time. Cranial Nerves: No cranial nerve deficit. Sensory: No sensory deficit. Deep Tendon Reflexes: Reflexes are normal and symmetric. Psychiatric:         Speech: Speech normal.         Behavior: Behavior normal.         Thought Content:  Thought content normal.         Judgment: Judgment normal.           Assessment and Plan:      Hearing loss   Consider getting hearing aides-seeing audiology    Class 1 obesity due to excess calories with serious comorbidity and body mass index (BMI) of 31.0 to 31.9 in adult   Discussed with patient at length health risks of obesity and need for diet and exercise      Glaucoma   utd w eye doctor    Obstructive sleep apnea syndrome   Consider rechecking     Atrial fibrillation ([address]   Cont f/u w cardiology     Essential hypertension   Up today-to watch at home if not coming down    Type 2 diabetes mellitus without complication, without long-term current use of insulin (HCC)   Trial of diet and recheck 3-4 mos     Hx of colonic polyps   Add fiber     Renal cell carcinoma of right kidney ([address]   Cont f/u w Dr. Raghavendra Wang    Hyperlipemia   Controlled w current regimen- continue and monitor closely      Pulmonary embolism (HCC)   Cont coumadin- has been stable but pt told still needs monthly checks which he is sometimes not too great about       Encounter Diagnoses   Name Primary?     Need for influenza vaccination Yes    Bilateral hearing loss, unspecified hearing loss type     Class 1 obesity due to excess calories with serious comorbidity and body mass index (BMI) of 31.0 to 31.9 in adult     Glaucoma, unspecified glaucoma type, unspecified laterality     Obstructive sleep apnea syndrome     Paroxysmal atrial fibrillation (HCC)     Essential hypertension     Type 2 diabetes mellitus without complication, without long-term current use of insulin (HCC)     Hx of colonic polyps     Vitamin D deficiency     Routine general medical examination at a health care facility     [facility] right [facility]     Hyperlipidemia, unspecified hyperlipidemia type     Other pulmonary embolism without acute cor pulmonale, unspecified chronicity ([facility]        Orders Placed This Encounter   Procedures    INFLUENZA, QUADV, ADJUVANTED, 65 YRS =, IM, PF, PREFILL SYR, 0.5ML (FLUAD)    Lipid Panel     Standing Status:   Future     Standing Expiration Date:   11/16/2022     Order Specific Question:   Is Patient Fasting?/# of Hours     Answer:   yes/10    Hemoglobin A1C     Standing Status:   Future     Standing Expiration Date:   11/16/2022    Microalbumin / Creatinine Urine Ratio     Standing Status:   Future     Standing Expiration Date:   11/16/2022    Vitamin D 25 Hydroxy     Standing Status:   Future     Standing Expiration Date:   11/16/2022    External Referral To Audiology     Referral Priority:   Routine     Referral Type:   Eval and Treat     Referral Reason:   Specialty Services Required     Requested Specialty:   Audiology     Number of Visits Requested:   [address] [facility] Robina Eldridge MD, Sleep Medicine, [facility]     Referral Priority:   Routine     Referral Type:   Eval and Treat     Referral Reason:   Specialty Services Required     Referred to Provider:   Adonay Rosado MD     Requested Specialty:   Sleep Medicine     Number of Visits Requested:   1           Medicare Annual Wellness Visit  Name: Dominguez Rodriguez Date: 2021   MRN: [id number] Sex: Male   Age: 78 y.o. Ethnicity: Non- / Non    : 1955 Race: White (non-)      Ely Talbot III is here for Medicare AWV (yearly/refills needed)    Screenings for behavioral, psychosocial and functional/safety risks, and cognitive dysfunction are all negative except as indicated below. These results, as well as other patient data from the [address] form, are documented in Flowsheets linked to this Encounter. No Known Allergies      Prior to Visit Medications    Medication Sig Taking?  Authorizing Provider   dorzolamide (TRUSOPT) 2 % ophthalmic solution 2 drops 3 times daily Yes Historical Provider, MD   vitamin D3 (CHOLECALCIFEROL) 25 MCG (1000 UT) TABS tablet Take 2 tablets by mouth daily Yes Fouzia Jefferson MD   rosuvastatin (CRESTOR) 40 MG tablet TAKE 1 TABLET BY MOUTH EVERY DAY Yes Fouzia Jefferson MD   divalproex (DEPAKOTE) 250 MG DR tablet TAKE 1 TABLET BY MOUTH THREE TIMES A DAY Yes Fouzia Jefferson MD   warfarin (COUMADIN) 5 MG tablet TAKE 1 TABLET BY MOUTH EVERY DAY Yes Fouzia Jefferson MD   warfarin (COUMADIN) 1 MG tablet TAKE 1 TABLET BY MOUTH TWICE A DAY Yes Fouzia Jefferson MD   fenofibrate (TRICOR) 145 MG tablet TAKE 1 TABLET BY MOUTH EVERY DAY Yes Fouzia Jefferson MD   metoprolol succinate (TOPROL XL) 50 MG extended release tablet TAKE 1 TABLET BY MOUTH EVERY DAY Yes Fouzia Jefferson MD   flecainide (TAMBOCOR) 100 MG tablet TAKE 1 TABLET BY MOUTH TWICE A DAY Yes Fouzia Jefferson MD   LUMIGAN 0.01 % SOLN ophthalmic drops Place 1 drop into both eyes nightly Yes Historical Provider, MD   methocarbamol (ROBAXIN) 500 MG tablet Take 1 tablet by mouth 4 times daily as needed (muscle spasm) Yes Prince Low MD   SUMAtriptan (IMITREX) 100 MG tablet TAKE ONE TABLET BY MOUTH ONCE AS NEEDED; MAY REPEAT IN 2 HOURS IF NEEDED. DO NOT EXCEED TWO TABLETS IN 24 HOURS.  Yes Prince Low MD   fexofenadine (ALLEGRA ALLERGY) 180 MG tablet Take 180 mg by mouth daily as needed Yes Historical Provider, MD   timolol (TIMOPTIC) 0.25 % ophthalmic solution INSTILL 1 DROP INTO BOTH EYES EVERY DAY  Patient not taking: Reported on 11/16/2021  Prince Low MD         Past Medical History:   Diagnosis Date    Allergic rhinitis due to other allergen     [facility] fibrillation [facility]     [facility])     right kidney    Cataract 11/30/2016    Glaucoma     both eyes    Hx of blood clots     pulmonary embolism 2002 shortly after ablation    Hypopotassemia     Migraines     Obstructive sleep apnea syndrome     Had sleep studies but couldn't tolerate mask     PHILLIP (obstructive sleep apnea)     Other abnormal blood chemistry     Other and unspecified hyperlipidemia     Screening PSA (prostate specific antigen) 12/5/2009    <0.1 ng/mL    Sleep apnea     does not use cpap    Strain of calf muscle 12/19/2018    Tremor     minor    Unspecified sleep apnea     Vitreous degeneration        Past Surgical History:   Procedure Laterality Date    ABLATION OF DYSRHYTHMIC FOCUS      2002    CATARACT REMOVAL Bilateral     COLONOSCOPY  06-    Bharathi Martin / Dr. Ravinder Jackson    COLONOSCOPY  10/20/2014    recheck 4-5 yrs w ++ h/o adenomatous plyps     HAND TENDON SURGERY Right 2/12/2021    RIGHT THUMB CARPOMETACARPAL JOINT ARTHROPLASTY INCLUDING TRAPEZIECTOMY AND AUTOGRAFT, LIGAMENT RECONSTRUCTION WITH FLEXOR CARPI RADIALIS TENODN; ANY INDICATED PROCEDURES performed by Anya Castillo MD at [place] [address] Right 12/6/2018    OPEN RIGHT PARTIAL NEPHRECTOMY (FLANK INCISION) WITH INTRAOPERATIVE ULTRASOUND performed by Sherryle Donalds, MD at [facility] OR         Family History   Problem Relation Age of Onset  Diabetes Father     Diabetes Mother     Uterine Cancer Mother     Sleep Apnea Brother        CareTeam (Including outside providers/suppliers regularly involved in providing care):   Patient Care Team:  Jordon Chiang MD as PCP - General (Internal Medicine)  Jordon Chiang MD as PCP - [facility] Empaneled Provider  Veronica Melvin MD as Surgeon (Orthopedic Surgery)  Jean-Pierre Luna MD as Surgeon (General Surgery)  Hernesto Cutler MD (Ophthalmology)    Wt Readings from Last 3 Encounters:   11/17/21 252 lb 6.4 oz (114.5 kg)   11/16/21 249 lb (112.9 kg)   08/17/21 257 lb (116.6 kg)     Vitals:    11/16/21 1143   BP: 138/88   Temp: 97.3 °F (36.3 °C)   Weight: 249 lb (112.9 kg)   Height: 6' 3\" (1.905 m)     Body mass index is 31.12 kg/m². Based upon direct observation of the patient, evaluation of cognition reveals recent and remote memory intact. Patient's complete Health Risk Assessment and screening values have been reviewed and are found in Flowsheets. The following problems were reviewed today and where indicated follow up appointments were made and/or referrals ordered.     Positive Risk Factor Screenings with Interventions:           Health Habits/Nutrition:  Health Habits/Nutrition  Do you exercise for at least 20 minutes 2-3 times per week?: (!) No  Have you lost any weight without trying in the past 3 months?: No  Do you eat only one meal per day?: No  Have you seen the dentist within the past year?: Yes  Body mass index: (!) 31.12  Health Habits/Nutrition Interventions:  · Inadequate physical activity:  patient agrees to exercise for at least 150 minutes/week    Hearing/Vision:  No exam data present  Hearing/Vision  Do you or your family notice any trouble with your hearing that hasn't been managed with hearing aids?: (!) Yes  Do you have difficulty driving, watching TV, or doing any of your daily activities because of your eyesight?: No  Have you had an eye exam within the past year?: Yes  Hearing/Vision Interventions:  · Hearing concerns:  patient declines any further evaluation/treatment for hearing issues    Safety:  Safety  Do you have working smoke detectors?: Yes  Have all throw rugs been removed or fastened?: (!) No  Do you have non-slip mats or surfaces in all bathtubs/showers?: Yes  Do all of your stairways have a railing or banister?: Yes  Are your doorways, halls and stairs free of clutter?: Yes  Do you always fasten your seatbelt when you are in a car?: Yes  Safety Interventions:  · Home safety tips provided     Personalized Preventive Plan   Current Health Maintenance Status  Immunization History   Administered Date(s) Administered    COVID-19, Debbrah Neighbor, Primary or Immunocompromised, PF, 100mcg/0.5mL 12/30/2020, 01/29/2021, 10/30/2021    DTaP 02/29/2008    Hepatitis A 02/29/2008    Influenza Virus Vaccine 09/02/2012, 10/05/2017, 10/01/2018    Influenza, Quadv, adjuvanted, 65 yrs +, IM, PF (Fluad) 11/16/2021    Pneumococcal Polysaccharide ([id number]) 08/26/2020    Tdap (Boostrix, Adacel) 06/06/2014    Zoster Live (Zostavax) 06/06/2014        Health Maintenance   Topic Date Due    Diabetic foot exam  Never done    Diabetic retinal exam  Never done    Shingles Vaccine (2 of 3) 08/01/2014    A1C test (Diabetic or Prediabetic)  11/04/2022    Diabetic microalbuminuria test  11/04/2022    Lipid screen  11/04/2022    Potassium monitoring  11/04/2022    Creatinine monitoring  11/04/2022    Colon cancer screen colonoscopy  10/29/2023    DTaP/Tdap/Td vaccine (3 - Td or Tdap) 06/06/2024    Flu vaccine  Completed    Pneumococcal 65+ years Vaccine  Completed    COVID-19 Vaccine  Completed    Hepatitis C screen  Completed    Hepatitis A vaccine  Aged Out    Hib vaccine  Aged Out    Meningococcal (ACWY) vaccine  Aged Out     Recommendations for [employer] Due: see orders and patient instructions/AVS.  .   Recommended screening schedule for the next 5-10 years is provided to the patient in written form: see Patient Dave De León was seen today for medicare awv. Diagnoses and all orders for this visit:    Need for influenza vaccination  -     INFLUENZA, QUADV, ADJUVANTED, 65 YRS =, IM, PF, PREFILL SYR, 0.5ML (FLUAD)  -     Lipid Panel; Future  -     Hemoglobin A1C; Future  -     Microalbumin / Creatinine Urine Ratio; Future  -     Vitamin D 25 Hydroxy; Future  -     Cancel: INFLUENZA, QUADV, ADJUVANTED, 65 YRS =, IM, PF, PREFILL SYR, 0.5ML (FLUAD)    Bilateral hearing loss, unspecified hearing loss type  -     External Referral To Audiology  -     Lipid Panel; Future  -     Hemoglobin A1C; Future  -     Microalbumin / Creatinine Urine Ratio; Future  -     Vitamin D 25 Hydroxy; Future    Class 1 obesity due to excess calories with serious comorbidity and body mass index (BMI) of 31.0 to 31.9 in adult  -     Lipid Panel; Future  -     Hemoglobin A1C; Future  -     Microalbumin / Creatinine Urine Ratio; Future  -     Vitamin D 25 Hydroxy; Future    Glaucoma, unspecified glaucoma type, unspecified laterality  -     Lipid Panel; Future  -     Hemoglobin A1C; Future  -     Microalbumin / Creatinine Urine Ratio; Future  -     Vitamin D 25 Hydroxy; Future    Obstructive sleep apnea syndrome  -     Claudia Hall MD, Sleep Medicine, [place]  -     Lipid Panel; Future  -     Hemoglobin A1C; Future  -     Microalbumin / Creatinine Urine Ratio; Future  -     Vitamin D 25 Hydroxy; Future    Paroxysmal atrial fibrillation (HCC)  -     Lipid Panel; Future  -     Hemoglobin A1C; Future  -     Microalbumin / Creatinine Urine Ratio; Future  -     Vitamin D 25 Hydroxy; Future    Essential hypertension  -     Lipid Panel; Future  -     Hemoglobin A1C; Future  -     Microalbumin / Creatinine Urine Ratio; Future  -     Vitamin D 25 Hydroxy; Future    Type 2 diabetes mellitus without complication, without long-term current use of insulin (HCC)  -     Lipid Panel;  Future  -     Hemoglobin A1C;

## 2021-11-17 ENCOUNTER — OFFICE VISIT (OUTPATIENT)
Dept: PULMONOLOGY | Age: 66
End: 2021-11-17
Payer: MEDICARE

## 2021-11-17 VITALS
SYSTOLIC BLOOD PRESSURE: 130 MMHG | BODY MASS INDEX: 31.38 KG/M2 | HEIGHT: 75 IN | OXYGEN SATURATION: 97 % | WEIGHT: 252.4 LBS | HEART RATE: 62 BPM | DIASTOLIC BLOOD PRESSURE: 78 MMHG

## 2021-11-17 DIAGNOSIS — G47.33 OBSTRUCTIVE SLEEP APNEA SYNDROME: Primary | Chronic | ICD-10-CM

## 2021-11-17 DIAGNOSIS — I10 ESSENTIAL HYPERTENSION: Chronic | ICD-10-CM

## 2021-11-17 DIAGNOSIS — E66.09 CLASS 1 OBESITY DUE TO EXCESS CALORIES WITH SERIOUS COMORBIDITY AND BODY MASS INDEX (BMI) OF 31.0 TO 31.9 IN ADULT: Chronic | ICD-10-CM

## 2021-11-17 DIAGNOSIS — E11.9 TYPE 2 DIABETES MELLITUS WITHOUT COMPLICATION, WITHOUT LONG-TERM CURRENT USE OF INSULIN (HCC): Chronic | ICD-10-CM

## 2021-11-17 DIAGNOSIS — I48.0 PAROXYSMAL ATRIAL FIBRILLATION (HCC): Chronic | ICD-10-CM

## 2021-11-17 PROBLEM — C64.1 RENAL CELL CARCINOMA OF RIGHT KIDNEY (HCC): Chronic | Status: ACTIVE | Noted: 2018-12-06

## 2021-11-17 PROBLEM — E66.811 CLASS 1 OBESITY DUE TO EXCESS CALORIES WITH SERIOUS COMORBIDITY AND BODY MASS INDEX (BMI) OF 31.0 TO 31.9 IN ADULT: Chronic | Status: ACTIVE | Noted: 2021-11-16

## 2021-11-17 PROCEDURE — 99204 OFFICE O/P NEW MOD 45 MIN: CPT | Performed by: INTERNAL MEDICINE

## 2021-11-17 ASSESSMENT — SLEEP AND FATIGUE QUESTIONNAIRES
HOW LIKELY ARE YOU TO NOD OFF OR FALL ASLEEP WHILE LYING DOWN TO REST IN THE AFTERNOON WHEN CIRCUMSTANCES PERMIT: 3
NECK CIRCUMFERENCE (INCHES): 17.5
ESS TOTAL SCORE: 7
HOW LIKELY ARE YOU TO NOD OFF OR FALL ASLEEP WHILE SITTING INACTIVE IN A PUBLIC PLACE: 0
HOW LIKELY ARE YOU TO NOD OFF OR FALL ASLEEP WHEN YOU ARE A PASSENGER IN A CAR FOR AN HOUR WITHOUT A BREAK: 0
HOW LIKELY ARE YOU TO NOD OFF OR FALL ASLEEP WHILE WATCHING TV: 1
HOW LIKELY ARE YOU TO NOD OFF OR FALL ASLEEP WHILE SITTING QUIETLY AFTER LUNCH WITHOUT ALCOHOL: 1
HOW LIKELY ARE YOU TO NOD OFF OR FALL ASLEEP WHILE SITTING AND TALKING TO SOMEONE: 0
HOW LIKELY ARE YOU TO NOD OFF OR FALL ASLEEP WHILE SITTING AND READING: 2
HOW LIKELY ARE YOU TO NOD OFF OR FALL ASLEEP IN A CAR, WHILE STOPPED FOR A FEW MINUTES IN TRAFFIC: 0

## 2021-11-17 ASSESSMENT — ENCOUNTER SYMPTOMS
PHOTOPHOBIA: 0
ALLERGIC/IMMUNOLOGIC NEGATIVE: 1
SHORTNESS OF BREATH: 0
EYE PAIN: 0
NAUSEA: 0
CHEST TIGHTNESS: 0
APNEA: 1
CHOKING: 0
VOMITING: 0
ABDOMINAL PAIN: 0
RHINORRHEA: 0
ABDOMINAL DISTENTION: 0

## 2021-11-17 NOTE — PROGRESS NOTES
Kyle Willard MD, Steven Arceo, [facility]  Tiffanie Kehrt CNP  Julien Jeffers CNP [address]  [place] [address], [address] [facility] [phone]   [facility] [address]. [address] [facility]. [address] [phone]     [place] [address]  Dept: [phone]  Loc: [phone]    Assessment:      Visit Diagnoses and Associated Orders     Obstructive sleep apnea syndrome   (New Problem)  -  Primary    Needs work up/treatment         Paroxysmal atrial fibrillation (HCC)   (Stable)           Essential hypertension   (Stable)           Type 2 diabetes mellitus without complication, without long-term current use of insulin (HCC)   (Stable)           Class 1 obesity due to excess calories with serious comorbidity and body mass index (BMI) of 31.0 to 31.9 in adult   (Not Stable)                  Plan:      Reviewed old records, pertinent data listed in history. Reviewed sleep study with patient as well as the pros and cons of multiple treatment options. At this time he would like to proceed with CPAP titration which is the most effective treatment for PHILLIP but his insurance company will not allow a titration till a trial of Auto CPAP despite it being medically indicated and the standard care. Instructed him not to drive when drowsy and unless he has worn his machine at least 4 hrs the night before. Continue medications per his PCP and other physicians. Will send an order to his DME company of choice for AutoCPAP trial (Pmin-10 Pmax-20 Flex-3 with heated humidification and mask of choice) per his insurance company. If he fails AutoCPAP trial then will need a formal in-lab titration. 8 wk follow up in the office. The chronic medical conditions listed are directly related to the primary diagnosis listed above. The management of the primary diagnosis affects the secondary diagnosis and vice versa.     This information was analyzed to assess complexity and medical decision making in regards to further testing and management. Continue meds for:  a fib, HTN, and DM. Pt would medically benefit from wt loss for PHILLIP (diet, exercise, surgical). Subjective:     Patient ID: Nghia King is a 78 y.o. male. Chief Complaint   Patient presents with    Sleep Apnea       HPI:      Nghia King is a 78 y.o. male referred by Daphnie Mancia MD for a sleep evaluation. He complains of: snoring, witnessed apneas, excessive daytime sleepiness , non-restorative sleep, AM headaches, napping, short term memory issues and tossing and turning at night. He denies: cataplexy and hypnagogic hallucinations. Symptoms began several years ago, gradually worsening since that time. Placed on CPAP in 2008 but never able to tolerate. Would often wake up choking gasping and wake up with the mask off his face not knowing he took it off. His machine is over 8years old, not auto capable, and cannot be downloaded. Previous evaluation and treatment has included-[facility]: PSG dated 5/28/2008 showed an AHI of 20 and a low sat of 84%. Had a CPAP titration on 6/11/2008. Chronic stable medical conditions: a fib, HTN, and DM  Chronic unstable medical conditions: obesity    DOT/CDL - No  FAA/'s license -No    Previous Report(s) Reviewed: historical medical records, office notes, andreferral letter(s). Pertinent data has been documented.     [place] - Total score: 7    Caffeine Intake - Coffee: 1 cup(s) per day    Social History     Socioeconomic History    Marital status:      Spouse name: Not on file    Number of children: Not on file    Years of education: Not on file    Highest education level: Not on file   Occupational History    Not on file   Tobacco Use    Smoking status: Never Smoker    Smokeless tobacco: Never Used   Vaping Use    Vaping Use: Never used   Substance and Sexual Activity  Alcohol use: Yes     Comment: 4 drinks weekly    Drug use: No    Sexual activity: Yes     Partners: Female   Other Topics Concern    Not on file   Social History Narrative    Not on file     Social Determinants of Health     Financial Resource Strain: Low Risk     Difficulty of Paying Living Expenses: Not hard at all   Food Insecurity: No Food Insecurity    Worried About [address] in the Last Year: Never true    [address] in the Last Year: Never true   Transportation Needs:     Lack of Transportation (Medical): Not on file    Lack of Transportation (Non-Medical):  Not on file   Physical Activity:     Days of Exercise per Week: Not on file    Minutes of Exercise per Session: Not on file   Stress:     Feeling of Stress : Not on file   Social Connections:     Frequency of Communication with Friends and Family: Not on file    Frequency of Social Gatherings with Friends and Family: Not on file    Attends Tenriism Services: Not on file    Active Member of Clubs or Organizations: Not on file    Attends Club or Organization Meetings: Not on file    Marital Status: Not on file   Intimate Partner Violence:     Fear of Current or Ex-Partner: Not on file    Emotionally Abused: Not on file    Physically Abused: Not on file    Sexually Abused: Not on file   Housing Stability:     Unable to Pay for Housing in the Last Year: Not on file    Number of Places Lived in the Last Year: Not on file    Unstable Housing in the Last Year: Not on file        Current Outpatient Medications   Medication Instructions    divalproex (DEPAKOTE) 250 MG DR tablet TAKE 1 TABLET BY MOUTH THREE TIMES A DAY    dorzolamide (TRUSOPT) 2 % ophthalmic solution 2 drops, 3 TIMES DAILY    fenofibrate (TRICOR) 145 MG tablet TAKE 1 TABLET BY MOUTH EVERY DAY    fexofenadine (ALLEGRA ALLERGY) 180 mg, Oral, DAILY PRN    flecainide (TAMBOCOR) 100 MG tablet TAKE 1 TABLET BY MOUTH TWICE A DAY    LUMIGAN 0.01 % SOLN ophthalmic drops 1 drop, Both Eyes, NIGHTLY    methocarbamol (ROBAXIN) 500 mg, Oral, 4 TIMES DAILY PRN    metoprolol succinate (TOPROL XL) 50 MG extended release tablet TAKE 1 TABLET BY MOUTH EVERY DAY    rosuvastatin (CRESTOR) 40 MG tablet TAKE 1 TABLET BY MOUTH EVERY DAY    SUMAtriptan (IMITREX) 100 MG tablet TAKE ONE TABLET BY MOUTH ONCE AS NEEDED; MAY REPEAT IN 2 HOURS IF NEEDED. DO NOT EXCEED TWO TABLETS IN 24 HOURS.     timolol (TIMOPTIC) 0.25 % ophthalmic solution INSTILL 1 DROP INTO BOTH EYES EVERY DAY    vitamin D3 (CHOLECALCIFEROL) 2,000 Units, Oral, DAILY    warfarin (COUMADIN) 1 MG tablet TAKE 1 TABLET BY MOUTH TWICE A DAY    warfarin (COUMADIN) 5 MG tablet TAKE 1 TABLET BY MOUTH EVERY DAY       Allergies as of 11/17/2021    (No Known Allergies)       Patient Active Problem List   Diagnosis    Atrial fibrillation ([facility]    Pulmonary embolism (HCC)    Hyperlipemia    Vitreous degeneration    Obstructive sleep apnea syndrome    Migraine    Hx of colonic polyps    Glaucoma    Chronic right-sided low back pain with sciatica    Tremor    Type 2 diabetes mellitus without complication, without long-term current use of insulin (HCC)    Renal cell carcinoma of right kidney (HCC)    Plantar fasciitis    Arthritis of carpometacarpal (CMC) joint of right thumb    Chronic left shoulder pain    Essential hypertension    Hearing loss    Class 1 obesity due to excess calories with serious comorbidity and body mass index (BMI) of 31.0 to 31.9 in adult       Past Medical History:   Diagnosis Date    Allergic rhinitis due to other allergen     Atrial fibrillation ([facility]     Cancer ([facility]     right kidney    Cataract 11/30/2016    Glaucoma     both eyes    Hx of blood clots     pulmonary embolism 2002 shortly after ablation    Hypopotassemia     Migraines     Obstructive sleep apnea syndrome     Had sleep studies but couldn't tolerate mask     PHILLIP (obstructive sleep apnea)     Other abnormal blood chemistry     Other and unspecified hyperlipidemia     Screening PSA (prostate specific antigen) 12/5/2009    <0.1 ng/mL    Sleep apnea     does not use cpap    Strain of calf muscle 12/19/2018    Tremor     minor    Unspecified sleep apnea     Vitreous degeneration        Past Surgical History:   Procedure Laterality Date    ABLATION OF DYSRHYTHMIC FOCUS      2002    CATARACT REMOVAL Bilateral     COLONOSCOPY  06-    [place] / Dr. Van Carney    COLONOSCOPY  10/20/2014    recheck 4-5 yrs w ++ h/o adenomatous plyps     HAND TENDON SURGERY Right 2/12/2021    RIGHT THUMB CARPOMETACARPAL JOINT ARTHROPLASTY INCLUDING TRAPEZIECTOMY AND AUTOGRAFT, LIGAMENT RECONSTRUCTION WITH FLEXOR CARPI RADIALIS TENODN; ANY INDICATED PROCEDURES performed by Zak Stubbs MD at [place] [address] Right 12/6/2018    OPEN RIGHT PARTIAL NEPHRECTOMY (FLANK INCISION) WITH INTRAOPERATIVE ULTRASOUND performed by Elvira De La Cruz MD at [facility] OR       Family History   Problem Relation Age of Onset    Diabetes Father     Diabetes Mother     Uterine Cancer Mother     Sleep Apnea Brother        Review of Systems   Constitutional: Positive for fatigue. Negative for activity change and appetite change. HENT: Positive for congestion. Negative for nosebleeds, postnasal drip, rhinorrhea and sneezing. Eyes: Negative for photophobia, pain and visual disturbance. Respiratory: Positive for apnea. Negative for choking, chest tightness and shortness of breath. Cardiovascular: Negative. Gastrointestinal: Negative for abdominal distention, abdominal pain, nausea and vomiting. Endocrine: Negative for cold intolerance and heat intolerance. Genitourinary: Negative for difficulty urinating, dysuria, frequency and urgency. Musculoskeletal: Negative. Negative for neck pain and neck stiffness. Skin: Negative. Allergic/Immunologic: Negative.     Neurological: Positive for headaches. Negative for tremors, seizures, syncope and weakness. Hematological: Negative for adenopathy. Does not bruise/bleed easily. Psychiatric/Behavioral: Positive for sleep disturbance. Negative for agitation, behavioral problems and confusion. Objective:     Vitals:  Weight BMI   Wt Readings from Last 3 Encounters:   11/17/21 252 lb 6.4 oz (114.5 kg)   11/16/21 249 lb (112.9 kg)   08/17/21 257 lb (116.6 kg)    Body mass index is 31.55 kg/m². BP HR SaO2   BP Readings from Last 3 Encounters:   11/17/21 130/78   11/16/21 138/88   08/17/21 130/80    Pulse Readings from Last 3 Encounters:   11/17/21 62   08/17/21 72   02/12/21 65    SpO2 Readings from Last 3 Encounters:   11/17/21 97%   02/12/21 99%   02/12/21 93%        Physical Exam  Vitals reviewed. Constitutional:       General: He is not in acute distress. Appearance: Normal appearance. He is well-developed. He is not toxic-appearing or diaphoretic. HENT:      Head: Normocephalic and atraumatic. Not macrocephalic and not microcephalic. Right Ear: External ear normal.      Left Ear: External ear normal.      Nose: Septal deviation and mucosal edema present. No nasal deformity. Mouth/Throat:      Lips: Pink. Mouth: Mucous membranes are moist.      Tongue: No lesions. Palate: No mass. Pharynx: Uvula midline. No oropharyngeal exudate or uvula swelling. Tonsils: No tonsillar exudate or tonsillar abscesses. Comments: Tonsils: normal size  Eyes:      General: Lids are normal.      Extraocular Movements: Extraocular movements intact. Conjunctiva/sclera: Conjunctivae normal.      Pupils: Pupils are equal, round, and reactive to light. Neck:      Vascular: No JVD. Trachea: Trachea normal.      Comments: Neck Circ: 17.5 inches    Cardiovascular:      Rate and Rhythm: Normal rate and regular rhythm. Heart sounds: Normal heart sounds.    Pulmonary:      Effort: Pulmonary effort is normal.      Breath sounds: Normal breath sounds. Abdominal:      General: Bowel sounds are normal.   Musculoskeletal:      Cervical back: Normal range of motion. Comments: No evidence of cyanosis or clubbing of nails   Skin:     General: Skin is warm. Nails: There is no clubbing. Neurological:      General: No focal deficit present. Mental Status: He is alert. Psychiatric:         Attention and Perception: Attention normal.         Mood and Affect: Mood and affect normal.         Speech: Speech normal.         Behavior: Behavior normal. Behavior is cooperative. Thought Content:  Thought content normal.         Electronically signed by Jr Yepez MD on11/17/2021 at 10:52 AM

## 2021-11-17 NOTE — LETTER
[facility]  [place] [address] [place] [address]  Phone: [phone]  Fax: [phone]           Ana Maria Mabry MD      November 17, 2021     Patient: Shaylee Jimenez   MR Number: [id number]   YOB: 1955   Date of Visit: 11/17/2021       Dear Dr. Suhail Trujillo:    Thank you for referring Marcela Steel to me for evaluation/treatment. Below are the relevant portions of my assessment and plan of care. Visit Diagnoses and Associated Orders     Obstructive sleep apnea syndrome   (New Problem)  -  Primary    Needs work up/treatment         Paroxysmal atrial fibrillation (HCC)   (Stable)           Essential hypertension   (Stable)           Type 2 diabetes mellitus without complication, without long-term current use of insulin (HCC)   (Stable)           Class 1 obesity due to excess calories with serious comorbidity and body mass index (BMI) of 31.0 to 31.9 in adult   (Not Stable)                 Reviewed old records, pertinent data listed in history. Reviewed sleep study with patient as well as the pros and cons of multiple treatment options. At this time he would like to proceed with CPAP titration which is the most effective treatment for PHILLIP but his insurance company will not allow a titration till a trial of Auto CPAP despite it being medically indicated and the standard care. Instructed him not to drive when drowsy and unless he has worn his machine at least 4 hrs the night before. Continue medications per his PCP and other physicians. Will send an order to his DME company of choice for AutoCPAP trial (Pmin-10 Pmax-20 Flex-3 with heated humidification and mask of choice) per his insurance company. If he fails AutoCPAP trial then will need a formal in-lab titration. 8 wk follow up in the office. The chronic medical conditions listed are directly related to the primary diagnosis listed above.   The management of the primary diagnosis affects the secondary diagnosis and vice versa. This information was analyzed to assess complexity and medical decision making in regards to further testing and management. Continue meds for:  a fib, HTN, and DM. Pt would medically benefit from wt loss for PHILLIP (diet, exercise, surgical). If you have questions, please do not hesitate to call me. I look forward to following Ingrid Dubin along with you.     Sincerely,        Kelsy Stubbs MD    CC providers:  Polo Lion MD  [address] [address]  [address]ia [employer]

## 2021-11-17 NOTE — PROGRESS NOTES
Ely Dahiana MEDEROS         : 1955    Diagnosis: [x] PHILLIP (G47.33) [] CSA (G47.31) [] Apnea (G47.30)   Length of Need: [x] 12 Months [] 99 Months [] Other:    Machine (JOVAN!): [] Respironics Dream Station   2   Auto [x] ResMed AirSense     Auto S11 [] Other:     [x]  CPAP () [] Bilevel ()   Mode: [x] Auto [] Spontaneous    Mode: [] Auto [] Spontaneous      Pmin-9  Pmax-20      Comfort Settings:   - Ramp Pressure: 5 cmH2O                                        - Ramp time: 15 min                                     -  Flex/EPR - 3 full time                                    - For ResMed Bilevel (TiMax-4 sec   TiMin- 0.2 sec)     Humidifier: [x] Heated ()        [x] Water chamber replacement ()/ 1 per 6 months        Mask:   [x] Nasal () /1 per 3 months [] Full Face () /1 per 3 months   [x] Patient choice -Size and fit mask [] Patient Choice - Size and fit mask   [] Dispense:  [] Dispense:    [x] Headgear () / 1 per 3 months [] Headgear () / 1 per 3 months   [x] Replacement Nasal Cushion ()/2 per month [] Interface Replacement ()/1 per month   [x] Replacement Nasal Pillows ()/2 per month         Tubing: [x] Heated ()/1 per 3 months    [] Standard ()/1 per 3 months [] Other:           Filters: [x] Non-disposable ()/1 per 6 months     [x] Ultra-Fine, Disposable ()/2 per month        Miscellaneous: [] Chin Strap ()/ 1 per 6 months [] O2 bleed-in:       LPM   [] Oximetry on CPAP/Bilevel []  Other:    [x] Modem: ()         Start Order Date: 21    MEDICAL JUSTIFICATION:  I, the undersigned, certify that the above prescribed supplies are medically necessary for this patients wellbeing. In my opinion, the supplies are both reasonable and necessary in reference to accepted standards of medicalpractice in treatment of this patients condition.     Diana Dumont MD      NPI: [id number]       Order Signed Date: 21    Electronically signed by Wili Cervantes MD on 2021 at 10:57 AM    Cameron Conteh III  1955  [address] [place] [address]  [phone] (home)   [phone] (mobile)      Insurance Info (confirm with patient if correct):  Payor/Plan Subscr  Sex Relation Sub.  Ins. ID Effective Group Num

## 2021-11-18 ASSESSMENT — ENCOUNTER SYMPTOMS
CHEST TIGHTNESS: 0
RESPIRATORY NEGATIVE: 1
SHORTNESS OF BREATH: 0
ABDOMINAL PAIN: 0

## 2021-11-18 NOTE — ASSESSMENT & PLAN NOTE
Cont coumadin- has been stable but pt told still needs monthly checks which he is sometimes not too great about

## 2021-12-10 ENCOUNTER — VIRTUAL VISIT (OUTPATIENT)
Dept: INTERNAL MEDICINE CLINIC | Age: 66
End: 2021-12-10
Payer: MEDICARE

## 2021-12-10 DIAGNOSIS — E11.9 TYPE 2 DIABETES MELLITUS WITHOUT COMPLICATION, WITHOUT LONG-TERM CURRENT USE OF INSULIN (HCC): Chronic | ICD-10-CM

## 2021-12-10 DIAGNOSIS — I10 ESSENTIAL HYPERTENSION: Chronic | ICD-10-CM

## 2021-12-10 DIAGNOSIS — I48.0 PAROXYSMAL ATRIAL FIBRILLATION (HCC): Chronic | ICD-10-CM

## 2021-12-10 DIAGNOSIS — J40 BRONCHITIS: Primary | ICD-10-CM

## 2021-12-10 DIAGNOSIS — R05.9 COUGH: ICD-10-CM

## 2021-12-10 DIAGNOSIS — G47.33 OBSTRUCTIVE SLEEP APNEA SYNDROME: Chronic | ICD-10-CM

## 2021-12-10 PROCEDURE — 3017F COLORECTAL CA SCREEN DOC REV: CPT | Performed by: INTERNAL MEDICINE

## 2021-12-10 PROCEDURE — 4040F PNEUMOC VAC/ADMIN/RCVD: CPT | Performed by: INTERNAL MEDICINE

## 2021-12-10 PROCEDURE — 99214 OFFICE O/P EST MOD 30 MIN: CPT | Performed by: INTERNAL MEDICINE

## 2021-12-10 PROCEDURE — 1123F ACP DISCUSS/DSCN MKR DOCD: CPT | Performed by: INTERNAL MEDICINE

## 2021-12-10 PROCEDURE — 3051F HG A1C>EQUAL 7.0%<8.0%: CPT | Performed by: INTERNAL MEDICINE

## 2021-12-10 PROCEDURE — G8427 DOCREV CUR MEDS BY ELIG CLIN: HCPCS | Performed by: INTERNAL MEDICINE

## 2021-12-10 PROCEDURE — 2022F DILAT RTA XM EVC RTNOPTHY: CPT | Performed by: INTERNAL MEDICINE

## 2021-12-10 RX ORDER — PROMETHAZINE HYDROCHLORIDE AND CODEINE PHOSPHATE 6.25; 1 MG/5ML; MG/5ML
5 SYRUP ORAL EVERY 4 HOURS PRN
Qty: 240 ML | Refills: 1 | Status: SHIPPED | OUTPATIENT
Start: 2021-12-10 | End: 2022-03-02 | Stop reason: ALTCHOICE

## 2021-12-10 RX ORDER — PREDNISONE 20 MG/1
40 TABLET ORAL DAILY
Qty: 14 TABLET | Refills: 0 | Status: SHIPPED | OUTPATIENT
Start: 2021-12-10 | End: 2021-12-17

## 2021-12-10 RX ORDER — AMOXICILLIN AND CLAVULANATE POTASSIUM 875; 125 MG/1; MG/1
1 TABLET, FILM COATED ORAL 2 TIMES DAILY
Qty: 14 TABLET | Refills: 0 | Status: SHIPPED | OUTPATIENT
Start: 2021-12-10 | End: 2021-12-17

## 2021-12-10 ASSESSMENT — ENCOUNTER SYMPTOMS
SINUS PAIN: 0
COUGH: 1
CHEST TIGHTNESS: 0
SHORTNESS OF BREATH: 0
SINUS PRESSURE: 0
ABDOMINAL PAIN: 0

## 2021-12-10 NOTE — PROGRESS NOTES
Ivy Dobbins (:  1955) is a 78 y.o. male,Established patient, here for evaluation of the following chief complaint(s): Cough (deep cough x 1 wk.congestion,hurts chest to cough, had migraine x 1 days ago)         ASSESSMENT/PLAN:  1. Bronchitis  -     promethazine-codeine (PHENERGAN WITH CODEINE) 6.25-10 MG/5ML syrup; Take 5 mLs by mouth every 4 hours as needed for Cough. , Disp-240 mL, R-1Normal  2. Cough  Assessment & Plan:   ?? Bronchitis after a uri-trial of abx/mucinex/cough syrup- trial of prednisone if not improving in a few days  3. Type 2 diabetes mellitus without complication, without long-term current use of insulin (HCC)  Assessment & Plan:   Cont to monitor and watch diet-discussed fact that it will get higher if she needs prednisone   4. Obstructive sleep apnea syndrome  Assessment & Plan:   Cont cpap mask for now   5. Essential hypertension  Assessment & Plan:   Cont to follow   6. Paroxysmal atrial fibrillation [facility])  Assessment & Plan:   utd w cardiology       No follow-ups on file. SUBJECTIVE/OBJECTIVE:  Onset of cough 7-10 days ago-did have a negative covid test recently- started using his cpap which seemed to aggravate it-no sob but feels like its in his chest now- no fevers-no sob-no wheezing but cough keeping him up at night- seemed worse when started his cpap again after a short reprieve- bp's fine- no chest pain-sugars have been ok-bp well controlled       Review of Systems   Constitutional: Positive for fatigue. Negative for appetite change and fever. HENT: Negative. Negative for congestion, sinus pressure and sinus pain. Respiratory: Positive for cough. Negative for chest tightness and shortness of breath. Cardiovascular: Negative for chest pain, palpitations and leg swelling. Gastrointestinal: Negative for abdominal pain. Genitourinary: Negative. Skin: Negative. Neurological: Negative for weakness.    Psychiatric/Behavioral: Positive for sleep disturbance. Negative for dysphoric mood. The patient is not nervous/anxious. Patient-Reported Vitals 12/10/2021   Patient-Reported Weight 250lb   Patient-Reported Systolic 554   Patient-Reported Diastolic 80   Patient-Reported Temperature 07.0        Physical Exam    [INSTRUCTIONS:  \"[x]\" Indicates a positive item  \"[]\" Indicates a negative item  -- DELETE ALL ITEMS NOT EXAMINED]    Constitutional: [x] Appears well-developed and well-nourished [x] No apparent distress      [] Abnormal -     Mental status: [x] Alert and awake  [x] Oriented to person/place/time [x] Able to follow commands    [] Abnormal -     Eyes:   EOM    [x]  Normal    [] Abnormal -   Sclera  [x]  Normal    [] Abnormal -          Discharge [x]  None visible   [] Abnormal -     HENT: [x] Normocephalic, atraumatic  [] Abnormal -   [x] Mouth/Throat: Mucous membranes are moist    External Ears [x] Normal  [] Abnormal -    Neck: [x] No visualized mass [] Abnormal -     Pulmonary/Chest: [x] Respiratory effort normal   [x] No visualized signs of difficulty breathing or respiratory distress        [] Abnormal -      Musculoskeletal:   [x] Normal gait with no signs of ataxia         [x] Normal range of motion of neck        [] Abnormal -     Neurological:        [x] No Facial Asymmetry (Cranial nerve 7 motor function) (limited exam due to video visit)          [x] No gaze palsy        [] Abnormal -          Skin:        [x] No significant exanthematous lesions or discoloration noted on facial skin         [] Abnormal -            Psychiatric:       [x] Normal Affect [] Abnormal -        [x] No Hallucinations    Other pertinent observable physical exam findings:-          Chema Rosenthal, was evaluated through a synchronous (real-time) audio-video encounter. The patient (or guardian if applicable) is aware that this is a billable service. Verbal consent to proceed has been obtained within the past 12 months.  The visit was conducted pursuant to the emergency declaration under the [facility], [address] [place] [address] waVA Hospital authority and the [employer] General Act. Patient identification was verified, and a caregiver was present when appropriate. The patient was located in a state where the provider was credentialed to provide care. An electronic signature was used to authenticate this note.     --Akira Gomes MD

## 2021-12-23 RX ORDER — WARFARIN SODIUM 1 MG/1
TABLET ORAL
Qty: 180 TABLET | Refills: 3 | Status: SHIPPED | OUTPATIENT
Start: 2021-12-23 | End: 2022-03-10 | Stop reason: SDUPTHER

## 2021-12-23 RX ORDER — WARFARIN SODIUM 5 MG/1
TABLET ORAL
Qty: 90 TABLET | Refills: 3 | Status: SHIPPED | OUTPATIENT
Start: 2021-12-23 | End: 2022-03-15 | Stop reason: SDUPTHER

## 2021-12-23 NOTE — TELEPHONE ENCOUNTER
Please refill 90 day supply      warfarin (COUMADIN) 5 MG tablet         warfarin (COUMADIN) 1 MG tablet           [address] [facility] [place] [phone] [place] [phone]      Please advise and call

## 2022-01-04 DIAGNOSIS — I48.0 PAROXYSMAL ATRIAL FIBRILLATION (HCC): ICD-10-CM

## 2022-01-04 DIAGNOSIS — Z01.89 ROUTINE LAB DRAW: ICD-10-CM

## 2022-01-04 LAB
INR BLD: 2.36 (ref 0.88–1.12)
PROTHROMBIN TIME: 27.7 SEC (ref 9.9–12.7)

## 2022-01-05 ENCOUNTER — ANTI-COAG VISIT (OUTPATIENT)
Dept: INTERNAL MEDICINE CLINIC | Age: 67
End: 2022-01-05

## 2022-02-01 ENCOUNTER — VIRTUAL VISIT (OUTPATIENT)
Dept: PULMONOLOGY | Age: 67
End: 2022-02-01
Payer: MEDICARE

## 2022-02-01 DIAGNOSIS — I10 ESSENTIAL HYPERTENSION: Chronic | ICD-10-CM

## 2022-02-01 DIAGNOSIS — I48.0 PAROXYSMAL ATRIAL FIBRILLATION (HCC): Chronic | ICD-10-CM

## 2022-02-01 DIAGNOSIS — E66.09 CLASS 1 OBESITY DUE TO EXCESS CALORIES WITH SERIOUS COMORBIDITY AND BODY MASS INDEX (BMI) OF 31.0 TO 31.9 IN ADULT: Chronic | ICD-10-CM

## 2022-02-01 DIAGNOSIS — G47.33 OBSTRUCTIVE SLEEP APNEA SYNDROME: Primary | Chronic | ICD-10-CM

## 2022-02-01 DIAGNOSIS — E11.9 TYPE 2 DIABETES MELLITUS WITHOUT COMPLICATION, WITHOUT LONG-TERM CURRENT USE OF INSULIN (HCC): Chronic | ICD-10-CM

## 2022-02-01 PROCEDURE — 3017F COLORECTAL CA SCREEN DOC REV: CPT | Performed by: NURSE PRACTITIONER

## 2022-02-01 PROCEDURE — G8427 DOCREV CUR MEDS BY ELIG CLIN: HCPCS | Performed by: NURSE PRACTITIONER

## 2022-02-01 PROCEDURE — 1123F ACP DISCUSS/DSCN MKR DOCD: CPT | Performed by: NURSE PRACTITIONER

## 2022-02-01 PROCEDURE — 99214 OFFICE O/P EST MOD 30 MIN: CPT | Performed by: NURSE PRACTITIONER

## 2022-02-01 PROCEDURE — 3046F HEMOGLOBIN A1C LEVEL >9.0%: CPT | Performed by: NURSE PRACTITIONER

## 2022-02-01 PROCEDURE — 2022F DILAT RTA XM EVC RTNOPTHY: CPT | Performed by: NURSE PRACTITIONER

## 2022-02-01 PROCEDURE — 4040F PNEUMOC VAC/ADMIN/RCVD: CPT | Performed by: NURSE PRACTITIONER

## 2022-02-01 ASSESSMENT — SLEEP AND FATIGUE QUESTIONNAIRES
HOW LIKELY ARE YOU TO NOD OFF OR FALL ASLEEP IN A CAR, WHILE STOPPED FOR A FEW MINUTES IN TRAFFIC: 0
HOW LIKELY ARE YOU TO NOD OFF OR FALL ASLEEP WHILE SITTING INACTIVE IN A PUBLIC PLACE: 0
ESS TOTAL SCORE: 4
HOW LIKELY ARE YOU TO NOD OFF OR FALL ASLEEP WHILE LYING DOWN TO REST IN THE AFTERNOON WHEN CIRCUMSTANCES PERMIT: 2
HOW LIKELY ARE YOU TO NOD OFF OR FALL ASLEEP WHEN YOU ARE A PASSENGER IN A CAR FOR AN HOUR WITHOUT A BREAK: 0
HOW LIKELY ARE YOU TO NOD OFF OR FALL ASLEEP WHILE SITTING AND READING: 1
HOW LIKELY ARE YOU TO NOD OFF OR FALL ASLEEP WHILE WATCHING TV: 0
HOW LIKELY ARE YOU TO NOD OFF OR FALL ASLEEP WHILE SITTING QUIETLY AFTER LUNCH WITHOUT ALCOHOL: 1
HOW LIKELY ARE YOU TO NOD OFF OR FALL ASLEEP WHILE SITTING AND TALKING TO SOMEONE: 0

## 2022-02-01 NOTE — PROGRESS NOTES
Ana Ace MD, [facility], [facility]  [address]  [address] [facility], [address] [phone]   [facility] [address] Kaiden Bowens. [address] [place] [address]. [address] [phone]     Video Visit- Follow up    Sarah Hackett, was evaluated through a synchronous (real-time) audio-video  encounter. The patient (or guardian if applicable) is aware that this is a billable  service, which includes applicable co-pays. This Virtual Visit was conducted with  patient's (and/or legal guardian's) consent. The visit was conducted pursuant to  the emergency declaration under the [facility], [address] [place] [address] [facility] authority and the [employer] and  [employer] General Act. Patient identification was verified,  and a caregiver was present when appropriate. The patient was located in a  state where the provider was licensed to provide care. Assessment/Plan:      1. Obstructive sleep apnea syndrome  Assessment & Plan:  Chronic-Iproving: Reviewed and analyzed results of physiologic download from patient's machine and reviewed with patient. Supplies and parts as needed for his machine. These are medically necessary. Limit caffeine use after 3pm. Based on the analyzed data will continue with current settings. Stable on his machine at current settings, getting benefit from the use, and having minimal side effects. Discussed working with his DME on mask fit and comfort. Will see back in 3 months or sooner if issues arise. 2. Paroxysmal atrial fibrillation (HCC)  Assessment & Plan:  Chronic- Stable. Discussed the importance of treating obstructive sleep apnea as part of the management of this disorder. Cont any meds per PCP and other physicians. 3. Essential hypertension  Assessment & Plan:  Chronic- Stable. Discussed the importance of treating obstructive sleep apnea as part of the management of this disorder.   Cont any meds per PCP and other physicians. 4. Type 2 diabetes mellitus without complication, without long-term current use of insulin ([facility])  Assessment & Plan:  Chronic- Stable. Discussed the importance of treating obstructive sleep apnea as part of the management of this disorder. Cont any meds per PCP and other physicians. 5. Class 1 obesity due to excess calories with serious comorbidity and body mass index (BMI) of 31.0 to 31.9 in adult  Assessment & Plan:  Chronic-not stable:  Discussed importance of treating obstructive sleep apnea and getting sufficient sleep to assist with weight control. Encouraged him to work on weight loss through diet and exercise. Recommended DASH or Mediterranean diets. Reviewed, analyzed, and documented physiologic data from patient's PAP machine. This information was analyzed to assess complexity and medical decision making in regards to further testing and management. The primary encounter diagnosis was Obstructive sleep apnea syndrome. Diagnoses of Paroxysmal atrial fibrillation ([facility] [address] Essential hypertension, Type 2 diabetes mellitus without complication, without long-term current use of insulin ([facility] and Class 1 obesity due to excess calories with serious comorbidity and body mass index (BMI) of 31.0 to 31.9 in adult were also pertinent to this visit. The chronic medical conditions listed are directly related to the primary diagnosis listed above. The management of the primary diagnosis affects the secondary diagnosis and vice versa. Subjective:     Patient ID: Emir Hubbard is a 75 y.o. male.     Chief Complaint   Patient presents with    Sleep Apnea     CFU yearly [phone]     Subjective   HPI:    Machine Modem/Download Info:  Compliance (hours/night): 6.49 hrs/night  % of nights >= 4 hrs: 84 %  Download AHI (/hour): 2.4 /HR  Average CPAP Pressure : 10.6 cmH2O      APAP - Settings  Pressure Min: 9 cmH2O  Pressure Max: 20 cmH2O                 Comfort Settings  Heated Tubing (Yes/No): Yes  Flex/EPR (0-3): 3 PAP Mask  Mask Type: Nasal pillows  Clinically Relevant Leak: No     [facility] reports he is doing well with his machine. He is acclimating better this time around. He has noticed he is not a sleepy during the day and his naps have decreased since he started using the machine. He turned ramp off on the machine. Pressure feels good and he is waking rested for the most part. he denies headaches, congestion, nosebleeds, dryness, aerophagia, or drowsiness while driving. Using nasal pillow mask and having some leak. Tightened the strap and has improved some. [address]    [place] - Total score: 4    Current Outpatient Medications   Medication Instructions    divalproex (DEPAKOTE) 250 MG DR tablet TAKE 1 TABLET BY MOUTH THREE TIMES A DAY    dorzolamide (TRUSOPT) 2 % ophthalmic solution 2 drops, 3 TIMES DAILY    fenofibrate (TRICOR) 145 MG tablet TAKE 1 TABLET BY MOUTH EVERY DAY    fexofenadine (ALLEGRA ALLERGY) 180 mg, Oral, DAILY PRN    flecainide (TAMBOCOR) 100 MG tablet TAKE 1 TABLET BY MOUTH TWICE A DAY    LUMIGAN 0.01 % SOLN ophthalmic drops 1 drop, Both Eyes, NIGHTLY    methocarbamol (ROBAXIN) 500 mg, Oral, 4 TIMES DAILY PRN    metoprolol succinate (TOPROL XL) 50 MG extended release tablet TAKE 1 TABLET BY MOUTH EVERY DAY    promethazine-codeine (PHENERGAN WITH CODEINE) 6.25-10 MG/5ML syrup 5 mLs, Oral, EVERY 4 HOURS PRN    rosuvastatin (CRESTOR) 40 MG tablet TAKE 1 TABLET BY MOUTH EVERY DAY    SUMAtriptan (IMITREX) 100 MG tablet TAKE ONE TABLET BY MOUTH ONCE AS NEEDED; MAY REPEAT IN 2 HOURS IF NEEDED. DO NOT EXCEED TWO TABLETS IN 24 HOURS.     timolol (TIMOPTIC) 0.25 % ophthalmic solution INSTILL 1 DROP INTO BOTH EYES EVERY DAY    vitamin D3 (CHOLECALCIFEROL) 2,000 Units, Oral, DAILY    warfarin (COUMADIN) 1 MG tablet TAKE 1 TABLET BY MOUTH TWICE A DAY    warfarin (COUMADIN) 5 MG tablet TAKE 1 TABLET BY MOUTH EVERY DAY Electronically signed by EARNEST Elizabeth on 2/1/2022 at 12:27 PM

## 2022-02-01 NOTE — ASSESSMENT & PLAN NOTE
Chronic-Iproving: Reviewed and analyzed results of physiologic download from patient's machine and reviewed with patient. Supplies and parts as needed for his machine. These are medically necessary. Limit caffeine use after 3pm. Based on the analyzed data will continue with current settings. Stable on his machine at current settings, getting benefit from the use, and having minimal side effects. Discussed working with his DME on mask fit and comfort. Will see back in 3 months or sooner if issues arise.

## 2022-02-01 NOTE — LETTER
[facility]  [address] [facility]  [address] [address]  Phone: [phone]  Fax: [phone]    February 1, 2022       Patient: Reji Edgar   MR Number: [id number]   YOB: 1955   Date of Visit: 2/1/2022       Aubree Potts was seen for a follow up visit today. Here is my assessment and plan as well as an attached copy of his visit today:    Atrial fibrillation ([address]  Chronic- Stable. Discussed the importance of treating obstructive sleep apnea as part of the management of this disorder. Cont any meds per PCP and other physicians. Essential hypertension  Chronic- Stable. Discussed the importance of treating obstructive sleep apnea as part of the management of this disorder. Cont any meds per PCP and other physicians. Type 2 diabetes mellitus without complication, without long-term current use of insulin (HCC)  Chronic- Stable. Discussed the importance of treating obstructive sleep apnea as part of the management of this disorder. Cont any meds per PCP and other physicians. Class 1 obesity due to excess calories with serious comorbidity and body mass index (BMI) of 31.0 to 31.9 in adult  Chronic-not stable:  Discussed importance of treating obstructive sleep apnea and getting sufficient sleep to assist with weight control. Encouraged him to work on weight loss through diet and exercise. Recommended DASH or Mediterranean diets. Obstructive sleep apnea syndrome  Chronic-Iproving: Reviewed and analyzed results of physiologic download from patient's machine and reviewed with patient. Supplies and parts as needed for his machine. These are medically necessary. Limit caffeine use after 3pm. Based on the analyzed data will continue with current settings. Stable on his machine at current settings, getting benefit from the use, and having minimal side effects. Discussed working with his DME on mask fit and comfort.  Will see back in 3 months or sooner if issues arise. If you have questions or concerns, please do not hesitate to call me. I look forward to following Mariajose Matthews along with you.     Sincerely,    EARNEST Ochoa    CC providers:  Reji Gill MD  [address] [id number]  [facility]

## 2022-02-22 ENCOUNTER — OFFICE VISIT (OUTPATIENT)
Dept: CARDIOLOGY CLINIC | Age: 67
End: 2022-02-22
Payer: COMMERCIAL

## 2022-02-22 VITALS
BODY MASS INDEX: 31.75 KG/M2 | HEART RATE: 60 BPM | SYSTOLIC BLOOD PRESSURE: 112 MMHG | WEIGHT: 254 LBS | DIASTOLIC BLOOD PRESSURE: 66 MMHG

## 2022-02-22 DIAGNOSIS — I10 ESSENTIAL HYPERTENSION: ICD-10-CM

## 2022-02-22 DIAGNOSIS — G47.33 OBSTRUCTIVE SLEEP APNEA SYNDROME: ICD-10-CM

## 2022-02-22 DIAGNOSIS — E55.9 VITAMIN D DEFICIENCY: ICD-10-CM

## 2022-02-22 DIAGNOSIS — H40.9 GLAUCOMA, UNSPECIFIED GLAUCOMA TYPE, UNSPECIFIED LATERALITY: ICD-10-CM

## 2022-02-22 DIAGNOSIS — Z86.010 HX OF COLONIC POLYPS: ICD-10-CM

## 2022-02-22 DIAGNOSIS — Z23 NEED FOR INFLUENZA VACCINATION: ICD-10-CM

## 2022-02-22 DIAGNOSIS — I48.0 PAROXYSMAL ATRIAL FIBRILLATION (HCC): ICD-10-CM

## 2022-02-22 DIAGNOSIS — H91.93 BILATERAL HEARING LOSS, UNSPECIFIED HEARING LOSS TYPE: ICD-10-CM

## 2022-02-22 DIAGNOSIS — R00.2 PALPITATION: ICD-10-CM

## 2022-02-22 DIAGNOSIS — E66.09 CLASS 1 OBESITY DUE TO EXCESS CALORIES WITH SERIOUS COMORBIDITY AND BODY MASS INDEX (BMI) OF 31.0 TO 31.9 IN ADULT: ICD-10-CM

## 2022-02-22 DIAGNOSIS — I27.20 PULMONARY HYPERTENSION (HCC): ICD-10-CM

## 2022-02-22 DIAGNOSIS — I48.0 PAROXYSMAL ATRIAL FIBRILLATION (HCC): Primary | ICD-10-CM

## 2022-02-22 DIAGNOSIS — Z01.89 ROUTINE LAB DRAW: ICD-10-CM

## 2022-02-22 DIAGNOSIS — E11.9 TYPE 2 DIABETES MELLITUS WITHOUT COMPLICATION, WITHOUT LONG-TERM CURRENT USE OF INSULIN (HCC): ICD-10-CM

## 2022-02-22 LAB
CHOLESTEROL, TOTAL: 188 MG/DL (ref 0–199)
CREATININE URINE: 236.8 MG/DL (ref 39–259)
HDLC SERPL-MCNC: 35 MG/DL (ref 40–60)
INR BLD: 2.13 (ref 0.88–1.12)
LDL CHOLESTEROL CALCULATED: 106 MG/DL
MICROALBUMIN UR-MCNC: 11.9 MG/DL
MICROALBUMIN/CREAT UR-RTO: 50.3 MG/G (ref 0–30)
PROTHROMBIN TIME: 24.8 SEC (ref 9.9–12.7)
TRIGL SERPL-MCNC: 234 MG/DL (ref 0–150)
VITAMIN D 25-HYDROXY: 23.8 NG/ML
VLDLC SERPL CALC-MCNC: 47 MG/DL

## 2022-02-22 PROCEDURE — G8417 CALC BMI ABV UP PARAM F/U: HCPCS | Performed by: INTERNAL MEDICINE

## 2022-02-22 PROCEDURE — G8484 FLU IMMUNIZE NO ADMIN: HCPCS | Performed by: INTERNAL MEDICINE

## 2022-02-22 PROCEDURE — G8427 DOCREV CUR MEDS BY ELIG CLIN: HCPCS | Performed by: INTERNAL MEDICINE

## 2022-02-22 PROCEDURE — 1036F TOBACCO NON-USER: CPT | Performed by: INTERNAL MEDICINE

## 2022-02-22 PROCEDURE — 4040F PNEUMOC VAC/ADMIN/RCVD: CPT | Performed by: INTERNAL MEDICINE

## 2022-02-22 PROCEDURE — 3017F COLORECTAL CA SCREEN DOC REV: CPT | Performed by: INTERNAL MEDICINE

## 2022-02-22 PROCEDURE — 99214 OFFICE O/P EST MOD 30 MIN: CPT | Performed by: INTERNAL MEDICINE

## 2022-02-22 PROCEDURE — 1123F ACP DISCUSS/DSCN MKR DOCD: CPT | Performed by: INTERNAL MEDICINE

## 2022-02-22 NOTE — PROGRESS NOTES
Subjective:      Patient ID: Carrol Ya is a 75 y.o. male. HPI:  Justino Vazquez is being seen for hisfollow up for afib and palps. No complaints. Feeling good. Rhythm stable. Active. No exertional sx. No tachycardia. No palps. No syncope. No edema. No pnd or orthopnea. No chest pain/sob.      Past Medical History:   Diagnosis Date    Allergic rhinitis due to other allergen     Atrial fibrillation ([facility]     Cancer ([facility] [address]     right kidney    Cataract 11/30/2016    Glaucoma     both eyes    Hx of blood clots     pulmonary embolism 2002 shortly after ablation    Hypopotassemia     Migraines     Obstructive sleep apnea syndrome     Had sleep studies but couldn't tolerate mask     PHILLIP (obstructive sleep apnea)     Other abnormal blood chemistry     Other and unspecified hyperlipidemia     Screening PSA (prostate specific antigen) 12/5/2009    <0.1 ng/mL    Sleep apnea     does not use cpap    Strain of calf muscle 12/19/2018    Tremor     minor    Unspecified sleep apnea     Vitreous degeneration      Past Surgical History:   Procedure Laterality Date    ABLATION OF DYSRHYTHMIC FOCUS      2002    CATARACT REMOVAL Bilateral     COLONOSCOPY  06-    Jazz Molina / Dr. Luann Cooper    COLONOSCOPY  10/20/2014    recheck 4-5 yrs w ++ h/o adenomatous plyps     HAND TENDON SURGERY Right 2/12/2021    RIGHT THUMB CARPOMETACARPAL JOINT ARTHROPLASTY INCLUDING TRAPEZIECTOMY AND AUTOGRAFT, LIGAMENT RECONSTRUCTION WITH FLEXOR CARPI RADIALIS TENODN; ANY INDICATED PROCEDURES performed by Sofía Jensen MD at [place] [address] Right 12/6/2018    OPEN RIGHT PARTIAL NEPHRECTOMY (FLANK INCISION) WITH INTRAOPERATIVE ULTRASOUND performed by Santiago Hammer MD at [facility] [address]       No Known Allergies     Social History     Socioeconomic History    Marital status:      Spouse name: Not on file    Number of children: Not on file    Years of education: Not on file    Highest education level: Not on file   Occupational History    Not on file   Tobacco Use    Smoking status: Never Smoker    Smokeless tobacco: Never Used   Vaping Use    Vaping Use: Never used   Substance and Sexual Activity    Alcohol use: Yes     Comment: 4 drinks weekly    Drug use: No    Sexual activity: Yes     Partners: Female   Other Topics Concern    Not on file   Social History Narrative    Not on file     Social Determinants of Health     Financial Resource Strain: Low Risk     Difficulty of Paying Living Expenses: Not hard at all   Food Insecurity: No Food Insecurity    Worried About Running Out of Food in the Last Year: Never true    Judy of Food in the Last Year: Never true   Transportation Needs:     Lack of Transportation (Medical): Not on file    Lack of Transportation (Non-Medical): Not on file   Physical Activity:     Days of Exercise per Week: Not on file    Minutes of Exercise per Session: Not on file   Stress:     Feeling of Stress : Not on file   Social Connections:     Frequency of Communication with Friends and Family: Not on file    Frequency of Social Gatherings with Friends and Family: Not on file    Attends Che[employer] Services: Not on file    Active Member of [address] [place] [address] or Organizations: Not on file    Attends Club or Organization Meetings: Not on file    Marital Status: Not on file   Intimate Partner Violence:     Fear of Current or Ex-Partner: Not on file    Emotionally Abused: Not on file    Physically Abused: Not on file    Sexually Abused: Not on file   Housing Stability:     Unable to Pay for Housing in the Last Year: Not on file    Number of [place] in the Last Year: Not on file    Unstable Housing in the Last Year: Not on file        Patient has a family history includes Diabetes in his father and mother; Sleep Apnea in his brother; Uterine Cancer in his mother.     Patient  has a past medical history of Allergic rhinitis due to other allergen, Atrial fibrillation ([facility] Cancer ([facility] [address] [facility] Cataract, Glaucoma, Hx of blood clots, Hypopotassemia, Migraines, Obstructive sleep apnea syndrome, PHILLIP (obstructive sleep apnea), Other abnormal blood chemistry, Other and unspecified hyperlipidemia, Screening PSA (prostate specific antigen), Sleep apnea, Strain of calf muscle, Tremor, Unspecified sleep apnea, and Vitreous degeneration. Current Outpatient Medications   Medication Sig Dispense Refill    warfarin (COUMADIN) 1 MG tablet TAKE 1 TABLET BY MOUTH TWICE A  tablet 3    warfarin (COUMADIN) 5 MG tablet TAKE 1 TABLET BY MOUTH EVERY DAY 90 tablet 3    promethazine-codeine (PHENERGAN WITH CODEINE) 6.25-10 MG/5ML syrup Take 5 mLs by mouth every 4 hours as needed for Cough. 240 mL 1    dorzolamide (TRUSOPT) 2 % ophthalmic solution 2 drops 3 times daily      vitamin D3 (CHOLECALCIFEROL) 25 MCG (1000 UT) TABS tablet Take 2 tablets by mouth daily 30 tablet 0    rosuvastatin (CRESTOR) 40 MG tablet TAKE 1 TABLET BY MOUTH EVERY DAY 90 tablet 3    divalproex (DEPAKOTE) 250 MG DR tablet TAKE 1 TABLET BY MOUTH THREE TIMES A  tablet 3    fenofibrate (TRICOR) 145 MG tablet TAKE 1 TABLET BY MOUTH EVERY DAY 90 tablet 3    metoprolol succinate (TOPROL XL) 50 MG extended release tablet TAKE 1 TABLET BY MOUTH EVERY DAY 90 tablet 3    flecainide (TAMBOCOR) 100 MG tablet TAKE 1 TABLET BY MOUTH TWICE A  tablet 3    LUMIGAN 0.01 % SOLN ophthalmic drops Place 1 drop into both eyes nightly      methocarbamol (ROBAXIN) 500 MG tablet Take 1 tablet by mouth 4 times daily as needed (muscle spasm) 60 tablet 3    SUMAtriptan (IMITREX) 100 MG tablet TAKE ONE TABLET BY MOUTH ONCE AS NEEDED; MAY REPEAT IN 2 HOURS IF NEEDED. DO NOT EXCEED TWO TABLETS IN 24 HOURS.  27 tablet 3    fexofenadine (ALLEGRA ALLERGY) 180 MG tablet Take 180 mg by mouth daily as needed      timolol (TIMOPTIC) 0.25 % ophthalmic solution INSTILL 1 DROP INTO BOTH EYES EVERY DAY (Patient not taking: Reported on 2/22/2022) 5 mL 3     No current facility-administered medications for this visit. Vitals:    02/22/22 0918   BP: 112/66   Pulse: 60       [address] 254      Review of Systems   Constitutional: Negative for activity change and fatigue. Respiratory: Negative for apnea, cough, choking, chest tightness and shortness of breath. Cardiovascular: Negative for chest pain, palpitations and leg swelling. [No PND or orthopnea. No tachycardia. Gastrointestinal: Negative for abdominal distention. Musculoskeletal: Negative for myalgias. Neurological: Negative for dizziness, syncope and light-headedness. Psychiatric/Behavioral: Negative for behavioral problems, confusion and agitation. All other systems reviewed negative as done. Objective:   Physical Exam   Constitutional: He is oriented to person, place, and time. He appears well-developed and well-nourished. No distress. HENT:   Head: Normocephalic and atraumatic. Eyes: EOM are normal. Right eye exhibits no discharge. Left eye exhibits no discharge. Neck: Normal range of motion. Neck supple. No JVD present. Cardiovascular: Normal rate, regular rhythm, S1 normal, S2 normal and normal heart sounds. Exam reveals no gallop. No murmur heard. Pulses:       Radial pulses are 2+ on the right side, and 2+ on the left side. Pulmonary/Chest: Effort normal and breath sounds normal. No respiratory distress. He has no wheezes. He has no rales. Abdominal: Soft. Bowel sounds are normal. He exhibits no distension. No tenderness. Musculoskeletal: Normal range of motion. He exhibits no edema. Neurological: He is alert and oriented to person, place, and time. Skin: Skin is warm and dry. Psychiatric: He has a normal mood and affect. His behavior is normal. Thought content normal.       Assessment:       Diagnosis Orders   1. Paroxysmal atrial fibrillation (HCC)     2. Palpitation     3.  Pulmonary hypertension ([address]             Plan: CV stable. residential good. No sob/chest pain. Rhythm stable. BP is stable. AC with no bleeding issues. Will continue coumadin. Routine INR. Will continue flecanide/toprol for afib. Encouraged diet, exercise and wt loss. No changes. Continue to monitor. Reviewed previous records and testing including echo 12/19. Follow up 6 months.

## 2022-02-23 ENCOUNTER — TELEPHONE (OUTPATIENT)
Dept: INTERNAL MEDICINE CLINIC | Age: 67
End: 2022-02-23

## 2022-02-23 ENCOUNTER — ANTI-COAG VISIT (OUTPATIENT)
Dept: INTERNAL MEDICINE CLINIC | Age: 67
End: 2022-02-23

## 2022-02-23 LAB
ESTIMATED AVERAGE GLUCOSE: 139.9 MG/DL
HBA1C MFR BLD: 6.5 %

## 2022-02-23 NOTE — TELEPHONE ENCOUNTER
Let him know the pt/inr were sl headed to the low end of therapeutic but ok so isis 1 mo-sugar is better -6.5 so good job-cholesterol is ok-about as good as we are going to get it but he may want to discuss w his cardiologist- spilling some protein in urine as he has in past but not a big issue-vit d is low-add 4000 u daily otc   Please enter in coag note

## 2022-03-02 ENCOUNTER — TELEMEDICINE (OUTPATIENT)
Dept: INTERNAL MEDICINE CLINIC | Age: 67
End: 2022-03-02
Payer: MEDICARE

## 2022-03-02 DIAGNOSIS — I10 ESSENTIAL HYPERTENSION: Chronic | ICD-10-CM

## 2022-03-02 DIAGNOSIS — I26.99 OTHER PULMONARY EMBOLISM WITHOUT ACUTE COR PULMONALE, UNSPECIFIED CHRONICITY (HCC): ICD-10-CM

## 2022-03-02 DIAGNOSIS — M18.11 ARTHRITIS OF CARPOMETACARPAL (CMC) JOINT OF RIGHT THUMB: ICD-10-CM

## 2022-03-02 DIAGNOSIS — R80.8 OTHER PROTEINURIA: ICD-10-CM

## 2022-03-02 DIAGNOSIS — E78.00 PURE HYPERCHOLESTEROLEMIA: Chronic | ICD-10-CM

## 2022-03-02 DIAGNOSIS — C64.1 RENAL CELL CARCINOMA OF RIGHT KIDNEY (HCC): ICD-10-CM

## 2022-03-02 DIAGNOSIS — E11.9 TYPE 2 DIABETES MELLITUS WITHOUT COMPLICATION, WITHOUT LONG-TERM CURRENT USE OF INSULIN (HCC): Chronic | ICD-10-CM

## 2022-03-02 PROCEDURE — 4040F PNEUMOC VAC/ADMIN/RCVD: CPT | Performed by: INTERNAL MEDICINE

## 2022-03-02 PROCEDURE — 99214 OFFICE O/P EST MOD 30 MIN: CPT | Performed by: INTERNAL MEDICINE

## 2022-03-02 PROCEDURE — 2022F DILAT RTA XM EVC RTNOPTHY: CPT | Performed by: INTERNAL MEDICINE

## 2022-03-02 PROCEDURE — 1123F ACP DISCUSS/DSCN MKR DOCD: CPT | Performed by: INTERNAL MEDICINE

## 2022-03-02 PROCEDURE — 3044F HG A1C LEVEL LT 7.0%: CPT | Performed by: INTERNAL MEDICINE

## 2022-03-02 PROCEDURE — G8427 DOCREV CUR MEDS BY ELIG CLIN: HCPCS | Performed by: INTERNAL MEDICINE

## 2022-03-02 PROCEDURE — 3017F COLORECTAL CA SCREEN DOC REV: CPT | Performed by: INTERNAL MEDICINE

## 2022-03-02 ASSESSMENT — ENCOUNTER SYMPTOMS
SHORTNESS OF BREATH: 0
ABDOMINAL PAIN: 0
CHEST TIGHTNESS: 0
RESPIRATORY NEGATIVE: 1

## 2022-03-02 NOTE — PROGRESS NOTES
Fina Haq (:  1955) is a Established patient, here for evaluation of the following:    Assessment & Plan   Below is the assessment and plan developed based on review of pertinent history, physical exam, labs, studies, and medications. 1. Type 2 diabetes mellitus without complication, without long-term current use of insulin (HCC)  Assessment & Plan:   Controlled w current regimen- continue and monitor closely    2. Other pulmonary embolism without acute cor pulmonale, unspecified chronicity (HCC)  Assessment & Plan:   Cont long term coumadin  3. Renal cell carcinoma of right kidney [facility])  Assessment & Plan:   utd w urology and stable  4. Pure hypercholesterolemia  Assessment & Plan:   Still sl up-to discuss w cards  5. Essential hypertension  Assessment & Plan:   Controlled w current regimen- continue and monitor closely    6. Arthritis of carpometacarpal (CMC) joint of right thumb  Assessment & Plan:   Referred to Dr. Adrian Marcial for treatment w marijuana  7. Other proteinuria  Assessment & Plan:   Need to control all risk factors- monitor blood pressure, blood sugars , and lipids carefully and treat aggressively to avoid progression of renal disease      No follow-ups on file. Subjective   Saw Dr. Konstantin Zamarripa on Monday and still having sever OA pain in hands- has had 7 cortisone shots in the hands but not working well any longer-wondering about cbd for the pain-is off to [facility] to help daughter w granddaughter while SHUKRI has surgery-trying to work on cutting carbs and A1C is down to 6.5- pt/inr on low side of nl so to recheck as soon as he gets home-bp's well controlled-vit d was low and still spilling some protein in the urine    Review of Systems   Constitutional: Negative for appetite change and fatigue. HENT: Negative. Respiratory: Negative. Negative for chest tightness and shortness of breath. Cardiovascular: Negative for chest pain and palpitations.    Gastrointestinal: Negative for abdominal pain. Genitourinary: Negative. Musculoskeletal: Positive for arthralgias. Skin: Negative. Neurological: Negative for weakness. Psychiatric/Behavioral: Negative for dysphoric mood and sleep disturbance. The patient is not nervous/anxious.            Objective   Patient-Reported Vitals  Patient-Reported Systolic (Top): 640 mmHg (taken at cardiology last week)  Patient-Reported Diastolic (Bottom): 78 mmHg (taken at cardiology last week)  Patient-Reported Weight: 250lbs  Patient-Reported Height: 6'3       Physical Exam  [INSTRUCTIONS:  \"[x]\" Indicates a positive item  \"[]\" Indicates a negative item  -- DELETE ALL ITEMS NOT EXAMINED]    Constitutional: [x] Appears well-developed and well-nourished [x] No apparent distress      [] Abnormal -     Mental status: [x] Alert and awake  [x] Oriented to person/place/time [x] Able to follow commands    [] Abnormal -     Eyes:   EOM    [x]  Normal    [] Abnormal -   Sclera  [x]  Normal    [] Abnormal -          Discharge [x]  None visible   [] Abnormal -     HENT: [x] Normocephalic, atraumatic  [] Abnormal -   [x] Mouth/Throat: Mucous membranes are moist    External Ears [x] Normal  [] Abnormal -    Neck: [x] No visualized mass [] Abnormal -     Pulmonary/Chest: [x] Respiratory effort normal   [x] No visualized signs of difficulty breathing or respiratory distress        [] Abnormal -      Musculoskeletal:   [x] Normal gait with no signs of ataxia         [x] Normal range of motion of neck        [] Abnormal -     Neurological:        [x] No Facial Asymmetry (Cranial nerve 7 motor function) (limited exam due to video visit)          [x] No gaze palsy        [] Abnormal -          Skin:        [x] No significant exanthematous lesions or discoloration noted on facial skin         [] Abnormal -            Psychiatric:       [x] Normal Affect [] Abnormal -        [x] No Hallucinations    Other pertinent observable physical exam findings:-       Lady Yanes was evaluated through a synchronous (real-time) audio-video encounter. The patient (or guardian if applicable) is aware that this is a billable service, which includes applicable co-pays. This Virtual Visit was conducted with patient's (and/or legal guardian's) consent. The visit was conducted pursuant to the emergency declaration under the [address] [place] [address] authority and the [employer] General Act. Patient identification was verified, and a caregiver was present when appropriate. The patient was located at home in a state where the provider was licensed to provide care.        --Hailee Farmer MD

## 2022-03-10 RX ORDER — WARFARIN SODIUM 1 MG/1
TABLET ORAL
Qty: 180 TABLET | Refills: 3 | Status: SHIPPED | OUTPATIENT
Start: 2022-03-10 | End: 2022-03-10 | Stop reason: SDUPTHER

## 2022-03-10 RX ORDER — WARFARIN SODIUM 1 MG/1
TABLET ORAL
Qty: 180 TABLET | Refills: 3 | Status: SHIPPED | OUTPATIENT
Start: 2022-03-10 | End: 2022-11-01 | Stop reason: SDUPTHER

## 2022-03-10 NOTE — TELEPHONE ENCOUNTER
----- Message from Brien English sent at 3/10/2022 12:54 PM EST -----  Subject: Message to Provider    QUESTIONS  Information for Provider? pt is requesting a 90 day supply of warfarin 1mg   tablet take 2 tablets by mouth daily . please send prescription to [facility]   phone number [phone]. Fax [phone]  ---------------------------------------------------------------------------  --------------  Lear How INFO  What is the best way for the office to contact you? OK to leave message on   voicemail  Preferred Call Back Phone Number? [phone]  ---------------------------------------------------------------------------  --------------  SCRIPT ANSWERS  Relationship to Patient?  Self

## 2022-03-15 ENCOUNTER — TELEPHONE (OUTPATIENT)
Dept: INTERNAL MEDICINE CLINIC | Age: 67
End: 2022-03-15

## 2022-03-15 RX ORDER — WARFARIN SODIUM 5 MG/1
TABLET ORAL
Qty: 90 TABLET | Refills: 3 | Status: SHIPPED | OUTPATIENT
Start: 2022-03-15 | End: 2022-03-15 | Stop reason: SDUPTHER

## 2022-03-15 RX ORDER — WARFARIN SODIUM 5 MG/1
TABLET ORAL
Qty: 90 TABLET | Refills: 3 | Status: SHIPPED | OUTPATIENT
Start: 2022-03-15 | End: 2022-11-01 | Stop reason: SDUPTHER

## 2022-03-15 NOTE — TELEPHONE ENCOUNTER
----- Message from [facility] sent at 3/15/2022 12:17 PM EDT -----  Subject: Message to Provider    QUESTIONS  Information for Provider? Pt called and his Warfarin prescription was sent   to the wrong pharmacy. Pt is in [address] right now. It needs to be sent to [facility]. Fax [phone]  ---------------------------------------------------------------------------  --------------  Archie PULLIAM  What is the best way for the office to contact you? OK to leave message on   voicemail  Preferred Call Back Phone Number? [phone]  ---------------------------------------------------------------------------  --------------  SCRIPT ANSWERS  Relationship to Patient?  Self

## 2022-03-15 NOTE — TELEPHONE ENCOUNTER
----- Message from [facility] sent at 3/15/2022 12:17 PM EDT -----  Subject: Message to Provider    QUESTIONS  Information for Provider? Pt called and his Warfarin prescription was sent   to the wrong pharmacy. [place] [address] right now. It needs to be sent to [facility]. Fax [phone]  ---------------------------------------------------------------------------  --------------  Gianna Plan INFO  What is the best way for the office to contact you? OK to leave message on   voicemail  Preferred Call Back Phone Number? [phone]  ---------------------------------------------------------------------------  --------------  SCRIPT ANSWERS  Relationship to Patient?  Self

## 2022-03-15 NOTE — TELEPHONE ENCOUNTER
[phone] (home) [phone] (work)  Left message on machine  rx sent to [facility]. [address] (WARFARIN)5MG.

## 2022-04-20 DIAGNOSIS — I48.0 PAROXYSMAL ATRIAL FIBRILLATION (HCC): ICD-10-CM

## 2022-04-20 DIAGNOSIS — Z01.89 ROUTINE LAB DRAW: ICD-10-CM

## 2022-04-20 LAB
INR BLD: 2.23 (ref 0.88–1.12)
PROTHROMBIN TIME: 26 SEC (ref 9.9–12.7)

## 2022-04-27 ENCOUNTER — OFFICE VISIT (OUTPATIENT)
Dept: CARDIOLOGY CLINIC | Age: 67
End: 2022-04-27
Payer: MEDICARE

## 2022-04-27 VITALS
BODY MASS INDEX: 30.34 KG/M2 | WEIGHT: 244 LBS | HEIGHT: 75 IN | OXYGEN SATURATION: 98 % | HEART RATE: 58 BPM | DIASTOLIC BLOOD PRESSURE: 86 MMHG | SYSTOLIC BLOOD PRESSURE: 140 MMHG

## 2022-04-27 DIAGNOSIS — R06.02 SHORTNESS OF BREATH: Primary | ICD-10-CM

## 2022-04-27 DIAGNOSIS — R53.83 FATIGUE, UNSPECIFIED TYPE: ICD-10-CM

## 2022-04-27 DIAGNOSIS — I10 ESSENTIAL HYPERTENSION: ICD-10-CM

## 2022-04-27 DIAGNOSIS — E78.2 MIXED HYPERLIPIDEMIA: ICD-10-CM

## 2022-04-27 DIAGNOSIS — R06.02 SOB (SHORTNESS OF BREATH): Primary | ICD-10-CM

## 2022-04-27 DIAGNOSIS — I48.0 PAROXYSMAL ATRIAL FIBRILLATION (HCC): ICD-10-CM

## 2022-04-27 PROCEDURE — 99214 OFFICE O/P EST MOD 30 MIN: CPT | Performed by: NURSE PRACTITIONER

## 2022-04-27 RX ORDER — AMLODIPINE BESYLATE 5 MG/1
5 TABLET ORAL DAILY
Qty: 90 TABLET | Refills: 3 | Status: SHIPPED | OUTPATIENT
Start: 2022-04-27 | End: 2022-11-01 | Stop reason: SDUPTHER

## 2022-04-27 NOTE — PROGRESS NOTES
CC HTN, fatigue     HPI:  75 y.o. patient of Dr Marlen Kaufman with pAF, pulmonary HTN and systemic HTN. He denies cp, palpitations, syncope or falls. No LE edema, orthopnea or PND. No n/v/d, fever or Gi/ bleeding. He is feeling more fatigued and has mild SOB. Had one episode of orthostatic LH/dizziness after taking clonidine. This week he was able to swim 30 lengths of a pool without difficulty.      sBP 130-160s/80-90s    Past Medical History:   Diagnosis Date    Allergic rhinitis due to other allergen     [facility]     Cancer ([facility]     right kidney    Cataract 11/30/2016    Glaucoma     both eyes    Hx of blood clots     pulmonary embolism 2002 shortly after ablation    Hypopotassemia     Migraines     Obstructive sleep apnea syndrome     Had sleep studies but couldn't tolerate mask     PHILLIP (obstructive sleep apnea)     Other abnormal blood chemistry     Other and unspecified hyperlipidemia     Screening PSA (prostate specific antigen) 12/5/2009    <0.1 ng/mL    Sleep apnea     does not use cpap    Strain of calf muscle 12/19/2018    Tremor     minor    Unspecified sleep apnea     Vitreous degeneration      Past Surgical History:   Procedure Laterality Date    ABLATION OF DYSRHYTHMIC FOCUS      2002    CATARACT REMOVAL Bilateral     COLONOSCOPY  06-    [facility] / Dr. Siddharth Haas    COLONOSCOPY  10/20/2014    recheck 4-5 yrs w ++ h/o adenomatous plyps     HAND TENDON SURGERY Right 2/12/2021    RIGHT THUMB CARPOMETACARPAL JOINT ARTHROPLASTY INCLUDING TRAPEZIECTOMY AND AUTOGRAFT, LIGAMENT RECONSTRUCTION WITH FLEXOR CARPI RADIALIS TENODN; ANY INDICATED PROCEDURES performed by Nela Maloney MD at [place] [address] Right 12/6/2018    OPEN RIGHT PARTIAL NEPHRECTOMY (FLANK INCISION) WITH INTRAOPERATIVE ULTRASOUND performed by Lilian Land MD at [facility] OR     Family History   Problem Relation Age of Onset    Diabetes Father     Diabetes Mother    [place] Uterine Cancer Mother     Sleep Apnea Brother      Social History     Tobacco Use    Smoking status: Never Smoker    Smokeless tobacco: Never Used   Vaping Use    Vaping Use: Never used   Substance Use Topics    Alcohol use: Yes     Comment: 4 drinks weekly    Drug use: No     Allergies:Patient has no known allergies. Review of Systems  General: No changes in weight, fatigue, or night sweats. HEENT: No blurry or decreased vision. No changes in hearing, nasal discharge or sore throat. Cardiovascular:  See HPI. Respiratory: No cough, hemoptysis, or wheezing. Gastrointestinal:  No abdominal pain, hematochezia, melana, constipation, diarrhea, or history of GI ulcers. Genito-Urinary: No dysuria or hematuria. No urgency or polyuria. Musculoskeletal:  No complaints of joint pain, joint swelling or muscular weakness/soreness. Neurological:  No dizziness, headaches, numbness/tingling, speech problems or weakness. Psychological:  No anxiety or depression. Hematological and Lymphatic: No abnormal bleeding or bruising, blood clots, jaundice or swollen lymph nodes. Endocrine:   No malaise/lethargy, palpitations, polydipsia/polyuria, temperature intolerance or unexpected weight changes  Skin:  No rashes or non-healing ulcers. Physical Exam:  BP (!) 140/86   Pulse 58   Ht 6' 3\" (1.905 m)   Wt 244 lb (110.7 kg)   SpO2 98%   BMI 30.50 kg/m²    General (appearance):  No acute distress  Eyes: anicteric   Neck: soft, No JVD  Ears/Nose/Mouth/Thorat: No cyanosis  CV: RRR   Respiratory:  Clear, normal effort. No wheeze or crackles  GI: soft, non-tender, non-distended  Skin: Warm, dry. No rashes  Neuro/Psych: Alert and oriented x 3. Appropriate behavior  Ext:  No c/c.  No edema  Pulses:  2+ radial     Weight  Wt Readings from Last 3 Encounters:   04/27/22 244 lb (110.7 kg)   02/22/22 254 lb (115.2 kg)   11/17/21 252 lb 6.4 oz (114.5 kg)          CBC:   Lab Results   Component Value Date    WBC 5.2 11/04/2021 HGB 14.0 2021    HCT 43.0 2021    MCV 87.4 2021     2021     BMP:  Lab Results   Component Value Date    CREATININE 1.3 2021    BUN 18 2021     2021    K 4.8 2021     2021    CO2 23 2021     Mag:   Lab Results   Component Value Date    MG 1.80 2018     LIVER PROFILE:   Lab Results   Component Value Date    ALT 40 2021    AST 27 2021    ALKPHOS 36 (L) 2021    BILITOT 0.3 2021     PT/INR:   Lab Results   Component Value Date    INR 2.23 (H) 2022    INR 2.13 (H) 2022    INR 2.36 (H) 2022    PROTIME 26.0 (H) 2022    PROTIME 24.8 (H) 2022    PROTIME 27.7 (H) 2022     Pro-BNP No results found for: PROBNP  LIPIDS:  No components found for: CHLPL  Lab Results   Component Value Date    TRIG 234 (H) 2022    TRIG 224 (H) 2021    TRIG 238 (H) 09/15/2020     Lab Results   Component Value Date    HDL 35 (L) 2022    HDL 30 (L) 2021    HDL 35 (L) 09/15/2020     Lab Results   Component Value Date    LDLCALC 106 (H) 2022    LDLCALC 108 (H) 2021    LDLCALC 91 09/15/2020     Lab Results   Component Value Date    LABVLDL 47 2022    LABVLDL 45 2021    LABVLDL 48 09/15/2020     TSH:  Lab Results   Component Value Date    TSH 1.93 2021       IMAGIN Echo:     Left ventricular cavity size is normal. There is concentric left ventricular   hypertrophy. Overall left ventricular systolic function appears normal with   an estimated ejection fraction of 55-60%. No regional wall motion   abnormalities are noted. Diastolic filling parameters suggests normal   diastolic function. Mitral annular calcification is present. IVC size is   dilated (>2.1 cm) but collapses > 50% with respiration consistent with   elevated RA pressure (8 mmHg).  Estimated pulmonary artery systolic pressure   is at 29 mmHg assuming a right atrial pressure of 8 mmHg.    Medications:   Current Outpatient Medications   Medication Sig Dispense Refill    warfarin (COUMADIN) 5 MG tablet TAKE 1 TABLET BY MOUTH EVERY DAY 90 tablet 3    warfarin (COUMADIN) 1 MG tablet TAKE 1 TABLET BY MOUTH TWICE A  tablet 3    dorzolamide (TRUSOPT) 2 % ophthalmic solution 2 drops 3 times daily      vitamin D3 (CHOLECALCIFEROL) 25 MCG (1000 UT) TABS tablet Take 2 tablets by mouth daily 30 tablet 0    rosuvastatin (CRESTOR) 40 MG tablet TAKE 1 TABLET BY MOUTH EVERY DAY 90 tablet 3    divalproex (DEPAKOTE) 250 MG DR tablet TAKE 1 TABLET BY MOUTH THREE TIMES A  tablet 3    fenofibrate (TRICOR) 145 MG tablet TAKE 1 TABLET BY MOUTH EVERY DAY 90 tablet 3    metoprolol succinate (TOPROL XL) 50 MG extended release tablet TAKE 1 TABLET BY MOUTH EVERY DAY 90 tablet 3    flecainide (TAMBOCOR) 100 MG tablet TAKE 1 TABLET BY MOUTH TWICE A  tablet 3    LUMIGAN 0.01 % SOLN ophthalmic drops Place 1 drop into both eyes nightly      methocarbamol (ROBAXIN) 500 MG tablet Take 1 tablet by mouth 4 times daily as needed (muscle spasm) 60 tablet 3    SUMAtriptan (IMITREX) 100 MG tablet TAKE ONE TABLET BY MOUTH ONCE AS NEEDED; MAY REPEAT IN 2 HOURS IF NEEDED. DO NOT EXCEED TWO TABLETS IN 24 HOURS. 27 tablet 3    fexofenadine (ALLEGRA ALLERGY) 180 MG tablet Take 180 mg by mouth daily as needed       No current facility-administered medications for this visit. Assessment:  1. SOB (shortness of breath)    2. Fatigue, unspecified type    3. Essential hypertension    4. Paroxysmal atrial fibrillation (HCC)    5.  Mixed hyperlipidemia        Plan:  SOB/fatigued: acute   Echo   Stress test  HTN: poor controlled    BP > 140's   Start amlodipine 5 mg daily   Cont to monitor  PAF: stable   Warfarin   toprol   Flecainide   HLD: stable    Crestor   Tricor     Follow up after testing       Reviewed most recent: CBC, BMP, LFT, Lipids, PT/INR, TSH  Reviewed most recent: ECG, Echo

## 2022-05-06 ENCOUNTER — HOSPITAL ENCOUNTER (OUTPATIENT)
Dept: NON INVASIVE DIAGNOSTICS | Age: 67
Discharge: HOME | End: 2022-05-06
Payer: MEDICARE

## 2022-05-06 DIAGNOSIS — R06.02 SOB (SHORTNESS OF BREATH): ICD-10-CM

## 2022-05-06 DIAGNOSIS — R06.02 SHORTNESS OF BREATH: ICD-10-CM

## 2022-05-06 DIAGNOSIS — R53.83 FATIGUE, UNSPECIFIED TYPE: ICD-10-CM

## 2022-05-06 LAB
LV EF: 58 %
LV EF: 64 %
LVEF MODALITY: NORMAL
LVEF MODALITY: NORMAL

## 2022-05-06 PROCEDURE — 6360000002 HC RX W HCPCS: Performed by: INTERNAL MEDICINE

## 2022-05-06 PROCEDURE — 93017 CV STRESS TEST TRACING ONLY: CPT

## 2022-05-06 PROCEDURE — A9502 TC99M TETROFOSMIN: HCPCS | Performed by: NURSE PRACTITIONER

## 2022-05-06 PROCEDURE — 93306 TTE W/DOPPLER COMPLETE: CPT

## 2022-05-06 PROCEDURE — 3430000000 HC RX DIAGNOSTIC RADIOPHARMACEUTICAL: Performed by: NURSE PRACTITIONER

## 2022-05-06 PROCEDURE — 78452 HT MUSCLE IMAGE SPECT MULT: CPT

## 2022-05-06 RX ADMIN — TETROFOSMIN 30 MILLICURIE: 1.38 INJECTION, POWDER, LYOPHILIZED, FOR SOLUTION INTRAVENOUS at 09:27

## 2022-05-06 RX ADMIN — REGADENOSON 0.4 MG: 0.08 INJECTION, SOLUTION INTRAVENOUS at 09:27

## 2022-05-06 RX ADMIN — TETROFOSMIN 10 MILLICURIE: 1.38 INJECTION, POWDER, LYOPHILIZED, FOR SOLUTION INTRAVENOUS at 08:48

## 2022-05-10 ENCOUNTER — TELEMEDICINE (OUTPATIENT)
Dept: PULMONOLOGY | Age: 67
End: 2022-05-10
Payer: MEDICARE

## 2022-05-10 DIAGNOSIS — E66.09 CLASS 1 OBESITY DUE TO EXCESS CALORIES WITH SERIOUS COMORBIDITY AND BODY MASS INDEX (BMI) OF 31.0 TO 31.9 IN ADULT: Chronic | ICD-10-CM

## 2022-05-10 DIAGNOSIS — I27.20 PULMONARY HTN (HCC): ICD-10-CM

## 2022-05-10 DIAGNOSIS — G47.33 OBSTRUCTIVE SLEEP APNEA SYNDROME: Primary | Chronic | ICD-10-CM

## 2022-05-10 DIAGNOSIS — I48.0 PAROXYSMAL ATRIAL FIBRILLATION (HCC): Chronic | ICD-10-CM

## 2022-05-10 DIAGNOSIS — I10 ESSENTIAL HYPERTENSION: Chronic | ICD-10-CM

## 2022-05-10 DIAGNOSIS — E11.9 TYPE 2 DIABETES MELLITUS WITHOUT COMPLICATION, WITHOUT LONG-TERM CURRENT USE OF INSULIN (HCC): Chronic | ICD-10-CM

## 2022-05-10 PROCEDURE — 3017F COLORECTAL CA SCREEN DOC REV: CPT | Performed by: NURSE PRACTITIONER

## 2022-05-10 PROCEDURE — 1123F ACP DISCUSS/DSCN MKR DOCD: CPT | Performed by: NURSE PRACTITIONER

## 2022-05-10 PROCEDURE — G8427 DOCREV CUR MEDS BY ELIG CLIN: HCPCS | Performed by: NURSE PRACTITIONER

## 2022-05-10 PROCEDURE — 2022F DILAT RTA XM EVC RTNOPTHY: CPT | Performed by: NURSE PRACTITIONER

## 2022-05-10 PROCEDURE — 99214 OFFICE O/P EST MOD 30 MIN: CPT | Performed by: NURSE PRACTITIONER

## 2022-05-10 PROCEDURE — 4040F PNEUMOC VAC/ADMIN/RCVD: CPT | Performed by: NURSE PRACTITIONER

## 2022-05-10 PROCEDURE — 3044F HG A1C LEVEL LT 7.0%: CPT | Performed by: NURSE PRACTITIONER

## 2022-05-10 ASSESSMENT — SLEEP AND FATIGUE QUESTIONNAIRES
HOW LIKELY ARE YOU TO NOD OFF OR FALL ASLEEP WHILE WATCHING TV: 1
HOW LIKELY ARE YOU TO NOD OFF OR FALL ASLEEP WHILE SITTING QUIETLY AFTER LUNCH WITHOUT ALCOHOL: 0
HOW LIKELY ARE YOU TO NOD OFF OR FALL ASLEEP WHILE LYING DOWN TO REST IN THE AFTERNOON WHEN CIRCUMSTANCES PERMIT: 3
HOW LIKELY ARE YOU TO NOD OFF OR FALL ASLEEP IN A CAR, WHILE STOPPED FOR A FEW MINUTES IN TRAFFIC: 0
HOW LIKELY ARE YOU TO NOD OFF OR FALL ASLEEP WHILE SITTING AND READING: 1
ESS TOTAL SCORE: 5
HOW LIKELY ARE YOU TO NOD OFF OR FALL ASLEEP WHILE SITTING INACTIVE IN A PUBLIC PLACE: 0
HOW LIKELY ARE YOU TO NOD OFF OR FALL ASLEEP WHILE SITTING AND TALKING TO SOMEONE: 0
HOW LIKELY ARE YOU TO NOD OFF OR FALL ASLEEP WHEN YOU ARE A PASSENGER IN A CAR FOR AN HOUR WITHOUT A BREAK: 0

## 2022-05-10 NOTE — PROGRESS NOTES
Diagnosis: [x] PHILLIP (G47.33) [] CSA (G47.31) [] Apnea (G47.30)   Length of Need: [x] 15 Months [] 99 Months [] Other:   Machine (JOVAN!): [] Respironics Dream Station      Auto [] ResMed AirSense     Auto [] Other:     []  CPAP () [] Bilevel ()   Mode: [] Auto [] Spontaneous    Mode: [] Auto [] Spontaneous              Comfort Settings:      Humidifier: [] Heated ()        [x] Water chamber replacement ()/ 1 per 6 months        Mask:   [x] Nasal () /1 per 3 months [] Full Face () /1 per 3 months   [x] Patient choice -Size and fit mask [] Patient Choice - Size and fit mask   [] Dispense: [] Dispense:   [x] Headgear () / 1 per 3 months [] Headgear () / 1 per 3 months   [] Replacement Nasal Cushion ()/2 per month [] Interface Replacement ()/1 per month   [x] Replacement Nasal Pillows ()/2 per month         Tubing: [x] Heated ()/1 per 3 months    [] Standard ()/1 per 3 months [] Other:           Filters: [x] Non-disposable ()/1 per 6 months     [x] Ultra-Fine, Disposable ()/2 per month        Miscellaneous: [x] Chin Strap ()/ 1 per 6 months [] O2 bleed-in:        LPM   [] Oxymetry on CPAP/Bilevel []  Other:         Start Order Date: 05/10/22    MEDICAL JUSTIFICATION:  I, the undersigned, certify that the above prescribed supplies are medically necessary for this patients wellbeing. In my opinion, the supplies are both reasonable and necessary in reference to accepted standards of medicalpractice in treatment of this patients condition. Cuortney Hale NP    NPI: [id number]       Order Signed Date: 05/10/22  [facility]  Pulmonary, Sleep, and Critical Care    Pulmonary, Sleep, and Critical Care  [address] [place] [address].  [facility] , [facility]  Phone: [phone]    Fax: [facility]  1955  [address]  [phone] (home)   [phone] (mobile)      Insurance Info (confirm with patient if correct):  Payor/Plan Subscr  Sex Relation Sub.  Ins. ID Effective Group Num

## 2022-05-10 NOTE — PROGRESS NOTES
Diagnosis: [x] PHILLIP (G47.33) [] CSA (G47.31) [] Apnea (G47.30)   Length of Need: [x] 15 Months [] 99 Months [] Other:   Machine (JOVAN!): [] Respironics Dream Station      Auto [] ResMed AirSense     Auto [] Other:     []  CPAP () [] Bilevel ()   Mode: [] Auto [] Spontaneous    Mode: [] Auto [] Spontaneous              Comfort Settings:      Humidifier: [] Heated ()        [x] Water chamber replacement ()/ 1 per 6 months        Mask:   [x] Nasal () /1 per 3 months [] Full Face () /1 per 3 months   [x] Patient choice -Size and fit mask [] Patient Choice - Size and fit mask   [] Dispense: [] Dispense:   [x] Headgear () / 1 per 3 months [] Headgear () / 1 per 3 months   [] Replacement Nasal Cushion ()/2 per month [] Interface Replacement ()/1 per month   [x] Replacement Nasal Pillows ()/2 per month         Tubing: [x] Heated ()/1 per 3 months    [] Standard ()/1 per 3 months [] Other:           Filters: [x] Non-disposable ()/1 per 6 months     [x] Ultra-Fine, Disposable ()/2 per month        Miscellaneous: [] Chin Strap ()/ 1 per 6 months [] O2 bleed-in:        LPM   [] Oxymetry on CPAP/Bilevel []  Other:         Start Order Date: 05/10/22    MEDICAL JUSTIFICATION:  I, the undersigned, certify that the above prescribed supplies are medically necessary for this patients wellbeing. In my opinion, the supplies are both reasonable and necessary in reference to accepted standards of medicalpractice in treatment of this patients condition. Sherrell He NP    NPI: [id number]       Order Signed Date: 05/10/22  [facility]  Pulmonary, Sleep, and Critical Care    Pulmonary, Sleep, and Critical Care  [facility] [address] [place] [address].  [facility], [facility] , [address] [facility] [place], [place]  Phone: [phone]    Fax: [address] [facility]  1955  [address]  [phone] (home)   [phone] (mobile)      Insurance Info (confirm with patient if correct):  Payor/Plan Subscr  Sex Relation Sub.  Ins. ID Effective Group Num

## 2022-05-10 NOTE — PROGRESS NOTES
Albania Barksdale Reading CNP Geovanna Hargrove  [address] [facility]  Geovanna Hargrove, [address] [facility]- [phone]   [facility] [address]. [address] [place] [address]. [address] [phone]     Video Visit- Follow up    Sarah Hackett, was evaluated through a synchronous (real-time) audio-video  encounter. The patient (or guardian if applicable) is aware that this is a billable  service, which includes applicable co-pays. This Virtual Visit was conducted with  patient's (and/or legal guardian's) consent. The visit was conducted pursuant to  the emergency declaration under the [address] [place] [address] authority and the [employer] and  [employer] General Act. Patient identification was verified,  and a caregiver was present when appropriate. The patient was located in a  state where the provider was licensed to provide care. Assessment/Plan:      1. Obstructive sleep apnea syndrome  Assessment & Plan:   Chronic-Stable: Reviewed and analyzed results of physiologic download from patient's machine and reviewed with patient. Supplies and parts as needed for his machine. These are medically necessary. Limit caffeine use after 3pm. Based on the analyzed data will continue with current settings. Stable on his machine at current settings, getting benefit from the use, and having minimal side effects. Encouraged consistent use of his machine each night, all night. Will place order for a chinstrap to help control oral leak and dryness. Discussed adjusting the moisture settings on his machine and increasing the tube temp to help control rainout. Will see him back in 1 year or sooner if issues arise. 2. Paroxysmal atrial fibrillation (HCC)  Assessment & Plan:   Chronic- Stable. Discussed the importance of treating obstructive sleep apnea as part of the management of this disorder. Cont any meds per PCP and other physicians.     3. Essential hypertension  Assessment & Plan:   Chronic- Stable. Discussed the importance of treating obstructive sleep apnea as part of the management of this disorder. Cont any meds per PCP and other physicians. 4. Pulmonary HTN ([address]  Assessment & Plan:   Chronic- Stable. Discussed the importance of treating obstructive sleep apnea as part of the management of this disorder. Cont any meds per PCP and other physicians. 5. Type 2 diabetes mellitus without complication, without long-term current use of insulin (HCC)  Assessment & Plan:  Chronic- Stable. Discussed the importance of treating obstructive sleep apnea as part of the management of this disorder. Cont any meds per PCP and other physicians. 6. Class 1 obesity due to excess calories with serious comorbidity and body mass index (BMI) of 31.0 to 31.9 in adult  Assessment & Plan:   Chronic-not stable:  Discussed importance of treating obstructive sleep apnea and getting sufficient sleep to assist with weight control. Encouraged him to work on weight loss through diet and exercise. Recommended DASH or Mediterranean diets. Reviewed, analyzed, and documented physiologic data from patient's PAP machine. This information was analyzed to assess complexity and medical decision making in regards to further testing and management. Diagnoses of Obstructive sleep apnea syndrome, Paroxysmal atrial fibrillation ([address] Essential hypertension, Pulmonary HTN ([address] Type 2 diabetes mellitus without complication, without long-term current use of insulin ([address] and Class 1 obesity due to excess calories with serious comorbidity and body mass index (BMI) of 31.0 to 31.9 in adult were pertinent to this visit. The chronic medical conditions listed are directly related to the primary diagnosis listed above. The management of the primary diagnosis affects the secondary diagnosis and vice versa. Subjective:     Patient ID: Meg Zhang is a 75 y.o. male.    Chief Complaint   Patient presents with    Sleep Apnea     issues with mask seal     Subjective   HPI:    Machine Modem/Download Info:  Compliance (hours/night): 5.78 hrs/night  % of nights >= 4 hrs: 79 %  Download AHI (/hour): 2.5 /HR  Average CPAP Pressure : 10.4 cmH2O      APAP - Settings  Pressure Min: 9 cmH2O  Pressure Max: 20 cmH2O                 Comfort Settings  Flex/EPR (0-3): 3 PAP Mask  Mask Type: Other (Comment)     Grecia Garcia III reports he is doing well with his machine. He continues to feel he is getting more comfortable with the machine. The pressure on his machine is comfortable and he is waking rested. He has been noticing some oral leak and dryness. He has been noticing mask leak registering on his jermaine but is not bothersome to him. he denies headaches, congestion, nosebleeds, aerophagia, or drowsiness while driving. Sometimes he experiences trouble falling back asleep after waking. This usually will happen maybe once a week or less. If it is taking him a couple hours to fall back asleep he will go downstairs to his recliner and then eventually fall back asleep. He feels this has been improving since starting his machine use.     [address]    [place] - Total score: 5    Current Outpatient Medications   Medication Instructions    amLODIPine (NORVASC) 5 mg, Oral, DAILY    divalproex (DEPAKOTE) 250 MG DR tablet TAKE 1 TABLET BY MOUTH THREE TIMES A DAY    dorzolamide (TRUSOPT) 2 % ophthalmic solution 2 drops, 3 TIMES DAILY    fenofibrate (TRICOR) 145 MG tablet TAKE 1 TABLET BY MOUTH EVERY DAY    fexofenadine (ALLEGRA ALLERGY) 180 mg, Oral, DAILY PRN    flecainide (TAMBOCOR) 100 MG tablet TAKE 1 TABLET BY MOUTH TWICE A DAY    LUMIGAN 0.01 % SOLN ophthalmic drops 1 drop, Both Eyes, NIGHTLY    methocarbamol (ROBAXIN) 500 mg, Oral, 4 TIMES DAILY PRN    metoprolol succinate (TOPROL XL) 50 MG extended release tablet TAKE 1 TABLET BY MOUTH EVERY DAY  rosuvastatin (CRESTOR) 40 MG tablet TAKE 1 TABLET BY MOUTH EVERY DAY    SUMAtriptan (IMITREX) 100 MG tablet TAKE ONE TABLET BY MOUTH ONCE AS NEEDED; MAY REPEAT IN 2 HOURS IF NEEDED. DO NOT EXCEED TWO TABLETS IN 24 HOURS.     vitamin D3 (CHOLECALCIFEROL) 2,000 Units, Oral, DAILY    warfarin (COUMADIN) 1 MG tablet TAKE 1 TABLET BY MOUTH TWICE A DAY    warfarin (COUMADIN) 5 MG tablet TAKE 1 TABLET BY MOUTH EVERY DAY        Electronically signed by EARNEST Meehan on 5/10/2022 at 11:47 AM

## 2022-05-10 NOTE — LETTER
[facility]  [address] [facility]  [address] [address]  Phone: [phone]  Fax: [phone]    May 10, 2022       Patient: Naty Givens   MR Number: [id number]   YOB: 1955   Date of Visit: 5/10/2022       Trini Mensah was seen for a follow up visit today. Here is my assessment and plan as well as an attached copy of his visit today:    Obstructive sleep apnea syndrome   Chronic-Stable: Reviewed and analyzed results of physiologic download from patient's machine and reviewed with patient. Supplies and parts as needed for his machine. These are medically necessary. Limit caffeine use after 3pm. Based on the analyzed data will continue with current settings. Stable on his machine at current settings, getting benefit from the use, and having minimal side effects. Encouraged consistent use of his machine each night, all night. Will place order for a chinstrap to help control oral leak and dryness. Discussed adjusting the moisture settings on his machine and increasing the tube temp to help control rainout. Will see him back in 1 year or sooner if issues arise. Atrial fibrillation (HCC)   Chronic- Stable. Discussed the importance of treating obstructive sleep apnea as part of the management of this disorder. Cont any meds per PCP and other physicians. Essential hypertension   Chronic- Stable. Discussed the importance of treating obstructive sleep apnea as part of the management of this disorder. Cont any meds per PCP and other physicians. Pulmonary HTN (HCC)   Chronic- Stable. Discussed the importance of treating obstructive sleep apnea as part of the management of this disorder. Cont any meds per PCP and other physicians. Type 2 diabetes mellitus without complication, without long-term current use of insulin (HCC)  Chronic- Stable. Discussed the importance of treating obstructive sleep apnea as part of the management of this disorder. Cont any meds per PCP and other physicians. Class 1 obesity due to excess calories with serious comorbidity and body mass index (BMI) of 31.0 to 31.9 in adult   Chronic-not stable:  Discussed importance of treating obstructive sleep apnea and getting sufficient sleep to assist with weight control. Encouraged him to work on weight loss through diet and exercise. Recommended DASH or Mediterranean diets. If you have questions or concerns, please do not hesitate to call me. I look forward to following Callie Huffman along with you.     Sincerely,    EARNEST Wilson    CC providers:  Timmy Jones MD  [address] [address]  [place] [place]

## 2022-05-17 ENCOUNTER — OFFICE VISIT (OUTPATIENT)
Dept: CARDIOLOGY CLINIC | Age: 67
End: 2022-05-17
Payer: MEDICARE

## 2022-05-17 VITALS
HEART RATE: 56 BPM | HEIGHT: 75 IN | SYSTOLIC BLOOD PRESSURE: 106 MMHG | WEIGHT: 242.6 LBS | DIASTOLIC BLOOD PRESSURE: 64 MMHG | BODY MASS INDEX: 30.16 KG/M2

## 2022-05-17 DIAGNOSIS — I10 ESSENTIAL HYPERTENSION: ICD-10-CM

## 2022-05-17 DIAGNOSIS — I48.0 PAROXYSMAL ATRIAL FIBRILLATION (HCC): ICD-10-CM

## 2022-05-17 DIAGNOSIS — R53.83 FATIGUE, UNSPECIFIED TYPE: ICD-10-CM

## 2022-05-17 DIAGNOSIS — R06.02 SHORTNESS OF BREATH: Primary | ICD-10-CM

## 2022-05-17 DIAGNOSIS — E78.2 MIXED HYPERLIPIDEMIA: ICD-10-CM

## 2022-05-17 PROCEDURE — 1123F ACP DISCUSS/DSCN MKR DOCD: CPT | Performed by: NURSE PRACTITIONER

## 2022-05-17 PROCEDURE — 1036F TOBACCO NON-USER: CPT | Performed by: NURSE PRACTITIONER

## 2022-05-17 PROCEDURE — 99214 OFFICE O/P EST MOD 30 MIN: CPT | Performed by: NURSE PRACTITIONER

## 2022-05-17 PROCEDURE — 4040F PNEUMOC VAC/ADMIN/RCVD: CPT | Performed by: NURSE PRACTITIONER

## 2022-05-17 PROCEDURE — 3017F COLORECTAL CA SCREEN DOC REV: CPT | Performed by: NURSE PRACTITIONER

## 2022-05-17 PROCEDURE — G8427 DOCREV CUR MEDS BY ELIG CLIN: HCPCS | Performed by: NURSE PRACTITIONER

## 2022-05-17 PROCEDURE — G8417 CALC BMI ABV UP PARAM F/U: HCPCS | Performed by: NURSE PRACTITIONER

## 2022-05-17 NOTE — PROGRESS NOTES
CC HTN, fatigue     HPI:  75 y.o. patient of Dr Ginger Cummings with pAF, pulmonary HTN and systemic HTN. Last visit he c/o sob and fatigue and echo and stress ordered. BP elevated and amlodipine 5 mg started. He denies cp, sob, LH/dizziness, palpitations or syncope. No LE edema, orthopnea, PND, early satiety or abdominal bloating. No n/v/d, fever, or GI/ bleeding. Continues to swim without difficulty.      /80 at home (reported)  /64 then 108/62 in office        Past Medical History:   Diagnosis Date    Allergic rhinitis due to other allergen     At[facility] fibrillation ([facility] [address]     Cancer ([facility] [address]     right kidney    Cataract 11/30/2016    Glaucoma     both eyes    Hx of blood clots     pulmonary embolism 2002 shortly after ablation    Hypopotassemia     Migraines     Obstructive sleep apnea syndrome     Had sleep studies but couldn't tolerate mask     PHILLIP (obstructive sleep apnea)     Other abnormal blood chemistry     Other and unspecified hyperlipidemia     Screening PSA (prostate specific antigen) 12/5/2009    <0.1 ng/mL    Sleep apnea     does not use cpap    Strain of calf muscle 12/19/2018    Tremor     minor    Unspecified sleep apnea     Vitreous degeneration      Past Surgical History:   Procedure Laterality Date    ABLATION OF DYSRHYTHMIC FOCUS      2002    CATARACT REMOVAL Bilateral     COLONOSCOPY  06-    Bharathi Martin / Dr. Trenton Schofield    COLONOSCOPY  10/20/2014    recheck 4-5 yrs w ++ h/o adenomatous plyps     HAND TENDON SURGERY Right 2/12/2021    RIGHT THUMB CARPOMETACARPAL JOINT ARTHROPLASTY INCLUDING TRAPEZIECTOMY AND AUTOGRAFT, LIGAMENT RECONSTRUCTION WITH FLEXOR CARPI RADIALIS TENODN; ANY INDICATED PROCEDURES performed by Erin Maldonado MD at [place] [address] Right 12/6/2018    OPEN RIGHT PARTIAL NEPHRECTOMY (FLANK INCISION) WITH INTRAOPERATIVE ULTRASOUND performed by Iqra Tineo MD at [facility] OR     Family History   Problem Relation Age of Onset    Diabetes Father     Diabetes Mother     Uterine Cancer Mother     Sleep Apnea Brother      Social History     Tobacco Use    Smoking status: Never Smoker    Smokeless tobacco: Never Used   Vaping Use    Vaping Use: Never used   Substance Use Topics    Alcohol use: Yes     Comment: 4 drinks weekly    Drug use: No     Allergies:Patient has no known allergies. Review of Systems  General: No changes in weight, fatigue, or night sweats. HEENT: No blurry or decreased vision. No changes in hearing, nasal discharge or sore throat. Cardiovascular:  See HPI. Respiratory: No cough, hemoptysis, or wheezing. Gastrointestinal:  No abdominal pain, hematochezia, melana, constipation, diarrhea, or history of GI ulcers. Genito-Urinary: No dysuria or hematuria. No urgency or polyuria. Musculoskeletal:  No complaints of joint pain, joint swelling or muscular weakness/soreness. Neurological:  No dizziness, headaches, numbness/tingling, speech problems or weakness. Psychological:  No anxiety or depression. Hematological and Lymphatic: No abnormal bleeding or bruising, blood clots, jaundice or swollen lymph nodes. Endocrine:   No malaise/lethargy, palpitations, polydipsia/polyuria, temperature intolerance or unexpected weight changes  Skin:  No rashes or non-healing ulcers. Physical Exam:  /64   Pulse 56   Ht 6' 3\" (1.905 m)   Wt 242 lb 9.6 oz (110 kg)   BMI 30.32 kg/m²    General (appearance):  No acute distress  Eyes: anicteric   Neck: soft, No JVD  Ears/Nose/Mouth/Thorat: No cyanosis  CV: RRR   Respiratory:  Clear, normal effort. No wheeze or crackles  GI: soft, non-tender, non-distended  Skin: Warm, dry. No rashes  Neuro/Psych: Alert and oriented x 3. Appropriate behavior  Ext:  No c/c.  No edema  Pulses:  2+ radial     Weight  Wt Readings from Last 3 Encounters:   04/27/22 244 lb (110.7 kg)   02/22/22 254 lb (115.2 kg)   11/17/21 252 lb 6.4 oz (114.5 kg)          CBC:   Lab Results Component Value Date    WBC 5.2 2021    HGB 14.0 2021    HCT 43.0 2021    MCV 87.4 2021     2021     BMP:  Lab Results   Component Value Date    CREATININE 1.3 2021    BUN 18 2021     2021    K 4.8 2021     2021    CO2 23 2021     Mag:   Lab Results   Component Value Date    MG 1.80 2018     LIVER PROFILE:   Lab Results   Component Value Date    ALT 40 2021    AST 27 2021    ALKPHOS 36 (L) 2021    BILITOT 0.3 2021     PT/INR:   Lab Results   Component Value Date    INR 2.23 (H) 2022    INR 2.13 (H) 2022    INR 2.36 (H) 2022    PROTIME 26.0 (H) 2022    PROTIME 24.8 (H) 2022    PROTIME 27.7 (H) 2022     Pro-BNP No results found for: PROBNP  LIPIDS:  No components found for: CHLPL  Lab Results   Component Value Date    TRIG 234 (H) 2022    TRIG 224 (H) 2021    TRIG 238 (H) 09/15/2020     Lab Results   Component Value Date    HDL 35 (L) 2022    HDL 30 (L) 2021    HDL 35 (L) 09/15/2020     Lab Results   Component Value Date    LDLCALC 106 (H) 2022    LDLCALC 108 (H) 2021    LDLCALC 91 09/15/2020     Lab Results   Component Value Date    LABVLDL 47 2022    LABVLDL 45 2021    LABVLDL 48 09/15/2020     TSH:  Lab Results   Component Value Date    TSH 1.93 2021       IMAGIN/6/2022 Nuc stress:      Overall findings represent a low risk scan.    There is normal isotope uptake at stress and rest. There is no evidence of    myocardial ischemia or scar.    Normal LV size and systolic function.    Normal myocardial perfusion study.    Non-diagnostic EKG response due to failure to reach target heart rate. 2022 Echo:     Normal left ventricle size. There is mild concentric left ventricular   hypertrophy. Overall left ventricular systolic function appears normal with   an ejection fraction of 55-60%.  No regional wall motion abnormalities are   noted. Diastolic filling parameters suggests normal diastolic function. 2019 Echo:     Left ventricular cavity size is normal. There is concentric left ventricular   hypertrophy. Overall left ventricular systolic function appears normal with   an estimated ejection fraction of 55-60%. No regional wall motion   abnormalities are noted. Diastolic filling parameters suggests normal   diastolic function. Mitral annular calcification is present. IVC size is   dilated (>2.1 cm) but collapses > 50% with respiration consistent with   elevated RA pressure (8 mmHg). Estimated pulmonary artery systolic pressure   is at 29 mmHg assuming a right atrial pressure of 8 mmHg. Medications:   Current Outpatient Medications   Medication Sig Dispense Refill    amLODIPine (NORVASC) 5 MG tablet Take 1 tablet by mouth daily 90 tablet 3    warfarin (COUMADIN) 5 MG tablet TAKE 1 TABLET BY MOUTH EVERY DAY 90 tablet 3    warfarin (COUMADIN) 1 MG tablet TAKE 1 TABLET BY MOUTH TWICE A  tablet 3    dorzolamide (TRUSOPT) 2 % ophthalmic solution 2 drops 3 times daily      vitamin D3 (CHOLECALCIFEROL) 25 MCG (1000 UT) TABS tablet Take 2 tablets by mouth daily 30 tablet 0    rosuvastatin (CRESTOR) 40 MG tablet TAKE 1 TABLET BY MOUTH EVERY DAY 90 tablet 3    divalproex (DEPAKOTE) 250 MG DR tablet TAKE 1 TABLET BY MOUTH THREE TIMES A  tablet 3    fenofibrate (TRICOR) 145 MG tablet TAKE 1 TABLET BY MOUTH EVERY DAY 90 tablet 3    metoprolol succinate (TOPROL XL) 50 MG extended release tablet TAKE 1 TABLET BY MOUTH EVERY DAY 90 tablet 3    flecainide (TAMBOCOR) 100 MG tablet TAKE 1 TABLET BY MOUTH TWICE A  tablet 3    LUMIGAN 0.01 % SOLN ophthalmic drops Place 1 drop into both eyes nightly      SUMAtriptan (IMITREX) 100 MG tablet TAKE ONE TABLET BY MOUTH ONCE AS NEEDED; MAY REPEAT IN 2 HOURS IF NEEDED. DO NOT EXCEED TWO TABLETS IN 24 HOURS.  27 tablet 3    fexofenadine (ALLEGRA ALLERGY) 180 MG tablet Take 180 mg by mouth daily as needed       No current facility-administered medications for this visit. Assessment:  1. Shortness of breath    2. Fatigue, unspecified type    3. Essential hypertension    4. Paroxysmal atrial fibrillation (HCC)    5.  Mixed hyperlipidemia        Plan:  SOB/fatigued: resolved    Echo EF 55%   Stress test neg ischemia    No longer having sob or feeling fatigued   HTN: poor controlled    Cont  amlodipine 5 mg daily   Cont to monitor  PAF: stable   Warfarin   toprol   Flecainide   HLD: stable    Crestor   Tricor     Follow up with Dr Chanel Weaver in August       Reviewed most recent: CBC, BMP, LFT, Lipids, PT/INR, TSH  Reviewed most recent: ECG, Echo

## 2022-05-18 DIAGNOSIS — Z01.89 ROUTINE LAB DRAW: ICD-10-CM

## 2022-05-18 DIAGNOSIS — I48.0 PAROXYSMAL ATRIAL FIBRILLATION (HCC): ICD-10-CM

## 2022-05-18 LAB
INR BLD: 2.56 (ref 0.88–1.12)
PROTHROMBIN TIME: 30.1 SEC (ref 9.9–12.7)

## 2022-05-19 ENCOUNTER — TELEPHONE (OUTPATIENT)
Dept: INTERNAL MEDICINE CLINIC | Age: 67
End: 2022-05-19

## 2022-05-19 NOTE — TELEPHONE ENCOUNTER
Let pt know inr fine-recheck 1 mo- tell Ed and Noman Malik (wife) hope they are doing well and I will miss them!

## 2022-05-20 ENCOUNTER — ANTI-COAG VISIT (OUTPATIENT)
Dept: INTERNAL MEDICINE CLINIC | Age: 67
End: 2022-05-20

## 2022-07-30 NOTE — ASSESSMENT & PLAN NOTE
Chronic-Stable: Reviewed and analyzed results of physiologic download from patient's machine and reviewed with patient. Supplies and parts as needed for his machine. These are medically necessary. Limit caffeine use after 3pm. Based on the analyzed data will continue with current settings. Stable on his machine at current settings, getting benefit from the use, and having minimal side effects. Encouraged consistent use of his machine each night, all night. Will place order for a chinstrap to help control oral leak and dryness. Discussed adjusting the moisture settings on his machine and increasing the tube temp to help control rainout. Will see him back in 1 year or sooner if issues arise. 05-Apr-2021

## 2022-08-01 RX ORDER — FLECAINIDE ACETATE 100 MG/1
TABLET ORAL
Qty: 180 TABLET | Refills: 0 | Status: SHIPPED | OUTPATIENT
Start: 2022-08-01 | End: 2022-11-01 | Stop reason: SDUPTHER

## 2022-09-18 ENCOUNTER — HOSPITAL ENCOUNTER (EMERGENCY)
Age: 67
Discharge: HOME | End: 2022-09-18
Attending: STUDENT IN AN ORGANIZED HEALTH CARE EDUCATION/TRAINING PROGRAM
Payer: MEDICARE

## 2022-09-18 VITALS
BODY MASS INDEX: 31.08 KG/M2 | RESPIRATION RATE: 16 BRPM | DIASTOLIC BLOOD PRESSURE: 87 MMHG | HEART RATE: 53 BPM | WEIGHT: 250 LBS | HEIGHT: 75 IN | TEMPERATURE: 98.4 F | SYSTOLIC BLOOD PRESSURE: 167 MMHG

## 2022-09-18 DIAGNOSIS — N36.9 URETHRA DISORDER: Primary | ICD-10-CM

## 2022-09-18 DIAGNOSIS — R79.1 ELEVATED INR: ICD-10-CM

## 2022-09-18 LAB
ANION GAP SERPL CALCULATED.3IONS-SCNC: 11 MMOL/L (ref 3–16)
BACTERIA: ABNORMAL /HPF
BASOPHILS ABSOLUTE: 0 K/UL (ref 0–0.2)
BASOPHILS RELATIVE PERCENT: 0.6 %
BILIRUBIN URINE: NEGATIVE
BLOOD, URINE: ABNORMAL
BUN BLDV-MCNC: 19 MG/DL (ref 7–20)
CALCIUM SERPL-MCNC: 9.3 MG/DL (ref 8.3–10.6)
CHLORIDE BLD-SCNC: 104 MMOL/L (ref 99–110)
CLARITY: CLEAR
CO2: 22 MMOL/L (ref 21–32)
COLOR: YELLOW
CREAT SERPL-MCNC: 1 MG/DL (ref 0.8–1.3)
EOSINOPHILS ABSOLUTE: 0.2 K/UL (ref 0–0.6)
EOSINOPHILS RELATIVE PERCENT: 2.3 %
EPITHELIAL CELLS, UA: ABNORMAL /HPF (ref 0–5)
GFR AFRICAN AMERICAN: >60
GFR NON-AFRICAN AMERICAN: >60
GLUCOSE BLD-MCNC: 93 MG/DL (ref 70–99)
GLUCOSE URINE: NEGATIVE MG/DL
HCT VFR BLD CALC: 43.3 % (ref 40.5–52.5)
HEMOGLOBIN: 14.3 G/DL (ref 13.5–17.5)
INR BLD: 3.06 (ref 0.87–1.14)
KETONES, URINE: NEGATIVE MG/DL
LEUKOCYTE ESTERASE, URINE: NEGATIVE
LYMPHOCYTES ABSOLUTE: 2 K/UL (ref 1–5.1)
LYMPHOCYTES RELATIVE PERCENT: 28.9 %
MCH RBC QN AUTO: 28.7 PG (ref 26–34)
MCHC RBC AUTO-ENTMCNC: 33 G/DL (ref 31–36)
MCV RBC AUTO: 87 FL (ref 80–100)
MICROSCOPIC EXAMINATION: YES
MONOCYTES ABSOLUTE: 0.6 K/UL (ref 0–1.3)
MONOCYTES RELATIVE PERCENT: 8.6 %
NEUTROPHILS ABSOLUTE: 4 K/UL (ref 1.7–7.7)
NEUTROPHILS RELATIVE PERCENT: 59.6 %
NITRITE, URINE: NEGATIVE
PDW BLD-RTO: 14.5 % (ref 12.4–15.4)
PH UA: 6 (ref 5–8)
PLATELET # BLD: 260 K/UL (ref 135–450)
PMV BLD AUTO: 9.1 FL (ref 5–10.5)
POTASSIUM REFLEX MAGNESIUM: 4.7 MMOL/L (ref 3.5–5.1)
PROTEIN UA: NEGATIVE MG/DL
PROTHROMBIN TIME: 31.8 SEC (ref 11.7–14.5)
RBC # BLD: 4.98 M/UL (ref 4.2–5.9)
RBC UA: ABNORMAL /HPF (ref 0–4)
SODIUM BLD-SCNC: 137 MMOL/L (ref 136–145)
SPECIFIC GRAVITY UA: 1.02 (ref 1–1.03)
URINE REFLEX TO CULTURE: ABNORMAL
URINE TYPE: ABNORMAL
UROBILINOGEN, URINE: 1 E.U./DL
WBC # BLD: 6.8 K/UL (ref 4–11)
WBC UA: ABNORMAL /HPF (ref 0–5)

## 2022-09-18 PROCEDURE — 85025 COMPLETE CBC W/AUTO DIFF WBC: CPT

## 2022-09-18 PROCEDURE — 81001 URINALYSIS AUTO W/SCOPE: CPT

## 2022-09-18 PROCEDURE — 80048 BASIC METABOLIC PNL TOTAL CA: CPT

## 2022-09-18 PROCEDURE — 85610 PROTHROMBIN TIME: CPT

## 2022-09-18 PROCEDURE — 99283 EMERGENCY DEPT VISIT LOW MDM: CPT

## 2022-09-18 RX ORDER — LIDOCAINE HYDROCHLORIDE 20 MG/ML
15 SOLUTION OROPHARYNGEAL ONCE
Status: DISCONTINUED | OUTPATIENT
Start: 2022-09-18 | End: 2022-09-18 | Stop reason: HOSPADM

## 2022-09-18 ASSESSMENT — ENCOUNTER SYMPTOMS
BACK PAIN: 0
BLOOD IN STOOL: 0
APNEA: 0
PHOTOPHOBIA: 0
NAUSEA: 0
VOMITING: 0
RECTAL PAIN: 0
ABDOMINAL PAIN: 0

## 2022-09-18 ASSESSMENT — PAIN - FUNCTIONAL ASSESSMENT: PAIN_FUNCTIONAL_ASSESSMENT: NONE - DENIES PAIN

## 2022-09-18 NOTE — ED PROVIDER NOTES
[address] ENCOUNTER          ATTENDING PHYSICIAN NOTE       Date of evaluation: 9/18/2022    Chief Complaint     Hematuria (Pt states he is on blood thinners) and Dysuria      History of Present Illness     Kat Cunningham III is a 75 y.o. male history of partial nephrectomy on the right 2018, obstructive sleep apnea, atrial fibrillation on Coumadin elevated prostate who presents to the hospital today for evaluation of hematuria. The patient states that last night he was trying to go to the bathroom and it pinched the tip of his penis to try to make it to the bathroom in time and since then he has had blood in his urine and it hurts when he pees. He states that he has not had this much bleeding before and that he otherwise does not have abdominal pain, nausea, vomiting. He denies any flank pain. Patient denies any new chest pain, lightheadedness, syncope. He states that he has not had his PT/INR checked in a long time because they have been out of town. He usually gets this checked every 3 months. He has seen urology previously for renal carcinoma which was removed. Review of Systems     Review of Systems   HENT:  Negative for congestion and ear discharge. Eyes:  Negative for photophobia and visual disturbance. Respiratory:  Negative for apnea. Cardiovascular:  Negative for chest pain. Gastrointestinal:  Negative for abdominal pain, blood in stool, nausea, rectal pain and vomiting. Endocrine: Negative for polydipsia. Genitourinary:  Positive for hematuria and penile pain. Negative for difficulty urinating. Musculoskeletal:  Negative for back pain. Skin:  Negative for pallor. Neurological:  Negative for dizziness and syncope. Hematological:  Negative for adenopathy. All other systems reviewed and are negative.     Past Medical, Surgical, Family, and Social History     He has a past medical history of Allergic rhinitis due to other allergen, Atrial fibrillation ([facility] [address] Cancer ([facility] [address] Cataract, Glaucoma, Hx of blood clots, Hypopotassemia, Migraines, Obstructive sleep apnea syndrome, PHILLIP (obstructive sleep apnea), Other abnormal blood chemistry, Other and unspecified hyperlipidemia, Screening PSA (prostate specific antigen), Sleep apnea, Strain of calf muscle, Tremor, Unspecified sleep apnea, and Vitreous degeneration. He has a past surgical history that includes ablation of dysrhythmic focus; Colonoscopy (06-); Colonoscopy (10/20/2014); Cataract removal (Bilateral); partial nephrectomy (Right, 12/6/2018); and Hand tendon surgery (Right, 2/12/2021). His family history includes Diabetes in his father and mother; Sleep Apnea in his brother; Uterine Cancer in his mother. He reports that he has never smoked. He has never used smokeless tobacco. He reports current alcohol use. He reports current drug use. Drug: Marijuana Ivett Ch). Medications     Previous Medications    AMLODIPINE (NORVASC) 5 MG TABLET    Take 1 tablet by mouth daily    DIVALPROEX (DEPAKOTE) 250 MG DR TABLET    TAKE 1 TABLET BY MOUTH THREE TIMES A DAY    DORZOLAMIDE (TRUSOPT) 2 % OPHTHALMIC SOLUTION    2 drops 3 times daily    FENOFIBRATE (TRICOR) 145 MG TABLET    TAKE 1 TABLET BY MOUTH EVERY DAY    FEXOFENADINE (ALLEGRA ALLERGY) 180 MG TABLET    Take 180 mg by mouth daily as needed    FLECAINIDE (TAMBOCOR) 100 MG TABLET    TAKE 1 TABLET BY MOUTH TWICE A DAY    LUMIGAN 0.01 % SOLN OPHTHALMIC DROPS    Place 1 drop into both eyes nightly    METOPROLOL SUCCINATE (TOPROL XL) 50 MG EXTENDED RELEASE TABLET    TAKE 1 TABLET BY MOUTH EVERY DAY    ROSUVASTATIN (CRESTOR) 40 MG TABLET    TAKE 1 TABLET BY MOUTH EVERY DAY    SUMATRIPTAN (IMITREX) 100 MG TABLET    TAKE ONE TABLET BY MOUTH ONCE AS NEEDED; MAY REPEAT IN 2 HOURS IF NEEDED. DO NOT EXCEED TWO TABLETS IN 24 HOURS.     VITAMIN D3 (CHOLECALCIFEROL) 25 MCG (1000 UT) TABS TABLET    Take 2 tablets by mouth daily    WARFARIN (COUMADIN) 1 MG TABLET TAKE 1 TABLET BY MOUTH TWICE A DAY    WARFARIN (COUMADIN) 5 MG TABLET    TAKE 1 TABLET BY MOUTH EVERY DAY       Allergies     He has No Known Allergies. Physical Exam     INITIAL VITALS: BP: (!) 167/87, Temp: 98.4 °F (36.9 °C), Heart Rate: 53, Resp: 16,     Physical Exam  Vitals reviewed. Constitutional:       General: He is not in acute distress. Appearance: Normal appearance. He is well-developed. He is not ill-appearing, toxic-appearing or diaphoretic. HENT:      Head: Normocephalic and atraumatic. Eyes:      Pupils: Pupils are equal, round, and reactive to light. Cardiovascular:      Rate and Rhythm: Normal rate and regular rhythm. Heart sounds: Normal heart sounds. Pulmonary:      Effort: Pulmonary effort is normal.      Breath sounds: Normal breath sounds. Abdominal:      General: Bowel sounds are normal. There is no distension. Palpations: Abdomen is soft. Tenderness: There is no abdominal tenderness. Genitourinary:     Penis: Normal.       Comments: No active bleeding at this time. Dried blood noted in diaper with a superficial laceration noted to the tip of the anus right at the urethral opening  Musculoskeletal:         General: No tenderness. Normal range of motion. Cervical back: Neck supple. Skin:     General: Skin is warm and dry. Capillary Refill: Capillary refill takes less than 2 seconds. Neurological:      General: No focal deficit present. Mental Status: He is alert and oriented to person, place, and time.    Psychiatric:         Mood and Affect: Mood normal.       Diagnostic Results       RADIOLOGY:  No orders to display       LABS:   Results for orders placed or performed during the hospital encounter of 09/18/22   Urinalysis with Reflex to Culture    Specimen: Urine   Result Value Ref Range    Color, UA Yellow Straw/Yellow    Clarity, UA Clear Clear    Glucose, Ur Negative Negative mg/dL    Bilirubin Urine Negative Negative    Ketones, Urine Negative Negative mg/dL    Specific Gravity, UA 1.020 1.005 - 1.030    Blood, Urine LARGE (A) Negative    pH, UA 6.0 5.0 - 8.0    Protein, UA Negative Negative mg/dL    Urobilinogen, Urine 1.0 <2.0 E.U./dL    Nitrite, Urine Negative Negative    Leukocyte Esterase, Urine Negative Negative    Microscopic Examination YES     Urine Type NotGiven     Urine Reflex to Culture Not Indicated    CBC with Auto Differential   Result Value Ref Range    WBC 6.8 4.0 - 11.0 K/uL    RBC 4.98 4.20 - 5.90 M/uL    Hemoglobin 14.3 13.5 - 17.5 g/dL    Hematocrit 43.3 40.5 - 52.5 %    MCV 87.0 80.0 - 100.0 fL    MCH 28.7 26.0 - 34.0 pg    MCHC 33.0 31.0 - 36.0 g/dL    RDW 14.5 12.4 - 15.4 %    Platelets 183 790 - 269 K/uL    MPV 9.1 5.0 - 10.5 fL    Neutrophils % 59.6 %    Lymphocytes % 28.9 %    Monocytes % 8.6 %    Eosinophils % 2.3 %    Basophils % 0.6 %    Neutrophils Absolute 4.0 1.7 - 7.7 K/uL    Lymphocytes Absolute 2.0 1.0 - 5.1 K/uL    Monocytes Absolute 0.6 0.0 - 1.3 K/uL    Eosinophils Absolute 0.2 0.0 - 0.6 K/uL    Basophils Absolute 0.0 0.0 - 0.2 K/uL   Protime-INR   Result Value Ref Range    Protime 31.8 (H) 11.7 - 14.5 sec    INR 3.06 (H) 0.87 - 1.14   BMP w/ Reflex to MG   Result Value Ref Range    Sodium 137 136 - 145 mmol/L    Potassium reflex Magnesium 4.7 3.5 - 5.1 mmol/L    Chloride 104 99 - 110 mmol/L    CO2 22 21 - 32 mmol/L    Anion Gap 11 3 - 16    Glucose 93 70 - 99 mg/dL    BUN 19 7 - 20 mg/dL    Creatinine 1.0 0.8 - 1.3 mg/dL    GFR Non-African American >60 >60    GFR African American >60 >60    Calcium 9.3 8.3 - 10.6 mg/dL   Microscopic Urinalysis   Result Value Ref Range    WBC, UA 0-2 0 - 5 /HPF    RBC, UA 21-50 (A) 0 - 4 /HPF    Epithelial Cells, UA 2-5 0 - 5 /HPF    Bacteria, UA Rare (A) None Seen /HPF       ED BEDSIDE ULTRASOUND:  No results found.     RECENT VITALS:  BP: (!) 167/87,Temp: 98.4 °F (36.9 °C), Heart Rate: 53, Resp: 16,       Procedures         ED Course     Nursing Notes, Past Medical Hx, Past Surgical Hx, Social Hx,Allergies, and Family Hx were reviewed. ED Course as of 09/18/22 1711   Sun Sep 18, 2022   1706 The patient's work-up at this time shows a normal CBC and hemoglobin. He has a supratherapeutic INR and he was encouraged to skip tonight's dose and resume tomorrow and follow-up with his primary care provider. His urinalysis showed blood which he has a superficial laceration to the tip of the penis that is not actively bleeding. I gave him some topical viscous lidocaine with a Q-tip which she was able to apply with immediate relief at this time. He will be given urology follow-up as well. [EI]      ED Course User Index  [EI] Melodie Escobar MD       patient was given the following medications:  Orders Placed This Encounter   Medications    lidocaine viscous hcl (XYLOCAINE) 2 % solution 15 mL       CONSULTS:  None    MEDICAL DECISIONMAKING / ASSESSMENT / Waldo Bridgman III is a 75 y.o. male who is presenting with trauma to his penis with some hematuria noted. No evidence of infection and no abdominal pain or other systemic signs at this time. I we will be giving him referral to his PCP given elevated INR as well as urology. Clinical Impression     1. Urethra disorder    2.  Elevated INR        Disposition     PATIENT REFERRED TO:  Star Middleton MD  [place] [address]  [facility] [address] [place] [address]  [phone]    Call in 1 day  For follow-up    Caleb Wakefield MD  [address] [place] [address]  [address] [address]  [phone]      For follow-up for your INR    DISCHARGE MEDICATIONS:  New Prescriptions    No medications on file       DISPOSITION Decision To Discharge 09/18/2022 04:57:54 PM         Melodie Escobar MD  09/18/22 [id number]

## 2022-09-19 ENCOUNTER — HOSPITAL ENCOUNTER (OUTPATIENT)
Dept: CT IMAGING | Age: 67
Discharge: HOME | End: 2022-09-19
Payer: MEDICARE

## 2022-09-19 DIAGNOSIS — E27.9 DISORDER OF ADRENAL GLAND, UNSPECIFIED (HCC): ICD-10-CM

## 2022-09-19 DIAGNOSIS — N39.0 URINARY TRACT INFECTION WITHOUT HEMATURIA, SITE UNSPECIFIED: ICD-10-CM

## 2022-09-19 DIAGNOSIS — N40.1 BENIGN PROSTATIC HYPERPLASIA WITH LOWER URINARY TRACT SYMPTOMS, SYMPTOM DETAILS UNSPECIFIED: ICD-10-CM

## 2022-09-19 DIAGNOSIS — D41.01 NEOPLASM OF UNCERTAIN BEHAVIOR OF RIGHT KIDNEY: ICD-10-CM

## 2022-09-19 DIAGNOSIS — C64.1 MALIGNANT NEOPLASM OF RIGHT KIDNEY, EXCEPT RENAL PELVIS (HCC): ICD-10-CM

## 2022-09-19 PROCEDURE — 82565 ASSAY OF CREATININE: CPT

## 2022-09-19 PROCEDURE — 6360000004 HC RX CONTRAST MEDICATION: Performed by: UROLOGY

## 2022-09-19 PROCEDURE — 74178 CT ABD&PLV WO CNTR FLWD CNTR: CPT

## 2022-09-19 RX ADMIN — IOPAMIDOL 75 ML: 755 INJECTION, SOLUTION INTRAVENOUS at 14:28

## 2022-10-13 ENCOUNTER — OFFICE VISIT (OUTPATIENT)
Dept: CARDIOLOGY CLINIC | Age: 67
End: 2022-10-13

## 2022-10-13 VITALS
WEIGHT: 250 LBS | BODY MASS INDEX: 31.25 KG/M2 | SYSTOLIC BLOOD PRESSURE: 126 MMHG | HEART RATE: 60 BPM | DIASTOLIC BLOOD PRESSURE: 80 MMHG

## 2022-10-13 DIAGNOSIS — I48.0 PAROXYSMAL ATRIAL FIBRILLATION (HCC): Primary | ICD-10-CM

## 2022-10-13 DIAGNOSIS — R00.2 PALPITATION: ICD-10-CM

## 2022-10-13 DIAGNOSIS — I27.20 PULMONARY HTN (HCC): ICD-10-CM

## 2022-10-13 PROCEDURE — 3017F COLORECTAL CA SCREEN DOC REV: CPT | Performed by: INTERNAL MEDICINE

## 2022-10-13 PROCEDURE — G8484 FLU IMMUNIZE NO ADMIN: HCPCS | Performed by: INTERNAL MEDICINE

## 2022-10-13 PROCEDURE — 1036F TOBACCO NON-USER: CPT | Performed by: INTERNAL MEDICINE

## 2022-10-13 PROCEDURE — 99214 OFFICE O/P EST MOD 30 MIN: CPT | Performed by: INTERNAL MEDICINE

## 2022-10-13 PROCEDURE — 1123F ACP DISCUSS/DSCN MKR DOCD: CPT | Performed by: INTERNAL MEDICINE

## 2022-10-13 PROCEDURE — G8417 CALC BMI ABV UP PARAM F/U: HCPCS | Performed by: INTERNAL MEDICINE

## 2022-10-13 PROCEDURE — G8428 CUR MEDS NOT DOCUMENT: HCPCS | Performed by: INTERNAL MEDICINE

## 2022-10-13 NOTE — PROGRESS NOTES
Subjective:      Patient ID: Rossy Marques is a 75 y.o. male. HPI:  Louis Jonas is being seen for hisfollow up for afib and palps. No complaints. Feeling good. Rhythm stable. Active. No exertional sx. No tachycardia. No palps. No syncope. No edema. No pnd or orthopnea. No chest pain/sob.      Past Medical History:   Diagnosis Date    Allergic rhinitis due to other allergen     Atrial fibrillation ([address]     Cancer ([facility] [address]     right kidney    Cataract 11/30/2016    Glaucoma     both eyes    Hx of blood clots     pulmonary embolism 2002 shortly after ablation    Hypopotassemia     Migraines     Obstructive sleep apnea syndrome     Had sleep studies but couldn't tolerate mask     PHILLIP (obstructive sleep apnea)     Other abnormal blood chemistry     Other and unspecified hyperlipidemia     Screening PSA (prostate specific antigen) 12/5/2009    <0.1 ng/mL    Sleep apnea     does not use cpap    Strain of calf muscle 12/19/2018    Tremor     minor    Unspecified sleep apnea     Vitreous degeneration      Past Surgical History:   Procedure Laterality Date    ABLATION OF DYSRHYTHMIC FOCUS      2002    CATARACT REMOVAL Bilateral     COLONOSCOPY  06-    [place] / Dr. Macrina Soliz    COLONOSCOPY  10/20/2014    recheck 4-5 yrs w ++ h/o adenomatous plyps     HAND TENDON SURGERY Right 2/12/2021    RIGHT THUMB CARPOMETACARPAL JOINT ARTHROPLASTY INCLUDING TRAPEZIECTOMY AND AUTOGRAFT, LIGAMENT RECONSTRUCTION WITH FLEXOR CARPI RADIALIS TENODN; ANY INDICATED PROCEDURES performed by Dary Mayo MD at [address] [facility] Right 12/6/2018    OPEN RIGHT PARTIAL NEPHRECTOMY (FLANK INCISION) WITH INTRAOPERATIVE ULTRASOUND performed by Tray Dahl MD at [address]       No Known Allergies     Social History     Socioeconomic History    Marital status:      Spouse name: Not on file    Number of children: Not on file    Years of education: Not on file    Highest education level: Not on file Occupational History    Not on file   Tobacco Use    Smoking status: Never    Smokeless tobacco: Never   Vaping Use    Vaping Use: Never used   Substance and Sexual Activity    Alcohol use: Yes     Comment: 4 drinks weekly    Drug use: Yes     Types: Marijuana [place])    Sexual activity: Yes     Partners: Female   Other Topics Concern    Not on file   Social History Narrative    Not on file     Social Determinants of Health     Financial Resource Strain: Low Risk     Difficulty of Paying Living Expenses: Not hard at all   Food Insecurity: No Food Insecurity    Worried About Running Out of Food in the Last Year: Never true    Ran Out of Food in the Last Year: Never true   Transportation Needs: Not on file   Physical Activity: Not on file   Stress: Not on file   Social Connections: Not on file   Intimate Partner Violence: Not on file   Housing Stability: Not on file        Patient has a family history includes Diabetes in his father and mother; Sleep Apnea in his brother; Uterine Cancer in his mother. Patient  has a past medical history of Allergic rhinitis due to other allergen, Atrial fibrillation ([address] Cancer ([facility] [address] Cataract, Glaucoma, Hx of blood clots, Hypopotassemia, Migraines, Obstructive sleep apnea syndrome, PHILLIP (obstructive sleep apnea), Other abnormal blood chemistry, Other and unspecified hyperlipidemia, Screening PSA (prostate specific antigen), Sleep apnea, Strain of calf muscle, Tremor, Unspecified sleep apnea, and Vitreous degeneration.      Current Outpatient Medications   Medication Sig Dispense Refill    flecainide (TAMBOCOR) 100 MG tablet TAKE 1 TABLET BY MOUTH TWICE A  tablet 0    amLODIPine (NORVASC) 5 MG tablet Take 1 tablet by mouth daily 90 tablet 3    warfarin (COUMADIN) 5 MG tablet TAKE 1 TABLET BY MOUTH EVERY DAY 90 tablet 3    warfarin (COUMADIN) 1 MG tablet TAKE 1 TABLET BY MOUTH TWICE A  tablet 3    dorzolamide (TRUSOPT) 2 % ophthalmic solution 2 drops 3 times daily vitamin D3 (CHOLECALCIFEROL) 25 MCG (1000 UT) TABS tablet Take 2 tablets by mouth daily 30 tablet 0    rosuvastatin (CRESTOR) 40 MG tablet TAKE 1 TABLET BY MOUTH EVERY DAY 90 tablet 3    divalproex (DEPAKOTE) 250 MG DR tablet TAKE 1 TABLET BY MOUTH THREE TIMES A  tablet 3    fenofibrate (TRICOR) 145 MG tablet TAKE 1 TABLET BY MOUTH EVERY DAY 90 tablet 3    metoprolol succinate (TOPROL XL) 50 MG extended release tablet TAKE 1 TABLET BY MOUTH EVERY DAY 90 tablet 3    LUMIGAN 0.01 % SOLN ophthalmic drops Place 1 drop into both eyes nightly      SUMAtriptan (IMITREX) 100 MG tablet TAKE ONE TABLET BY MOUTH ONCE AS NEEDED; MAY REPEAT IN 2 HOURS IF NEEDED. DO NOT EXCEED TWO TABLETS IN 24 HOURS. 27 tablet 3    fexofenadine (ALLEGRA) 180 MG tablet Take 180 mg by mouth daily as needed       No current facility-administered medications for this visit. Vitals:    10/13/22 0953   BP: 126/80   Pulse: 60           Wt 254      Review of Systems   Constitutional: Negative for activity change and fatigue. Respiratory: Negative for apnea, cough, choking, chest tightness and shortness of breath. Cardiovascular: Negative for chest pain, palpitations and leg swelling. [No PND or orthopnea. No tachycardia. Gastrointestinal: Negative for abdominal distention. Musculoskeletal: Negative for myalgias. Neurological: Negative for dizziness, syncope and light-headedness. Psychiatric/Behavioral: Negative for behavioral problems, confusion and agitation. All other systems reviewed negative as done. Objective:   Physical Exam   Constitutional: He is oriented to person, place, and time. He appears well-developed and well-nourished. No distress. HENT:   Head: Normocephalic and atraumatic. Eyes: EOM are normal. Right eye exhibits no discharge. Left eye exhibits no discharge. Neck: Normal range of motion. Neck supple. No JVD present.    Cardiovascular: Normal rate, regular rhythm, S1 normal, S2 normal and normal heart sounds. Exam reveals no gallop. No murmur heard. Pulses:       Radial pulses are 2+ on the right side, and 2+ on the left side. Pulmonary/Chest: Effort normal and breath sounds normal. No respiratory distress. He has no wheezes. He has no rales. Abdominal: Soft. Bowel sounds are normal. He exhibits no distension. No tenderness. Musculoskeletal: Normal range of motion. He exhibits no edema. Neurological: He is alert and oriented to person, place, and time. Skin: Skin is warm and dry. Psychiatric: He has a normal mood and affect. His behavior is normal. Thought content normal.       Assessment:       Diagnosis Orders   1. Paroxysmal atrial fibrillation (HCC)        2. Palpitation        3. Pulmonary HTN ([address]                  Plan:      CV stable. MCFP good. No sob/chest pain. Rhythm stable. BP is stable. AC with no bleeding issues. Will continue coumadin. Routine INR. Will continue flecanide/toprol for afib. Encouraged diet, exercise and wt loss. No changes. Continue to monitor. Reviewed previous records and testing including echo 12/19. Follow up 6 months.

## 2022-11-01 ENCOUNTER — TELEPHONE (OUTPATIENT)
Dept: PHARMACY | Age: 67
End: 2022-11-01

## 2022-11-01 ENCOUNTER — OFFICE VISIT (OUTPATIENT)
Dept: INTERNAL MEDICINE CLINIC | Age: 67
End: 2022-11-01
Payer: MEDICARE

## 2022-11-01 VITALS
WEIGHT: 255.4 LBS | SYSTOLIC BLOOD PRESSURE: 128 MMHG | BODY MASS INDEX: 31.92 KG/M2 | HEART RATE: 62 BPM | DIASTOLIC BLOOD PRESSURE: 82 MMHG | OXYGEN SATURATION: 99 % | TEMPERATURE: 97.3 F

## 2022-11-01 DIAGNOSIS — I26.99 OTHER PULMONARY EMBOLISM WITHOUT ACUTE COR PULMONALE, UNSPECIFIED CHRONICITY (HCC): ICD-10-CM

## 2022-11-01 DIAGNOSIS — C64.1 RENAL CELL CARCINOMA OF RIGHT KIDNEY (HCC): Chronic | ICD-10-CM

## 2022-11-01 DIAGNOSIS — I10 ESSENTIAL HYPERTENSION: Chronic | ICD-10-CM

## 2022-11-01 DIAGNOSIS — E11.9 TYPE 2 DIABETES MELLITUS WITHOUT COMPLICATION, WITHOUT LONG-TERM CURRENT USE OF INSULIN (HCC): Chronic | ICD-10-CM

## 2022-11-01 DIAGNOSIS — I48.0 PAROXYSMAL ATRIAL FIBRILLATION (HCC): Chronic | ICD-10-CM

## 2022-11-01 DIAGNOSIS — G43.909 MIGRAINE WITHOUT STATUS MIGRAINOSUS, NOT INTRACTABLE, UNSPECIFIED MIGRAINE TYPE: Chronic | ICD-10-CM

## 2022-11-01 DIAGNOSIS — H40.9 GLAUCOMA, UNSPECIFIED GLAUCOMA TYPE, UNSPECIFIED LATERALITY: ICD-10-CM

## 2022-11-01 DIAGNOSIS — I27.20 PULMONARY HTN (HCC): ICD-10-CM

## 2022-11-01 PROBLEM — M25.512 CHRONIC LEFT SHOULDER PAIN: Status: RESOLVED | Noted: 2019-12-31 | Resolved: 2022-11-01

## 2022-11-01 PROBLEM — M72.2 PLANTAR FASCIITIS: Status: RESOLVED | Noted: 2018-12-19 | Resolved: 2022-11-01

## 2022-11-01 PROBLEM — G89.29 CHRONIC LEFT SHOULDER PAIN: Status: RESOLVED | Noted: 2019-12-31 | Resolved: 2022-11-01

## 2022-11-01 PROCEDURE — 3078F DIAST BP <80 MM HG: CPT | Performed by: INTERNAL MEDICINE

## 2022-11-01 PROCEDURE — 3044F HG A1C LEVEL LT 7.0%: CPT | Performed by: INTERNAL MEDICINE

## 2022-11-01 PROCEDURE — 1123F ACP DISCUSS/DSCN MKR DOCD: CPT | Performed by: INTERNAL MEDICINE

## 2022-11-01 PROCEDURE — 3074F SYST BP LT 130 MM HG: CPT | Performed by: INTERNAL MEDICINE

## 2022-11-01 PROCEDURE — 1036F TOBACCO NON-USER: CPT | Performed by: INTERNAL MEDICINE

## 2022-11-01 PROCEDURE — 99214 OFFICE O/P EST MOD 30 MIN: CPT | Performed by: INTERNAL MEDICINE

## 2022-11-01 PROCEDURE — 2022F DILAT RTA XM EVC RTNOPTHY: CPT | Performed by: INTERNAL MEDICINE

## 2022-11-01 PROCEDURE — G8417 CALC BMI ABV UP PARAM F/U: HCPCS | Performed by: INTERNAL MEDICINE

## 2022-11-01 PROCEDURE — G8427 DOCREV CUR MEDS BY ELIG CLIN: HCPCS | Performed by: INTERNAL MEDICINE

## 2022-11-01 PROCEDURE — 3017F COLORECTAL CA SCREEN DOC REV: CPT | Performed by: INTERNAL MEDICINE

## 2022-11-01 PROCEDURE — G8484 FLU IMMUNIZE NO ADMIN: HCPCS | Performed by: INTERNAL MEDICINE

## 2022-11-01 RX ORDER — MELATONIN
2000 DAILY
Qty: 180 TABLET | Refills: 1 | Status: SHIPPED | OUTPATIENT
Start: 2022-11-01

## 2022-11-01 RX ORDER — FENOFIBRATE 145 MG/1
TABLET, COATED ORAL
Qty: 90 TABLET | Refills: 3 | Status: SHIPPED | OUTPATIENT
Start: 2022-11-01

## 2022-11-01 RX ORDER — DIVALPROEX SODIUM 250 MG/1
TABLET, DELAYED RELEASE ORAL
Qty: 270 TABLET | Refills: 1 | Status: SHIPPED | OUTPATIENT
Start: 2022-11-01

## 2022-11-01 RX ORDER — METOPROLOL SUCCINATE 50 MG/1
TABLET, EXTENDED RELEASE ORAL
Qty: 90 TABLET | Refills: 3 | Status: SHIPPED | OUTPATIENT
Start: 2022-11-01

## 2022-11-01 RX ORDER — WARFARIN SODIUM 5 MG/1
TABLET ORAL
Qty: 90 TABLET | Refills: 3 | Status: SHIPPED | OUTPATIENT
Start: 2022-11-01

## 2022-11-01 RX ORDER — FLECAINIDE ACETATE 100 MG/1
TABLET ORAL
Qty: 180 TABLET | Refills: 0 | Status: SHIPPED | OUTPATIENT
Start: 2022-11-01

## 2022-11-01 RX ORDER — ROSUVASTATIN CALCIUM 40 MG/1
TABLET, COATED ORAL
Qty: 90 TABLET | Refills: 3 | Status: SHIPPED | OUTPATIENT
Start: 2022-11-01

## 2022-11-01 RX ORDER — AMLODIPINE BESYLATE 5 MG/1
5 TABLET ORAL DAILY
Qty: 90 TABLET | Refills: 1 | Status: SHIPPED | OUTPATIENT
Start: 2022-11-01

## 2022-11-01 RX ORDER — WARFARIN SODIUM 1 MG/1
TABLET ORAL
Qty: 180 TABLET | Refills: 3 | Status: SHIPPED | OUTPATIENT
Start: 2022-11-01

## 2022-11-01 RX ORDER — SUMATRIPTAN 100 MG/1
TABLET, FILM COATED ORAL
Qty: 27 TABLET | Refills: 3 | Status: SHIPPED | OUTPATIENT
Start: 2022-11-01

## 2022-11-01 ASSESSMENT — PATIENT HEALTH QUESTIONNAIRE - PHQ9
SUM OF ALL RESPONSES TO PHQ QUESTIONS 1-9: 0
SUM OF ALL RESPONSES TO PHQ QUESTIONS 1-9: 0
SUM OF ALL RESPONSES TO PHQ9 QUESTIONS 1 & 2: 0
2. FEELING DOWN, DEPRESSED OR HOPELESS: 0
SUM OF ALL RESPONSES TO PHQ QUESTIONS 1-9: 0
SUM OF ALL RESPONSES TO PHQ QUESTIONS 1-9: 0
1. LITTLE INTEREST OR PLEASURE IN DOING THINGS: 0

## 2022-11-01 ASSESSMENT — ENCOUNTER SYMPTOMS: SHORTNESS OF BREATH: 0

## 2022-11-01 NOTE — ASSESSMENT & PLAN NOTE
-sees dr Fry Credit  -on metoprolol 50mg, flecainide 100mg bid  -on coumadin, will refer to coumadin clinic

## 2022-11-01 NOTE — ASSESSMENT & PLAN NOTE
-much improved since retired  -continue depakote 250 mg TID. He tried to wean off but HA was uncontrolled  -continue Imitrex (not taking almost at all lately maybe once a month)  -tried topomax?

## 2022-11-01 NOTE — ASSESSMENT & PLAN NOTE
Last A1c 6.5% 2/22, known proteinuria. Overall doing well from a clinical standpoint.    - diet controlled  - Repeat HbA1c. AIC goal< 7    - BP is within desired goal. Not on ace/arb  - Will repeat lipid profile, last LDL slightly above goal. On crestor 40 mg and tricor 145  Lab Results   Component Value Date    LDLCALC 106 (H) 02/22/2022    CREATININE 1.3 09/19/2022   - repeat BMP,  Microalbuminuria 50 2/22  - uptodate PSV23 vaccine , flu shot.

## 2022-11-01 NOTE — TELEPHONE ENCOUNTER
Received referral from Dr. Geena Hernandez to manage warfarin therapy. Attempted to call patient in order to schedule an appointment. Left a voicemail with instructions for patient to contact the clinic in order to schedule an appointment.     Robinson James PharmD  [facility]   [place] [address] Ph: [phone]  [place] Ph: [phone]  11/1/2022 5:00 PM

## 2022-11-01 NOTE — PROGRESS NOTES
[facility] Internal Medicine  Establish care visit   2022    Delta Toribio III (:  1955) nell 75 y.o. male, here to establish care. Chief Complaint   Patient presents with    New Patient        Patient Active Problem List   Diagnosis    Atrial fibrillation ([address]    Pulmonary embolism (HCC)    Hyperlipemia    Vitreous degeneration    Obstructive sleep apnea syndrome    Migraine    Hx of colonic polyps    Glaucoma    Chronic right-sided low back pain with sciatica    Tremor    Type 2 diabetes mellitus without complication, without long-term current use of insulin (HCC)    Renal cell carcinoma of right kidney (HCC)    Arthritis of carpometacarpal (CMC) joint of right thumb    Essential hypertension    Hearing loss    Class 1 obesity due to excess calories with serious comorbidity and body mass index (BMI) of 31.0 to 31.9 in adult    Pulmonary HTN (HCC)    Other proteinuria       ASSESSMENT/ PLAN:    Essential hypertension  Well controlled  -continue metoprolol 50mg , amlodipine 5 mg     Atrial fibrillation ([facility] [address]  -sees dr Betzy Nelson  -on metoprolol, flecainide 100mg bid  -on coumadin, will refer to coumadin clinic    Renal cell carcinoma of right kidney [facility])  -S/p partial right nephrectomy 2018, pathology T1a clear-cell renal cell carcinoma negative margins  Dr. Radu Cagle is clear plan to complete hematuria work-up with local cystoscopy   -plan for cysto, awaiting to hear back from urology. Pulmonary embolism ([address]  -  -on coumadin     Pulmonary HTN ([facility] [address]  -echo - normal systolic and diastolic functions, pulmonary arterial pressure estimated 25mmhg. Type 2 diabetes mellitus without complication, without long-term current use of insulin (HCC)  Last A1c 6.5% , known proteinuria. Overall doing well from a clinical standpoint.    - diet controlled  - Repeat HbA1c.  AIC goal< 7    - BP is within desired goal. Not on ace/arb  - Will repeat lipid profile, last LDL slightly above goal. On crestor 40 mg and tricor 145  Lab Results   Component Value Date    LDLCALC 106 (H) 02/22/2022    CREATININE 1.3 09/19/2022   - repeat BMP,  Microalbuminuria 50 2/22  - uptodate PSV23 vaccine , flu shot. Glaucoma  -sees ophto but will need to establish with new doc as his last eye doc left    Migraine  -much improved since retired  -continue depakote 250 mg TID. He tried to wean off but HA was uncontrolled  -continue Imitrex (not taking almost at all lately maybe once a month)  -tried topomax? Orders Placed This Encounter   Procedures    Lipid Panel    Hemoglobin H1I    Basic Metabolic Panel    [facility] Medication Mgmt (Anticoagulation) - [facility]     Orders Placed This Encounter   Medications    divalproex (DEPAKOTE) 250 MG DR tablet     Sig: TAKE 1 TABLET BY MOUTH THREE TIMES A DAY     Dispense:  270 tablet     Refill:  1    fenofibrate (TRICOR) 145 MG tablet     Sig: TAKE 1 TABLET BY MOUTH EVERY DAY     Dispense:  90 tablet     Refill:  3    flecainide (TAMBOCOR) 100 MG tablet     Sig: TAKE 1 TABLET BY MOUTH TWICE A DAY     Dispense:  180 tablet     Refill:  0    metoprolol succinate (TOPROL XL) 50 MG extended release tablet     Sig: TAKE 1 TABLET BY MOUTH EVERY DAY     Dispense:  90 tablet     Refill:  3    rosuvastatin (CRESTOR) 40 MG tablet     Sig: TAKE 1 TABLET BY MOUTH EVERY DAY     Dispense:  90 tablet     Refill:  3    SUMAtriptan (IMITREX) 100 MG tablet     Sig: TAKE ONE TABLET BY MOUTH ONCE AS NEEDED; MAY REPEAT IN 2 HOURS IF NEEDED. DO NOT EXCEED TWO TABLETS IN 24 HOURS.      Dispense:  27 tablet     Refill:  3    warfarin (COUMADIN) 1 MG tablet     Sig: TAKE 1 TABLET BY MOUTH TWICE A DAY     Dispense:  180 tablet     Refill:  3    warfarin (COUMADIN) 5 MG tablet     Sig: TAKE 1 TABLET BY MOUTH EVERY DAY     Dispense:  90 tablet     Refill:  3    vitamin D3 (CHOLECALCIFEROL) 25 MCG (1000 UT) TABS tablet     Sig: Take 2 tablets by mouth daily     Dispense:  180 tablet Refill:  1    amLODIPine (NORVASC) 5 MG tablet     Sig: Take 1 tablet by mouth daily     Dispense:  90 tablet     Refill:  1      Medications Discontinued During This Encounter   Medication Reason    SUMAtriptan (IMITREX) 100 MG tablet REORDER    fenofibrate (TRICOR) 145 MG tablet REORDER    metoprolol succinate (TOPROL XL) 50 MG extended release tablet REORDER    rosuvastatin (CRESTOR) 40 MG tablet REORDER    divalproex (DEPAKOTE) 250 MG DR tablet REORDER    vitamin D3 (CHOLECALCIFEROL) 25 MCG (1000 UT) TABS tablet REORDER    warfarin (COUMADIN) 1 MG tablet REORDER    warfarin (COUMADIN) 5 MG tablet REORDER    amLODIPine (NORVASC) 5 MG tablet REORDER    flecainide (TAMBOCOR) 100 MG tablet REORDER        No follow-ups on file. HPI  75years old man with history of hypertension, hyperlipidemia, diabetes, A. fib, history of PE, migraines, establishing care. Prior patient of Dr. Ryan Vila who retired. Cell carcinoma and is planned for cystoscopy for new hematuria. Also reports chronic right and left thumb pain she is s/p right thumb surgery with no significant clinical improvement and sees hand surgery for his left thumb as well. He is doing PT OT      HISTORIES  Current Outpatient Medications on File Prior to Visit   Medication Sig Dispense Refill    dorzolamide (TRUSOPT) 2 % ophthalmic solution 2 drops 3 times daily      LUMIGAN 0.01 % SOLN ophthalmic drops Place 1 drop into both eyes nightly      fexofenadine (ALLEGRA) 180 MG tablet Take 180 mg by mouth daily as needed       No current facility-administered medications on file prior to visit.         No Known Allergies  Past Medical History:   Diagnosis Date    Allergic rhinitis due to other allergen     Atrial fibrillation ([facility] [address]     Cancer ([facility] [address]     right kidney    Cataract 11/30/2016    Glaucoma     both eyes    Hx of blood clots     pulmonary embolism 2002 shortly after ablation    Hypopotassemia     Migraines     Obstructive sleep apnea syndrome     Had sleep studies but couldn't tolerate mask     PHILLIP (obstructive sleep apnea)     Other abnormal blood chemistry     Other and unspecified hyperlipidemia     Screening PSA (prostate specific antigen) 12/5/2009    <0.1 ng/mL    Sleep apnea     does not use cpap    Strain of calf muscle 12/19/2018    Tremor     minor    Unspecified sleep apnea     Vitreous degeneration      Patient Active Problem List   Diagnosis    Atrial fibrillation (HCC)    Pulmonary embolism (HCC)    Hyperlipemia    Vitreous degeneration    Obstructive sleep apnea syndrome    Migraine    Hx of colonic polyps    Glaucoma    Chronic right-sided low back pain with sciatica    Tremor    Type 2 diabetes mellitus without complication, without long-term current use of insulin (HCC)    Renal cell carcinoma of right kidney (HCC)    Arthritis of carpometacarpal (CMC) joint of right thumb    Essential hypertension    Hearing loss    Class 1 obesity due to excess calories with serious comorbidity and body mass index (BMI) of 31.0 to 31.9 in adult    Pulmonary HTN ([facility] [address]    Other proteinuria     Past Surgical History:   Procedure Laterality Date    ABLATION OF DYSRHYTHMIC FOCUS      2002    CATARACT REMOVAL Bilateral     COLONOSCOPY  06-    [facility] Martin / Dr. Pat Slaughter    COLONOSCOPY  10/20/2014    recheck 4-5 yrs w ++ h/o adenomatous plyps     HAND TENDON SURGERY Right 2/12/2021    RIGHT THUMB CARPOMETACARPAL JOINT ARTHROPLASTY INCLUDING TRAPEZIECTOMY AND AUTOGRAFT, LIGAMENT RECONSTRUCTION WITH FLEXOR CARPI RADIALIS TENODN; ANY INDICATED PROCEDURES performed by Ana Cristobal MD at [address] [facility] Right 12/6/2018    OPEN RIGHT PARTIAL NEPHRECTOMY (FLANK INCISION) WITH INTRAOPERATIVE ULTRASOUND performed by Franklyn Garcia MD at [address] [facility] History     Socioeconomic History    Marital status:      Spouse name: Not on file    Number of children: Not on file    Years of education: Not on file    Highest education level: Not on file   Occupational History    Not on file   Tobacco Use    Smoking status: Never    Smokeless tobacco: Never   Vaping Use    Vaping Use: Never used   Substance and Sexual Activity    Alcohol use: Yes     Comment: 4 drinks weekly    Drug use: Yes     Types: Marijuana [place])    Sexual activity: Yes     Partners: Female   Other Topics Concern    Not on file   Social History Narrative    Not on file     Social Determinants of Health     Financial Resource Strain: Low Risk     Difficulty of Paying Living Expenses: Not hard at all   Food Insecurity: No Food Insecurity    Worried About Running Out of Food in the Last Year: Never true    Ran Out of Food in the Last Year: Never true   Transportation Needs: Not on file   Physical Activity: Not on file   Stress: Not on file   Social Connections: Not on file   Intimate Partner Violence: Not on file   Housing Stability: Not on file      Family History   Problem Relation Age of Onset    Diabetes Father     Diabetes Mother     Uterine Cancer Mother     Sleep Apnea Brother          ROS  Review of Systems   Respiratory:  Negative for shortness of breath. Cardiovascular:  Negative for chest pain, palpitations and leg swelling. Musculoskeletal:  Positive for arthralgias. PE  Vitals:    11/01/22 1339   BP: 128/82   Pulse: 62   Temp: 97.3 °F (36.3 °C)   SpO2: 99%   Weight: 255 lb 6.4 oz (115.8 kg)     Estimated body mass index is 31.92 kg/m² as calculated from the following:    Height as of 9/18/22: 6' 3\" (1.905 m). Weight as of this encounter: 255 lb 6.4 oz (115.8 kg). Physical Exam  Vitals reviewed. Constitutional:       General: He is not in acute distress. Appearance: Normal appearance. He is not ill-appearing, toxic-appearing or diaphoretic. HENT:      Head: Normocephalic and atraumatic. Eyes:      Conjunctiva/sclera: Conjunctivae normal.      Pupils: Pupils are equal, round, and reactive to light.    Cardiovascular:      Rate and Rhythm: Normal rate and regular rhythm. Heart sounds: Normal heart sounds. Pulmonary:      Effort: Pulmonary effort is normal. No respiratory distress. Breath sounds: Normal breath sounds. Musculoskeletal:         General: No swelling or deformity. Cervical back: Normal range of motion and neck supple. Skin:     General: Skin is warm and dry. Neurological:      Mental Status: He is alert and oriented to person, place, and time. Immunization History   Administered Date(s) Administered    COVID-19, MODERNA BLUE border, Primary or Immunocompromised, (age 12y+), IM, 100 mcg/0.5mL 12/30/2020, 01/29/2021, 10/30/2021    COVID-19, MODERNA Bivalent BOOSTER, (age 12y+), IM, 48 mcg/0.5 mL 10/11/2022    DTaP 02/29/2008    Hepatitis A 02/29/2008    Influenza Virus Vaccine 09/02/2012, 10/05/2017, 10/01/2018    Influenza, FLUAD, (age 72 y+), Adjuvanted, 0.5mL 11/16/2021    Pneumococcal Polysaccharide ([id number]) 08/26/2020    Tdap (Boostrix, Adacel) 06/06/2014    Zoster Live (Zostavax) 06/06/2014       Health Maintenance   Topic Date Due    Diabetic foot exam  Never done    Shingles vaccine (2 of 3) 08/01/2014    Depression Screen  11/16/2022    Annual Wellness Visit (AWV)  11/17/2022    A1C test (Diabetic or Prediabetic)  02/22/2023    Diabetic microalbuminuria test  02/22/2023    Lipids  02/22/2023    Diabetic retinal exam  04/20/2023    Colorectal Cancer Screen  10/29/2023    DTaP/Tdap/Td vaccine (3 - Td or Tdap) 06/06/2024    Flu vaccine  Completed    Pneumococcal 65+ years Vaccine  Completed    COVID-19 Vaccine  Completed    Hepatitis C screen  Completed    Hepatitis A vaccine  Aged Out    Hib vaccine  Aged Out    Meningococcal (ACWY) vaccine  Aged Out       PSH, PMH, SH and FH reviewed and noted. Recent and past labs, tests and consults also reviewed. Recent or new meds also reviewed. Genaro Duenas MD    This dictation was generated by voice recognition computer software.   Although all attempts are made to edit the dictation for accuracy, there may be errors in the transcription that are not intended.

## 2022-11-01 NOTE — ASSESSMENT & PLAN NOTE
-S/p partial right nephrectomy 12/2018, pathology T1a clear-cell renal cell carcinoma negative margins  Dr. Walker Butler is clear plan to complete hematuria work-up with local cystoscopy   -plan for cysto, awaiting to hear back from urology.

## 2022-11-02 DIAGNOSIS — E11.9 TYPE 2 DIABETES MELLITUS WITHOUT COMPLICATION, WITHOUT LONG-TERM CURRENT USE OF INSULIN (HCC): Chronic | ICD-10-CM

## 2022-11-02 DIAGNOSIS — I10 ESSENTIAL HYPERTENSION: Chronic | ICD-10-CM

## 2022-11-02 LAB
ANION GAP SERPL CALCULATED.3IONS-SCNC: 14 MMOL/L (ref 3–16)
BUN BLDV-MCNC: 18 MG/DL (ref 7–20)
CALCIUM SERPL-MCNC: 9.3 MG/DL (ref 8.3–10.6)
CHLORIDE BLD-SCNC: 105 MMOL/L (ref 99–110)
CHOLESTEROL, TOTAL: 200 MG/DL (ref 0–199)
CO2: 23 MMOL/L (ref 21–32)
CREAT SERPL-MCNC: 1.1 MG/DL (ref 0.8–1.3)
GFR SERPL CREATININE-BSD FRML MDRD: >60 ML/MIN/{1.73_M2}
GLUCOSE BLD-MCNC: 134 MG/DL (ref 70–99)
HDLC SERPL-MCNC: 35 MG/DL (ref 40–60)
LDL CHOLESTEROL CALCULATED: 114 MG/DL
POTASSIUM SERPL-SCNC: 5 MMOL/L (ref 3.5–5.1)
SODIUM BLD-SCNC: 142 MMOL/L (ref 136–145)
TRIGL SERPL-MCNC: 253 MG/DL (ref 0–150)
VLDLC SERPL CALC-MCNC: 51 MG/DL

## 2022-11-02 NOTE — TELEPHONE ENCOUNTER
Patient is scheduled for Friday for initial visit.      Katerin Ghosh, PharmD, [facility]  [facility]  [place]: [phone]  [place]: [phone]  11/2/2022 12:31 PM

## 2022-11-03 LAB
ESTIMATED AVERAGE GLUCOSE: 148.5 MG/DL
HBA1C MFR BLD: 6.8 %

## 2022-11-04 ENCOUNTER — ANTI-COAG VISIT (OUTPATIENT)
Dept: PHARMACY | Age: 67
End: 2022-11-04
Payer: MEDICARE

## 2022-11-04 LAB — INTERNATIONAL NORMALIZATION RATIO, POC: 3.6

## 2022-11-04 PROCEDURE — 85610 PROTHROMBIN TIME: CPT | Performed by: PHARMACIST

## 2022-11-04 PROCEDURE — 99213 OFFICE O/P EST LOW 20 MIN: CPT | Performed by: PHARMACIST

## 2022-11-04 NOTE — PROGRESS NOTES
ANTICOAGULATION SERVICE    Coreen Pelaez III is a 75 y.o. male with PMHx significant for afib, pulmonary HTN, HLD, DM2, hx renal cell carcinoma, and hx PE (2002, after ablation) who presents to clinic 11/4/2022 for anticoagulation monitoring and adjustment. Anticoagulation Indication(s):  Afib, hx of PE (2002)   Referring Physician:   Dr. Izaiah Stern (also follows with Dr. Maggie Neal, cardiology)   Goal INR Range:  2-3  Duration of Anticoagulation Therapy:  Indefinite  Time of day dose taken: PM  Product patient has at home: warfarin 5 mg (peach color) and warfarin 1 mg (pink)     [id number] Score for Atrial Fibrillation Stroke Risk   Risk   Factors  Component Value   C CHF No 0   H HTN Yes 1   A2 Age >= 76 No,  (76 y.o.) 0   D DM Yes 1   S2 Prior Stroke/TIA No 0   V Vascular Disease No 0   A Age 74-69 Yes,  (76 y.o.) 1   Sc Sex male 0    [id number]  Score  3   Score last updated 11/4/22 3:53 AM EDT    Click here for a link to the UpToDate guideline \"Atrial Fibrillation: Anticoagulation therapy to prevent embolization    Disclaimer: Risk Score calculation is dependent on accuracy of patient problem list and past encounter diagnosis.     Pertinent labs:  Lab Results   Component Value Date    INR 3.06 (H) 09/18/2022    INR 2.56 (H) 05/18/2022    INR 2.23 (H) 04/20/2022    INR 2.13 (H) 02/22/2022         INR Summary                           Warfarin regimen (mg)  Date INR   A/P   Sun Mon Tue Wed Thu Fri Sat Mg/wk  11/4 3.6 Above goal, holdx1 7 7 7 7 7 0/7 7 49  9/18 3.06 Per [facility] ER   5/18 2.56 Per PCP  7 7 7 7 7 7 7 49  4/20 2.23  2/22 2.13  1/4 2.36    Last CBC:  Lab Results   Component Value Date    RBC 4.98 09/18/2022    HGB 14.3 09/18/2022    HCT 43.3 09/18/2022    MCV 87.0 09/18/2022    MCH 28.7 09/18/2022    MPV 9.1 09/18/2022    RDW 14.5 09/18/2022     09/18/2022       Patient History:  Recent hospitalizations/HC visits None    Recent medication changes None   Medications taken regularly that may interact with warfarin or alter INR Fenofibrate 145 mg daily   Divalproex 250 mg TID    Warfarin dose taken as prescribed Reports compliance with medications    Signs/symptoms of bleeding Hx of hematuria 9/2022   Vitamin K intake Normally avoids vitamin k foods    Recent vomiting/diarrhea/fever, changes in weight or activity level None reported   Tobacco or alcohol use Patient denies smoking   Patient reports having 2 drinks per week   Upcoming surgeries or procedures Possible cystoscopy? Assessment/Plan:  Patient's INR was supratherapeutic (3.6) today for unknown reason. He reports being on current warfarin dose for >1 year and INRs have been stable. His INRs were previously managed by his PCP, who has since retired. He denies any changes to medications, diet, or lifestyle. He denies bleeding. Patient was instructed to hold warfarin tonight then continue warfarin 7 mg daily. Repeat INR in 2 weeks. Will adjust dose at that time if still elevated. Patient was reminded to maintain consistent vitamin K intake and call with any bleeding, medication changes, or fever/vomiting/diarrhea. As it was the first visit to [facility] for Delta Toribio III, the following was also reviewed with the patient in detail:  Reason for and intended duration of therapy  INR goal and frequency of INR monitoring. Medication Interactions: Call clinic if new any new medications are started--especially antibiotics. Avoid NSAIDs for pain. Use Tylenol instead. Food-Drug Interactions: Foods high in vitamin K can change the effects of warfarin (decrease INR)--Stressed consistency with these foods. Reviewed a list of high vitamin K foods- mostly leafy green vegetables. Side effects: May bruise easier and small cuts may take longer to clot than usual.  Seek medical attention for any cuts that won't stop bleeding or falls involving head injury. Any blood in the urine or stool should be reported immediately.   Effect of alcohol and tobacco on INR  Call the clinic with planned surgeries or procedures. An information pamphlet \"A Guide to Your Warfarin Therapy\" was provided to the patient. Patient understands dosing directions and information discussed. Dosing schedule and follow up appointment given to patient. Progress note routed to referring physician's office. Patient acknowledges working in consult agreement with pharmacist as referred by his/her physician. Next Clinic Appointment:  11/21    Please call [facility] at [phone] with any questions. Please call The [address] [place] [address] Avenue at [phone] with any questions. Thanks!   Nicol Patterson, PharmD, [facility]  [facility]  [place]: [address] [place] [address] Street: [phone]  11/4/2022 12:34 PM    For Pharmacy Admin Tracking Only    Intervention Detail: Dose Adjustment: 1, reason: Therapy De-escalation  Total # of Interventions Recommended: 1  Total # of Interventions Accepted: 1  Time Spent (min): 30

## 2022-11-21 ENCOUNTER — ANTI-COAG VISIT (OUTPATIENT)
Dept: PHARMACY | Age: 67
End: 2022-11-21
Payer: MEDICARE

## 2022-11-21 LAB — INR BLD: 2.1

## 2022-11-21 PROCEDURE — 99211 OFF/OP EST MAY X REQ PHY/QHP: CPT | Performed by: PHARMACIST

## 2022-11-21 PROCEDURE — 85610 PROTHROMBIN TIME: CPT | Performed by: PHARMACIST

## 2022-11-21 NOTE — PROGRESS NOTES
ANTICOAGULATION SERVICE    Suzette Ozuna III is a 75 y.o. male with PMHx significant for afib, pulmonary HTN, HLD, DM2, hx renal cell carcinoma, and hx PE (2002, after ablation) who presents to clinic 11/21/2022 for anticoagulation monitoring and adjustment. Anticoagulation Indication(s):  Afib, hx of PE (2002)   Referring Physician:   Dr. Adelina José (also follows with Dr. Caitlyn Snow, cardiology)   Goal INR Range:  2-3  Duration of Anticoagulation Therapy:  Indefinite  Time of day dose taken: PM  Product patient has at home: warfarin 5 mg (peach color) and warfarin 1 mg (pink)     [id number] Score for Atrial Fibrillation Stroke Risk   Risk   Factors  Component Value   C CHF No 0   H HTN Yes 1   A2 Age >= 76 No,  (76 y.o.) 0   D DM Yes 1   S2 Prior Stroke/TIA No 0   V Vascular Disease No 0   A Age 74-69 Yes,  (76 y.o.) 1   Sc Sex male 0    [id number]  Score  3   Score last updated 11/4/22 2:53 AM EDT    Click here for a link to the UpToDate guideline \"Atrial Fibrillation: Anticoagulation therapy to prevent embolization    Disclaimer: Risk Score calculation is dependent on accuracy of patient problem list and past encounter diagnosis.     Pertinent labs:  Lab Results   Component Value Date    INR 3.6 11/04/2022    INR 3.06 (H) 09/18/2022    INR 2.56 (H) 05/18/2022    INR 2.23 (H) 04/20/2022         INR Summary                           Warfarin regimen (mg)  Date INR   A/P   Sun Mon Tue Wed Thu Fri Sat Mg/wk  11/21 2.1 At goal, continue 7 7 7 7 7 7 7 49  11/4 3.6 Above goal, holdx1 7 7 7 7 7 0/7 7 49  9/18 3.06 Per [facility] ER   5/18 2.56 Per PCP  7 7 7 7 7 7 7 49  4/20 2.23  2/22 2.13  1/4 2.36    Last CBC:  Lab Results   Component Value Date    RBC 4.98 09/18/2022    HGB 14.3 09/18/2022    HCT 43.3 09/18/2022    MCV 87.0 09/18/2022    MCH 28.7 09/18/2022    MPV 9.1 09/18/2022    RDW 14.5 09/18/2022     09/18/2022       Patient History:  Recent hospitalizations/HC visits None    Recent medication changes None Medications taken regularly that may interact with warfarin or alter INR Fenofibrate 145 mg daily   Divalproex 250 mg TID    Warfarin dose taken as prescribed Reports compliance with medications    Signs/symptoms of bleeding Hx of hematuria 9/2022   Vitamin K intake Normally avoids vitamin k foods    Recent vomiting/diarrhea/fever, changes in weight or activity level None reported   Tobacco or alcohol use Patient denies smoking   Patient reports having 2 drinks per week   Upcoming surgeries or procedures Possible cystoscopy? Assessment/Plan:  Patient's INR was therapeutic (2.1). He denies any changes to medications, diet, or lifestyle. He denies bleeding. Patient was instructed to continue warfarin 7 mg daily. Repeat INR in 6 weeks. Patient was reminded to maintain consistent vitamin K intake and call with any bleeding, medication changes, or fever/vomiting/diarrhea. Patient understands dosing directions and information discussed. Dosing schedule and follow up appointment given to patient. Progress note routed to referring physician's office. Patient acknowledges working in consult agreement with pharmacist as referred by his/her physician. Next Clinic Appointment:  1/6    Please call [facility] at [phone] with any questions. Please call The [address] [place] [address] Avenue at [phone] with any questions. Thanks!   Eagle Salcido, PharmD Candidate  [facility]  [place]: [phone]  [place]: [phone]  11/21/2022 9:31 AM    For Pharmacy Admin Tracking Only    Total # of Interventions Recommended: 0  Total # of Interventions Accepted: 0  Time Spent (min): 15

## 2022-12-13 DIAGNOSIS — E11.9 TYPE 2 DIABETES MELLITUS WITHOUT COMPLICATION, WITHOUT LONG-TERM CURRENT USE OF INSULIN (HCC): Chronic | ICD-10-CM

## 2022-12-13 RX ORDER — ATORVASTATIN CALCIUM 40 MG/1
40 TABLET, FILM COATED ORAL DAILY
Qty: 90 TABLET | Refills: 1 | Status: SHIPPED | OUTPATIENT
Start: 2022-12-13

## 2022-12-13 NOTE — PROGRESS NOTES
I sent Lipitor 40 mg instead of Crestor 40 mg (do not take both), will repeat lipids when I see him in February

## 2023-01-05 ENCOUNTER — ANTI-COAG VISIT (OUTPATIENT)
Dept: PHARMACY | Age: 68
End: 2023-01-05
Payer: MEDICARE

## 2023-01-05 DIAGNOSIS — I48.0 PAROXYSMAL ATRIAL FIBRILLATION (HCC): ICD-10-CM

## 2023-01-05 DIAGNOSIS — I26.99 OTHER PULMONARY EMBOLISM WITHOUT ACUTE COR PULMONALE, UNSPECIFIED CHRONICITY (HCC): Primary | ICD-10-CM

## 2023-01-05 LAB — INTERNATIONAL NORMALIZATION RATIO, POC: 3.2

## 2023-01-05 PROCEDURE — 85610 PROTHROMBIN TIME: CPT

## 2023-01-05 PROCEDURE — 99211 OFF/OP EST MAY X REQ PHY/QHP: CPT

## 2023-01-05 NOTE — PROGRESS NOTES
ANTICOAGULATION SERVICE    Annette Delgadillo III is a 75 y.o. 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     Anticoagulation Indication(s):  Afib, hx of PE (2002)   Referring Physician:   Dr. Leonel Ramos (also follows with Dr. Jordon Masters, cardiology)   Goal INR Range:  2-3  Duration of Anticoagulation Therapy:  Indefinite  Time of day dose taken: PM  Product patient has at home: warfarin 5 mg (peach color) and warfarin 1 mg (pink)     [id number] Score for Atrial Fibrillation Stroke Risk   Risk   Factors  Component Value   C CHF No 0   H HTN Yes 1   A2 Age >= 75 No,  (76 y.o.) 0   D DM Yes 1   S2 Prior Stroke/TIA No 0   V Vascular Disease No 0   A Age 74-69 Yes,  (76 y.o.) 1   Sc Sex male 0    [id number]  Score  3   Score last updated 11/4/22 9:36 AM EDT      INR Summary                           Warfarin regimen (mg)  Date INR   A/P   Sun Mon Tue Wed Thu Fri Sat Mg/wk  1/5 3.2 Above goal, continue 7 7 7 7 7 7 7 49  11/21 2.1 At goal, continue 7 7 7 7 7 7 7 49  11/4 3.6 Above goal, holdx1 7 7 7 7 7 0/7 7 49  9/18 3.06 Per [facility] ER   5/18 2.56 Per PCP  7 7 7 7 7 7 7 49  4/20 2.23  2/22 2.13  1/4 2.36    Last CBC:  Lab Results   Component Value Date    RBC 4.98 09/18/2022    HGB 14.3 09/18/2022    HCT 43.3 09/18/2022    MCV 87.0 09/18/2022    MCH 28.7 09/18/2022    MPV 9.1 09/18/2022    RDW 14.5 09/18/2022     09/18/2022       Patient History:  Recent hospitalizations/HC visits None    Recent medication changes 12/13 Crestor switched to Lipitor   Medications taken regularly that may interact with warfarin or alter INR Fenofibrate 145 mg daily   Divalproex 250 mg TID    Warfarin dose taken as prescribed Reports compliance with medications    Signs/symptoms of bleeding Hx of hematuria 9/2022   Vitamin K intake Normally avoids high vitamin k foods   Eats yue salads daily Recent vomiting/diarrhea/fever, changes in weight or activity level None reported   Tobacco or alcohol use Patient denies smoking   Patient reports having 2 drinks per week   Upcoming surgeries or procedures Possible cystoscopy? Assessment/Plan:  Patient's INR was supratherapeutic (3.2), unknown etiology. Crestor was recently switched to Lipitor, but Lipitor does not interact with warfarin. Patient endorses having a cold about a week ago, but denies any fever, vomiting, diarrhea. He denies any changes to diet or lifestyle. He denies bleeding. Patient has been taking current warfarin dose for at least 6 months. Patient was instructed to continue warfarin 7 mg daily. Repeat INR in 6 weeks (unable to come sooner as he will be in [place] from 1/11-2/11). Patient was reminded to maintain consistent vitamin K intake and call with any bleeding, medication changes, or fever/vomiting/diarrhea. Patient understands dosing directions and information discussed. Dosing schedule and follow up appointment given to patient. Progress note routed to referring physician's office. Patient acknowledges working in consult agreement with pharmacist as referred by his/her physician. Next Clinic Appointment:  2/16    Please call [facility] at [phone] with any questions. Thanks! Dylon Dee.  Chandrakant Hutson, PharmD, BCPS  [facility]  Ph: [phone]  1/5/2023 10:19 AM    For Pharmacy Admin Tracking Only    Total # of Interventions Recommended: 0  Total # of Interventions Accepted: 0  Time Spent (min): 15

## 2023-02-14 ENCOUNTER — OFFICE VISIT (OUTPATIENT)
Dept: INTERNAL MEDICINE CLINIC | Age: 68
End: 2023-02-14
Payer: MEDICARE

## 2023-02-14 VITALS
BODY MASS INDEX: 31.93 KG/M2 | OXYGEN SATURATION: 97 % | HEIGHT: 75 IN | DIASTOLIC BLOOD PRESSURE: 78 MMHG | SYSTOLIC BLOOD PRESSURE: 134 MMHG | WEIGHT: 256.8 LBS | HEART RATE: 63 BPM | TEMPERATURE: 96.9 F

## 2023-02-14 DIAGNOSIS — E11.9 TYPE 2 DIABETES MELLITUS WITHOUT COMPLICATION, WITHOUT LONG-TERM CURRENT USE OF INSULIN (HCC): ICD-10-CM

## 2023-02-14 DIAGNOSIS — Z23 NEED FOR PROPHYLACTIC VACCINATION AND INOCULATION AGAINST VARICELLA: ICD-10-CM

## 2023-02-14 DIAGNOSIS — Z00.00 MEDICARE ANNUAL WELLNESS VISIT, SUBSEQUENT: Primary | ICD-10-CM

## 2023-02-14 DIAGNOSIS — R23.3 PETECHIAE: ICD-10-CM

## 2023-02-14 DIAGNOSIS — E78.2 MIXED HYPERLIPIDEMIA: Chronic | ICD-10-CM

## 2023-02-14 DIAGNOSIS — I48.0 PAROXYSMAL ATRIAL FIBRILLATION (HCC): Chronic | ICD-10-CM

## 2023-02-14 PROCEDURE — 1123F ACP DISCUSS/DSCN MKR DOCD: CPT | Performed by: INTERNAL MEDICINE

## 2023-02-14 PROCEDURE — 3078F DIAST BP <80 MM HG: CPT | Performed by: INTERNAL MEDICINE

## 2023-02-14 PROCEDURE — 3046F HEMOGLOBIN A1C LEVEL >9.0%: CPT | Performed by: INTERNAL MEDICINE

## 2023-02-14 PROCEDURE — G0439 PPPS, SUBSEQ VISIT: HCPCS | Performed by: INTERNAL MEDICINE

## 2023-02-14 PROCEDURE — 3075F SYST BP GE 130 - 139MM HG: CPT | Performed by: INTERNAL MEDICINE

## 2023-02-14 PROCEDURE — 3017F COLORECTAL CA SCREEN DOC REV: CPT | Performed by: INTERNAL MEDICINE

## 2023-02-14 PROCEDURE — G8484 FLU IMMUNIZE NO ADMIN: HCPCS | Performed by: INTERNAL MEDICINE

## 2023-02-14 SDOH — ECONOMIC STABILITY: HOUSING INSECURITY
IN THE LAST 12 MONTHS, WAS THERE A TIME WHEN YOU DID NOT HAVE A STEADY PLACE TO SLEEP OR SLEPT IN A SHELTER (INCLUDING NOW)?: NO

## 2023-02-14 SDOH — ECONOMIC STABILITY: FOOD INSECURITY: WITHIN THE PAST 12 MONTHS, THE FOOD YOU BOUGHT JUST DIDN'T LAST AND YOU DIDN'T HAVE MONEY TO GET MORE.: NEVER TRUE

## 2023-02-14 SDOH — ECONOMIC STABILITY: INCOME INSECURITY: HOW HARD IS IT FOR YOU TO PAY FOR THE VERY BASICS LIKE FOOD, HOUSING, MEDICAL CARE, AND HEATING?: NOT HARD AT ALL

## 2023-02-14 SDOH — ECONOMIC STABILITY: FOOD INSECURITY: WITHIN THE PAST 12 MONTHS, YOU WORRIED THAT YOUR FOOD WOULD RUN OUT BEFORE YOU GOT MONEY TO BUY MORE.: NEVER TRUE

## 2023-02-14 ASSESSMENT — PATIENT HEALTH QUESTIONNAIRE - PHQ9
1. LITTLE INTEREST OR PLEASURE IN DOING THINGS: 0
SUM OF ALL RESPONSES TO PHQ QUESTIONS 1-9: 0
2. FEELING DOWN, DEPRESSED OR HOPELESS: 0
SUM OF ALL RESPONSES TO PHQ9 QUESTIONS 1 & 2: 0

## 2023-02-14 ASSESSMENT — LIFESTYLE VARIABLES
HOW MANY STANDARD DRINKS CONTAINING ALCOHOL DO YOU HAVE ON A TYPICAL DAY: 1 OR 2
HOW OFTEN DO YOU HAVE A DRINK CONTAINING ALCOHOL: 2-3 TIMES A WEEK

## 2023-02-14 NOTE — PROGRESS NOTES
Medicare Annual Wellness Visit    Rito Shirley III is here for Medicare AWV (/)    Assessment & Plan   Medicare annual wellness visit, subsequent  Mixed hyperlipidemia  Assessment & Plan:  LDL above goal 11/22 and we switched crestor 40mg to lipitor 40 mg, will repeat lipids now  Orders:  -     LIPID PANEL; Future  Paroxysmal atrial fibrillation [facility])  Assessment & Plan:  -sees dr Sha Chen  -on metoprolol 50mg, flecainide 100mg bid, explained flecainide should be prescribed by EP who manages aFIB. -on coumadin, will refer to coumadin clinic  Petechiae  -     CBC with Auto Differential; Future  Type 2 diabetes mellitus without complication, without long-term current use of insulin ([facility] [address]  Assessment & Plan:  Well controlled A1c 6.8% 11/22, known proteinuria. Overall doing well from a clinical standpoint.    - diet controlled  - Repeat HbA1c. AIC goal< 7    - BP is within desired goal. Not on ace/arb  - Will repeat lipid profile, last LDL slightly above goal and we switched crestor to lipitor 40 mg. on tricor 145  Lab Results   Component Value Date    LDLCALC 114 (H) 11/02/2022    CREATININE 1.1 11/02/2022   - Microalbuminuria 50 2/22, repeat now  - uptodate PSV23 vaccine , flu shot. Orders:  -     MICROALBUMIN / CREATININE URINE RATIO; Future  -     Hemoglobin A1C; Future  Need for prophylactic vaccination and inoculation against varicella  -     zoster recombinant adjuvanted vaccine (SHINGRIX) 50 MCG/0.5ML SUSR injection; Inject 0.5 mLs into the muscle once for 1 dose, Disp-0.5 mL, R-0Print    Recommendations for Preventive Services Due: see orders and patient instructions/AVS.  Recommended screening schedule for the next 5-10 years is provided to the patient in written form: see Patient Instructions/AVS.     Return for Medicare Annual Wellness Visit in 1 year.      Subjective   The following acute and/or chronic problems were also addressed today:  As above    Patient's complete Health Risk Assessment and screening values have been reviewed and are found in Flowsheets. The following problems were reviewed today and where indicated follow up appointments were made and/or referrals ordered. Positive Risk Factor Screenings with Interventions:          Drug Use:          Interpretation:  1-2: Low level - Monitor, re-assess at a later date  3-5: Moderate level - Further Investigation  6-8: Substantial level - Intensive Assessment  9-10: Severe level - Intensive Assessment    Interventions:  Patient comments: takes medical marijuana for sleep and hand pain          Weight and Activity:  Physical Activity: Insufficiently Active    Days of Exercise per Week: 2 days    Minutes of Exercise per Session: 20 min     On average, how many days per week do you engage in moderate to strenuous exercise (like a brisk walk)?: 2 days  Have you lost any weight without trying in the past 3 months?: No  Body mass index: (!) 32.09  Obesity Interventions:  See AVS for additional education material  See A/P for plan and any pertinent orders                               Objective   Vitals:    02/14/23 1003   BP: 134/78   Site: Left Upper Arm   Position: Sitting   Cuff Size: Large Adult   Pulse: 63   Temp: 96.9 °F (36.1 °C)   TempSrc: Temporal   SpO2: 97%   Weight: 256 lb 12.8 oz (116.5 kg)   Height: 6' 3\" (1.905 m)      Body mass index is 32.1 kg/m². Physical Exam  Vitals reviewed. Constitutional:       General: He is not in acute distress. Appearance: Normal appearance. He is well-developed. He is not ill-appearing, toxic-appearing or diaphoretic. HENT:      Head: Normocephalic and atraumatic. Right Ear: Tympanic membrane normal.      Left Ear: Tympanic membrane normal.   Eyes:      General: No scleral icterus. Conjunctiva/sclera: Conjunctivae normal.      Pupils: Pupils are equal, round, and reactive to light. Cardiovascular:      Rate and Rhythm: Normal rate and regular rhythm. Heart sounds: Normal heart sounds. Pulmonary:      Effort: Pulmonary effort is normal. No respiratory distress. Breath sounds: Normal breath sounds. Abdominal:      General: There is no distension. Palpations: Abdomen is soft. Tenderness: There is no abdominal tenderness. Musculoskeletal:      Cervical back: Normal range of motion and neck supple. Right lower leg: No edema. Left lower leg: No edema. Skin:     General: Skin is warm and dry. Coloration: Skin is not jaundiced. Comments: Left shin petechia    Neurological:      Mental Status: He is alert and oriented to person, place, and time. Psychiatric:         Behavior: Behavior normal.          No Known Allergies  Prior to Visit Medications    Medication Sig Taking? Authorizing Provider   zoster recombinant adjuvanted vaccine (SHINGRIX) 50 MCG/0.5ML SUSR injection Inject 0.5 mLs into the muscle once for 1 dose Yes Pancho Rao MD   atorvastatin (LIPITOR) 40 MG tablet Take 1 tablet by mouth daily Yes Pancho Rao MD   divalproex (DEPAKOTE) 250 MG DR tablet TAKE 1 TABLET BY MOUTH THREE TIMES A DAY  Patient taking differently: TAKE 1 TABLET IN THE MORNING AND TWO TABLETS IN THE EVENING Yes Pancho Rao MD   fenofibrate (TRICOR) 145 MG tablet TAKE 1 TABLET BY MOUTH EVERY DAY Yes Pancho Rao MD   flecainide (TAMBOCOR) 100 MG tablet TAKE 1 TABLET BY MOUTH TWICE A DAY Yes Pancho Rao MD   metoprolol succinate (TOPROL XL) 50 MG extended release tablet TAKE 1 TABLET BY MOUTH EVERY DAY Yes Pancho Rao MD   SUMAtriptan (IMITREX) 100 MG tablet TAKE ONE TABLET BY MOUTH ONCE AS NEEDED; MAY REPEAT IN 2 HOURS IF NEEDED. DO NOT EXCEED TWO TABLETS IN 24 HOURS.  Yes Pancho Rao MD   warfarin (COUMADIN) 1 MG tablet TAKE 1 TABLET BY MOUTH TWICE A DAY Yes Pancho Rao MD   warfarin (COUMADIN) 5 MG tablet TAKE 1 TABLET BY MOUTH EVERY DAY Yes Pancho Rao MD   vitamin D3 (CHOLECALCIFEROL) 25 MCG (1000 UT) TABS tablet Take 2 tablets by mouth daily Yes Pancho Rao MD   amLODIPine (NORVASC) 5 MG tablet Take 1 tablet by mouth daily Yes Brianne Haskins MD   dorzolamide (TRUSOPT) 2 % ophthalmic solution 2 drops 3 times daily Yes Historical Provider, MD GUZMAN 0.01 % SOLN ophthalmic drops Place 1 drop into both eyes nightly Yes Historical Provider, MD   fexofenadine (ALLEGRA) 180 MG tablet Take 180 mg by mouth daily as needed Yes Historical Provider, MD       CareTeam (Including outside providers/suppliers regularly involved in providing care):   Patient Care Team:  Brianne Haskins MD as PCP - General (Internal Medicine)  Brianne Haskins MD as PCP - Empaneled Provider  Eder Solares MD as Surgeon (Orthopedic Surgery)  Danielle Carranza MD as Surgeon (General Surgery)  Agnieszka Price MD (Ophthalmology)     Reviewed and updated this visit:  Tobacco  Allergies  Meds  Med Hx  Surg Hx  Soc Hx  Fam Hx             Brianne Haskins MD

## 2023-02-14 NOTE — ASSESSMENT & PLAN NOTE
-sees dr Litzy Gan  -on metoprolol 50mg, flecainide 100mg bid, explained flecainide should be prescribed by EP who manages aFIB.    -on coumadin, will refer to coumadin clinic

## 2023-02-14 NOTE — PATIENT INSTRUCTIONS
Advance Directives: Care Instructions  Overview  An advance directive is a legal way to state your wishes at the end of your life. It tells your family and your doctor what to do if you can't say what you want. There are two main types of advance directives. You can change them any time your wishes change. Living will. This form tells your family and your doctor your wishes about life support and other treatment. The form is also called a declaration. Medical power of . This form lets you name a person to make treatment decisions for you when you can't speak for yourself. This person is called a health care agent (health care proxy, health care surrogate). The form is also called a durable power of  for health care. If you do not have an advance directive, decisions about your medical care may be made by a family member, or by a doctor or a  who doesn't know you. It may help to think of an advance directive as a gift to the people who care for you. If you have one, they won't have to make tough decisions by themselves. For more information, including forms for your state, see the [address] website (www.caringinfo.org/planning/advance-directives/). Follow-up care is a key part of your treatment and safety. Be sure to make and go to all appointments, and call your doctor if you are having problems. It's also a good idea to know your test results and keep a list of the medicines you take. What should you include in an advance directive? Many states have a unique advance directive form. (It may ask you to address specific issues.) Or you might use a universal form that's approved by many states. If your form doesn't tell you what to address, it may be hard to know what to include in your advance directive. Use the questions below to help you get started. Who do you want to make decisions about your medical care if you are not able to?   What life-support measures do you want if you have a serious illness that gets worse over time or can't be cured? What are you most afraid of that might happen? (Maybe you're afraid of having pain, losing your independence, or being kept alive by machines.)  Where would you prefer to die? (Your home? A hospital? A nursing home?)  Do you want to donate your organs when you die? Do you want certain Hinduism practices performed before you die? When should you call for help? Be sure to contact your doctor if you have any questions. Where can you learn more? Go to http://www.murry.com/ and enter R264 to learn more about \"Advance Directives: Care Instructions. \"  Current as of: June 16, 2022               Content Version: 13.5  © 2479-1056 [employer]. Care instructions adapted under license by [facility] [address] If you have questions about a medical condition or this instruction, always ask your healthcare professional. [place] [address] any warranty or liability for your use of this information. Starting a Weight Loss Plan: Care Instructions  Overview     If you're thinking about losing weight, it can be hard to know where to start. Your doctor can help you set up a weight loss plan that best meets your needs. You may want to take a class on nutrition or exercise, or you could join a weight loss support group. If you have questions about how to make changes to your eating or exercise habits, ask your doctor about seeing a registered dietitian or an exercise specialist.  It can be a big challenge to lose weight. But you don't have to make huge changes at once. Make small changes, and stick with them. When those changes become habit, add a few more changes. If you don't think you're ready to make changes right now, try to pick a date in the future. Make an appointment to see your doctor to discuss whether the time is right for you to start a plan. Follow-up care is a key part of your treatment and safety.  Be sure to make and go to all appointments, and call your doctor if you are having problems. It's also a good idea to know your test results and keep a list of the medicines you take. How can you care for yourself at home? Set realistic goals. Many people expect to lose much more weight than is likely. A weight loss of 5% to 10% of your body weight may be enough to improve your health. Get family and friends involved to provide support. Talk to them about why you are trying to lose weight, and ask them to help. They can help by participating in exercise and having meals with you, even if they may be eating something different. Find what works best for you. If you do not have time or do not like to cook, a program that offers meal replacement bars or shakes may be better for you. Or if you like to prepare meals, finding a plan that includes daily menus and recipes may be best.  Ask your doctor about other health professionals who can help you achieve your weight loss goals. A dietitian can help you make healthy changes in your diet. An exercise specialist or  can help you develop a safe and effective exercise program.  A counselor or psychiatrist can help you cope with issues such as depression, anxiety, or family problems that can make it hard to focus on weight loss. Consider joining a support group for people who are trying to lose weight. Your doctor can suggest groups in your area. Where can you learn more? Go to http://www.woods.com/ and enter U357 to learn more about \"Starting a Weight Loss Plan: Care Instructions. \"  Current as of: August 25, 2022               Content Version: 13.5  © 2006-2022 Healthwise, Incorporated. Care instructions adapted under license by [facility] ([facility]).  If you have questions about a medical condition or this instruction, always ask your healthcare professional. [address] any warranty or liability for your use of this information. Eating Healthy Foods: Care Instructions  Your Care Instructions     Eating healthy foods can help lower your risk for disease. Healthy food gives you energy and keeps your heart strong, your brain active, your muscles working, and your bones strong. A healthy diet includes a variety of foods from the basic food groups: grains, vegetables, fruits, milk and milk products, and meat and beans. Some people may eat more of their favorite foods from only one food group and, as a result, miss getting the nutrients they need. So, it is important to pay attention not only to what you eat but also to what you are missing from your diet. You can eat a healthy, balanced diet by making a few small changes. Follow-up care is a key part of your treatment and safety. Be sure to make and go to all appointments, and call your doctor if you are having problems. It's also a good idea to know your test results and keep a list of the medicines you take. How can you care for yourself at home? Look at what you eat  Keep a food diary for a week or two and record everything you eat or drink. Track the number of servings you eat from each food group. For a balanced diet every day, eat a variety of:  6 or more ounce-equivalents of grains, such as cereals, breads, crackers, rice, or pasta, every day. An ounce-equivalent is 1 slice of bread, 1 cup of ready-to-eat cereal, or ½ cup of cooked rice, cooked pasta, or cooked cereal.  2½ cups of vegetables, especially:  Dark-green vegetables such as broccoli and spinach. Orange vegetables such as carrots and sweet potatoes. Dry beans (such as lang and kidney beans) and peas (such as lentils). 2 cups of fresh, frozen, or canned fruit. A small apple or 1 banana or orange equals 1 cup.  3 cups of nonfat or low-fat milk, yogurt, or other milk products. 5½ ounces of meat and beans, such as chicken, fish, lean meat, beans, nuts, and seeds.  One egg, 1 tablespoon of peanut butter, ½ ounce nuts or seeds, or ¼ cup of cooked beans equals 1 ounce of meat. Learn how to read food labels for serving sizes and ingredients. Fast-food and convenience-food meals often contain few or no fruits or vegetables. Make sure you eat some fruits and vegetables to make the meal more nutritious. Look at your food diary. For each food group, add up what you have eaten and then divide the total by the number of days. This will give you an idea of how much you are eating from each food group. See if you can find some ways to change your diet to make it more healthy. Start small  Do not try to make dramatic changes to your diet all at once. You might feel that you are missing out on your favorite foods and then be more likely to fail. Start slowly, and gradually change your habits. Try some of the following:  Use whole wheat bread instead of white bread. Use nonfat or low-fat milk instead of whole milk. Eat brown rice instead of white rice, and eat whole wheat pasta instead of white-flour pasta. Try low-fat cheeses and low-fat yogurt. Add more fruits and vegetables to meals and have them for snacks. Add lettuce, tomato, cucumber, and onion to sandwiches. Add fruit to yogurt and cereal.  Enjoy food  You can still eat your favorite foods. You just may need to eat less of them. If your favorite foods are high in fat, salt, and sugar, limit how often you eat them, but do not cut them out entirely. Eat a wide variety of foods. Make healthy choices when eating out  The type of restaurant you choose can help you make healthy choices. Even fast-food chains are now offering more low-fat or healthier choices on the menu. Choose smaller portions, or take half of your meal home. When eating out, try:  A veggie pizza with a whole wheat crust or grilled chicken (instead of sausage or pepperoni). Pasta with roasted vegetables, grilled chicken, or marinara sauce instead of cream sauce.   A vegetable wrap or grilled chicken wrap. Broiled or poached food instead of fried or breaded items. Make healthy choices easy  Buy packaged, prewashed, ready-to-eat fresh vegetables and fruits, such as baby carrots, salad mixes, and chopped or shredded broccoli and cauliflower. Buy packaged, presliced fruits, such as melon or pineapple. Choose 100% fruit or vegetable juice instead of soda. Limit juice intake to 4 to 6 oz (½ to ¾ cup) a day. Blend low-fat yogurt, fruit juice, and canned or frozen fruit to make a smoothie for breakfast or a snack. Where can you learn more? Go to http://www.woods.com/ and enter T756 to learn more about \"Eating Healthy Foods: Care Instructions. \"  Current as of: May 9, 2022               Content Version: 13.5  © 2297-5488 [employer]. Care instructions adapted under license by [facility] ([facility]). If you have questions about a medical condition or this instruction, always ask your healthcare professional. [place] [address] any warranty or liability for your use of this information. A Healthy Heart: Care Instructions  Your Care Instructions     Coronary artery disease, also called heart disease, occurs when a substance called plaque builds up in the vessels that supply oxygen-rich blood to your heart muscle. This can narrow the blood vessels and reduce blood flow. A heart attack happens when blood flow is completely blocked. A high-fat diet, smoking, and other factors increase the risk of heart disease. Your doctor has found that you have a chance of having heart disease. You can do lots of things to keep your heart healthy. It may not be easy, but you can change your diet, exercise more, and quit smoking. These steps really work to lower your chance of heart disease. Follow-up care is a key part of your treatment and safety. Be sure to make and go to all appointments, and call your doctor if you are having problems.  It's also a good idea to know your test results and keep a list of the medicines you take. How can you care for yourself at home? Diet    Use less salt when you cook and eat. This helps lower your blood pressure. Taste food before salting. Add only a little salt when you think you need it. With time, your taste buds will adjust to less salt.     Eat fewer snack items, fast foods, canned soups, and other high-salt, high-fat, processed foods.     Read food labels and try to avoid saturated and trans fats. They increase your risk of heart disease by raising cholesterol levels.     Limit the amount of solid fat-butter, margarine, and shortening-you eat. Use olive, peanut, or canola oil when you cook. Bake, broil, and steam foods instead of frying them.     Eat a variety of fruit and vegetables every day. Dark green, deep orange, red, or yellow fruits and vegetables are especially good for you. Examples include spinach, carrots, peaches, and berries.     Foods high in fiber can reduce your cholesterol and provide important vitamins and minerals. High-fiber foods include whole-grain cereals and breads, oatmeal, beans, brown rice, citrus fruits, and apples.     Eat lean proteins. Heart-healthy proteins include seafood, lean meats and poultry, eggs, beans, peas, nuts, seeds, and soy products.     Limit drinks and foods with added sugar. These include candy, desserts, and soda pop. Lifestyle changes    If your doctor recommends it, get more exercise. Walking is a good choice. Bit by bit, increase the amount you walk every day. Try for at least 30 minutes on most days of the week. You also may want to swim, bike, or do other activities.     Do not smoke. If you need help quitting, talk to your doctor about stop-smoking programs and medicines. These can increase your chances of quitting for good. Quitting smoking may be the most important step you can take to protect your heart.  It is never too late to quit.     Limit alcohol to 2 drinks a day for men and 1 drink a day for women. Too much alcohol can cause health problems.     Manage other health problems such as diabetes, high blood pressure, and high cholesterol. If you think you may have a problem with alcohol or drug use, talk to your doctor. Medicines    Take your medicines exactly as prescribed. Call your doctor if you think you are having a problem with your medicine.     If your doctor recommends aspirin, take the amount directed each day. Make sure you take aspirin and not another kind of pain reliever, such as acetaminophen (Tylenol). When should you call for help? Call 911 if you have symptoms of a heart attack. These may include:    Chest pain or pressure, or a strange feeling in the chest.     Sweating.     Shortness of breath.     Pain, pressure, or a strange feeling in the back, neck, jaw, or upper belly or in one or both shoulders or arms.     Lightheadedness or sudden weakness.     A fast or irregular heartbeat. After you call 911, the  may tell you to chew 1 adult-strength or 2 to 4 low-dose aspirin. Wait for an ambulance. Do not try to drive yourself. Watch closely for changes in your health, and be sure to contact your doctor if you have any problems. Where can you learn more? Go to http://www.murry.com/ and enter F075 to learn more about \"A Healthy Heart: Care Instructions. \"  Current as of: September 7, 2022               Content Version: 13.5  © 2006-2022 Healthwise, Incorporated. Care instructions adapted under license by [facility] ([facility]). If you have questions about a medical condition or this instruction, always ask your healthcare professional. [place] [address] any warranty or liability for your use of this information. Personalized Preventive Plan for Luisa Galicia III - 2/14/2023  Medicare offers a range of preventive health benefits.  Some of the tests and screenings are paid in full while other may be subject to a deductible, co-insurance, and/or copay. Some of these benefits include a comprehensive review of your medical history including lifestyle, illnesses that may run in your family, and various assessments and screenings as appropriate. After reviewing your medical record and screening and assessments performed today your provider may have ordered immunizations, labs, imaging, and/or referrals for you. A list of these orders (if applicable) as well as your Preventive Care list are included within your After Visit Summary for your review. Other Preventive Recommendations:    A preventive eye exam performed by an eye specialist is recommended every 1-2 years to screen for glaucoma; cataracts, macular degeneration, and other eye disorders. A preventive dental visit is recommended every 6 months. Try to get at least 150 minutes of exercise per week or 10,000 steps per day on a pedometer . Order or download the FREE \"Exercise & Physical Activity: Your Everyday Guide\" from [employer]. Call 9-988.327.2618 or search [employer] online. You need 4074-2552 mg of calcium and 8678-8132 IU of vitamin D per day. It is possible to meet your calcium requirement with diet alone, but a vitamin D supplement is usually necessary to meet this goal.  When exposed to the sun, use a sunscreen that protects against both UVA and UVB radiation with an SPF of 30 or greater. Reapply every 2 to 3 hours or after sweating, drying off with a towel, or swimming. Always wear a seat belt when traveling in a car. Always wear a helmet when riding a bicycle or motorcycle.

## 2023-02-14 NOTE — ASSESSMENT & PLAN NOTE
Well controlled A1c 6.8% 11/22, known proteinuria. Overall doing well from a clinical standpoint.    - diet controlled  - Repeat HbA1c. AIC goal< 7    - BP is within desired goal. Not on ace/arb  - Will repeat lipid profile, last LDL slightly above goal and we switched crestor to lipitor 40 mg. on tricor 145  Lab Results   Component Value Date    LDLCALC 114 (H) 11/02/2022    CREATININE 1.1 11/02/2022   - Microalbuminuria 50 2/22, repeat now  - uptodate PSV23 vaccine , flu shot.

## 2023-02-16 ENCOUNTER — ANTI-COAG VISIT (OUTPATIENT)
Dept: PHARMACY | Age: 68
End: 2023-02-16
Payer: MEDICARE

## 2023-02-16 ENCOUNTER — OFFICE VISIT (OUTPATIENT)
Dept: CARDIOLOGY CLINIC | Age: 68
End: 2023-02-16
Payer: MEDICARE

## 2023-02-16 VITALS
HEART RATE: 60 BPM | BODY MASS INDEX: 32.37 KG/M2 | WEIGHT: 259 LBS | DIASTOLIC BLOOD PRESSURE: 70 MMHG | SYSTOLIC BLOOD PRESSURE: 108 MMHG

## 2023-02-16 DIAGNOSIS — I48.0 PAROXYSMAL ATRIAL FIBRILLATION (HCC): Primary | ICD-10-CM

## 2023-02-16 DIAGNOSIS — I26.99 OTHER PULMONARY EMBOLISM WITHOUT ACUTE COR PULMONALE, UNSPECIFIED CHRONICITY (HCC): ICD-10-CM

## 2023-02-16 DIAGNOSIS — R00.2 PALPITATION: ICD-10-CM

## 2023-02-16 DIAGNOSIS — I27.20 PULMONARY HTN (HCC): ICD-10-CM

## 2023-02-16 LAB — INR BLD: 3.5

## 2023-02-16 PROCEDURE — 3017F COLORECTAL CA SCREEN DOC REV: CPT | Performed by: INTERNAL MEDICINE

## 2023-02-16 PROCEDURE — G8427 DOCREV CUR MEDS BY ELIG CLIN: HCPCS | Performed by: INTERNAL MEDICINE

## 2023-02-16 PROCEDURE — 99214 OFFICE O/P EST MOD 30 MIN: CPT | Performed by: INTERNAL MEDICINE

## 2023-02-16 PROCEDURE — 3074F SYST BP LT 130 MM HG: CPT | Performed by: INTERNAL MEDICINE

## 2023-02-16 PROCEDURE — G8484 FLU IMMUNIZE NO ADMIN: HCPCS | Performed by: INTERNAL MEDICINE

## 2023-02-16 PROCEDURE — G8417 CALC BMI ABV UP PARAM F/U: HCPCS | Performed by: INTERNAL MEDICINE

## 2023-02-16 PROCEDURE — 3078F DIAST BP <80 MM HG: CPT | Performed by: INTERNAL MEDICINE

## 2023-02-16 PROCEDURE — 1036F TOBACCO NON-USER: CPT | Performed by: INTERNAL MEDICINE

## 2023-02-16 PROCEDURE — 1123F ACP DISCUSS/DSCN MKR DOCD: CPT | Performed by: INTERNAL MEDICINE

## 2023-02-16 PROCEDURE — 85610 PROTHROMBIN TIME: CPT | Performed by: PHARMACIST

## 2023-02-16 PROCEDURE — 99212 OFFICE O/P EST SF 10 MIN: CPT | Performed by: PHARMACIST

## 2023-02-16 RX ORDER — DORZOLAMIDE HYDROCHLORIDE AND TIMOLOL MALEATE 20; 5 MG/ML; MG/ML
SOLUTION/ DROPS OPHTHALMIC
COMMUNITY
Start: 2023-01-05 | End: 2023-02-16 | Stop reason: CLARIF

## 2023-02-16 NOTE — PROGRESS NOTES
ANTICOAGULATION SERVICE    Luz Maria Fonseca III is a 70 y.o. 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     Anticoagulation Indication(s):  Afib, hx of PE (2002)   Referring Physician:   Dr. Ivory Santos (also follows with Dr. Faina Ga, cardiology)   Goal INR Range:  2-3  Duration of Anticoagulation Therapy:  Indefinite  Time of day dose taken: PM  Product patient has at home: warfarin 5 mg (peach color) and warfarin 1 mg (pink)     [id number] Score for Atrial Fibrillation Stroke Risk   Risk   Factors  Component Value   C CHF No 0   H HTN Yes 1   A2 Age >= 75 No,  (74 y.o.) 0   D DM Yes 1   S2 Prior Stroke/TIA No 0   V Vascular Disease No 0   A Age 74-69 Yes,  (74 y.o.) 1   Sc Sex male 0    [id number]  Score  3   Score last updated 11/4/22 9:36 AM EDT      INR Summary                           Warfarin regimen (mg)  Date INR   A/P   Sun Mon Tue Wed Thu Fri Sat Mg/wk  2/16 3.5 Above goal, decrease 7 7 7 7 5 7 7 47  1/5 3.2 Above goal, continue 7 7 7 7 7 7 7 49  11/21 2.1 At goal, continue 7 7 7 7 7 7 7 49  11/4 3.6 Above goal, holdx1 7 7 7 7 7 0/7 7 49  9/18 3.06 Per [facility]   5/18 2.56 Per PCP  7 7 7 7 7 7 7 49  4/20 2.23  2/22 2.13  1/4 2.36    Last CBC:  Lab Results   Component Value Date    RBC 4.98 09/18/2022    HGB 14.3 09/18/2022    HCT 43.3 09/18/2022    MCV 87.0 09/18/2022    MCH 28.7 09/18/2022    MPV 9.1 09/18/2022    RDW 14.5 09/18/2022     09/18/2022       Patient History:  Recent hospitalizations/HC visits None    Recent medication changes 12/13 Crestor switched to Lipitor   Medications taken regularly that may interact with warfarin or alter INR Fenofibrate 145 mg daily   Divalproex 250 mg TID    Warfarin dose taken as prescribed Reports compliance with medications    Signs/symptoms of bleeding Hx of hematuria 9/2022   Vitamin K intake Normally avoids high vitamin k foods   Eats yue salads daily   Recent vomiting/diarrhea/fever, changes in weight or activity level None reported   Tobacco or alcohol use Patient denies smoking   Patient reports having 2 drinks per week   Upcoming surgeries or procedures Possible cystoscopy? Assessment/Plan:  Patient's INR was supratherapeutic (3.5), possibly due in part to a decrease in vitamin K foods over the last month while in [place]. He denies any other changes to medications, diet or lifestyle. He denies bleeding. As 3 of the last 4 INRs have been elevated, patient was instructed to decrease warfarin to 7 mg daily except 5 mg on Thursdays (4% decrease). Repeat INR in 3 weeks. Patient was reminded to maintain consistent vitamin K intake and call with any bleeding, medication changes, or fever/vomiting/diarrhea. Patient understands dosing directions and information discussed. Dosing schedule and follow up appointment given to patient. Progress note routed to referring physician's office. Patient acknowledges working in consult agreement with pharmacist as referred by his/her physician. Next Clinic Appointment:  3/9    Please call [facility] at [phone] with any questions. Thanks!   William Garcai - PharmD Candidate [id number]  [facility]  Ph: [phone]  2/16/2023 10:01 AM    For Pharmacy Admin Tracking Only    Intervention Detail: Dose Adjustment: 1, reason: Therapy De-escalation  Total # of Interventions Recommended: 1  Total # of Interventions Accepted: 1  Time Spent (min): 15

## 2023-02-16 NOTE — PROGRESS NOTES
Subjective:      Patient ID: Antonio Melvin is a 70 y.o. male. HPI:  Greg Morales is being seen for hisfollow up for afib and palps/Hx PE. No complaints. Feeling good. Rhythm stable. Active. No exertional sx. No tachycardia. No palps. No syncope. No edema. No pnd or orthopnea. No chest pain/sob.      Past Medical History:   Diagnosis Date    Allergic rhinitis due to other allergen     Atrial fibrillation ([facility] [address]     Cancer ([facility] [address]     right kidney    Cataract 11/30/2016    Glaucoma     both eyes    Hx of blood clots     pulmonary embolism 2002 shortly after ablation    Hypopotassemia     Migraines     Obstructive sleep apnea syndrome     Had sleep studies but couldn't tolerate mask     PHILLIP (obstructive sleep apnea)     Other abnormal blood chemistry     Other and unspecified hyperlipidemia     Screening PSA (prostate specific antigen) 12/5/2009    <0.1 ng/mL    Sleep apnea     does not use cpap    Strain of calf muscle 12/19/2018    Tremor     minor    Unspecified sleep apnea     Vitreous degeneration      Past Surgical History:   Procedure Laterality Date    ABLATION OF DYSRHYTHMIC FOCUS      2002    CATARACT REMOVAL Bilateral     COLONOSCOPY  06-    Oscar Brito / Dr. Chance Martínez    COLONOSCOPY  10/20/2014    recheck 4-5 yrs w ++ h/o adenomatous plyps     HAND TENDON SURGERY Right 2/12/2021    RIGHT THUMB CARPOMETACARPAL JOINT ARTHROPLASTY INCLUDING TRAPEZIECTOMY AND AUTOGRAFT, LIGAMENT RECONSTRUCTION WITH FLEXOR CARPI RADIALIS TENODN; ANY INDICATED PROCEDURES performed by Kayleigh Coon MD at [address] [place] [address] Right 12/6/2018    OPEN RIGHT PARTIAL NEPHRECTOMY (FLANK INCISION) WITH INTRAOPERATIVE ULTRASOUND performed by Amanuel Saez MD at [facility]       No Known Allergies     Social History     Socioeconomic History    Marital status:      Spouse name: Not on file    Number of children: Not on file    Years of education: Not on file    Highest education level: Not on file   Occupational History    Not on file   Tobacco Use    Smoking status: Never    Smokeless tobacco: Never   Vaping Use    Vaping Use: Never used   Substance and Sexual Activity    Alcohol use: Yes     Comment: 4 drinks weekly    Drug use: Yes     Types: Marijuana Juanetta Bishop)    Sexual activity: Yes     Partners: Female   Other Topics Concern    Not on file   Social History Narrative    Not on file     Social Determinants of Health     Financial Resource Strain: Low Risk     Difficulty of Paying Living Expenses: Not hard at all   Food Insecurity: No Food Insecurity    Worried About [employer] in the Last Year: Never true    [address] [employer] in the Last Year: Never true   Transportation Needs: Unknown    Lack of Transportation (Medical): Not on file    Lack of Transportation (Non-Medical): No   Physical Activity: Insufficiently Active    Days of Exercise per Week: 2 days    Minutes of Exercise per Session: 20 min   Stress: Not on file   Social Connections: Not on file   Intimate Partner Violence: Not on file   Housing Stability: Unknown    Unable to Pay for Housing in the Last Year: Not on file    Number of Places Lived in the Last Year: Not on file    Unstable Housing in the Last Year: No        Patient has a family history includes Diabetes in his father and mother; Sleep Apnea in his brother; Uterine Cancer in his mother. Patient  has a past medical history of Allergic rhinitis due to other allergen, Atrial fibrillation ([address] Cancer ([address] Cataract, Glaucoma, Hx of blood clots, Hypopotassemia, Migraines, Obstructive sleep apnea syndrome, PHILLIP (obstructive sleep apnea), Other abnormal blood chemistry, Other and unspecified hyperlipidemia, Screening PSA (prostate specific antigen), Sleep apnea, Strain of calf muscle, Tremor, Unspecified sleep apnea, and Vitreous degeneration.      Current Outpatient Medications   Medication Sig Dispense Refill    atorvastatin (LIPITOR) 40 MG tablet Take 1 tablet by mouth daily 90 tablet 1    divalproex (DEPAKOTE) 250 MG DR tablet TAKE 1 TABLET BY MOUTH THREE TIMES A DAY (Patient taking differently: TAKE 1 TABLET IN THE MORNING AND TWO TABLETS IN THE EVENING) 270 tablet 1    fenofibrate (TRICOR) 145 MG tablet TAKE 1 TABLET BY MOUTH EVERY DAY 90 tablet 3    flecainide (TAMBOCOR) 100 MG tablet TAKE 1 TABLET BY MOUTH TWICE A  tablet 0    metoprolol succinate (TOPROL XL) 50 MG extended release tablet TAKE 1 TABLET BY MOUTH EVERY DAY 90 tablet 3    SUMAtriptan (IMITREX) 100 MG tablet TAKE ONE TABLET BY MOUTH ONCE AS NEEDED; MAY REPEAT IN 2 HOURS IF NEEDED. DO NOT EXCEED TWO TABLETS IN 24 HOURS. 27 tablet 3    warfarin (COUMADIN) 1 MG tablet TAKE 1 TABLET BY MOUTH TWICE A  tablet 3    warfarin (COUMADIN) 5 MG tablet TAKE 1 TABLET BY MOUTH EVERY DAY 90 tablet 3    vitamin D3 (CHOLECALCIFEROL) 25 MCG (1000 UT) TABS tablet Take 2 tablets by mouth daily 180 tablet 1    amLODIPine (NORVASC) 5 MG tablet Take 1 tablet by mouth daily 90 tablet 1    dorzolamide (TRUSOPT) 2 % ophthalmic solution 2 drops 3 times daily      LUMIGAN 0.01 % SOLN ophthalmic drops Place 1 drop into both eyes nightly      fexofenadine (ALLEGRA) 180 MG tablet Take 180 mg by mouth daily as needed       No current facility-administered medications for this visit. Vitals:    02/16/23 1034   BP: 108/70   Pulse: 60           Wt 259      Review of Systems   Constitutional: Negative for activity change and fatigue. Respiratory: Negative for apnea, cough, choking, chest tightness and shortness of breath. Cardiovascular: Negative for chest pain, palpitations and leg swelling. [No PND or orthopnea. No tachycardia. Gastrointestinal: Negative for abdominal distention. Musculoskeletal: Negative for myalgias. Neurological: Negative for dizziness, syncope and light-headedness. Psychiatric/Behavioral: Negative for behavioral problems, confusion and agitation.    All other systems reviewed negative as done.     Objective:   Physical Exam   Constitutional: He is oriented to person, place, and time. He appears well-developed and well-nourished. No distress. HENT:   Head: Normocephalic and atraumatic. Eyes: EOM are normal. Right eye exhibits no discharge. Left eye exhibits no discharge. Neck: Normal range of motion. Neck supple. No JVD present. Cardiovascular: Normal rate, regular rhythm, S1 normal, S2 normal and normal heart sounds. Exam reveals no gallop. No murmur heard. Pulses:       Radial pulses are 2+ on the right side, and 2+ on the left side. Pulmonary/Chest: Effort normal and breath sounds normal. No respiratory distress. He has no wheezes. He has no rales. Abdominal: Soft. Bowel sounds are normal. He exhibits no distension. No tenderness. Musculoskeletal: Normal range of motion. He exhibits no edema. Neurological: He is alert and oriented to person, place, and time. Skin: Skin is warm and dry. Psychiatric: He has a normal mood and affect. His behavior is normal. Thought content normal.       Assessment:       Diagnosis Orders   1. Paroxysmal atrial fibrillation (HCC)        2. Other pulmonary embolism without acute cor pulmonale, unspecified chronicity (HCC)        3. Pulmonary HTN (HCC)        4. Palpitation                Plan:      CV stable. snf good. No sob/chest pain. Rhythm stable. BP is stable. AC with no bleeding issues. Will continue coumadin. Routine INR. Will continue flecanide/toprol for afib. Encouraged diet, exercise and wt loss. No changes. Continue to monitor. Reviewed previous records and testing including echo 12/19. Follow up 6 months.

## 2023-02-21 DIAGNOSIS — E11.9 TYPE 2 DIABETES MELLITUS WITHOUT COMPLICATION, WITHOUT LONG-TERM CURRENT USE OF INSULIN (HCC): Chronic | ICD-10-CM

## 2023-02-21 RX ORDER — ATORVASTATIN CALCIUM 80 MG/1
80 TABLET, FILM COATED ORAL DAILY
Qty: 90 TABLET | Refills: 1 | Status: SHIPPED | OUTPATIENT
Start: 2023-02-21

## 2023-02-21 RX ORDER — METFORMIN HYDROCHLORIDE 500 MG/1
500 TABLET, EXTENDED RELEASE ORAL
Qty: 90 TABLET | Refills: 1 | Status: SHIPPED | OUTPATIENT
Start: 2023-02-21

## 2023-03-09 ENCOUNTER — ANTI-COAG VISIT (OUTPATIENT)
Dept: PHARMACY | Age: 68
End: 2023-03-09
Payer: MEDICARE

## 2023-03-09 DIAGNOSIS — I26.99 OTHER PULMONARY EMBOLISM WITHOUT ACUTE COR PULMONALE, UNSPECIFIED CHRONICITY (HCC): ICD-10-CM

## 2023-03-09 DIAGNOSIS — I48.0 PAROXYSMAL ATRIAL FIBRILLATION (HCC): Primary | ICD-10-CM

## 2023-03-09 LAB — INTERNATIONAL NORMALIZATION RATIO, POC: 2.4

## 2023-03-09 PROCEDURE — 85610 PROTHROMBIN TIME: CPT | Performed by: PHARMACIST

## 2023-03-09 PROCEDURE — 99211 OFF/OP EST MAY X REQ PHY/QHP: CPT | Performed by: PHARMACIST

## 2023-03-09 NOTE — PROGRESS NOTES
ANTICOAGULATION SERVICE    Ryder Camacho III is a 70 y.o. male with PMHx significant for afib, pulmonary HTN, HLD, DM2, hx renal cell carcinoma, and hx PE (2002, after ablation) who presents to clinic 3/9/2023 for anticoagulation monitoring and adjustment.     Anticoagulation Indication(s):  Afib, hx of PE (2002)   Referring Physician:   Dr. Raz Sibley (also follows with Dr. Brandan Barraza, cardiology)   Goal INR Range:  2-3  Duration of Anticoagulation Therapy:  Indefinite  Time of day dose taken: PM  Product patient has at home: warfarin 5 mg (peach color) and warfarin 1 mg (pink)     [id number] Score for Atrial Fibrillation Stroke Risk   Risk   Factors  Component Value   C CHF No 0   H HTN Yes 1   A2 Age >= 75 No,  (74 y.o.) 0   D DM Yes 1   S2 Prior Stroke/TIA No 0   V Vascular Disease No 0   A Age 74-69 Yes,  (74 y.o.) 1   Sc Sex male 0    [id number]  Score  3   Score last updated 11/4/22 9:36 AM EDT    INR Summary                           Warfarin regimen (mg)  Date INR   A/P   Sun Mon Tue Wed Thu Fri Sat Mg/wk  3/9 2.4 At goal, continue  7 7 7 7 5 7 7 47  2/16 3.5 Above goal, decrease 7 7 7 7 5 7 7 47  1/5 3.2 Above goal, continue 7 7 7 7 7 7 7 49  11/21 2.1 At goal, continue 7 7 7 7 7 7 7 49  11/4 3.6 Above goal, holdx1 7 7 7 7 7 0/7 7 49  9/18 3.06 Per [facility] ER   5/18 2.56 Per PCP  7 7 7 7 7 7 7 49  4/20 2.23  2/22 2.13  1/4 2.36    Last CBC:  Lab Results   Component Value Date    RBC 4.98 02/16/2023    HGB 14.4 02/16/2023    HCT 44.3 02/16/2023    MCV 89.1 02/16/2023    MCH 28.8 02/16/2023    MPV 9.9 02/16/2023    RDW 14.6 02/16/2023     02/16/2023       Patient History:  Recent hospitalizations/HC visits None    Recent medication changes 12/13 Crestor switched to Lipitor   Medications taken regularly that may interact with warfarin or alter INR Fenofibrate 145 mg daily   Divalproex 250 mg TID    Warfarin dose taken as prescribed Reports compliance with medications    Signs/symptoms of bleeding Hx of hematuria 9/2022   Vitamin K intake Normally avoids high vitamin k foods   Eats yue salads daily   Recent vomiting/diarrhea/fever, changes in weight or activity level None reported   Tobacco or alcohol use Patient denies smoking   Patient reports having 2 drinks per week   Upcoming surgeries or procedures Possible cystoscopy? Assessment/Plan:  Patient's INR was therapeutic (2.4) after dose decrease last visit. INR was thought to be elevated possibly due a decrease in vitamin K foods while visiting his daughter in [place]. Today he reports his diet is back to normal, however he did have some elevated INRs prior to traveling, so will keep warfarin dose the same for now. He denies any other changes to medications, diet or lifestyle. He denies bleeding. Patient was instructed to continue warfarin 7 mg daily except 5 mg on Thursdays. Repeat INR in 4 weeks. Patient was reminded to maintain consistent vitamin K intake and call with any bleeding, medication changes, or fever/vomiting/diarrhea. Patient understands dosing directions and information discussed. Dosing schedule and follow up appointment given to patient. Progress note routed to referring physician's office. Patient acknowledges working in consult agreement with pharmacist as referred by his/her physician. Next Clinic Appointment:  4/6    Please call [facility]agulation [facility] at [phone] with any questions. Thanks!   Yoon Tom, PharmD, BCPS  [facility] Medication Management [address] [place] [address] [address]: [phone]  [place]: [phone]  3/9/2023 10:26 AM    For Pharmacy Admin Tracking Only  Time Spent (min): 15

## 2023-04-06 ENCOUNTER — ANTI-COAG VISIT (OUTPATIENT)
Dept: PHARMACY | Age: 68
End: 2023-04-06
Payer: MEDICARE

## 2023-04-06 DIAGNOSIS — I26.99 OTHER PULMONARY EMBOLISM WITHOUT ACUTE COR PULMONALE, UNSPECIFIED CHRONICITY (HCC): ICD-10-CM

## 2023-04-06 DIAGNOSIS — I48.0 PAROXYSMAL ATRIAL FIBRILLATION (HCC): Primary | Chronic | ICD-10-CM

## 2023-04-06 LAB — INTERNATIONAL NORMALIZATION RATIO, POC: 2.2

## 2023-04-06 PROCEDURE — 99211 OFF/OP EST MAY X REQ PHY/QHP: CPT

## 2023-04-06 PROCEDURE — 85610 PROTHROMBIN TIME: CPT

## 2023-04-06 NOTE — PROGRESS NOTES
Signs/symptoms of bleeding Hx of hematuria 9/2022   Vitamin K intake Normally avoids high vitamin k foods   Eats yue salads daily   Recent vomiting/diarrhea/fever, changes in weight or activity level None reported   Tobacco or alcohol use Patient denies smoking   Patient reports having 2 drinks per week   Upcoming surgeries or procedures Possible cystoscopy? Assessment/Plan:  Patient's INR was therapeutic (2.2) today. INR was previously thought to be elevated possibly due a decrease in vitamin K foods while visiting his daughter in [place]. He has another trip scheduled for approximately May 15-Jacquie 15 to [place] again. Today he reports his diet is back to normal, however he did have some elevated INRs prior to traveling, so will keep warfarin dose the same for now. He denies any other changes to medications, diet or lifestyle. He denies bleeding. Patient was instructed to continue warfarin 7 mg daily except 5 mg on Thursdays. Repeat INR in 5 weeks to balance with travel plans. Ensured patient is aware that we definitely want to get INR checked prior to travel so to call if plans change. Patient was reminded to maintain consistent vitamin K intake and call with any bleeding, medication changes, or fever/vomiting/diarrhea. Patient understands dosing directions and information discussed. Dosing schedule and follow up appointment given to patient. Progress note routed to referring physician's office. Patient acknowledges working in consult agreement with pharmacist as referred by his/her physician. Next Clinic Appointment:  5/11    Please call [facility] Anticoagulation Clinic at [phone] with any questions.      Keri Aase, JoannD, [facility]  Medication Management Clinic   Miracle Stoddard3 Ph: [phone]  Peter Menchaca Ph: [phone]  4/6/2023 10:39 AM      For Pharmacy Admin Tracking Only  Time Spent (min): 15

## 2023-05-01 RX ORDER — DIVALPROEX SODIUM 250 MG/1
TABLET, DELAYED RELEASE ORAL
Qty: 270 TABLET | Refills: 1 | Status: SHIPPED | OUTPATIENT
Start: 2023-05-01

## 2023-05-04 RX ORDER — FLECAINIDE ACETATE 100 MG/1
TABLET ORAL
Qty: 180 TABLET | Refills: 1 | OUTPATIENT
Start: 2023-05-04

## 2023-05-05 ENCOUNTER — PATIENT MESSAGE (OUTPATIENT)
Dept: INTERNAL MEDICINE CLINIC | Age: 68
End: 2023-05-05

## 2023-05-08 RX ORDER — FLECAINIDE ACETATE 100 MG/1
TABLET ORAL
Qty: 180 TABLET | Refills: 0 | Status: SHIPPED | OUTPATIENT
Start: 2023-05-08

## 2023-05-08 NOTE — TELEPHONE ENCOUNTER
Looks like it was denied due to you not being the prescriber  of this med. But, you gave him the last Rx?

## 2023-05-08 NOTE — TELEPHONE ENCOUNTER
From: Tommy Lefort III  To: Dr. Marianne Carter: 5/5/2023 1:45 PM EDT  Subject: Flecainide    I got a message from [facility] that my Flecainide refill was denied. Am I missing something?  Since I am out of pills, what do you suggest?

## 2023-05-10 NOTE — PROGRESS NOTES
Diagnosis: [x] PHILLIP (G47.33) [] CSA (G47.31) [] Apnea (G47.30)   Length of Need: [x] 15 Months [] 99 Months [] Other:   Machine (JOVAN!): [] Respironics Dream Station      Auto [] ResMed AirSense     Auto [] Other:     []  CPAP () [] Bilevel ()   Mode: [] Auto [] Spontaneous    Mode: [] Auto [] Spontaneous              Comfort Settings:      Humidifier: [] Heated ()        [x] Water chamber replacement ()/ 1 per 6 months        Mask:   [x] Nasal () /1 per 3 months [] Full Face () /1 per 3 months   [x] Patient choice -Size and fit mask [] Patient Choice - Size and fit mask   [] Dispense: [] Dispense:   [x] Headgear () / 1 per 3 months [] Headgear () / 1 per 3 months   [] Replacement Nasal Cushion ()/2 per month [] Interface Replacement ()/1 per month   [x] Replacement Nasal Pillows ()/2 per month         Tubing: [x] Heated ()/1 per 3 months    [] Standard ()/1 per 3 months [] Other:           Filters: [x] Non-disposable ()/1 per 6 months     [x] Ultra-Fine, Disposable ()/2 per month        Miscellaneous: [] Chin Strap ()/ 1 per 6 months [] O2 bleed-in:        LPM   [] Oxymetry on CPAP/Bilevel []  Other:         Start Order Date: 05/10/23    MEDICAL JUSTIFICATION:  I, the undersigned, certify that the above prescribed supplies are medically necessary for this patients wellbeing. In my opinion, the supplies are both reasonable and necessary in reference to accepted standards of medicalpractice in treatment of this patients condition. Don Rossi NP    NPI: [id number]       Order Signed Date: 05/10/23  [facility]  Pulmonary, Sleep, and Critical Care    Pulmonary, Sleep, and Critical Care  [address] [place] [address].  [facility], [facility] , [address]                                    Randy De La Cruz  Phone: [phone]    Fax:

## 2023-05-11 ENCOUNTER — ANTI-COAG VISIT (OUTPATIENT)
Dept: PHARMACY | Age: 68
End: 2023-05-11
Payer: MEDICARE

## 2023-05-11 DIAGNOSIS — I48.0 PAROXYSMAL ATRIAL FIBRILLATION (HCC): Primary | Chronic | ICD-10-CM

## 2023-05-11 DIAGNOSIS — I26.99 OTHER PULMONARY EMBOLISM WITHOUT ACUTE COR PULMONALE, UNSPECIFIED CHRONICITY (HCC): ICD-10-CM

## 2023-05-11 LAB — INTERNATIONAL NORMALIZATION RATIO, POC: 2.1

## 2023-05-11 PROCEDURE — 99211 OFF/OP EST MAY X REQ PHY/QHP: CPT | Performed by: PHARMACIST

## 2023-05-11 PROCEDURE — 85610 PROTHROMBIN TIME: CPT | Performed by: PHARMACIST

## 2023-05-11 NOTE — PROGRESS NOTES
prescribed Reports compliance with medications    Signs/symptoms of bleeding Hx of hematuria 9/2022   Vitamin K intake Normally avoids high vitamin k foods   Eats yue salads daily   Recent vomiting/diarrhea/fever, changes in weight or activity level None reported   Tobacco or alcohol use Patient denies smoking   Patient reports having 2 drinks per week   Upcoming surgeries or procedures Possible cystoscopy? Assessment/Plan:  Patient's INR was therapeutic (2.1) today. INR was previously thought to be elevated possibly due a decrease in vitamin K foods while visiting his daughter in [place]. He has another trip scheduled for approximately May 15-Jacquie 15 to [place] again. Today he reports his diet is back to normal, however he did have some elevated INRs prior to traveling, so will keep warfarin dose the same for now. He denies any other changes to medications, diet or lifestyle. He denies bleeding. Patient was instructed to continue warfarin 7 mg daily except 5 mg on Thursdays. Repeat INR in 6 weeks upon return from [address] [facility]. Patient was reminded to maintain consistent vitamin K intake and call with any bleeding, medication changes, or fever/vomiting/diarrhea. Patient understands dosing directions and information discussed. Dosing schedule and follow up appointment given to patient. Progress note routed to referring physician's office. Patient acknowledges working in consult agreement with pharmacist as referred by his/her physician. Next Clinic Appointment:  6/22     Please call [facility] at [phone] with any questions.      Lesley Knight, PharmD, BCPS  [facility]: [phone]  [facility]: [phone]  5/11/2023 9:24 AM    For Pharmacy Admin Tracking Only  Time Spent (min): 15

## 2023-06-22 ENCOUNTER — ANTI-COAG VISIT (OUTPATIENT)
Dept: PHARMACY | Age: 68
End: 2023-06-22
Payer: MEDICARE

## 2023-06-22 DIAGNOSIS — I26.99 OTHER PULMONARY EMBOLISM WITHOUT ACUTE COR PULMONALE, UNSPECIFIED CHRONICITY (HCC): ICD-10-CM

## 2023-06-22 DIAGNOSIS — I48.0 PAROXYSMAL ATRIAL FIBRILLATION (HCC): Primary | Chronic | ICD-10-CM

## 2023-06-22 LAB — INTERNATIONAL NORMALIZATION RATIO, POC: 2.4

## 2023-06-22 PROCEDURE — 85610 PROTHROMBIN TIME: CPT | Performed by: PHARMACIST

## 2023-06-22 PROCEDURE — 99211 OFF/OP EST MAY X REQ PHY/QHP: CPT | Performed by: PHARMACIST

## 2023-06-22 NOTE — PROGRESS NOTES
ANTICOAGULATION SERVICE    Jhon Fisher III is a 70 y.o. male with PMHx significant for afib, pulmonary HTN, HLD, DM2, hx renal cell carcinoma, and hx PE (2002, after ablation) who presents to clinic 6/22/2023 for anticoagulation monitoring and adjustment.     Anticoagulation Indication(s):  Afib, hx of PE (2002)   Referring Physician:   Dr. Bryce Epstein (also follows with Dr. Mike Arrington, cardiology)   Goal INR Range:  2-3  Duration of Anticoagulation Therapy:  Indefinite  Time of day dose taken: PM  Product patient has at home: warfarin 5 mg (peach color) and warfarin 1 mg (pink)     [id number] Score for Atrial Fibrillation Stroke Risk   Risk   Factors  Component Value   C CHF No 0   H HTN Yes 1   A2 Age >= 76 No,  (74 y.o.) 0   D DM Yes 1   S2 Prior Stroke/TIA No 0   V Vascular Disease No 0   A Age 74-69 Yes,  (74 y.o.) 1   Sc Sex male 0    [id number]  Score  3   Score last updated 11/4/22 9:36 AM EDT    INR Summary                           Warfarin regimen (mg)  Date INR   A/P   Sun Mon Tue Wed Thu Fri Sat Mg/wk  6/22 2.4 At goal, continue 7 7 7 7 5 7 7 47  5/11 2.1 At goal, continue 7 7 7 7 5 7 7 47  4/6 2.2 At goal, continue 7 7 7 7 5 7 7 47  3/9 2.4 At goal, continue  7 7 7 7 5 7 7 47  2/16 3.5 Above goal, decrease 7 7 7 7 5 7 7 47  1/5 3.2 Above goal, continue 7 7 7 7 7 7 7 49  11/21 2.1 At goal, continue 7 7 7 7 7 7 7 49  11/4 3.6 Above goal, holdx1 7 7 7 7 7 0/7 7 49  9/18 3.06 Per [facility] ER   5/18 2.56 Per PCP  7 7 7 7 7 7 7 49  4/20 2.23  2/22 2.13  1/4 2.36    Last CBC:  Lab Results   Component Value Date    RBC 4.98 02/16/2023    HGB 14.4 02/16/2023    HCT 44.3 02/16/2023    MCV 89.1 02/16/2023    MCH 28.8 02/16/2023    MPV 9.9 02/16/2023    RDW 14.6 02/16/2023     02/16/2023       Patient History:  Recent hospitalizations/HC visits None    Recent medication changes 12/13 Crestor switched to Lipitor   Medications taken regularly that may interact with warfarin or alter INR Fenofibrate 145 mg daily

## 2023-08-02 RX ORDER — FLECAINIDE ACETATE 100 MG/1
TABLET ORAL
Qty: 180 TABLET | Refills: 0 | Status: SHIPPED | OUTPATIENT
Start: 2023-08-02

## 2023-08-03 ENCOUNTER — ANTI-COAG VISIT (OUTPATIENT)
Dept: PHARMACY | Age: 68
End: 2023-08-03
Payer: MEDICARE

## 2023-08-03 DIAGNOSIS — I26.99 OTHER PULMONARY EMBOLISM WITHOUT ACUTE COR PULMONALE, UNSPECIFIED CHRONICITY (HCC): ICD-10-CM

## 2023-08-03 DIAGNOSIS — I48.0 PAROXYSMAL ATRIAL FIBRILLATION (HCC): Primary | Chronic | ICD-10-CM

## 2023-08-03 LAB — INTERNATIONAL NORMALIZATION RATIO, POC: 3.1

## 2023-08-03 PROCEDURE — 99211 OFF/OP EST MAY X REQ PHY/QHP: CPT | Performed by: PHARMACIST

## 2023-08-03 PROCEDURE — 85610 PROTHROMBIN TIME: CPT | Performed by: PHARMACIST

## 2023-08-03 NOTE — PROGRESS NOTES
or alter INR Fenofibrate 145 mg daily   Divalproex 250 mg TID    Warfarin dose taken as prescribed Reports compliance with medications    Signs/symptoms of bleeding Hx of hematuria 9/2022   Vitamin K intake Normally avoids high vitamin k foods   Eats yue salads daily   Recent vomiting/diarrhea/fever, changes in weight or activity level None reported   Tobacco or alcohol use Patient denies smoking   Patient reports having 2 drinks per week   Upcoming surgeries or procedures Possible cystoscopy? Assessment/Plan:  Patient's INR was slightly supratherapeutic (3.1) today. He denies any changes to medications, diet or lifestyle. He denies bleeding. Patient was instructed to continue warfarin 7 mg daily except 5 mg on Thursdays. Repeat INR in 2 weeks to coordinate with PCP appt, if stable will continue 6 week visits. Patient was reminded to maintain consistent vitamin K intake and call with any bleeding, medication changes, or fever/vomiting/diarrhea. Patient understands dosing directions and information discussed. Dosing schedule and follow up appointment given to patient. Progress note routed to referring physician's office. Patient acknowledges working in consult agreement with pharmacist as referred by his/her physician. Next Clinic Appointment:  8/17      Please call [facility] Antico[facility] at [phone] with any questions.      Beverly Salazar, PharmD, BCPS  [facility] Medication Management [place]: [phone]  [place]: [phone]  8/3/2023 9:47 AM    For Pharmacy Admin Tracking Only  Time Spent (min): 15

## 2023-08-16 ENCOUNTER — HOSPITAL ENCOUNTER (OUTPATIENT)
Dept: GENERAL RADIOLOGY | Age: 68
Discharge: HOME | End: 2023-08-16
Payer: MEDICARE

## 2023-08-16 ENCOUNTER — TELEPHONE (OUTPATIENT)
Dept: INTERNAL MEDICINE CLINIC | Age: 68
End: 2023-08-16

## 2023-08-16 ENCOUNTER — OFFICE VISIT (OUTPATIENT)
Dept: INTERNAL MEDICINE CLINIC | Age: 68
End: 2023-08-16

## 2023-08-16 VITALS
DIASTOLIC BLOOD PRESSURE: 78 MMHG | SYSTOLIC BLOOD PRESSURE: 116 MMHG | WEIGHT: 247 LBS | OXYGEN SATURATION: 97 % | BODY MASS INDEX: 30.71 KG/M2 | HEART RATE: 64 BPM | HEIGHT: 75 IN | TEMPERATURE: 97.2 F

## 2023-08-16 DIAGNOSIS — R06.02 SHORTNESS OF BREATH: ICD-10-CM

## 2023-08-16 DIAGNOSIS — R05.1 ACUTE COUGH: ICD-10-CM

## 2023-08-16 DIAGNOSIS — R05.1 ACUTE COUGH: Primary | ICD-10-CM

## 2023-08-16 PROCEDURE — 71046 X-RAY EXAM CHEST 2 VIEWS: CPT

## 2023-08-16 RX ORDER — AZITHROMYCIN 250 MG/1
250 TABLET, FILM COATED ORAL SEE ADMIN INSTRUCTIONS
Qty: 6 TABLET | Refills: 0 | Status: SHIPPED | OUTPATIENT
Start: 2023-08-16 | End: 2023-08-21

## 2023-08-16 RX ORDER — PENICILLIN V POTASSIUM 500 MG/1
TABLET ORAL
COMMUNITY
Start: 2023-08-07

## 2023-08-16 ASSESSMENT — ENCOUNTER SYMPTOMS
ABDOMINAL DISTENTION: 0
SINUS PAIN: 0
SORE THROAT: 0
RHINORRHEA: 1
VOMITING: 0
CONSTIPATION: 0
ABDOMINAL PAIN: 0
NAUSEA: 0
COUGH: 1
SINUS PRESSURE: 0
SHORTNESS OF BREATH: 1
TROUBLE SWALLOWING: 0
BLOOD IN STOOL: 0
VOICE CHANGE: 0
WHEEZING: 1
BACK PAIN: 0
DIARRHEA: 0
CHEST TIGHTNESS: 0

## 2023-08-16 NOTE — TELEPHONE ENCOUNTER
Remy Aviles from [facility] states the two medications listed below can cause an arhythmia/ bad interaction and wants to be sure that Dr. Nain Don is ok with patient taking both.      Remy Aviles [phone]        azithromycin (ZITHROMAX) 250 MG tablet [[id number]]    flecainide (TAMBOCOR) 100 MG tablet [[id number]]

## 2023-08-16 NOTE — PROGRESS NOTES
[facility] Internal Medicine  Follow-up visit   2023    Sathish Buck III (:  1955) is a 70 y.o. male, here for follow-up:    Chief Complaint   Patient presents with    Cough     Seen at Urgent Care  for bronchitis        HPI  2 weeks of cough. He was seen in urgent care. He denies any fevers or chills. Cough is dry. Patient has mild nasal congestion and runny nose. He denies any throat pain or trouble swallowing. We will have some wheezing when he wakes up first thing in the morning. Non-smoker. Patient has had negative COVID testing since onset of symptoms. Patient to urgent care and has been prescribed Pen-Vee K as well as Tessalon Perles. Completed 8 days of therapy. He has not improved at all    ROS  Review of Systems   Constitutional:  Negative for activity change, appetite change, chills, diaphoresis, fatigue, fever and unexpected weight change. HENT:  Positive for congestion and rhinorrhea. Negative for sinus pressure, sinus pain, sore throat, trouble swallowing and voice change. Eyes:  Negative for visual disturbance. Respiratory:  Positive for cough, shortness of breath and wheezing. Negative for chest tightness. Cardiovascular:  Negative for chest pain, palpitations and leg swelling. Gastrointestinal:  Negative for abdominal distention, abdominal pain, blood in stool, constipation, diarrhea, nausea and vomiting. Endocrine: Negative for polydipsia and polyphagia. Genitourinary:  Negative for decreased urine volume, difficulty urinating, dysuria and urgency. Musculoskeletal:  Negative for back pain, gait problem, joint swelling and myalgias. Neurological:  Negative for dizziness, seizures, syncope, light-headedness and headaches. Psychiatric/Behavioral:  Negative for agitation, behavioral problems, confusion and suicidal ideas.       HISTORIES  Current Outpatient Medications on File Prior to Visit   Medication Sig Dispense Refill    penicillin v potassium

## 2023-08-17 ENCOUNTER — OFFICE VISIT (OUTPATIENT)
Dept: CARDIOLOGY CLINIC | Age: 68
End: 2023-08-17
Payer: MEDICARE

## 2023-08-17 ENCOUNTER — ANTI-COAG VISIT (OUTPATIENT)
Dept: PHARMACY | Age: 68
End: 2023-08-17
Payer: MEDICARE

## 2023-08-17 VITALS
HEART RATE: 58 BPM | SYSTOLIC BLOOD PRESSURE: 122 MMHG | BODY MASS INDEX: 30.9 KG/M2 | DIASTOLIC BLOOD PRESSURE: 78 MMHG | WEIGHT: 247.2 LBS

## 2023-08-17 DIAGNOSIS — R00.2 PALPITATION: ICD-10-CM

## 2023-08-17 DIAGNOSIS — I48.0 PAROXYSMAL ATRIAL FIBRILLATION (HCC): Primary | Chronic | ICD-10-CM

## 2023-08-17 DIAGNOSIS — I26.99 OTHER PULMONARY EMBOLISM WITHOUT ACUTE COR PULMONALE, UNSPECIFIED CHRONICITY (HCC): ICD-10-CM

## 2023-08-17 DIAGNOSIS — I27.20 PULMONARY HTN (HCC): ICD-10-CM

## 2023-08-17 DIAGNOSIS — I48.0 PAROXYSMAL ATRIAL FIBRILLATION (HCC): Primary | ICD-10-CM

## 2023-08-17 DIAGNOSIS — Z86.711 HX PULMONARY EMBOLISM: ICD-10-CM

## 2023-08-17 LAB — INR BLD: 3.1

## 2023-08-17 PROCEDURE — 3074F SYST BP LT 130 MM HG: CPT | Performed by: INTERNAL MEDICINE

## 2023-08-17 PROCEDURE — 99213 OFFICE O/P EST LOW 20 MIN: CPT | Performed by: INTERNAL MEDICINE

## 2023-08-17 PROCEDURE — 3017F COLORECTAL CA SCREEN DOC REV: CPT | Performed by: INTERNAL MEDICINE

## 2023-08-17 PROCEDURE — 85610 PROTHROMBIN TIME: CPT | Performed by: PHARMACIST

## 2023-08-17 PROCEDURE — 1036F TOBACCO NON-USER: CPT | Performed by: INTERNAL MEDICINE

## 2023-08-17 PROCEDURE — 1123F ACP DISCUSS/DSCN MKR DOCD: CPT | Performed by: INTERNAL MEDICINE

## 2023-08-17 PROCEDURE — 99212 OFFICE O/P EST SF 10 MIN: CPT | Performed by: PHARMACIST

## 2023-08-17 PROCEDURE — G8417 CALC BMI ABV UP PARAM F/U: HCPCS | Performed by: INTERNAL MEDICINE

## 2023-08-17 PROCEDURE — 3078F DIAST BP <80 MM HG: CPT | Performed by: INTERNAL MEDICINE

## 2023-08-17 PROCEDURE — G8427 DOCREV CUR MEDS BY ELIG CLIN: HCPCS | Performed by: INTERNAL MEDICINE

## 2023-08-17 NOTE — PROGRESS NOTES
azithromycin (ZITHROMAX) 250 MG tablet Take 1 tablet by mouth See Admin Instructions for 5 days 500mg on day 1 followed by 250mg on days 2 - 5 6 tablet 0    flecainide (TAMBOCOR) 100 MG tablet TAKE ONE TABLET BY MOUTH TWICE A  tablet 0    divalproex (DEPAKOTE) 250 MG DR tablet TAKE ONE TABLET BY MOUTH THREE TIMES A  tablet 1    atorvastatin (LIPITOR) 80 MG tablet Take 1 tablet by mouth daily 90 tablet 1    metFORMIN (GLUCOPHAGE-XR) 500 MG extended release tablet Take 1 tablet by mouth daily (with breakfast) 90 tablet 1    fenofibrate (TRICOR) 145 MG tablet TAKE 1 TABLET BY MOUTH EVERY DAY 90 tablet 3    metoprolol succinate (TOPROL XL) 50 MG extended release tablet TAKE 1 TABLET BY MOUTH EVERY DAY 90 tablet 3    SUMAtriptan (IMITREX) 100 MG tablet TAKE ONE TABLET BY MOUTH ONCE AS NEEDED; MAY REPEAT IN 2 HOURS IF NEEDED. DO NOT EXCEED TWO TABLETS IN 24 HOURS. 27 tablet 3    warfarin (COUMADIN) 1 MG tablet TAKE 1 TABLET BY MOUTH TWICE A  tablet 3    warfarin (COUMADIN) 5 MG tablet TAKE 1 TABLET BY MOUTH EVERY DAY 90 tablet 3    vitamin D3 (CHOLECALCIFEROL) 25 MCG (1000 UT) TABS tablet Take 2 tablets by mouth daily 180 tablet 1    amLODIPine (NORVASC) 5 MG tablet Take 1 tablet by mouth daily 90 tablet 1    dorzolamide (TRUSOPT) 2 % ophthalmic solution 2 drops 3 times daily      LUMIGAN 0.01 % SOLN ophthalmic drops Place 1 drop into both eyes nightly      fexofenadine (ALLEGRA) 180 MG tablet Take 1 tablet by mouth daily as needed       No current facility-administered medications for this visit. Vitals:    08/17/23 1016   BP: 122/78   Pulse: 58           Wt 247 down      Review of Systems   Constitutional: Negative for activity change and fatigue. Respiratory: Negative for apnea, cough, choking, chest tightness and shortness of breath. Cardiovascular: Negative for chest pain, palpitations and leg swelling. [No PND or orthopnea. No tachycardia.   Gastrointestinal: Negative for

## 2023-08-17 NOTE — PROGRESS NOTES
ANTICOAGULATION SERVICE    Martina Sheriff III is a 70 y.o. male with PMHx significant for afib, pulmonary HTN, HLD, DM2, hx renal cell carcinoma, and hx PE (2002, after ablation) who presents to clinic 8/17/2023 for anticoagulation monitoring and adjustment.     Anticoagulation Indication(s):  Afib, hx of PE (2002)   Referring Physician:   Dr. Bahman Costa (also follows with Dr. Geronimo Montano, cardiology)   Goal INR Range:  2-3  Duration of Anticoagulation Therapy:  Indefinite  Time of day dose taken: PM  Product patient has at home: warfarin 5 mg (peach color) and warfarin 1 mg (pink)     [id number] Score for Atrial Fibrillation Stroke Risk   Risk   Factors  Component Value   C CHF No 0   H HTN Yes 1   A2 Age >= 76 No,  (74 y.o.) 0   D DM Yes 1   S2 Prior Stroke/TIA No 0   V Vascular Disease No 0   A Age 77-78 Yes,  (74 y.o.) 1   Sc Sex male 0    [id number]  Score  3   Score last updated 11/4/22 9:36 AM EDT    INR Summary                           Warfarin regimen (mg)  Date INR   A/P   Sun Mon Tue Wed Thu Fri Sat Mg/wk  8/17 3.1 Above goal, decrease 7 5 7 7 5 7 7 45  8/3 3.1 Above goal, continue 7 7 7 7 5 7 7 47  6/22 2.4 At goal, continue 7 7 7 7 5 7 7 47  5/11 2.1 At goal, continue 7 7 7 7 5 7 7 47  4/6 2.2 At goal, continue 7 7 7 7 5 7 7 47  3/9 2.4 At goal, continue  7 7 7 7 5 7 7 47  2/16 3.5 Above goal, decrease 7 7 7 7 5 7 7 47  1/5 3.2 Above goal, continue 7 7 7 7 7 7 7 49  11/21 2.1 At goal, continue 7 7 7 7 7 7 7 49  11/4 3.6 Above goal, holdx1 7 7 7 7 7 0/7 7 49  9/18 3.06 Per [facility] ER   5/18 2.56 Per PCP  7 7 7 7 7 7 7 49  4/20 2.23  2/22 2.13  1/4 2.36    Last CBC:  Lab Results   Component Value Date    RBC 4.69 08/16/2023    HGB 14.4 08/16/2023    HCT 40.8 08/16/2023    MCV 86.9 08/16/2023    MCH 30.7 08/16/2023    MPV 9.9 08/16/2023    RDW 14.5 08/16/2023     08/16/2023       Patient History:  Recent hospitalizations/HC visits None    Recent medication changes 8/17-8/21: azithromycin 250 mg x 5 days  8/7:

## 2023-08-24 ENCOUNTER — TELEPHONE (OUTPATIENT)
Dept: PHARMACY | Age: 68
End: 2023-08-24

## 2023-08-24 ENCOUNTER — TELEPHONE (OUTPATIENT)
Dept: CARDIOLOGY CLINIC | Age: 68
End: 2023-08-24

## 2023-08-24 ENCOUNTER — OFFICE VISIT (OUTPATIENT)
Dept: INTERNAL MEDICINE CLINIC | Age: 68
End: 2023-08-24

## 2023-08-24 VITALS
HEART RATE: 58 BPM | WEIGHT: 248 LBS | SYSTOLIC BLOOD PRESSURE: 124 MMHG | BODY MASS INDEX: 30.84 KG/M2 | HEIGHT: 75 IN | OXYGEN SATURATION: 98 % | DIASTOLIC BLOOD PRESSURE: 80 MMHG | TEMPERATURE: 97.5 F

## 2023-08-24 DIAGNOSIS — G43.909 MIGRAINE WITHOUT STATUS MIGRAINOSUS, NOT INTRACTABLE, UNSPECIFIED MIGRAINE TYPE: Chronic | ICD-10-CM

## 2023-08-24 DIAGNOSIS — E11.9 TYPE 2 DIABETES MELLITUS WITHOUT COMPLICATION, WITHOUT LONG-TERM CURRENT USE OF INSULIN (HCC): Chronic | ICD-10-CM

## 2023-08-24 DIAGNOSIS — Z01.818 PREOP EXAM FOR INTERNAL MEDICINE: ICD-10-CM

## 2023-08-24 DIAGNOSIS — I10 ESSENTIAL HYPERTENSION: Chronic | ICD-10-CM

## 2023-08-24 DIAGNOSIS — I26.99 OTHER PULMONARY EMBOLISM WITHOUT ACUTE COR PULMONALE, UNSPECIFIED CHRONICITY (HCC): ICD-10-CM

## 2023-08-24 DIAGNOSIS — I48.0 PAROXYSMAL ATRIAL FIBRILLATION (HCC): Chronic | ICD-10-CM

## 2023-08-24 DIAGNOSIS — C64.1 RENAL CELL CARCINOMA OF RIGHT KIDNEY (HCC): ICD-10-CM

## 2023-08-24 RX ORDER — ATORVASTATIN CALCIUM 80 MG/1
80 TABLET, FILM COATED ORAL DAILY
Qty: 90 TABLET | Refills: 1 | Status: SHIPPED | OUTPATIENT
Start: 2023-08-24

## 2023-08-24 RX ORDER — DIVALPROEX SODIUM 250 MG/1
250 TABLET, DELAYED RELEASE ORAL 2 TIMES DAILY
Qty: 180 TABLET | Refills: 1
Start: 2023-08-24

## 2023-08-24 RX ORDER — SILDENAFIL CITRATE 20 MG/1
TABLET ORAL
COMMUNITY
Start: 2023-06-15

## 2023-08-24 ASSESSMENT — ENCOUNTER SYMPTOMS
ABDOMINAL PAIN: 0
EYE REDNESS: 0
COLOR CHANGE: 0
DIARRHEA: 0
NAUSEA: 0
COUGH: 0
SHORTNESS OF BREATH: 0

## 2023-08-24 NOTE — TELEPHONE ENCOUNTER
Rc Maldonado III, 1955    Cardiac Risk Assessment    What type of procedure are you having?:  Right Thumb Arthroplasty Surgery/ regional anesthesia sedation    When is your procedure scheduled for?: 09/06/23    What physician is performing your procedure?: Dr. Liborio Urbina    Phone Number: [phone]    Fax number to send the letter: [phone] (Attn: Brenda Gutierrez)    Cardiologist: Dr. Rebeca Fitzgerald     Last Appointment: 08/17/23    Next Appointment: 02/20/24    Are you on any blood thinners?: Coumadin (Up to Dr. Rebeca Fitzgerald on how long to hold)    History of Coronary Artery Disease:    Last stress test: 05/06/22    Last echo: 05/06/22

## 2023-08-24 NOTE — TELEPHONE ENCOUNTER
CARDIAC CLEARANCE     What type of procedure are you having? Right hand surgery    Which physician is performing your procedure? Madeline Rosenthal    When is your procedure scheduled for? September 6.2023    Where are you having this procedure? [facility]    Are you taking Blood Thinners? Yes. Coumadin   If so what? (Name/dose/frequesncy)     Does the surgeon want you to stop your blood thinner? If so for how long?  Stop Coumadin for 3 days    Phone Number and Contact Name for Physicians office:    [phone]  Fax number to send information:  [phone]    Patient also needs to know if he needs to stop flecenaide

## 2023-08-24 NOTE — PATIENT INSTRUCTIONS
Medication adjustments:   Diabetes Instructions: hold oral diabetic medications the morning of surgery    Hypertension instructions: can continue metoprolol and amlodipine as usual     Check with cardiology regarding flecainide and that ok with holding coumadin 3 days     No NSAIDs (ibuprofen, aleve, advil, aspirin, motrin etc) for 1 week prior to your surgery. If you have pain, you can take tylenol 1000mg every 8 hours as needed.

## 2023-08-24 NOTE — ASSESSMENT & PLAN NOTE
-S/p partial right nephrectomy 12/2018, pathology T1a clear-cell renal cell carcinoma negative margins  Dr. Mani Greene is clear plan to complete hematuria work-up with local cystoscopy   -plan for cysto, awaiting to hear back from urology.

## 2023-08-24 NOTE — ASSESSMENT & PLAN NOTE
A1c up to 7.1% 02/23, with known proteinuria and we started metformin 02/23. Overall doing well from a clinical standpoint. - continue [id number] mg  - Repeat HbA1c. AIC goal< 7    - BP is within desired goal. Not on ace/arb  - we increased  lipitor to 80 mg 02/23. on tricor 145. Repeat lipids  Lab Results   Component Value Date    LDLCALC 129 (H) 02/16/2023    CREATININE 1.4 (H) 08/16/2023   - Microalbuminuria 50 2/22, repeat now  - uptodate PSV23 vaccine , flu shot.

## 2023-08-24 NOTE — ASSESSMENT & PLAN NOTE
Here for preoperative eval.  Patient's revised cardiac risk stratification is 0 points. ASA class III. No current complaints to suggest active cardiac heart disease. No indication for further cardiac work up prior to surgery at this time and can proceed with procedure with acceptable risk. - Known PHILLIP. Recommend close intra/postop pulmonary monitoring   - noted GFR decrease on recent labs, repeat before surgery   - Pt was instructed to not take ASA or NSAIDs 7 days prior to surgery. - hold hypoglycemic meds on surgery morning.    - Continue BB / CCB  as prescribe, no need to hold on surgery day   - Contact cardiologist for flecainide preop adjustments  - per surgeon to hold coumadin 3 days prior to surgery, ok to hold from medical stand point (hx of 1 PE and afib, no hxof CVA) but asked to make sure his cardiologist is ok with this as well.

## 2023-08-24 NOTE — ASSESSMENT & PLAN NOTE
-sees dr Phong King  -on metoprolol 50mg, flecainide 100mg bid. Can continue metoprolol on surgery day, flecainide adjustment per cardiology    -on coumadin per coumadin clinic, ok to hold preop for 3 days as per surgeon without bridging from medical stand point as long as ok per cards.

## 2023-08-24 NOTE — ASSESSMENT & PLAN NOTE
-much improved since retired  -had been on depakote 250 mg TID. wiches to wean off will try to lower to 250 mg bid after recovering from surgery surgery if possible   -continue Imitrex (not taking almost at all lately maybe once a month)  -tried topomax?

## 2023-08-24 NOTE — PROGRESS NOTES
[facility]  Preoperative visit   8/24/2023    Patient is here for preop evaluation at the request of Dr. Amanuel Guerrero for arthroplasty right tumb . Is scheduled for surgery on 09/06/23    Functional Assessment:  Exercise tolerance: 7.99 METS using the DASI calculator   Denies any current chest pain or discomfort, sob or ALMODOVAR, orthopnea, PND or leg swelling. Medications: reviewed, Over the counter (NSAIDs) use: no    Cardiac Risk:  High-risk Surgery: n / Hx of ischemic heart disease: n / Hx of CHF:n / Hx of CVA:n / Pre-op insulin: n / Pre-op creatinine >2.0: n (last cr 1.4 )    PHILLIP Screen: known PHILLIP    History of surgery/ anesthesia:  - No hx of complications, anesthesia reaction, bleeding, bruising, clots  - No family Hx of reactions to anesthesia/ bleeding/clots  - No loose teeth/ dentures      - Support systems after surgery - friend   - Smoking/ alcohol/drugs - no  - Complicating medical diseases are currently well controlled.        Problem List  Patient Active Problem List   Diagnosis    Atrial fibrillation ([address])    Pulmonary embolism (HCC)    Hyperlipemia    Vitreous degeneration    Obstructive sleep apnea syndrome    Migraine    Hx of colonic polyps    Glaucoma    Chronic right-sided low back pain with sciatica    Tremor    Type 2 diabetes mellitus without complication, without long-term current use of insulin (HCC)    Renal cell carcinoma of right kidney (HCC)    Arthritis of carpometacarpal (CMC) joint of right thumb    Essential hypertension    Hearing loss    Class 1 obesity due to excess calories with serious comorbidity and body mass index (BMI) of 31.0 to 31.9 in adult    Pulmonary HTN ([address])    Other proteinuria    Preop exam for internal medicine       Medical and Surgical History  Past Medical History:   Diagnosis Date    Allergic rhinitis due to other allergen     Atrial fibrillation ([address])     Cancer ([address])     right kidney    Cataract 11/30/2016    Glaucoma     both eyes    Hx of

## 2023-08-24 NOTE — TELEPHONE ENCOUNTER
Patient is having hand surgery on 9/6 and will need to be off his warfarin x3 days. Given Chads-Vasc = 3, do not anticipate bridging is necessary (especially with only a 3 day hold). Cardiac clearance was sent to Dr. Shoshana Parra. Will follow-up on recommendations.      Chris Ocasio, PharmD, BCPS  [facility]  [place]: [phone]  [place]: [phone]  8/24/2023 2:12 PM

## 2023-08-25 DIAGNOSIS — I10 ESSENTIAL HYPERTENSION: Chronic | ICD-10-CM

## 2023-08-25 DIAGNOSIS — E11.9 TYPE 2 DIABETES MELLITUS WITHOUT COMPLICATION, WITHOUT LONG-TERM CURRENT USE OF INSULIN (HCC): Chronic | ICD-10-CM

## 2023-08-25 LAB
ANION GAP SERPL CALCULATED.3IONS-SCNC: 13 MMOL/L (ref 3–16)
BUN SERPL-MCNC: 24 MG/DL (ref 7–20)
CALCIUM SERPL-MCNC: 9.6 MG/DL (ref 8.3–10.6)
CHLORIDE SERPL-SCNC: 103 MMOL/L (ref 99–110)
CHOLEST SERPL-MCNC: 200 MG/DL (ref 0–199)
CO2 SERPL-SCNC: 24 MMOL/L (ref 21–32)
CREAT SERPL-MCNC: 1.4 MG/DL (ref 0.8–1.3)
GFR SERPLBLD CREATININE-BSD FMLA CKD-EPI: 54 ML/MIN/{1.73_M2}
GLUCOSE SERPL-MCNC: 124 MG/DL (ref 70–99)
HDLC SERPL-MCNC: 36 MG/DL (ref 40–60)
LDLC SERPL CALC-MCNC: 114 MG/DL
POTASSIUM SERPL-SCNC: 4.5 MMOL/L (ref 3.5–5.1)
SODIUM SERPL-SCNC: 140 MMOL/L (ref 136–145)
TRIGL SERPL-MCNC: 250 MG/DL (ref 0–150)
VLDLC SERPL CALC-MCNC: 50 MG/DL

## 2023-08-26 LAB
EST. AVERAGE GLUCOSE BLD GHB EST-MCNC: 142.7 MG/DL
HBA1C MFR BLD: 6.6 %

## 2023-08-28 RX ORDER — ATORVASTATIN CALCIUM 80 MG/1
TABLET, FILM COATED ORAL
Qty: 90 TABLET | Refills: 1 | Status: SHIPPED | OUTPATIENT
Start: 2023-08-28

## 2023-08-28 NOTE — TELEPHONE ENCOUNTER
09/01/2023 Last coumadin dose    09/02/2023 No Coumadin or Lovenox    09/03/2023 Lovenox injection twice daily (12 hours apart)    09/04/2023 Lovenox injection twice daily (12 hours apart)    09/05/2023 Lovenox injection morning dose only (24 hours prior to procedure)    09/06/2023 Day of Procedure. No Lovenox.  Resume Coumadin in evening     09/07/2023 Lovenox injection twice daily and Coumadin in the evening    09/08/2023 Lovenox injection twice daily and Coumadin in the evening    09/09/2023 Lovenox injection twice daily and Coumadin in the evening    09/10/2023 Continue Coumadin only from this day on    09/11/2023 Get PT/INR done

## 2023-08-29 DIAGNOSIS — N28.9 FUNCTION KIDNEY DECREASED: Primary | ICD-10-CM

## 2023-08-30 NOTE — TELEPHONE ENCOUNTER
Dr. Sly Paez would like patient to bridge with Lovenox for procedure. Will discuss plan with patient at appointment tomorrow. Jaimee-procedural Anticoagulation Management    Procedure: Hand surgery   Date of Procedure: 9/6  Current warfarin dosage: 5 mg on Mondays and Thursdays and 7 mg all other days   Weight: 112 kg   Enoxaparin (Lovenox) Dosage: 100 mg twice daily     Date Days to procedure AM PM   Friday 9/1 -5 None Last dose of warfarin   Saturday 9/2 -4 None None   Albino 9/3 -3 Lovenox 100 mg Lovenox 100 mg   Monday 9/4 -2 Lovenox 100 mg Lovenox 100 mg   Tuesday 9/5 -1 Lovenox 100 mg None   Wednesday 9/6 Procedure None Warfarin 7 mg   Thursday 9/7 +1 Lovenox 100 mg Lovenox 100 mg  Warfarin 7 mg   Friday 9/8 +2 Lovenox 100 mg Lovenox 100 mg  Warfarin 7 mg   Saturday 9/9 +3 Lovenox 100 mg Lovenox 100 mg  Warfarin 7 mg   Albino 9/10 +4 Lovenox 100 mg Lovenox 100 mg  Warfarin 7 mg   Monday 9/11 +5 Lovenox 100 mg Lovenox 100 mg   Warfarin 7 mg   Tuesday 9/12 +6  Warfarin 7 mg   Wednesday 9/13 +7 INR check and further warfarin/Lovenox dosing will be decided at that time.      Tania Colmenares, PharmD, [facility]  [facility]  [place]: [phone]  [facility]: [phone]  8/30/2023 2:35 PM

## 2023-08-31 ENCOUNTER — ANTI-COAG VISIT (OUTPATIENT)
Dept: PHARMACY | Age: 68
End: 2023-08-31
Payer: MEDICARE

## 2023-08-31 DIAGNOSIS — I48.0 PAROXYSMAL ATRIAL FIBRILLATION (HCC): Primary | Chronic | ICD-10-CM

## 2023-08-31 DIAGNOSIS — I26.99 OTHER PULMONARY EMBOLISM WITHOUT ACUTE COR PULMONALE, UNSPECIFIED CHRONICITY (HCC): ICD-10-CM

## 2023-08-31 LAB — INR BLD: 1.7

## 2023-08-31 PROCEDURE — 99213 OFFICE O/P EST LOW 20 MIN: CPT | Performed by: PHARMACIST

## 2023-08-31 PROCEDURE — 85610 PROTHROMBIN TIME: CPT | Performed by: PHARMACIST

## 2023-08-31 RX ORDER — ENOXAPARIN SODIUM 100 MG/ML
100 INJECTION SUBCUTANEOUS 2 TIMES DAILY
Qty: 15 ML | Refills: 0 | Status: SHIPPED | OUTPATIENT
Start: 2023-08-31

## 2023-08-31 NOTE — PROGRESS NOTES
ANTICOAGULATION SERVICE    Gabriella Hawkins III is a 70 y.o. male with PMHx significant for afib, pulmonary HTN, HLD, DM2, hx renal cell carcinoma, and hx PE (2002, after ablation) who presents to clinic 8/31/2023 for anticoagulation monitoring and adjustment.     Anticoagulation Indication(s):  Afib, hx of PE (2002)   Referring Physician:   Dr. Arturo Malik (also follows with Dr. Jagdeep Abdullahi, cardiology)   Goal INR Range:  2-3  Duration of Anticoagulation Therapy:  Indefinite  Time of day dose taken: PM  Product patient has at home: warfarin 5 mg (peach color) and warfarin 1 mg (pink)     [id number] Score for Atrial Fibrillation Stroke Risk   Risk   Factors  Component Value   C CHF No 0   H HTN Yes 1   A2 Age >= 76 No,  (74 y.o.) 0   D DM Yes 1   S2 Prior Stroke/TIA No 0   V Vascular Disease No 0   A Age 77-78 Yes,  (74 y.o.) 1   Sc Sex male 0    [id number]  Score  3   Score last updated 11/4/22 9:36 AM EDT    INR Summary                           Warfarin regimen (mg)  Date INR   A/P   Sun Mon Tue Wed Thu Fri Sat Mg/wk  8/31 1.7 Below goal, increase 7 7 7 7 5 7 7 45  8/17 3.1 Above goal, decrease 7 5 7 7 5 7 7 45  8/3 3.1 Above goal, continue 7 7 7 7 5 7 7 47  6/22 2.4 At goal, continue 7 7 7 7 5 7 7 47  5/11 2.1 At goal, continue 7 7 7 7 5 7 7 47  4/6 2.2 At goal, continue 7 7 7 7 5 7 7 47  3/9 2.4 At goal, continue  7 7 7 7 5 7 7 47  2/16 3.5 Above goal, decrease 7 7 7 7 5 7 7 47  1/5 3.2 Above goal, continue 7 7 7 7 7 7 7 49  11/21 2.1 At goal, continue 7 7 7 7 7 7 7 49  11/4 3.6 Above goal, holdx1 7 7 7 7 7 0/7 7 49  9/18 3.06 Per [facility] ER   5/18 2.56 Per PCP  7 7 7 7 7 7 7 49  4/20 2.23  2/22 2.13  1/4 2.36    Last CBC:  Lab Results   Component Value Date    RBC 4.69 08/16/2023    HGB 14.4 08/16/2023    HCT 40.8 08/16/2023    MCV 86.9 08/16/2023    MCH 30.7 08/16/2023    MPV 9.9 08/16/2023    RDW 14.5 08/16/2023     08/16/2023       Patient History:  Recent hospitalizations/HC visits None    Recent medication

## 2023-09-13 ENCOUNTER — ANTI-COAG VISIT (OUTPATIENT)
Dept: PHARMACY | Age: 68
End: 2023-09-13
Payer: MEDICARE

## 2023-09-13 DIAGNOSIS — I48.0 PAROXYSMAL ATRIAL FIBRILLATION (HCC): Primary | Chronic | ICD-10-CM

## 2023-09-13 DIAGNOSIS — I26.99 OTHER PULMONARY EMBOLISM WITHOUT ACUTE COR PULMONALE, UNSPECIFIED CHRONICITY (HCC): ICD-10-CM

## 2023-09-13 LAB — INTERNATIONAL NORMALIZATION RATIO, POC: 2.2

## 2023-09-13 PROCEDURE — 99211 OFF/OP EST MAY X REQ PHY/QHP: CPT | Performed by: PHARMACIST

## 2023-09-13 PROCEDURE — 85610 PROTHROMBIN TIME: CPT | Performed by: PHARMACIST

## 2023-09-13 NOTE — PROGRESS NOTES
ANTICOAGULATION SERVICE    Korin Stevens III is a 70 y.o. male with PMHx significant for afib, pulmonary HTN, HLD, DM2, hx renal cell carcinoma, and hx PE (2002, after ablation) who presents to clinic 9/13/2023 for anticoagulation monitoring and adjustment.     Anticoagulation Indication(s):  Afib, hx of PE (2002)   Referring Physician:   Dr. Joy Conteh (also follows with Dr. Lamine Soliz, cardiology)   Goal INR Range:  2-3  Duration of Anticoagulation Therapy:  Indefinite  Time of day dose taken: PM  Product patient has at home: warfarin 5 mg (peach color) and warfarin 1 mg (pink)     [id number] Score for Atrial Fibrillation Stroke Risk   Risk   Factors  Component Value   C CHF No 0   H HTN Yes 1   A2 Age >= 76 No,  (74 y.o.) 0   D DM Yes 1   S2 Prior Stroke/TIA No 0   V Vascular Disease No 0   A Age 77-78 Yes,  (74 y.o.) 1   Sc Sex male 0    [id number]  Score  3   Score last updated 11/4/22 9:36 AM EDT    INR Summary                           Warfarin regimen (mg)  Date INR   A/P   Sun Mon Tue Wed Thu Fri Sat Mg/wk  9/13 2.2 At goal, continue  7 7 7 7 5 7 7 45  8/31 1.7 Below goal, increase 7 7 7 7 5 7 7 45  8/17 3.1 Above goal, decrease 7 5 7 7 5 7 7 45  8/3 3.1 Above goal, continue 7 7 7 7 5 7 7 47  6/22 2.4 At goal, continue 7 7 7 7 5 7 7 47  5/11 2.1 At goal, continue 7 7 7 7 5 7 7 47  4/6 2.2 At goal, continue 7 7 7 7 5 7 7 47  3/9 2.4 At goal, continue  7 7 7 7 5 7 7 47  2/16 3.5 Above goal, decrease 7 7 7 7 5 7 7 47  1/5 3.2 Above goal, continue 7 7 7 7 7 7 7 49  11/21 2.1 At goal, continue 7 7 7 7 7 7 7 49  11/4 3.6 Above goal, holdx1 7 7 7 7 7 0/7 7 49  9/18 3.06 Per [facility] ER   5/18 2.56 Per PCP  7 7 7 7 7 7 7 49  4/20 2.23  2/22 2.13  1/4 2.36    Last CBC:  Lab Results   Component Value Date    RBC 4.69 08/16/2023    HGB 14.4 08/16/2023    HCT 40.8 08/16/2023    MCV 86.9 08/16/2023    MCH 30.7 08/16/2023    MPV 9.9 08/16/2023    RDW 14.5 08/16/2023     08/16/2023       Patient History:  Recent

## 2023-09-23 PROBLEM — Z01.818 PREOP EXAM FOR INTERNAL MEDICINE: Status: RESOLVED | Noted: 2023-08-24 | Resolved: 2023-09-23

## 2023-09-28 ENCOUNTER — ANTI-COAG VISIT (OUTPATIENT)
Dept: PHARMACY | Age: 68
End: 2023-09-28
Payer: MEDICARE

## 2023-09-28 DIAGNOSIS — I48.0 PAROXYSMAL ATRIAL FIBRILLATION (HCC): Primary | Chronic | ICD-10-CM

## 2023-09-28 DIAGNOSIS — I26.99 OTHER PULMONARY EMBOLISM WITHOUT ACUTE COR PULMONALE, UNSPECIFIED CHRONICITY (HCC): ICD-10-CM

## 2023-09-28 LAB — INR BLD: 2.1

## 2023-09-28 PROCEDURE — 99211 OFF/OP EST MAY X REQ PHY/QHP: CPT | Performed by: PHARMACIST

## 2023-09-28 PROCEDURE — 85610 PROTHROMBIN TIME: CPT | Performed by: PHARMACIST

## 2023-09-28 NOTE — PROGRESS NOTES
ANTICOAGULATION SERVICE    Tatianna Buckley III is a 70 y.o. male with PMHx significant for afib, pulmonary HTN, HLD, DM2, hx renal cell carcinoma, and hx PE (2002, after ablation) who presents to clinic 9/28/2023 for anticoagulation monitoring and adjustment.     Anticoagulation Indication(s):  Afib, hx of PE (2002)   Referring Physician:   Dr. Mariza Swift (also follows with Dr. Sesar Wilson, cardiology)   Goal INR Range:  2-3  Duration of Anticoagulation Therapy:  Indefinite  Time of day dose taken: PM  Product patient has at home: warfarin 5 mg (peach color) and warfarin 1 mg (pink)     [id number] Score for Atrial Fibrillation Stroke Risk   Risk   Factors  Component Value   C CHF No 0   H HTN Yes 1   A2 Age >= 76 No,  (74 y.o.) 0   D DM Yes 1   S2 Prior Stroke/TIA No 0   V Vascular Disease No 0   A Age 77-78 Yes,  (74 y.o.) 1   Sc Sex male 0    [id number]  Score  3   Score last updated 11/4/22 9:36 AM EDT    INR Summary                           Warfarin regimen (mg)  Date INR   A/P   Sun Mon Tue Wed Thu Fri Sat Mg/wk  9/28 2.1 At goal, continue  7 7 7 7 5 7 7 45  9/13 2.2 At goal, continue  7 7 7 7 5 7 7 45  8/31 1.7 Below goal, increase 7 7 7 7 5 7 7 45  8/17 3.1 Above goal, decrease 7 5 7 7 5 7 7 45  8/3 3.1 Above goal, continue 7 7 7 7 5 7 7 47  6/22 2.4 At goal, continue 7 7 7 7 5 7 7 47  5/11 2.1 At goal, continue 7 7 7 7 5 7 7 47  4/6 2.2 At goal, continue 7 7 7 7 5 7 7 47  3/9 2.4 At goal, continue  7 7 7 7 5 7 7 47  2/16 3.5 Above goal, decrease 7 7 7 7 5 7 7 47  1/5 3.2 Above goal, continue 7 7 7 7 7 7 7 49  11/21 2.1 At goal, continue 7 7 7 7 7 7 7 49  11/4 3.6 Above goal, holdx1 7 7 7 7 7 0/7 7 49  9/18 3.06 Per [facility] ER   5/18 2.56 Per PCP  7 7 7 7 7 7 7 49  4/20 2.23  2/22 2.13  1/4 2.36    Last CBC:  Lab Results   Component Value Date    RBC 4.69 08/16/2023    HGB 14.4 08/16/2023    HCT 40.8 08/16/2023    MCV 86.9 08/16/2023    MCH 30.7 08/16/2023    MPV 9.9 08/16/2023    RDW 14.5 08/16/2023

## 2023-10-02 RX ORDER — METFORMIN HYDROCHLORIDE 500 MG/1
500 TABLET, EXTENDED RELEASE ORAL
Qty: 90 TABLET | Refills: 1 | Status: SHIPPED | OUTPATIENT
Start: 2023-10-02

## 2023-10-16 RX ORDER — FENOFIBRATE 145 MG/1
TABLET, COATED ORAL
Qty: 90 TABLET | Refills: 1 | Status: SHIPPED | OUTPATIENT
Start: 2023-10-16

## 2023-10-16 RX ORDER — METOPROLOL SUCCINATE 50 MG/1
TABLET, EXTENDED RELEASE ORAL
Qty: 90 TABLET | Refills: 1 | Status: SHIPPED | OUTPATIENT
Start: 2023-10-16

## 2023-10-16 RX ORDER — AMLODIPINE BESYLATE 5 MG/1
5 TABLET ORAL DAILY
Qty: 90 TABLET | Refills: 1 | Status: SHIPPED | OUTPATIENT
Start: 2023-10-16

## 2023-10-16 NOTE — TELEPHONE ENCOUNTER
Patient has been waiting for refills on these medications and is calling back to check the status of his refill request.

## 2023-10-30 RX ORDER — DIVALPROEX SODIUM 250 MG/1
250 TABLET, DELAYED RELEASE ORAL 2 TIMES DAILY
Qty: 180 TABLET | Refills: 1 | Status: SHIPPED | OUTPATIENT
Start: 2023-10-30

## 2023-10-30 RX ORDER — WARFARIN SODIUM 5 MG/1
TABLET ORAL
Qty: 90 TABLET | Refills: 1 | Status: SHIPPED | OUTPATIENT
Start: 2023-10-30

## 2023-10-30 NOTE — TELEPHONE ENCOUNTER
Medication:   Requested Prescriptions     Pending Prescriptions Disp Refills    warfarin (COUMADIN) 5 MG tablet [Pharmacy Med Name: WARFARIN SODIUM 5 MG TABLET] 90 tablet 1     Sig: TAKE ONE TABLET BY MOUTH DAILY    divalproex (DEPAKOTE) 250 MG DR tablet [Pharmacy Med Name: DIVALPROEX SOD  MG TAB] 270 tablet 1     Sig: TAKE ONE TABLET BY MOUTH THREE TIMES A DAY        Last Filled:  8/1/2023    Patient Phone Number: [phone] (home)     Last appt: 8/24/2023   Next appt: 2/20/2024

## 2023-11-03 NOTE — TELEPHONE ENCOUNTER
Case Management Discharge Note      Final Note: DC to SNF    Provided Post Acute Provider List?: Yes  Post Acute Provider List: Inpatient Rehab  Provided Post Acute Provider Quality & Resource List?: Yes  Post Acute Provider Quality and Resource List: Inpatient Rehab  Delivered To: Support Person  Support Person: Mar[place]  Method of Delivery: In person    Selected Continued Care - Discharged on 11/2/2023 Admission date: 10/19/2023 - Discharge disposition: Skilled Nursing Facility (DC - External)      Destination Coordination complete.      Service Provider Selected Services Address Phone Fax Patient Preferred    [facility] [address] [address] [phone] --              Durable Medical Equipment       Service Provider Selected Services Address Phone Fax Patient Preferred    [facility] [address] [address] [phone] --              Dialysis/Infusion    No services have been selected for the patient.                Home Medical Care    No services have been selected for the patient.                Therapy    No services have been selected for the patient.                Community Resources    No services have been selected for the patient.                Community & DME    No services have been selected for the patient.                         Final Discharge Disposition Code: 03 - skilled nursing facility (SNF)   Patient called in requesting refill for the following medication:      warfarin (COUMADIN) 1 MG tablet       [facility]/pharmacy #[id number]- [place], [place] - [facility] - P [phone] - F [phone]    Pls advise.

## 2023-11-07 ENCOUNTER — TELEPHONE (OUTPATIENT)
Dept: CARDIOLOGY CLINIC | Age: 68
End: 2023-11-07

## 2023-11-07 RX ORDER — FLECAINIDE ACETATE 100 MG/1
TABLET ORAL
Qty: 180 TABLET | Refills: 1 | Status: SHIPPED | OUTPATIENT
Start: 2023-11-07

## 2023-11-07 NOTE — TELEPHONE ENCOUNTER
Patient called to request a refill on the following medication:     PATIENT IS COMPLETELY OUT    flecainide (TAMBOCOR) 100 MG tablet [[id number]]     Order Details  Dose, Route, Frequency: As Directed   Dispense Quantity: 180 tablet Refills: 0          Sig: TAKE ONE TABLET BY MOUTH TWICE [address] [facility] [id number] [address] - F [phone] [address] [facility] [address] [facility] [address]   Phone:  [phone]  Fax:  [phone]       Last Appt: 8/17/23  Next Appt: 2/20/24  Last Refill: 8/2/23

## 2023-11-08 ENCOUNTER — ANTI-COAG VISIT (OUTPATIENT)
Dept: PHARMACY | Age: 68
End: 2023-11-08
Payer: MEDICARE

## 2023-11-08 DIAGNOSIS — I26.99 OTHER PULMONARY EMBOLISM WITHOUT ACUTE COR PULMONALE, UNSPECIFIED CHRONICITY (HCC): ICD-10-CM

## 2023-11-08 DIAGNOSIS — I48.0 PAROXYSMAL ATRIAL FIBRILLATION (HCC): Primary | Chronic | ICD-10-CM

## 2023-11-08 LAB — INTERNATIONAL NORMALIZATION RATIO, POC: 3.2

## 2023-11-08 PROCEDURE — 99211 OFF/OP EST MAY X REQ PHY/QHP: CPT | Performed by: PHARMACIST

## 2023-11-08 PROCEDURE — 85610 PROTHROMBIN TIME: CPT | Performed by: PHARMACIST

## 2023-11-08 NOTE — PROGRESS NOTES
ANTICOAGULATION SERVICE    Natasha Qiu III is a 70 y.o. male with PMHx significant for afib, pulmonary HTN, HLD, DM2, hx renal cell carcinoma, and hx PE (2002, after ablation) who presents to clinic 11/8/2023 for anticoagulation monitoring and adjustment.     Anticoagulation Indication(s):  Afib, hx of PE (2002)   Referring Physician:   Dr. Jane Gray (also follows with Dr. Marie Recio, cardiology)   Goal INR Range:  2-3  Duration of Anticoagulation Therapy:  Indefinite  Time of day dose taken: PM  Product patient has at home: warfarin 5 mg (peach color) and warfarin 1 mg (pink)     [id number] Score for Atrial Fibrillation Stroke Risk   Risk   Factors  Component Value   C CHF No 0   H HTN Yes 1   A2 Age >= 76 No,  (74 y.o.) 0   D DM Yes 1   S2 Prior Stroke/TIA No 0   V Vascular Disease No 0   A Age 77-78 Yes,  (74 y.o.) 1   Sc Sex male 0    [id number]  Score  3   Score last updated 11/4/22 9:36 AM EDT    INR Summary                           Warfarin regimen (mg)  Date INR   A/P   Sun Mon Tue Wed Thu Fri Sat Mg/wk  11/8 3.2 Above goal, continue  7 7 7 7 5 7 7 45  9/28 2.1 At goal, continue  7 7 7 7 5 7 7 45  9/13 2.2 At goal, continue  7 7 7 7 5 7 7 45  8/31 1.7 Below goal, increase 7 7 7 7 5 7 7 45  8/17 3.1 Above goal, decrease 7 5 7 7 5 7 7 45  8/3 3.1 Above goal, continue 7 7 7 7 5 7 7 47  6/22 2.4 At goal, continue 7 7 7 7 5 7 7 47  5/11 2.1 At goal, continue 7 7 7 7 5 7 7 47  4/6 2.2 At goal, continue 7 7 7 7 5 7 7 47  3/9 2.4 At goal, continue  7 7 7 7 5 7 7 47  2/16 3.5 Above goal, decrease 7 7 7 7 5 7 7 47  1/5 3.2 Above goal, continue 7 7 7 7 7 7 7 49  11/21 2.1 At goal, continue 7 7 7 7 7 7 7 49  11/4 3.6 Above goal, holdx1 7 7 7 7 7 0/7 7 49  9/18 3.06 Per [facility] ER   5/18 2.56 Per PCP  7 7 7 7 7 7 7 49  4/20 2.23  2/22 2.13  1/4 2.36    Last CBC:  Lab Results   Component Value Date    RBC 4.69 08/16/2023    HGB 14.4 08/16/2023    HCT 40.8 08/16/2023    MCV 86.9 08/16/2023    MCH 30.7 08/16/2023    MPV 9.9

## 2023-11-30 ENCOUNTER — ANTI-COAG VISIT (OUTPATIENT)
Dept: PHARMACY | Age: 68
End: 2023-11-30
Payer: MEDICARE

## 2023-11-30 DIAGNOSIS — I26.99 OTHER PULMONARY EMBOLISM WITHOUT ACUTE COR PULMONALE, UNSPECIFIED CHRONICITY (HCC): ICD-10-CM

## 2023-11-30 DIAGNOSIS — I48.0 PAROXYSMAL ATRIAL FIBRILLATION (HCC): Primary | Chronic | ICD-10-CM

## 2023-11-30 LAB — INTERNATIONAL NORMALIZATION RATIO, POC: 2.9

## 2023-11-30 PROCEDURE — 85610 PROTHROMBIN TIME: CPT | Performed by: PHARMACIST

## 2023-11-30 PROCEDURE — 99211 OFF/OP EST MAY X REQ PHY/QHP: CPT | Performed by: PHARMACIST

## 2023-11-30 NOTE — PROGRESS NOTES
ANTICOAGULATION SERVICE    Mignon Mora III is a 70 y.o. male with PMHx significant for afib, pulmonary HTN, HLD, DM2, hx renal cell carcinoma, and hx PE (2002, after ablation) who presents to clinic 11/30/2023 for anticoagulation monitoring and adjustment.     Anticoagulation Indication(s):  Afib, hx of PE (2002)   Referring Physician:   Dr. Bailey Ryan (also follows with Dr. Eduarda Tillman, cardiology)   Goal INR Range:  2-3  Duration of Anticoagulation Therapy:  Indefinite  Time of day dose taken: PM  Product patient has at home: warfarin 5 mg (peach color) and warfarin 1 mg (pink)     [id number] Score for Atrial Fibrillation Stroke Risk   Risk   Factors  Component Value   C CHF No 0   H HTN Yes 1   A2 Age >= 76 No,  (74 y.o.) 0   D DM Yes 1   S2 Prior Stroke/TIA No 0   V Vascular Disease No 0   A Age 77-78 Yes,  (74 y.o.) 1   Sc Sex male 0    [id number]  Score  3   Score last updated 11/4/22 9:36 AM EDT    INR Summary                           Warfarin regimen (mg)  Date INR   A/P   Sun Mon Tue Wed Thu Fri Sat Mg/wk  11/30 2.9 At goal, continue  7 7 7 7 5 7 7 45  11/8 3.2 Above goal, continue  7 7 7 7 5 7 7 45  9/28 2.1 At goal, continue  7 7 7 7 5 7 7 45  9/13 2.2 At goal, continue  7 7 7 7 5 7 7 45  8/31 1.7 Below goal, increase 7 7 7 7 5 7 7 45  8/17 3.1 Above goal, decrease 7 5 7 7 5 7 7 45  8/3 3.1 Above goal, continue 7 7 7 7 5 7 7 47  6/22 2.4 At goal, continue 7 7 7 7 5 7 7 47  5/11 2.1 At goal, continue 7 7 7 7 5 7 7 47  4/6 2.2 At goal, continue 7 7 7 7 5 7 7 47  3/9 2.4 At goal, continue  7 7 7 7 5 7 7 47  2/16 3.5 Above goal, decrease 7 7 7 7 5 7 7 47  1/5 3.2 Above goal, continue 7 7 7 7 7 7 7 49  11/21 2.1 At goal, continue 7 7 7 7 7 7 7 49  11/4 3.6 Above goal, holdx1 7 7 7 7 7 0/7 7 49  9/18 3.06 Per [facility]   5/18 2.56 Per PCP  7 7 7 7 7 7 7 49  4/20 2.23  2/22 2.13  1/4 2.36    Last CBC:  Lab Results   Component Value Date    RBC 4.69 08/16/2023    HGB 14.4 08/16/2023    HCT 40.8 08/16/2023    MCV 86.9

## 2023-12-13 ENCOUNTER — TELEPHONE (OUTPATIENT)
Dept: INTERNAL MEDICINE CLINIC | Age: 68
End: 2023-12-13

## 2023-12-13 NOTE — TELEPHONE ENCOUNTER
----- Message from Shobha Manual sent at 12/13/2023 11:16 AM EST -----  Subject: Appointment Request    Reason for Call: Established Patient Appointment needed: Routine Medicare   AWV    QUESTIONS    Reason for appointment request? Available appointments did not meet   patient need     Additional Information for Provider? patient called to reschedule AWV on   2/20/24 for a different day that week if possible, all that is populating   are VV. please call patient  ---------------------------------------------------------------------------  --------------  [address]  [phone]; OK to leave message on voicemail  ---------------------------------------------------------------------------  --------------  SCRIPT ANSWERS

## 2024-01-08 ENCOUNTER — ANTI-COAG VISIT (OUTPATIENT)
Dept: PHARMACY | Age: 69
End: 2024-01-08
Payer: MEDICARE

## 2024-01-08 DIAGNOSIS — I26.99 OTHER PULMONARY EMBOLISM WITHOUT ACUTE COR PULMONALE, UNSPECIFIED CHRONICITY (HCC): ICD-10-CM

## 2024-01-08 DIAGNOSIS — I48.0 PAROXYSMAL ATRIAL FIBRILLATION (HCC): Primary | Chronic | ICD-10-CM

## 2024-01-08 LAB — INTERNATIONAL NORMALIZATION RATIO, POC: 1.5

## 2024-01-08 PROCEDURE — 99212 OFFICE O/P EST SF 10 MIN: CPT | Performed by: PHARMACIST

## 2024-01-08 PROCEDURE — 85610 PROTHROMBIN TIME: CPT | Performed by: PHARMACIST

## 2024-01-08 NOTE — PROGRESS NOTES
ANTICOAGULATION SERVICE    Renato Hills III is a 68 y.o. male with PMHx significant for afib, pulmonary HTN, HLD, DM2, hx renal cell carcinoma, and hx PE (2002, after ablation) who presents to clinic 1/8/2024 for anticoagulation monitoring and adjustment.    Anticoagulation Indication(s):  Afib, hx of PE (2002)   Referring Physician:   Dr. Ocampo (also follows with Dr. Newberry, cardiology)   Goal INR Range:  2-3  Duration of Anticoagulation Therapy:  Indefinite  Time of day dose taken: PM  Product patient has at home: warfarin 5 mg (peach color) and warfarin 1 mg (pink)     [id number] Score for Atrial Fibrillation Stroke Risk   Risk   Factors  Component Value   C CHF No 0   H HTN Yes 1   A2 Age >= 75 No,  (68 y.o.) 0   D DM Yes 1   S2 Prior Stroke/TIA No 0   V Vascular Disease No 0   A Age 65-74 Yes,  (68 y.o.) 1   Sc Sex male 0    [id number]  Score  3   Score last updated 11/4/22 9:36 AM EDT    INR Summary                           Warfarin regimen (mg)  Date INR   A/P   Sun Mon Tue Wed Thu Fri Sat Mg/wk  1/8 1.5 Below goal, held  7 10/7 7 7 5 7 7 45  11/30 2.9 At goal, continue  7 7 7 7 5 7 7 45  11/8 3.2 Above goal, continue  7 7 7 7 5 7 7 45  9/28 2.1 At goal, continue  7 7 7 7 5 7 7 45  9/13 2.2 At goal, continue  7 7 7 7 5 7 7 45  8/31 1.7 Below goal, increase 7 7 7 7 5 7 7 45  8/17 3.1 Above goal, decrease 7 5 7 7 5 7 7 45  8/3 3.1 Above goal, continue 7 7 7 7 5 7 7 47  6/22 2.4 At goal, continue 7 7 7 7 5 7 7 47  5/11 2.1 At goal, continue 7 7 7 7 5 7 7 47  4/6 2.2 At goal, continue 7 7 7 7 5 7 7 47  3/9 2.4 At goal, continue  7 7 7 7 5 7 7 47  2/16 3.5 Above goal, decrease 7 7 7 7 5 7 7 47  1/5 3.2 Above goal, continue 7 7 7 7 7 7 7 49  11/21 2.1 At goal, continue 7 7 7 7 7 7 7 49  11/4 3.6 Above goal, holdx1 7 7 7 7 7 0/7 7 49  9/18 3.06 Per [facility]   5/18 2.56 Per PCP  7 7 7 7 7 7 7 49  4/20 2.23  2/22 2.13  1/4 2.36    Last CBC:  Lab Results   Component Value Date    RBC 4.69 08/16/2023    HGB 14.4

## 2024-01-15 ENCOUNTER — TELEPHONE (OUTPATIENT)
Dept: INTERNAL MEDICINE CLINIC | Age: 69
End: 2024-01-15

## 2024-01-15 NOTE — TELEPHONE ENCOUNTER
----- Message from Vernell Paul sent at 1/10/2024  2:41 PM EST -----  Subject: Appointment Request    Reason for Call: Established Patient Appointment needed: Routine Pre-Op    QUESTIONS    Reason for appointment request? No appointments available during search     Additional Information for Provider? Patient needs a pre-op appt surgery   is March 1st would not allow be to schedule only had first available appt   March 19th and he needs in before that   ---------------------------------------------------------------------------  --------------  CALL BACK INFO  [phone]; OK to leave message on voicemail  ---------------------------------------------------------------------------  --------------  SCRIPT ANSWERS

## 2024-01-16 NOTE — TELEPHONE ENCOUNTER
What dates will he not be in [place]?  Can he do the 14th? (I might be out of country 19th-26th so trying to get him in before)

## 2024-01-16 NOTE — TELEPHONE ENCOUNTER
Right shoulder rotator cuff on 3/1/24 by Dr Dann Martin at [facility]. Appt made 2/15/24 at 8:20 am ok per Dr. Ocampo.

## 2024-01-16 NOTE — TELEPHONE ENCOUNTER
Patient cannot make the 02/13/24 appt.    He will be out of town through 02/14/24.    Please call with another date and time for the pre op.

## 2024-01-18 PROBLEM — K63.5 COLONIC POLYP: Status: ACTIVE | Noted: 2024-01-18

## 2024-01-22 ENCOUNTER — TELEPHONE (OUTPATIENT)
Dept: PHARMACY | Age: 69
End: 2024-01-22

## 2024-01-22 ENCOUNTER — ANTI-COAG VISIT (OUTPATIENT)
Dept: PHARMACY | Age: 69
End: 2024-01-22
Payer: MEDICARE

## 2024-01-22 DIAGNOSIS — I26.99 OTHER PULMONARY EMBOLISM WITHOUT ACUTE COR PULMONALE, UNSPECIFIED CHRONICITY (HCC): ICD-10-CM

## 2024-01-22 DIAGNOSIS — I48.0 PAROXYSMAL ATRIAL FIBRILLATION (HCC): Primary | Chronic | ICD-10-CM

## 2024-01-22 LAB — INTERNATIONAL NORMALIZATION RATIO, POC: 3.1

## 2024-01-22 PROCEDURE — 99211 OFF/OP EST MAY X REQ PHY/QHP: CPT | Performed by: PHARMACIST

## 2024-01-22 PROCEDURE — 85610 PROTHROMBIN TIME: CPT | Performed by: PHARMACIST

## 2024-01-22 NOTE — TELEPHONE ENCOUNTER
Dr. Newberry,     Patient is having rotator cuff surgery on 3/1/24 and will need to be off his warfarin. He recently bridged with Lovenox per your advice for hand surgery in September, however was not bridged for recent colonoscopy (due to clinic / your office not being unaware of this procedure). He has a chads-vasc = 3 and hx of PE in 2002 after an ablation. Please let me know if you would like him to be bridged with Lovenox for upcoming procedure. I will review the outlined plan below with the patient if so.     Thanks!   Karolina Hurd, PharmD, [facility]  [facility]  [place]: [phone]  [facility]: [phone]  1/22/2024 10:54 AM      Periprocedural Anticoagulation Management    Procedure: Rotator cuff surgery   Date of Procedure:  3/1/24  Current warfarin dosage: 7 mg every day except 5 mg on Thursdays    Weight (kg): 112 kg  CrCl (mL/min): 66 mL.min   Enoxaparin (Lovenox®) dosage: 100 mg twice daily     Date Days to procedure AM PM   Saturday 2/24 -6 None LAST dose of warfarin   Sunday 2/25 -5 None None   Monday 2/26 -4 Lovenox 100 mg Lovenox 100 mg   Tuesday 2/27 -3 Lovenox 100 mg  Lovenox 100 mg    Wednesday 2/28 -2 Lovenox 100 mg Lovenox 100 mg   Thursday 2/29 -1 Lovenox 100 mg  None   Friday  3/1 Procedure None Warfarin 10 mg*    Saturday  3/2 +1 Lovenox 100 mg*  Lovenox 100 mg   Warfarin 7 mg    [place]  3/3 +2 Lovenox 100 mg    Lovenox 100 mg   Warfarin 7 mg    Monday  3/4 +3 Lovenox 100 mg    Lovenox 100 mg   Warfarin 7 mg    Tuesday  3/5 +4 Lovenox 100 mg    Lovenox 100 mg   Warfarin 7 mg    Wednesday  3/6 +5 Lovenox 100 mg    Lovenox 100 mg   Warfarin 7 mg    Thursday  3/7 +6 INR check and further warfarin/Lovenox dosing will be decided at that time.     *Resume warfarin and Lovenox when safe at the discretion of surgeon.     Will send Lovenox Rx to patient's pharmacy after discussion of plan with patient.     For Pharmacy Admin Tracking Only  Time Spent (min): 10

## 2024-01-22 NOTE — PROGRESS NOTES
ANTICOAGULATION SERVICE    Renato Hills III is a 69 y.o. male with PMHx significant for afib, pulmonary HTN, HLD, DM2, hx renal cell carcinoma, and hx PE (2002, after ablation) who presents to clinic 1/22/2024 for anticoagulation monitoring and adjustment.    Anticoagulation Indication(s):  Afib, hx of PE (2002)   Referring Physician:   Dr. Ocampo (also follows with Dr. Newberry, cardiology)   Goal INR Range:  2-3  Duration of Anticoagulation Therapy:  Indefinite  Time of day dose taken: PM  Product patient has at home: warfarin 5 mg (peach color) and warfarin 1 mg (pink)     [id number] Score for Atrial Fibrillation Stroke Risk   Risk   Factors  Component Value   C CHF No 0   H HTN Yes 1   A2 Age >= 75 No,  (69 y.o.) 0   D DM Yes 1   S2 Prior Stroke/TIA No 0   V Vascular Disease No 0   A Age 65-74 Yes,  (69 y.o.) 1   Sc Sex male 0    [id number]  Score  3   Score last updated 11/4/22 9:36 AM EDT    INR Summary                           Warfarin regimen (mg)  Date INR   A/P   Sun Mon Tue Wed Thu Fri Sat Mg/wk  1/22 3.1 Above goal, continue  7 7 7 7 5 7 7 45  1/8 1.5 Below goal, held  7 10/7 7 7 5 7 7 45  11/30 2.9 At goal, continue  7 7 7 7 5 7 7 45  11/8 3.2 Above goal, continue  7 7 7 7 5 7 7 45  9/28 2.1 At goal, continue  7 7 7 7 5 7 7 45  9/13 2.2 At goal, continue  7 7 7 7 5 7 7 45  8/31 1.7 Below goal, increase 7 7 7 7 5 7 7 45  8/17 3.1 Above goal, decrease 7 5 7 7 5 7 7 45  8/3 3.1 Above goal, continue 7 7 7 7 5 7 7 47  6/22 2.4 At goal, continue 7 7 7 7 5 7 7 47  5/11 2.1 At goal, continue 7 7 7 7 5 7 7 47  4/6 2.2 At goal, continue 7 7 7 7 5 7 7 47  3/9 2.4 At goal, continue  7 7 7 7 5 7 7 47  2/16 3.5 Above goal, decrease 7 7 7 7 5 7 7 47  1/5 3.2 Above goal, continue 7 7 7 7 7 7 7 49  11/21 2.1 At goal, continue 7 7 7 7 7 7 7 49  11/4 3.6 Above goal, holdx1 7 7 7 7 7 0/7 7 49  9/18 3.06 Per [facility]   5/18 2.56 Per PCP  7 7 7 7 7 7 7 49  4/20 2.23  2/22 2.13  1/4 2.36    Last CBC:  Lab Results   Component

## 2024-01-24 NOTE — TELEPHONE ENCOUNTER
Received call from cardiology office. Dr. Newberry would like patient to be bridged. Will discuss plan with patient at next visit.     Karolina Hurd, PharmD, [facility]  [facility]  [place]: [phone]  [place]: [phone]  1/24/2024 8:55 AM     [place] Pregnancy And Lactation Text: This medication is Pregnancy Category X and contraindicated in pregnancy and in women who may become pregnant. It is unknown if this medication is excreted in breast milk.

## 2024-02-06 ENCOUNTER — TELEPHONE (OUTPATIENT)
Dept: INTERNAL MEDICINE CLINIC | Age: 69
End: 2024-02-06

## 2024-02-06 NOTE — TELEPHONE ENCOUNTER
Patient received a call saying he needed to reschedule his pre op scheduled for 02/15/24.    Please call him back with date and time for pre op.  Surgery is on 03/01/24.

## 2024-02-08 NOTE — DISCHARGE INSTRUCTIONS
Patient is not pregnant (male or female) Please do not take your Coumadin tonight. Resume this tomorrow. Follow-up with your family doctor regarding your INR level. Follow-up with urology as well given the trauma noted to your urethra.   Return if you have any worsening bleeding, abdominal pain, nausea, vomiting, fever

## 2024-02-19 DIAGNOSIS — N28.9 FUNCTION KIDNEY DECREASED: ICD-10-CM

## 2024-02-20 LAB
ANION GAP SERPL CALCULATED.3IONS-SCNC: 11 MMOL/L (ref 3–16)
BUN SERPL-MCNC: 22 MG/DL (ref 7–20)
CALCIUM SERPL-MCNC: 9.6 MG/DL (ref 8.3–10.6)
CHLORIDE SERPL-SCNC: 103 MMOL/L (ref 99–110)
CO2 SERPL-SCNC: 26 MMOL/L (ref 21–32)
CREAT SERPL-MCNC: 1.2 MG/DL (ref 0.8–1.3)
CREAT UR-MCNC: 195.2 MG/DL (ref 39–259)
GFR SERPLBLD CREATININE-BSD FMLA CKD-EPI: >60 ML/MIN/{1.73_M2}
GLUCOSE SERPL-MCNC: 139 MG/DL (ref 70–99)
MICROALBUMIN UR DL<=1MG/L-MCNC: 28 MG/DL
MICROALBUMIN/CREAT UR: 143.4 MG/G (ref 0–30)
POTASSIUM SERPL-SCNC: 5.1 MMOL/L (ref 3.5–5.1)
SODIUM SERPL-SCNC: 140 MMOL/L (ref 136–145)

## 2024-02-21 RX ORDER — ATORVASTATIN CALCIUM 80 MG/1
80 TABLET, FILM COATED ORAL DAILY
Qty: 90 TABLET | Refills: 1 | Status: SHIPPED | OUTPATIENT
Start: 2024-02-21

## 2024-02-23 ENCOUNTER — OFFICE VISIT (OUTPATIENT)
Dept: CARDIOLOGY CLINIC | Age: 69
End: 2024-02-23
Payer: MEDICARE

## 2024-02-23 ENCOUNTER — ANTI-COAG VISIT (OUTPATIENT)
Dept: PHARMACY | Age: 69
End: 2024-02-23
Payer: MEDICARE

## 2024-02-23 VITALS
WEIGHT: 251 LBS | SYSTOLIC BLOOD PRESSURE: 120 MMHG | BODY MASS INDEX: 31.37 KG/M2 | HEART RATE: 72 BPM | DIASTOLIC BLOOD PRESSURE: 80 MMHG | TEMPERATURE: 72 F

## 2024-02-23 DIAGNOSIS — I48.0 PAROXYSMAL ATRIAL FIBRILLATION (HCC): Primary | ICD-10-CM

## 2024-02-23 DIAGNOSIS — R00.2 PALPITATION: ICD-10-CM

## 2024-02-23 DIAGNOSIS — I26.99 OTHER PULMONARY EMBOLISM WITHOUT ACUTE COR PULMONALE, UNSPECIFIED CHRONICITY (HCC): ICD-10-CM

## 2024-02-23 DIAGNOSIS — I48.0 PAROXYSMAL ATRIAL FIBRILLATION (HCC): Primary | Chronic | ICD-10-CM

## 2024-02-23 DIAGNOSIS — I27.20 PULMONARY HTN (HCC): ICD-10-CM

## 2024-02-23 PROBLEM — G40.89 OTHER SEIZURES (HCC): Status: ACTIVE | Noted: 2024-02-23

## 2024-02-23 PROBLEM — R56.9 UNSPECIFIED CONVULSIONS (HCC): Status: ACTIVE | Noted: 2024-02-23

## 2024-02-23 LAB — INTERNATIONAL NORMALIZATION RATIO, POC: 3

## 2024-02-23 PROCEDURE — G8484 FLU IMMUNIZE NO ADMIN: HCPCS | Performed by: INTERNAL MEDICINE

## 2024-02-23 PROCEDURE — 3017F COLORECTAL CA SCREEN DOC REV: CPT | Performed by: INTERNAL MEDICINE

## 2024-02-23 PROCEDURE — 1123F ACP DISCUSS/DSCN MKR DOCD: CPT | Performed by: INTERNAL MEDICINE

## 2024-02-23 PROCEDURE — G8417 CALC BMI ABV UP PARAM F/U: HCPCS | Performed by: INTERNAL MEDICINE

## 2024-02-23 PROCEDURE — 99213 OFFICE O/P EST LOW 20 MIN: CPT | Performed by: PHARMACIST

## 2024-02-23 PROCEDURE — 3074F SYST BP LT 130 MM HG: CPT | Performed by: INTERNAL MEDICINE

## 2024-02-23 PROCEDURE — 3079F DIAST BP 80-89 MM HG: CPT | Performed by: INTERNAL MEDICINE

## 2024-02-23 PROCEDURE — 99214 OFFICE O/P EST MOD 30 MIN: CPT | Performed by: INTERNAL MEDICINE

## 2024-02-23 PROCEDURE — 1036F TOBACCO NON-USER: CPT | Performed by: INTERNAL MEDICINE

## 2024-02-23 PROCEDURE — G8427 DOCREV CUR MEDS BY ELIG CLIN: HCPCS | Performed by: INTERNAL MEDICINE

## 2024-02-23 PROCEDURE — 85610 PROTHROMBIN TIME: CPT | Performed by: PHARMACIST

## 2024-02-23 RX ORDER — ENOXAPARIN SODIUM 100 MG/ML
INJECTION SUBCUTANEOUS
Qty: 20 ML | Refills: 0 | Status: SHIPPED | OUTPATIENT
Start: 2024-02-23

## 2024-02-23 NOTE — PROGRESS NOTES
Subjective:      Patient ID: Renato Hills III is a 69 y.o. male.    HPI:  Renato is being seen for hisfollow up for afib and palps/Hx PE. No complaints.  Feeling good.  Rhythm stable.  Active.   No exertional sx.  No tachycardia.  No palps. No syncope.  No edema.  No pnd or orthopnea.  No chest pain/sob. Wt stable.     Past Medical History:   Diagnosis Date    Allergic rhinitis due to other allergen     Atrial fibrillation (HCC)     Cancer (HCC)     right kidney    Cataract 11/30/2016    Glaucoma     both eyes    Hx of blood clots     pulmonary embolism 2002 shortly after ablation    Hypopotassemia     Migraines     Obstructive sleep apnea syndrome     Had sleep studies but couldn't tolerate mask     PHILLIP (obstructive sleep apnea)     Other abnormal blood chemistry     Other and unspecified hyperlipidemia     Screening PSA (prostate specific antigen) 12/5/2009    <0.1 ng/mL    Sleep apnea     does not use cpap    Strain of calf muscle 12/19/2018    Tremor     minor    Unspecified sleep apnea     Vitreous degeneration      Past Surgical History:   Procedure Laterality Date    ABLATION OF DYSRHYTHMIC FOCUS      2002    CATARACT REMOVAL Bilateral     COLONOSCOPY  06-    [facility] / Dr. Horan    COLONOSCOPY  10/20/2014    recheck 4-5 yrs w ++ h/o adenomatous plyps     HAND TENDON SURGERY Right 2/12/2021    RIGHT THUMB CARPOMETACARPAL JOINT ARTHROPLASTY INCLUDING TRAPEZIECTOMY AND AUTOGRAFT, LIGAMENT RECONSTRUCTION WITH FLEXOR CARPI RADIALIS TENODN; ANY INDICATED PROCEDURES performed by Robert Alvarez MD at [facility] OR    PARTIAL NEPHRECTOMY Right 12/6/2018    OPEN RIGHT PARTIAL NEPHRECTOMY (FLANK INCISION) WITH INTRAOPERATIVE ULTRASOUND performed by Sanchez Flowers MD at [facility] OR       No Known Allergies     Social History     Socioeconomic History    Marital status:      Spouse name: Not on file    Number of children: Not on file    Years of education: Not on file    Highest education

## 2024-02-23 NOTE — PROGRESS NOTES
ANTICOAGULATION SERVICE    Renato Hills III is a 69 y.o. male with PMHx significant for afib, pulmonary HTN, HLD, DM2, hx renal cell carcinoma, and hx PE (2002, after ablation) who presents to clinic 2/23/2024 for anticoagulation monitoring and adjustment.    Anticoagulation Indication(s):  Afib, hx of PE (2002)   Referring Physician:   Dr. Ocampo (also follows with Dr. Newberry, cardiology)   Goal INR Range:  2-3  Duration of Anticoagulation Therapy:  Indefinite  Time of day dose taken: PM  Product patient has at home: warfarin 5 mg (peach color) and warfarin 1 mg (pink)     [id number] Score for Atrial Fibrillation Stroke Risk   Risk   Factors  Component Value   C CHF No 0   H HTN Yes 1   A2 Age >= 75 No,  (69 y.o.) 0   D DM Yes 1   S2 Prior Stroke/TIA No 0   V Vascular Disease No 0   A Age 65-74 Yes,  (69 y.o.) 1   Sc Sex male 0    [id number]  Score  3   Score last updated 11/4/22 9:36 AM EDT    INR Summary                           Warfarin regimen (mg)  Date INR   A/P   Sun Mon Tue Wed Thu Fri Sat Mg/wk  2/23 3.0 At goal, continue  7 7 7 7 5 7 7 45  1/22 3.1 Above goal, continue  7 7 7 7 5 7 7 45  1/8 1.5 Below goal, held  7 10/7 7 7 5 7 7 45  11/30 2.9 At goal, continue  7 7 7 7 5 7 7 45  11/8 3.2 Above goal, continue  7 7 7 7 5 7 7 45  9/28 2.1 At goal, continue  7 7 7 7 5 7 7 45  9/13 2.2 At goal, continue  7 7 7 7 5 7 7 45  8/31 1.7 Below goal, increase 7 7 7 7 5 7 7 45  8/17 3.1 Above goal, decrease 7 5 7 7 5 7 7 45  8/3 3.1 Above goal, continue 7 7 7 7 5 7 7 47  6/22 2.4 At goal, continue 7 7 7 7 5 7 7 47  5/11 2.1 At goal, continue 7 7 7 7 5 7 7 47  4/6 2.2 At goal, continue 7 7 7 7 5 7 7 47  3/9 2.4 At goal, continue  7 7 7 7 5 7 7 47  2/16 3.5 Above goal, decrease 7 7 7 7 5 7 7 47  1/5 3.2 Above goal, continue 7 7 7 7 7 7 7 49  11/21 2.1 At goal, continue 7 7 7 7 7 7 7 49  11/4 3.6 Above goal, holdx1 7 7 7 7 7 0/7 7 49  9/18 3.06 Per [facility]   5/18 2.56 Per

## 2024-02-26 ENCOUNTER — OFFICE VISIT (OUTPATIENT)
Dept: INTERNAL MEDICINE CLINIC | Age: 69
End: 2024-02-26
Payer: MEDICARE

## 2024-02-26 VITALS
SYSTOLIC BLOOD PRESSURE: 130 MMHG | BODY MASS INDEX: 31.08 KG/M2 | HEIGHT: 75 IN | OXYGEN SATURATION: 95 % | TEMPERATURE: 97.3 F | HEART RATE: 61 BPM | DIASTOLIC BLOOD PRESSURE: 80 MMHG | WEIGHT: 250 LBS

## 2024-02-26 DIAGNOSIS — G43.909 MIGRAINE WITHOUT STATUS MIGRAINOSUS, NOT INTRACTABLE, UNSPECIFIED MIGRAINE TYPE: Chronic | ICD-10-CM

## 2024-02-26 DIAGNOSIS — I10 ESSENTIAL HYPERTENSION: Chronic | ICD-10-CM

## 2024-02-26 DIAGNOSIS — Z01.818 PREOP EXAM FOR INTERNAL MEDICINE: ICD-10-CM

## 2024-02-26 DIAGNOSIS — I48.0 PAROXYSMAL ATRIAL FIBRILLATION (HCC): Chronic | ICD-10-CM

## 2024-02-26 DIAGNOSIS — R10.31 RIGHT LOWER QUADRANT PAIN: ICD-10-CM

## 2024-02-26 DIAGNOSIS — Z01.811 PRE-OP CHEST EXAM: Primary | ICD-10-CM

## 2024-02-26 DIAGNOSIS — E11.9 TYPE 2 DIABETES MELLITUS WITHOUT COMPLICATION, WITHOUT LONG-TERM CURRENT USE OF INSULIN (HCC): ICD-10-CM

## 2024-02-26 DIAGNOSIS — I26.99 OTHER PULMONARY EMBOLISM WITHOUT ACUTE COR PULMONALE, UNSPECIFIED CHRONICITY (HCC): ICD-10-CM

## 2024-02-26 DIAGNOSIS — C64.1 RENAL CELL CARCINOMA OF RIGHT KIDNEY (HCC): ICD-10-CM

## 2024-02-26 PROBLEM — E66.811 CLASS 1 OBESITY DUE TO EXCESS CALORIES WITH SERIOUS COMORBIDITY AND BODY MASS INDEX (BMI) OF 31.0 TO 31.9 IN ADULT: Chronic | Status: RESOLVED | Noted: 2021-11-16 | Resolved: 2024-02-26

## 2024-02-26 PROBLEM — G40.89 OTHER SEIZURES (HCC): Status: RESOLVED | Noted: 2024-02-23 | Resolved: 2024-02-26

## 2024-02-26 PROBLEM — R56.9 UNSPECIFIED CONVULSIONS (HCC): Status: RESOLVED | Noted: 2024-02-23 | Resolved: 2024-02-26

## 2024-02-26 PROBLEM — M18.11 ARTHRITIS OF CARPOMETACARPAL (CMC) JOINT OF RIGHT THUMB: Status: RESOLVED | Noted: 2019-12-31 | Resolved: 2024-02-26

## 2024-02-26 PROBLEM — E66.09 CLASS 1 OBESITY DUE TO EXCESS CALORIES WITH SERIOUS COMORBIDITY AND BODY MASS INDEX (BMI) OF 31.0 TO 31.9 IN ADULT: Chronic | Status: RESOLVED | Noted: 2021-11-16 | Resolved: 2024-02-26

## 2024-02-26 LAB
ALBUMIN SERPL-MCNC: 4.5 G/DL (ref 3.4–5)
ALBUMIN/GLOB SERPL: 2 {RATIO} (ref 1.1–2.2)
ALP SERPL-CCNC: 43 U/L (ref 40–129)
ALT SERPL-CCNC: 41 U/L (ref 10–40)
ANION GAP SERPL CALCULATED.3IONS-SCNC: 10 MMOL/L (ref 3–16)
AST SERPL-CCNC: 24 U/L (ref 15–37)
BILIRUB SERPL-MCNC: 0.3 MG/DL (ref 0–1)
BUN SERPL-MCNC: 22 MG/DL (ref 7–20)
CALCIUM SERPL-MCNC: 9 MG/DL (ref 8.3–10.6)
CHLORIDE SERPL-SCNC: 105 MMOL/L (ref 99–110)
CHOLEST SERPL-MCNC: 192 MG/DL (ref 0–199)
CO2 SERPL-SCNC: 24 MMOL/L (ref 21–32)
CREAT SERPL-MCNC: 1.2 MG/DL (ref 0.8–1.3)
GFR SERPLBLD CREATININE-BSD FMLA CKD-EPI: >60 ML/MIN/{1.73_M2}
GLUCOSE SERPL-MCNC: 133 MG/DL (ref 70–99)
HDLC SERPL-MCNC: 34 MG/DL (ref 40–60)
LDLC SERPL CALC-MCNC: 107 MG/DL
POTASSIUM SERPL-SCNC: 4.6 MMOL/L (ref 3.5–5.1)
PROT SERPL-MCNC: 6.7 G/DL (ref 6.4–8.2)
SODIUM SERPL-SCNC: 139 MMOL/L (ref 136–145)
TRIGL SERPL-MCNC: 257 MG/DL (ref 0–150)
VLDLC SERPL CALC-MCNC: 51 MG/DL

## 2024-02-26 PROCEDURE — G8427 DOCREV CUR MEDS BY ELIG CLIN: HCPCS | Performed by: INTERNAL MEDICINE

## 2024-02-26 PROCEDURE — 3075F SYST BP GE 130 - 139MM HG: CPT | Performed by: INTERNAL MEDICINE

## 2024-02-26 PROCEDURE — G8484 FLU IMMUNIZE NO ADMIN: HCPCS | Performed by: INTERNAL MEDICINE

## 2024-02-26 PROCEDURE — 2022F DILAT RTA XM EVC RTNOPTHY: CPT | Performed by: INTERNAL MEDICINE

## 2024-02-26 PROCEDURE — 1123F ACP DISCUSS/DSCN MKR DOCD: CPT | Performed by: INTERNAL MEDICINE

## 2024-02-26 PROCEDURE — G8417 CALC BMI ABV UP PARAM F/U: HCPCS | Performed by: INTERNAL MEDICINE

## 2024-02-26 PROCEDURE — 1036F TOBACCO NON-USER: CPT | Performed by: INTERNAL MEDICINE

## 2024-02-26 PROCEDURE — 3017F COLORECTAL CA SCREEN DOC REV: CPT | Performed by: INTERNAL MEDICINE

## 2024-02-26 PROCEDURE — 99214 OFFICE O/P EST MOD 30 MIN: CPT | Performed by: INTERNAL MEDICINE

## 2024-02-26 PROCEDURE — 93000 ELECTROCARDIOGRAM COMPLETE: CPT | Performed by: INTERNAL MEDICINE

## 2024-02-26 PROCEDURE — 3079F DIAST BP 80-89 MM HG: CPT | Performed by: INTERNAL MEDICINE

## 2024-02-26 PROCEDURE — 3046F HEMOGLOBIN A1C LEVEL >9.0%: CPT | Performed by: INTERNAL MEDICINE

## 2024-02-26 RX ORDER — DAPAGLIFLOZIN 5 MG/1
5 TABLET, FILM COATED ORAL EVERY MORNING
Qty: 30 TABLET | Refills: 0 | Status: SHIPPED | OUTPATIENT
Start: 2024-02-26

## 2024-02-26 SDOH — ECONOMIC STABILITY: FOOD INSECURITY: WITHIN THE PAST 12 MONTHS, YOU WORRIED THAT YOUR FOOD WOULD RUN OUT BEFORE YOU GOT MONEY TO BUY MORE.: NEVER TRUE

## 2024-02-26 SDOH — ECONOMIC STABILITY: INCOME INSECURITY: HOW HARD IS IT FOR YOU TO PAY FOR THE VERY BASICS LIKE FOOD, HOUSING, MEDICAL CARE, AND HEATING?: NOT HARD AT ALL

## 2024-02-26 SDOH — ECONOMIC STABILITY: FOOD INSECURITY: WITHIN THE PAST 12 MONTHS, THE FOOD YOU BOUGHT JUST DIDN'T LAST AND YOU DIDN'T HAVE MONEY TO GET MORE.: NEVER TRUE

## 2024-02-26 ASSESSMENT — ENCOUNTER SYMPTOMS
BLOOD IN STOOL: 0
ANAL BLEEDING: 0
ABDOMINAL DISTENTION: 0
RECTAL PAIN: 0
COLOR CHANGE: 0
EYE REDNESS: 0
ABDOMINAL PAIN: 1
VOMITING: 0
NAUSEA: 0
SHORTNESS OF BREATH: 0
CONSTIPATION: 0
DIARRHEA: 0
COUGH: 0

## 2024-02-26 ASSESSMENT — PATIENT HEALTH QUESTIONNAIRE - PHQ9
SUM OF ALL RESPONSES TO PHQ QUESTIONS 1-9: 0
SUM OF ALL RESPONSES TO PHQ9 QUESTIONS 1 & 2: 0
SUM OF ALL RESPONSES TO PHQ QUESTIONS 1-9: 0
2. FEELING DOWN, DEPRESSED OR HOPELESS: 0
1. LITTLE INTEREST OR PLEASURE IN DOING THINGS: 0
SUM OF ALL RESPONSES TO PHQ QUESTIONS 1-9: 0
SUM OF ALL RESPONSES TO PHQ QUESTIONS 1-9: 0

## 2024-02-26 NOTE — PATIENT INSTRUCTIONS
Medication adjustments:   Diabetes Instructions: hold oral diabetic medications the morning of surgery (metformin) and only start farxiga after surgery     Hypertension instructions: continue meds as is before surgery    No NSAIDs (ibuprofen, aleve, advil, aspirin, motrin etc) for 1 week prior to your surgery.  If you have pain, you can take tylenol 1000mg every 8 hours as needed.    Wean down dep

## 2024-02-26 NOTE — ASSESSMENT & PLAN NOTE
Here for preoperative eval.  Patient's revised cardiac risk stratification is 0 points.  ASA class III. No current complaints to suggest active cardiac heart disease.  EKG with no change from baseline and no acute ischemic changes.  Recently saw his cardiologist with no medication change or further testing.  No indication for further cardiac work up prior to surgery at this time and can proceed with procedure with acceptable risk.    - Known PHILLIP. Recommend close intra/postop pulmonary monitoring   - Pt was instructed to not take ASA or NSAIDs 7 days prior to surgery.  - hold hypoglycemic meds on surgery morning.    - Continue BB / CCB  as prescribe, no need to hold on surgery day   - Contact cardiologist for flecainide preop adjustments  - coumadin bridging per cardiology (hx of 1 PE and afib, no hxof CVA)

## 2024-02-26 NOTE — ASSESSMENT & PLAN NOTE
-much improved since retired  -had been on depakote 250 mg TID. We started weaning off in August to 250 mg bid and doing well with no migraines, will try to lower again to 250 daily only. Plan to wean off completely in 3 months if remains without migraine s  -continue Imitrex (not taking almost at all lately maybe once a month)  -tried topomax?

## 2024-02-26 NOTE — ASSESSMENT & PLAN NOTE
-sees dr camacho  -on metoprolol 50mg, flecainide 100mg bid. Can continue metoprolol on surgery day, flecainide adjustment per cardiology    -on coumadin per coumadin clinic, plan for bridging per cards

## 2024-02-26 NOTE — ASSESSMENT & PLAN NOTE
A1c down 6.6% 08/23 from 7.1% 02/23, with known proteinuria, worsening to 143 on recent labs. Overall doing well from a clinical standpoint.   - continue [id number] mg, given worsening proteinuria on recent labs, will add SGLT2 (will try farxiga), start after surgery   - Repeat HbA1c now and again in 3 months. AIC goal< 7  .  Repeat microalbumin in 3 months.  - BP is within desired goal. Not on ace/arb  - we increased  lipitor to 80 mg 02/23. on tricor 145. Repeat lipids  Lab Results   Component Value Date    LDLCALC 114 (H) 08/25/2023    CREATININE 1.2 02/19/2024   - Microalbuminuria 143 02/24  - uptodate PSV23 vaccine , flu shot.

## 2024-02-26 NOTE — ASSESSMENT & PLAN NOTE
-S/p partial right nephrectomy 12/2018, pathology T1a clear-cell renal cell carcinoma negative margins  -follows with Dr. Flowers  -today complains of right lower quadrant pain below surgical scar.  Slight tenderness on exam with no rebound or guarding.  Otherwise no symptoms.  Will start with CMP and repeat CT abdomen pelvis (last done 2022)

## 2024-02-26 NOTE — PROGRESS NOTES
[facility]  Preoperative visit   2/26/2024    Patient is here for preop evaluation at the request of Dr. Martin  for right shoulder repair. . Is scheduled for surgery on 03/15/23    Functional Assessment:  Exercise tolerance: >4 METS    Denies any current chest pain or discomfort, sob or ALMODOVAR, orthopnea, PND or leg swelling.     Medications: reviewed, Over the counter (NSAIDs) use: no    Cardiac Risk:  High-risk Surgery: n / Hx of ischemic heart disease: n / Hx of CHF:n / Hx of CVA:n / Pre-op insulin: n / Pre-op creatinine >2.0: n (last cr 1.4 )    PHILLIP Screen: known PHILLIP    History of surgery/ anesthesia:  - No hx of complications, anesthesia reaction, bleeding, bruising, clots  - No family Hx of reactions to anesthesia/ bleeding/clots  - No loose teeth/ dentures      - Support systems after surgery - friend   - Smoking/ alcohol/drugs - no  - Complicating medical diseases are currently well controlled.       Problem List  Patient Active Problem List   Diagnosis    Atrial fibrillation (HCC)    Pulmonary embolism (HCC)    Hyperlipemia    Vitreous degeneration    Obstructive sleep apnea syndrome    Migraine    Glaucoma    Chronic right-sided low back pain with sciatica    Tremor    Type 2 diabetes mellitus without complication, without long-term current use of insulin (HCC)    Renal cell carcinoma of right kidney (HCC)    Essential hypertension    Hearing loss    Pulmonary HTN (HCC)    Other proteinuria    Preop exam for internal medicine    Colonic polyp       Medical and Surgical History  Past Medical History:   Diagnosis Date    Allergic rhinitis due to other allergen     Atrial fibrillation (HCC)     Cancer (HCC)     right kidney    Cataract 11/30/2016    Glaucoma     both eyes    Hx of blood clots     pulmonary embolism 2002 shortly after ablation    Hypopotassemia     Migraines     Obstructive sleep apnea syndrome     Had sleep studies but couldn't tolerate mask     PHILLIP (obstructive sleep apnea)

## 2024-02-27 ENCOUNTER — PATIENT MESSAGE (OUTPATIENT)
Dept: INTERNAL MEDICINE CLINIC | Age: 69
End: 2024-02-27

## 2024-02-27 DIAGNOSIS — Z01.818 PREOP EXAM FOR INTERNAL MEDICINE: Primary | ICD-10-CM

## 2024-02-27 LAB
EST. AVERAGE GLUCOSE BLD GHB EST-MCNC: 157.1 MG/DL
HBA1C MFR BLD: 7.1 %

## 2024-02-27 NOTE — TELEPHONE ENCOUNTER
From: Renato Hills III  To: Dr. Mio Ocampo  Sent: 2/27/2024 11:30 AM EST  Subject: Pre Op Testing    Dr. Ocampo,    I scanned in the order from my surgeon. My only question is whether the Hemoglobin and Hematocrit were performed. Please let me know if I need to have more blood drawn. Thank you.    Tl Hills

## 2024-03-01 ENCOUNTER — HOSPITAL ENCOUNTER (OUTPATIENT)
Dept: CT IMAGING | Age: 69
Discharge: HOME | End: 2024-03-01
Attending: INTERNAL MEDICINE
Payer: MEDICARE

## 2024-03-01 DIAGNOSIS — C64.1 RENAL CELL CARCINOMA OF RIGHT KIDNEY (HCC): ICD-10-CM

## 2024-03-01 DIAGNOSIS — R10.31 RIGHT LOWER QUADRANT PAIN: ICD-10-CM

## 2024-03-01 PROBLEM — K76.0 FATTY LIVER: Status: ACTIVE | Noted: 2024-03-01

## 2024-03-01 PROCEDURE — 6360000004 HC RX CONTRAST MEDICATION: Performed by: INTERNAL MEDICINE

## 2024-03-01 PROCEDURE — 74178 CT ABD&PLV WO CNTR FLWD CNTR: CPT

## 2024-03-01 RX ADMIN — IOPAMIDOL 75 ML: 755 INJECTION, SOLUTION INTRAVENOUS at 07:24

## 2024-03-02 DIAGNOSIS — Z01.818 PREOP EXAM FOR INTERNAL MEDICINE: ICD-10-CM

## 2024-03-02 LAB
BASOPHILS # BLD: 0.1 K/UL (ref 0–0.2)
BASOPHILS NFR BLD: 1 %
DEPRECATED RDW RBC AUTO: 15.2 % (ref 12.4–15.4)
EOSINOPHIL # BLD: 0.2 K/UL (ref 0–0.6)
EOSINOPHIL NFR BLD: 3.6 %
HCT VFR BLD AUTO: 43.2 % (ref 40.5–52.5)
HGB BLD-MCNC: 14.5 G/DL (ref 13.5–17.5)
LYMPHOCYTES # BLD: 2 K/UL (ref 1–5.1)
LYMPHOCYTES NFR BLD: 29.2 %
MCH RBC QN AUTO: 29.1 PG (ref 26–34)
MCHC RBC AUTO-ENTMCNC: 33.5 G/DL (ref 31–36)
MCV RBC AUTO: 86.8 FL (ref 80–100)
MONOCYTES # BLD: 0.4 K/UL (ref 0–1.3)
MONOCYTES NFR BLD: 6.5 %
NEUTROPHILS # BLD: 4.1 K/UL (ref 1.7–7.7)
NEUTROPHILS NFR BLD: 59.7 %
PLATELET # BLD AUTO: 297 K/UL (ref 135–450)
PMV BLD AUTO: 9.8 FL (ref 5–10.5)
RBC # BLD AUTO: 4.98 M/UL (ref 4.2–5.9)
WBC # BLD AUTO: 6.9 K/UL (ref 4–11)

## 2024-03-05 ENCOUNTER — PATIENT MESSAGE (OUTPATIENT)
Dept: INTERNAL MEDICINE CLINIC | Age: 69
End: 2024-03-05

## 2024-03-06 ENCOUNTER — TELEPHONE (OUTPATIENT)
Dept: INTERNAL MEDICINE CLINIC | Age: 69
End: 2024-03-06

## 2024-03-06 NOTE — TELEPHONE ENCOUNTER
From: Renato Hills III  To: Dr. Mio Ocampo  Sent: 3/5/2024 5:51 PM EST  Subject: Dapagliflozin    Dr. Ocampo,    My prescription insurance carrier, [employer], has notified me that dapagliflozin is not on their formulary. I have a 30 day supply that I will start on 3/16. I have ordered a copy of the formulary, but may not get it for 14 days. I have attached the letter for reference. I am told that I have two options. I can ask for an exception to the formulary or find a drug that is comparable and on the formulary. Please let me know what you think. Thank you.    Tl Hills

## 2024-03-06 NOTE — TELEPHONE ENCOUNTER
Submitted PA for Farxiga 5MG tablets   Via CMM Key: [id number]  STATUS: Prior Authorization Not Required     If this requires a response please respond to the pool ( P MHCX PSC MEDICATION PRE-AUTH).      Thank you please advise patient.

## 2024-03-06 NOTE — TELEPHONE ENCOUNTER
PA for Farxiga 5 mg    [facility]    Phone [phone]    Will send to PA dept and will notify patient of the outcome.

## 2024-03-11 ENCOUNTER — TELEPHONE (OUTPATIENT)
Dept: CARDIOLOGY CLINIC | Age: 69
End: 2024-03-11

## 2024-03-11 NOTE — TELEPHONE ENCOUNTER
Renato Hills III, 1955    Cardiac Risk Assessment    What type of procedure are you having?: Right shoulder arthroscopy rotator cuff repair      When is your procedure scheduled for?: 03/15/24    What physician is performing your procedure?: Dr. Dann Martin    Phone Number:[phone]    Fax number to send the letter:[phone]    Cardiologist: Dr. Newberry     Last Appointment: 02/23/24    Next Appointment: 08/23/24    Are you on any blood thinners?: warfarin ( Needs to hold 4 to 5 days prior)    History of Coronary Artery Disease:    Last stress test: 05/06/22    Last echo: 05/06/22    Device Type and :

## 2024-03-12 NOTE — TELEPHONE ENCOUNTER
Rosie from [facility] called regarding the status of the cardiac clearance.  She's wanting to know if the patient can be cleared by 3/15/24.  She does know that Dr. Newberry is out of the office.  Call back [phone]

## 2024-03-15 ENCOUNTER — TELEPHONE (OUTPATIENT)
Dept: PHARMACY | Age: 69
End: 2024-03-15

## 2024-03-15 NOTE — TELEPHONE ENCOUNTER
Patient was scheduled Rotator cuff surgery today (3/15). He called, reported being sick with pneumonia per urgent care vitis and has been given z-pack. Called [facility] and talked to Tisha to find out about surgery plan.    Bing Brasher, PharmD  [facility]  [place]: [phone]  [place]: [phone]  3/15/2024 1:01 PM

## 2024-03-15 NOTE — TELEPHONE ENCOUNTER
Spoke with Maura ([phone]) at [facility]. Due to their full schedule, patient is not going to have surgery scheduled for another 3-4 weeks. Informed Maura and the patient to let the clinic know once the surgery date is confirmed.     Advised patient to stop Lovenox and continue with his regular warfarin until his next clinic visit 3/22. Patient on z-pack for 5 days.     Bing Brasher, PharmD  [facility]  [place]: [phone]  [place]: [phone]  3/15/2024 2:52 PM

## 2024-03-22 ENCOUNTER — TELEPHONE (OUTPATIENT)
Dept: PHARMACY | Age: 69
End: 2024-03-22

## 2024-03-22 ENCOUNTER — ANTI-COAG VISIT (OUTPATIENT)
Dept: PHARMACY | Age: 69
End: 2024-03-22
Payer: MEDICARE

## 2024-03-22 DIAGNOSIS — I48.0 PAROXYSMAL ATRIAL FIBRILLATION (HCC): Primary | Chronic | ICD-10-CM

## 2024-03-22 DIAGNOSIS — I26.99 OTHER PULMONARY EMBOLISM WITHOUT ACUTE COR PULMONALE, UNSPECIFIED CHRONICITY (HCC): ICD-10-CM

## 2024-03-22 LAB — INR BLD: 1.5

## 2024-03-22 PROCEDURE — 99211 OFF/OP EST MAY X REQ PHY/QHP: CPT

## 2024-03-22 PROCEDURE — 85610 PROTHROMBIN TIME: CPT

## 2024-03-22 NOTE — TELEPHONE ENCOUNTER
Patient rescheduled surgery for 4/12. Updated bridging plan from previous dates - updated plan below.     Called patient and left VM - will need Rx called in for Lovenox pending number of syringes remaining in patient possession from previous bridge.     Periprocedural Anticoagulation Management     Procedure: Rotator cuff surgery   Date of Procedure: 3/1/24  Current warfarin dosage: 7 mg every day except 5 mg on Thursdays   Weight (kg): 112 kg  CrCl (mL/min): 66 mL.min   Enoxaparin (Lovenox®) dosage: 100 mg twice daily      Date Days to procedure AM PM   Saturday 4/6 -6 None LAST dose of warfarin   Sunday 4/7 -5 None None   Monday 4/8 -4 Lovenox 100 mg Lovenox 100 mg   Tuesday 4/9 -3 Lovenox 100 mg  Lovenox 100 mg    Wednesday  4/10 -2 Lovenox 100 mg Lovenox 100 mg   Thursday 4/11 -1 Lovenox 100 mg  None   Friday 4/12 Procedure None Warfarin 10 mg*    Saturday 4/13 +1 Lovenox 100 mg*  Lovenox 100 mg   Warfarin 7 mg    Sunday 4/14 +2 Lovenox 100 mg     Lovenox 100 mg   Warfarin 7 mg    Monday  4/15 +3 Lovenox 100 mg     Lovenox 100 mg   Warfarin 7 mg    Tuesday 4/16 +4 Lovenox 100 mg     Lovenox 100 mg   Warfarin 7 mg    Wednesday 4/17 +5 Lovenox 100 mg     Lovenox 100 mg   Warfarin 7 mg    Thursday 4/18 +6 INR check and further warfarin/Lovenox dosing will be decided at that time.      *Resume warfarin and Lovenox when safe at the discretion of surgeon.      Will send Lovenox Rx to patient's pharmacy after discussion of plan with patient.

## 2024-03-22 NOTE — PROGRESS NOTES
ANTICOAGULATION SERVICE    Renato Hills III is a 69 y.o. male with PMHx significant for afib, pulmonary HTN, HLD, DM2, hx renal cell carcinoma, and hx PE (2002, after ablation) who presents to clinic 3/22/2024 for anticoagulation monitoring and adjustment.    Anticoagulation Indication(s):  Afib, hx of PE (2002)   Referring Physician:   Dr. Ocampo (also follows with Dr. Newberry, cardiology)   Goal INR Range:  2-3  Duration of Anticoagulation Therapy:  Indefinite  Time of day dose taken: PM  Product patient has at home: warfarin 5 mg (peach color) and warfarin 1 mg (pink)     [id number] Score for Atrial Fibrillation Stroke Risk   Risk   Factors  Component Value   C CHF No 0   H HTN Yes 1   A2 Age >= 75 No,  (69 y.o.) 0   D DM Yes 1   S2 Prior Stroke/TIA No 0   V Vascular Disease No 0   A Age 65-74 Yes,  (69 y.o.) 1   Sc Sex male 0    [id number]  Score  3   Score last updated 11/4/22 9:36 AM EDT    INR Summary                           Warfarin regimen (mg)  Date INR   A/P   Sun Mon Tue Wed Thu Fri Sat Mg/wk  2/23 1.5 Below goal, bolus x1  7 7 7 7 5 9/7 7 45  2/23 3.0 At goal, continue  7 7 7 7 5 7 7 45  1/22 3.1 Above goal, continue  7 7 7 7 5 7 7 45  1/8 1.5 Below goal, held  7 10/7 7 7 5 7 7 45  11/30 2.9 At goal, continue  7 7 7 7 5 7 7 45  11/8 3.2 Above goal, continue  7 7 7 7 5 7 7 45  9/28 2.1 At goal, continue  7 7 7 7 5 7 7 45  9/13 2.2 At goal, continue  7 7 7 7 5 7 7 45  8/31 1.7 Below goal, increase 7 7 7 7 5 7 7 45  8/17 3.1 Above goal, decrease 7 5 7 7 5 7 7 45  8/3 3.1 Above goal, continue 7 7 7 7 5 7 7 47  6/22 2.4 At goal, continue 7 7 7 7 5 7 7 47  5/11 2.1 At goal, continue 7 7 7 7 5 7 7 47  4/6 2.2 At goal, continue 7 7 7 7 5 7 7 47  3/9 2.4 At goal, continue  7 7 7 7 5 7 7 47  2/16 3.5 Above goal, decrease 7 7 7 7 5 7 7 47  1/5 3.2 Above goal, continue 7 7 7 7 7 7 7 49  11/21 2.1 At goal, continue 7 7 7 7 7 7 7 49  11/4 3.6 Above goal, holdx1 7 7 7 7 7 0/7 7 49  9/18 3.06 Per [facility]

## 2024-03-25 RX ORDER — ENOXAPARIN SODIUM 100 MG/ML
INJECTION SUBCUTANEOUS
Qty: 5 EACH | Refills: 0 | Status: SHIPPED | OUTPATIENT
Start: 2024-03-25

## 2024-03-25 NOTE — PROGRESS NOTES
Patient reports 13 syringes remain from previous bridging. Rx sent in for 5 additional syringes to have full number needed to bridge for surgery scheduled 4/12   Cheiloplasty (Complex) Text: A decision was made to reconstruct the defect with a  cheiloplasty.  The defect was undermined extensively.  Additional obicularis oris muscle was excised with a 15 blade scalpel.  The defect was converted into a full thickness wedge to facilite a better cosmetic result.  Small vessels were then tied off with 5-0 monocyrl. The obicularis oris, superficial fascia, adipose and dermis were then reapproximated.  After the deeper layers were approximated the epidermis was reapproximated with particular care given to realign the vermilion border.

## 2024-03-27 PROBLEM — Z01.818 PREOP EXAM FOR INTERNAL MEDICINE: Status: RESOLVED | Noted: 2023-08-24 | Resolved: 2024-03-27

## 2024-04-01 ENCOUNTER — PATIENT MESSAGE (OUTPATIENT)
Dept: INTERNAL MEDICINE CLINIC | Age: 69
End: 2024-04-01

## 2024-04-02 NOTE — TELEPHONE ENCOUNTER
Spoke with Zachary at Dr Martin office [phone] opt 3 and message was left, awaiting a return call regarding office note addended. I sent pt a [employer] message.

## 2024-04-02 NOTE — TELEPHONE ENCOUNTER
From: Renato Hills III  To: Dr. Mio Ocampo  Sent: 4/1/2024 7:38 PM EDT  Subject: Pre Op Physical    Dr. Ocampo,    When I showed up for my shoulder surgery on 3/15, I was running a fever. I was told I would have to reschedule the surgery which is now scheduled for 4/12. On the way home from the surgery center I went to an urgent care and was diagnosed with pneumonia. I completed a course of antibiotic and steroids. I am now told that I must have an updated pre op physical and blood work. I called your office on Friday and was told that they couldn't schedule the appointment by 4/9. I then left a message with that person and have not heard back yet to get it scheduled. I will attempt to do it online.    Tl Hills

## 2024-04-03 ENCOUNTER — ANTI-COAG VISIT (OUTPATIENT)
Dept: PHARMACY | Age: 69
End: 2024-04-03
Payer: MEDICARE

## 2024-04-03 DIAGNOSIS — I48.0 PAROXYSMAL ATRIAL FIBRILLATION (HCC): Primary | Chronic | ICD-10-CM

## 2024-04-03 DIAGNOSIS — I26.99 OTHER PULMONARY EMBOLISM WITHOUT ACUTE COR PULMONALE, UNSPECIFIED CHRONICITY (HCC): ICD-10-CM

## 2024-04-03 LAB — INTERNATIONAL NORMALIZATION RATIO, POC: 2.9

## 2024-04-03 PROCEDURE — 85610 PROTHROMBIN TIME: CPT

## 2024-04-03 PROCEDURE — 99213 OFFICE O/P EST LOW 20 MIN: CPT

## 2024-04-03 PROCEDURE — 99211 OFF/OP EST MAY X REQ PHY/QHP: CPT

## 2024-04-03 NOTE — TELEPHONE ENCOUNTER
Maura called back from [facility] and Dr. Ocampo can update the February note regarding surgery and I faxed it to [phone] Attwarren Velez.

## 2024-04-03 NOTE — PROGRESS NOTES
ANTICOAGULATION SERVICE    Renato Hills III is a 69 y.o. 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    Anticoagulation Indication(s):  Afib, hx of PE (2002)   Referring Physician:   Dr. Ocampo (also follows with Dr. Newberry, cardiology)   Goal INR Range:  2-3  Duration of Anticoagulation Therapy:  Indefinite  Time of day dose taken: PM  Product patient has at home: warfarin 5 mg (peach color) and warfarin 1 mg (pink)     [id number] Score for Atrial Fibrillation Stroke Risk   Risk   Factors  Component Value   C CHF No 0   H HTN Yes 1   A2 Age >= 75 No,  (69 y.o.) 0   D DM Yes 1   S2 Prior Stroke/TIA No 0   V Vascular Disease No 0   A Age 65-74 Yes,  (69 y.o.) 1   Sc Sex male 0    [id number]  Score  3   Score last updated 11/4/22 9:36 AM EDT    INR Summary                           Warfarin regimen (mg)  Date INR   A/P   Sun Mon Tue Wed Thu Fri Sat Mg/wk  4/3 2.9 At goal, continue  7 7 7 7 5 7 7 45  3/22 1.5 Below goal, bolus x1  7 7 7 7 5 9/7 7 45  2/23 3.0 At goal, continue  7 7 7 7 5 7 7 45  1/22 3.1 Above goal, continue  7 7 7 7 5 7 7 45  1/8 1.5 Below goal, held  7 10/7 7 7 5 7 7 45  11/30 2.9 At goal, continue  7 7 7 7 5 7 7 45  11/8 3.2 Above goal, continue  7 7 7 7 5 7 7 45  9/28 2.1 At goal, continue  7 7 7 7 5 7 7 45  9/13 2.2 At goal, continue  7 7 7 7 5 7 7 45  8/31 1.7 Below goal, increase 7 7 7 7 5 7 7 45  8/17 3.1 Above goal, decrease 7 5 7 7 5 7 7 45  8/3 3.1 Above goal, continue 7 7 7 7 5 7 7 47  6/22 2.4 At goal, continue 7 7 7 7 5 7 7 47  5/11 2.1 At goal, continue 7 7 7 7 5 7 7 47  4/6 2.2 At goal, continue 7 7 7 7 5 7 7 47  3/9 2.4 At goal, continue  7 7 7 7 5 7 7 47  2/16 3.5 Above goal, decrease 7 7 7 7 5 7 7 47  1/5 3.2 Above goal, continue 7 7 7 7 7 7 7 49  11/21 2.1 At goal, continue 7 7 7 7 7 7 7 49  11/4 3.6 Above goal,

## 2024-04-04 ENCOUNTER — TELEPHONE (OUTPATIENT)
Dept: INTERNAL MEDICINE CLINIC | Age: 69
End: 2024-04-04

## 2024-04-04 NOTE — TELEPHONE ENCOUNTER
Gunner from [facility] is asking if DR. Ocampo will addend the pre-op notes from 2/26/24 and clear the patient for surgery on April 12th, 2024. Pt got sick and he to reschedule surgery for this month but they need the ok from DR. Ocampo in the  February pre-op notes.     Rhonda [phone]

## 2024-04-04 NOTE — TELEPHONE ENCOUNTER
Cancel this request. Disregard. Gunner called back and says the notes have already been addend and she just overlooked it.

## 2024-04-15 ENCOUNTER — TELEPHONE (OUTPATIENT)
Dept: INTERNAL MEDICINE CLINIC | Age: 69
End: 2024-04-15

## 2024-04-15 NOTE — TELEPHONE ENCOUNTER
Pt states he's been having bleeding from scrotum possibly from medication he is on. Will callback with appt. Pt advised per MA if it becomes unmanageable or if he feels like its getting worse, to go to the ER.

## 2024-04-16 ENCOUNTER — OFFICE VISIT (OUTPATIENT)
Dept: INTERNAL MEDICINE CLINIC | Age: 69
End: 2024-04-16
Payer: MEDICARE

## 2024-04-16 VITALS
HEIGHT: 75 IN | OXYGEN SATURATION: 97 % | TEMPERATURE: 97 F | HEART RATE: 91 BPM | BODY MASS INDEX: 30.59 KG/M2 | WEIGHT: 246 LBS | DIASTOLIC BLOOD PRESSURE: 80 MMHG | SYSTOLIC BLOOD PRESSURE: 120 MMHG

## 2024-04-16 DIAGNOSIS — R58 BLEEDING: Primary | ICD-10-CM

## 2024-04-16 PROCEDURE — 1036F TOBACCO NON-USER: CPT | Performed by: STUDENT IN AN ORGANIZED HEALTH CARE EDUCATION/TRAINING PROGRAM

## 2024-04-16 PROCEDURE — 3079F DIAST BP 80-89 MM HG: CPT | Performed by: STUDENT IN AN ORGANIZED HEALTH CARE EDUCATION/TRAINING PROGRAM

## 2024-04-16 PROCEDURE — 3017F COLORECTAL CA SCREEN DOC REV: CPT | Performed by: STUDENT IN AN ORGANIZED HEALTH CARE EDUCATION/TRAINING PROGRAM

## 2024-04-16 PROCEDURE — 3074F SYST BP LT 130 MM HG: CPT | Performed by: STUDENT IN AN ORGANIZED HEALTH CARE EDUCATION/TRAINING PROGRAM

## 2024-04-16 PROCEDURE — 99212 OFFICE O/P EST SF 10 MIN: CPT | Performed by: STUDENT IN AN ORGANIZED HEALTH CARE EDUCATION/TRAINING PROGRAM

## 2024-04-16 PROCEDURE — 1123F ACP DISCUSS/DSCN MKR DOCD: CPT | Performed by: STUDENT IN AN ORGANIZED HEALTH CARE EDUCATION/TRAINING PROGRAM

## 2024-04-16 PROCEDURE — G8427 DOCREV CUR MEDS BY ELIG CLIN: HCPCS | Performed by: STUDENT IN AN ORGANIZED HEALTH CARE EDUCATION/TRAINING PROGRAM

## 2024-04-16 PROCEDURE — G8417 CALC BMI ABV UP PARAM F/U: HCPCS | Performed by: STUDENT IN AN ORGANIZED HEALTH CARE EDUCATION/TRAINING PROGRAM

## 2024-04-16 RX ORDER — AMLODIPINE BESYLATE 5 MG/1
5 TABLET ORAL DAILY
Qty: 90 TABLET | Refills: 1 | Status: CANCELLED | OUTPATIENT
Start: 2024-04-16

## 2024-04-16 RX ORDER — WARFARIN SODIUM 1 MG/1
TABLET ORAL
Qty: 180 TABLET | Refills: 3 | Status: CANCELLED | OUTPATIENT
Start: 2024-04-16

## 2024-04-16 RX ORDER — FENOFIBRATE 145 MG/1
TABLET, COATED ORAL
Qty: 90 TABLET | Refills: 1 | Status: CANCELLED | OUTPATIENT
Start: 2024-04-16

## 2024-04-16 RX ORDER — METOPROLOL SUCCINATE 50 MG/1
TABLET, EXTENDED RELEASE ORAL
Qty: 90 TABLET | Refills: 1 | Status: SHIPPED | OUTPATIENT
Start: 2024-04-16

## 2024-04-16 RX ORDER — WARFARIN SODIUM 1 MG/1
TABLET ORAL
Qty: 180 TABLET | Refills: 3 | Status: SHIPPED | OUTPATIENT
Start: 2024-04-16

## 2024-04-16 RX ORDER — METOPROLOL SUCCINATE 50 MG/1
TABLET, EXTENDED RELEASE ORAL
Qty: 90 TABLET | Refills: 1 | Status: CANCELLED | OUTPATIENT
Start: 2024-04-16

## 2024-04-16 RX ORDER — FLECAINIDE ACETATE 100 MG/1
TABLET ORAL
Qty: 180 TABLET | Refills: 1 | Status: CANCELLED | OUTPATIENT
Start: 2024-04-16

## 2024-04-16 RX ORDER — ATORVASTATIN CALCIUM 80 MG/1
80 TABLET, FILM COATED ORAL DAILY
Qty: 90 TABLET | Refills: 1 | Status: CANCELLED | OUTPATIENT
Start: 2024-04-16

## 2024-04-16 RX ORDER — FENOFIBRATE 145 MG/1
TABLET, COATED ORAL
Qty: 90 TABLET | Refills: 1 | Status: SHIPPED | OUTPATIENT
Start: 2024-04-16

## 2024-04-16 RX ORDER — SUMATRIPTAN 100 MG/1
TABLET, FILM COATED ORAL
Qty: 27 TABLET | Refills: 3 | Status: CANCELLED | OUTPATIENT
Start: 2024-04-16

## 2024-04-16 RX ORDER — AMLODIPINE BESYLATE 5 MG/1
5 TABLET ORAL DAILY
Qty: 90 TABLET | Refills: 1 | Status: SHIPPED | OUTPATIENT
Start: 2024-04-16

## 2024-04-16 NOTE — PROGRESS NOTES
Renato Hills III (:  1955) is a 69 y.o. male here for evaluation of the following chief complaint(s):  Testicle Pain (Bleeding testicle twice this week)      Assessment & Plan   ASSESSMENT/PLAN:  1. Bleeding  Assessment & Plan:  Reports to 2 episodes of minor bleeding from small area on the skin of left scrotum (skin cyst?) which now appears to be healed. No longer has bleeding. No pain, no issues with the scrotum or testes.   Most likely resolved.   INR within therapeutic window  Will have pt monitor at home.        Return if symptoms worsen or fail to improve.         Subjective   SUBJECTIVE/OBJECTIVE:  HPI  Renato presented today with his wife for abnormal bleeding from the scrotum. He reports that he noted twice this week some blood coming from his scrotum. Denies any testicular or scrotum pain. No GI or  sx. Of note, he recently underwent right shoulder surgery and is bridging lovenox to Coumadin. Last INR on 4/3/2024 2.9 and next one is due on 2024.     Review of Systems:   Constitutional:  Denies fever or chills   Eyes:  Denies change in visual acuity   HENT:  Denies nasal congestion or sore throat   Respiratory:  Denies cough or shortness of breath   Cardiovascular:  Denies chest pain or edema   GI:  Denies abdominal pain, nausea, vomiting, bloody stools or diarrhea   :  Denies dysuria   Musculoskeletal:  Denies back pain or joint pain   Integument:  Denies rash   Neurologic:  Denies headache, focal weakness or sensory changes   Endocrine:  Denies polyuria or polydipsia   Lymphatic:  Denies swollen glands   Psychiatric:  Denies depression or anxiety        Current Outpatient Medications   Medication Sig Dispense Refill    enoxaparin (LOVENOX) 100 MG/ML Use as directed following bridging protocol from clinic 5 each 0    dapagliflozin (FARXIGA) 5 MG tablet Take 1 tablet by mouth every morning 30 tablet 0    enoxaparin (LOVENOX) 100 MG/ML Inject 100 mg into the skin twice daily as

## 2024-04-17 RX ORDER — FLECAINIDE ACETATE 100 MG/1
TABLET ORAL
Qty: 180 TABLET | Refills: 1 | Status: SHIPPED | OUTPATIENT
Start: 2024-04-17

## 2024-04-17 NOTE — TELEPHONE ENCOUNTER
[facility] Medication Refills:    Medication: Flecainide      Dosage: 100 mg    Number: 180 mg    Refills: 3    Last Office Visit: 02/23/2024    Next Office Visit: 08/23/2024    Last Refill: 01/18/2024    Last Labs: 03/22/24 Pt INR/ 03/02/2024 CBC02/26/2024 CMP/ Lipid/ Hemoglobin A1c

## 2024-04-18 PROBLEM — R58 BLEEDING: Status: ACTIVE | Noted: 2024-04-18

## 2024-04-18 NOTE — ASSESSMENT & PLAN NOTE
Reports to 2 episodes of minor bleeding from small area on the skin of left scrotum (skin cyst?) which now appears to be healed. No longer has bleeding. No pain, no issues with the scrotum or testes.   Most likely resolved.   INR within therapeutic window  Will have pt monitor at home.

## 2024-04-19 ENCOUNTER — ANTI-COAG VISIT (OUTPATIENT)
Dept: PHARMACY | Age: 69
End: 2024-04-19
Payer: MEDICARE

## 2024-04-19 DIAGNOSIS — I48.0 PAROXYSMAL ATRIAL FIBRILLATION (HCC): Primary | Chronic | ICD-10-CM

## 2024-04-19 DIAGNOSIS — I26.99 OTHER PULMONARY EMBOLISM WITHOUT ACUTE COR PULMONALE, UNSPECIFIED CHRONICITY (HCC): ICD-10-CM

## 2024-04-19 LAB — INTERNATIONAL NORMALIZATION RATIO, POC: 1.4

## 2024-04-19 PROCEDURE — 85610 PROTHROMBIN TIME: CPT

## 2024-04-19 RX ORDER — ENOXAPARIN SODIUM 100 MG/ML
100 INJECTION SUBCUTANEOUS 2 TIMES DAILY
Qty: 9 EACH | Refills: 0 | Status: SHIPPED | OUTPATIENT
Start: 2024-04-19

## 2024-04-19 NOTE — PROGRESS NOTES
ANTICOAGULATION SERVICE    Renato Hills III is a 69 y.o. 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    Anticoagulation Indication(s):  Afib, hx of PE (2002)   Referring Physician:   Dr. Ocampo (also follows with Dr. Newberry, cardiology)   Goal INR Range:  2-3  Duration of Anticoagulation Therapy:  Indefinite  Time of day dose taken: PM  Product patient has at home: warfarin 5 mg (peach color) and warfarin 1 mg (pink)     [id number] Score for Atrial Fibrillation Stroke Risk   Risk   Factors  Component Value   C CHF No 0   H HTN Yes 1   A2 Age >= 75 No,  (69 y.o.) 0   D DM Yes 1   S2 Prior Stroke/TIA No 0   V Vascular Disease No 0   A Age 65-74 Yes,  (69 y.o.) 1   Sc Sex male 0    [id number]  Score  3   Score last updated 11/4/22 9:36 AM EDT    INR Summary                           Warfarin regimen (mg)  Date INR   A/P   Sun Mon Tue Wed Thu Fri Sat Mg/wk  4/19 1.4 Below goal, bolus  7 7 7 7 5 10/7 10/7 45  4/3 2.9 At goal, continue  7 7 7 7 5 7 7 45  3/22 1.5 Below goal, bolus x1  7 7 7 7 5 9/7 7 45  2/23 3.0 At goal, continue  7 7 7 7 5 7 7 45  1/22 3.1 Above goal, continue  7 7 7 7 5 7 7 45  1/8 1.5 Below goal, held  7 10/7 7 7 5 7 7 45  11/30 2.9 At goal, continue  7 7 7 7 5 7 7 45  11/8 3.2 Above goal, continue  7 7 7 7 5 7 7 45  9/28 2.1 At goal, continue  7 7 7 7 5 7 7 45  9/13 2.2 At goal, continue  7 7 7 7 5 7 7 45  8/31 1.7 Below goal, increase 7 7 7 7 5 7 7 45  8/17 3.1 Above goal, decrease 7 5 7 7 5 7 7 45  8/3 3.1 Above goal, continue 7 7 7 7 5 7 7 47  6/22 2.4 At goal, continue 7 7 7 7 5 7 7 47  5/11 2.1 At goal, continue 7 7 7 7 5 7 7 47  4/6 2.2 At goal, continue 7 7 7 7 5 7 7 47  3/9 2.4 At goal, continue  7 7 7 7 5 7 7 47  2/16 3.5 Above goal, decrease 7 7 7 7 5 7 7 47  1/5 3.2 Above goal, continue 7 7 7 7 7 7 7 49  11/21 2.1 At goal,

## 2024-04-22 ENCOUNTER — ANTI-COAG VISIT (OUTPATIENT)
Dept: PHARMACY | Age: 69
End: 2024-04-22
Payer: MEDICARE

## 2024-04-22 DIAGNOSIS — I26.99 OTHER PULMONARY EMBOLISM WITHOUT ACUTE COR PULMONALE, UNSPECIFIED CHRONICITY (HCC): ICD-10-CM

## 2024-04-22 DIAGNOSIS — I48.0 PAROXYSMAL ATRIAL FIBRILLATION (HCC): Primary | Chronic | ICD-10-CM

## 2024-04-22 LAB — INTERNATIONAL NORMALIZATION RATIO, POC: 1.5

## 2024-04-22 PROCEDURE — 99212 OFFICE O/P EST SF 10 MIN: CPT

## 2024-04-22 PROCEDURE — 85610 PROTHROMBIN TIME: CPT

## 2024-04-22 NOTE — PROGRESS NOTES
ANTICOAGULATION SERVICE    Renato Hills III is a 69 y.o. 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    Anticoagulation Indication(s):  Afib, hx of PE (2002)   Referring Physician:   Dr. Ocampo (also follows with Dr. Newberry, cardiology)   Goal INR Range:  2-3  Duration of Anticoagulation Therapy:  Indefinite  Time of day dose taken: PM  Product patient has at home: warfarin 5 mg (peach color) and warfarin 1 mg (pink)     [id number] Score for Atrial Fibrillation Stroke Risk   Risk   Factors  Component Value   C CHF No 0   H HTN Yes 1   A2 Age >= 75 No,  (69 y.o.) 0   D DM Yes 1   S2 Prior Stroke/TIA No 0   V Vascular Disease No 0   A Age 65-74 Yes,  (69 y.o.) 1   Sc Sex male 0    [id number]  Score  3   Score last updated 11/4/22 9:36 AM EDT    INR Summary                           Warfarin regimen (mg)  Date INR   A/P   Sun Mon Tue Wed Thu Fri Sat Mg/wk  4/22 1.5 Below goal, bolus  7 10/7 10/7 7 5 7 7 45  4/19 1.4 Below goal, bolus  7 7 7 7 5 10/7 10/7 45  4/3 2.9 At goal, continue  7 7 7 7 5 7 7 45  3/22 1.5 Below goal, bolus x1  7 7 7 7 5 9/7 7 45  2/23 3.0 At goal, continue  7 7 7 7 5 7 7 45  1/22 3.1 Above goal, continue  7 7 7 7 5 7 7 45  1/8 1.5 Below goal, held  7 10/7 7 7 5 7 7 45  11/30 2.9 At goal, continue  7 7 7 7 5 7 7 45  11/8 3.2 Above goal, continue  7 7 7 7 5 7 7 45  9/28 2.1 At goal, continue  7 7 7 7 5 7 7 45  9/13 2.2 At goal, continue  7 7 7 7 5 7 7 45  8/31 1.7 Below goal, increase 7 7 7 7 5 7 7 45  8/17 3.1 Above goal, decrease 7 5 7 7 5 7 7 45  8/3 3.1 Above goal, continue 7 7 7 7 5 7 7 47  6/22 2.4 At goal, continue 7 7 7 7 5 7 7 47  5/11 2.1 At goal, continue 7 7 7 7 5 7 7 47  4/6 2.2 At goal, continue 7 7 7 7 5 7 7 47  3/9 2.4 At goal, continue  7 7 7 7 5 7 7 47  2/16 3.5 Above goal, decrease 7 7 7 7 5 7 7 47  1/5 3.2 Above goal,

## 2024-04-24 ENCOUNTER — ANTI-COAG VISIT (OUTPATIENT)
Dept: PHARMACY | Age: 69
End: 2024-04-24
Payer: MEDICARE

## 2024-04-24 DIAGNOSIS — I48.0 PAROXYSMAL ATRIAL FIBRILLATION (HCC): Primary | Chronic | ICD-10-CM

## 2024-04-24 DIAGNOSIS — I26.99 OTHER PULMONARY EMBOLISM WITHOUT ACUTE COR PULMONALE, UNSPECIFIED CHRONICITY (HCC): ICD-10-CM

## 2024-04-24 LAB — INTERNATIONAL NORMALIZATION RATIO, POC: 2.4

## 2024-04-24 PROCEDURE — 99211 OFF/OP EST MAY X REQ PHY/QHP: CPT

## 2024-04-24 PROCEDURE — 85610 PROTHROMBIN TIME: CPT

## 2024-04-24 NOTE — PROGRESS NOTES
ANTICOAGULATION SERVICE    Renato Hills III is a 69 y.o. 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    Anticoagulation Indication(s):  Afib, hx of PE (2002)   Referring Physician:   Dr. Ocampo (also follows with Dr. Newberry, cardiology)   Goal INR Range:  2-3  Duration of Anticoagulation Therapy:  Indefinite  Time of day dose taken: PM  Product patient has at home: warfarin 5 mg (peach color) and warfarin 1 mg (pink)     [id number] Score for Atrial Fibrillation Stroke Risk   Risk   Factors  Component Value   C CHF No 0   H HTN Yes 1   A2 Age >= 75 No,  (69 y.o.) 0   D DM Yes 1   S2 Prior Stroke/TIA No 0   V Vascular Disease No 0   A Age 65-74 Yes,  (69 y.o.) 1   Sc Sex male 0    [id number]  Score  3   Score last updated 11/4/22 9:36 AM EDT    INR Summary                           Warfarin regimen (mg)  Date INR   A/P   Sun Mon Tue Wed Thu Fri Sat Mg/wk  4/24 2.4 At goal, continue  7 7 7 7 5 7 7 45  4/22 1.5 Below goal, bolus  7 10/7 10/7 7 5 7 7 45  4/19 1.4 Below goal, bolus  7 7 7 7 5 10/7 10/7 45  4/3 2.9 At goal, continue  7 7 7 7 5 7 7 45  3/22 1.5 Below goal, bolus x1  7 7 7 7 5 9/7 7 45  2/23 3.0 At goal, continue  7 7 7 7 5 7 7 45  1/22 3.1 Above goal, continue  7 7 7 7 5 7 7 45  1/8 1.5 Below goal, held  7 10/7 7 7 5 7 7 45  11/30 2.9 At goal, continue  7 7 7 7 5 7 7 45  11/8 3.2 Above goal, continue  7 7 7 7 5 7 7 45  9/28 2.1 At goal, continue  7 7 7 7 5 7 7 45  9/13 2.2 At goal, continue  7 7 7 7 5 7 7 45  8/31 1.7 Below goal, increase 7 7 7 7 5 7 7 45  8/17 3.1 Above goal, decrease 7 5 7 7 5 7 7 45  8/3 3.1 Above goal, continue 7 7 7 7 5 7 7 47  6/22 2.4 At goal, continue 7 7 7 7 5 7 7 47  5/11 2.1 At goal, continue 7 7 7 7 5 7 7 47  4/6 2.2 At goal, continue 7 7 7 7 5 7 7 47  3/9 2.4 At goal, continue  7 7 7 7 5 7 7 47  2/16 3.5 Above goal,

## 2024-04-29 RX ORDER — DIVALPROEX SODIUM 250 MG/1
TABLET, DELAYED RELEASE ORAL
Qty: 180 TABLET | Refills: 0 | Status: SHIPPED | OUTPATIENT
Start: 2024-04-29

## 2024-04-29 RX ORDER — WARFARIN SODIUM 5 MG/1
TABLET ORAL
Qty: 90 TABLET | Refills: 0 | Status: SHIPPED | OUTPATIENT
Start: 2024-04-29 | End: 2024-05-01

## 2024-05-01 ENCOUNTER — ANTI-COAG VISIT (OUTPATIENT)
Dept: PHARMACY | Age: 69
End: 2024-05-01
Payer: MEDICARE

## 2024-05-01 ENCOUNTER — PATIENT MESSAGE (OUTPATIENT)
Dept: INTERNAL MEDICINE CLINIC | Age: 69
End: 2024-05-01

## 2024-05-01 DIAGNOSIS — I48.0 PAROXYSMAL ATRIAL FIBRILLATION (HCC): Primary | Chronic | ICD-10-CM

## 2024-05-01 DIAGNOSIS — I26.99 OTHER PULMONARY EMBOLISM WITHOUT ACUTE COR PULMONALE, UNSPECIFIED CHRONICITY (HCC): ICD-10-CM

## 2024-05-01 LAB — INTERNATIONAL NORMALIZATION RATIO, POC: 2.4

## 2024-05-01 PROCEDURE — 99211 OFF/OP EST MAY X REQ PHY/QHP: CPT

## 2024-05-01 PROCEDURE — 85610 PROTHROMBIN TIME: CPT

## 2024-05-01 RX ORDER — WARFARIN SODIUM 5 MG/1
TABLET ORAL
Qty: 12 TABLET | Refills: 1 | Status: SHIPPED | OUTPATIENT
Start: 2024-05-01

## 2024-05-01 RX ORDER — DAPAGLIFLOZIN 5 MG/1
5 TABLET, FILM COATED ORAL EVERY MORNING
Qty: 90 TABLET | Refills: 1 | Status: SHIPPED | OUTPATIENT
Start: 2024-05-01

## 2024-05-01 RX ORDER — WARFARIN SODIUM 2 MG/1
TABLET ORAL
Qty: 72 TABLET | Refills: 1 | Status: SHIPPED | OUTPATIENT
Start: 2024-05-01

## 2024-05-01 NOTE — TELEPHONE ENCOUNTER
Michelle from [facility] pharmacy called to clarify the directions for the Warfarin sent on 4/29    Please call and advise: [phone]

## 2024-05-01 NOTE — PROGRESS NOTES
ANTICOAGULATION SERVICE    Renato Hills III is a 69 y.o. 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    Anticoagulation Indication(s):  Afib, hx of PE (2002)   Referring Physician:   Dr. Ocampo (also follows with Dr. Newberry, cardiology)   Goal INR Range:  2-3  Duration of Anticoagulation Therapy:  Indefinite  Time of day dose taken: PM  Product patient has at home: warfarin 5 mg (peach color) and warfarin 1 mg (pink)     [id number] Score for Atrial Fibrillation Stroke Risk   Risk   Factors  Component Value   C CHF No 0   H HTN Yes 1   A2 Age >= 75 No,  (69 y.o.) 0   D DM Yes 1   S2 Prior Stroke/TIA No 0   V Vascular Disease No 0   A Age 65-74 Yes,  (69 y.o.) 1   Sc Sex male 0    [id number]  Score  3   Score last updated 11/4/22 9:36 AM EDT    INR Summary                           Warfarin regimen (mg)  Date INR   A/P   Sun Mon Tue Wed Thu Fri Sat Mg/wk  5/1 2.4 At goal, continue  7 7 7 7 5 7 7 45  4/24 2.4 At goal, continue  7 7 7 7 5 7 7 45  4/22 1.5 Below goal, bolus  7 10/7 10/7 7 5 7 7 45  4/19 1.4 Below goal, bolus  7 7 7 7 5 10/7 10/7 45  4/3 2.9 At goal, continue  7 7 7 7 5 7 7 45  3/22 1.5 Below goal, bolus x1  7 7 7 7 5 9/7 7 45  2/23 3.0 At goal, continue  7 7 7 7 5 7 7 45  1/22 3.1 Above goal, continue  7 7 7 7 5 7 7 45  1/8 1.5 Below goal, held  7 10/7 7 7 5 7 7 45  11/30 2.9 At goal, continue  7 7 7 7 5 7 7 45  11/8 3.2 Above goal, continue  7 7 7 7 5 7 7 45  9/28 2.1 At goal, continue  7 7 7 7 5 7 7 45  9/13 2.2 At goal, continue  7 7 7 7 5 7 7 45  8/31 1.7 Below goal, increase 7 7 7 7 5 7 7 45  8/17 3.1 Above goal, decrease 7 5 7 7 5 7 7 45  8/3 3.1 Above goal, continue 7 7 7 7 5 7 7 47  6/22 2.4 At goal, continue 7 7 7 7 5 7 7 47  5/11 2.1 At goal, continue 7 7 7 7 5 7 7 47  4/6 2.2 At goal, continue 7 7 7 7 5 7 7 47  3/9 2.4 At goal, continue  Statement Selected

## 2024-05-02 NOTE — TELEPHONE ENCOUNTER
From: Renato Hills III  To: Dr. Mio Ocampo  Sent: 5/1/2024 6:29 PM EDT  Subject: Daplagliflozin    Dr. Ocampo,    I finally got a response from [facility] about this medication. They tell me the cost is $1,676.38. I can't afford this. what are my alternatives?    Tl Hills

## 2024-05-05 ASSESSMENT — SLEEP AND FATIGUE QUESTIONNAIRES
HOW LIKELY ARE YOU TO NOD OFF OR FALL ASLEEP WHILE WATCHING TV: SLIGHT CHANCE OF DOZING
HOW LIKELY ARE YOU TO NOD OFF OR FALL ASLEEP WHILE SITTING AND TALKING TO SOMEONE: WOULD NEVER DOZE
HOW LIKELY ARE YOU TO NOD OFF OR FALL ASLEEP WHILE LYING DOWN TO REST IN THE AFTERNOON WHEN CIRCUMSTANCES PERMIT: MODERATE CHANCE OF DOZING
HOW LIKELY ARE YOU TO NOD OFF OR FALL ASLEEP WHILE WATCHING TV: SLIGHT CHANCE OF DOZING
HOW LIKELY ARE YOU TO NOD OFF OR FALL ASLEEP WHILE SITTING QUIETLY AFTER LUNCH WITHOUT ALCOHOL: SLIGHT CHANCE OF DOZING
HOW LIKELY ARE YOU TO NOD OFF OR FALL ASLEEP WHILE SITTING INACTIVE IN A PUBLIC PLACE: SLIGHT CHANCE OF DOZING
HOW LIKELY ARE YOU TO NOD OFF OR FALL ASLEEP WHILE SITTING QUIETLY AFTER LUNCH WITHOUT ALCOHOL: SLIGHT CHANCE OF DOZING
HOW LIKELY ARE YOU TO NOD OFF OR FALL ASLEEP WHILE LYING DOWN TO REST IN THE AFTERNOON WHEN CIRCUMSTANCES PERMIT: MODERATE CHANCE OF DOZING
HOW LIKELY ARE YOU TO NOD OFF OR FALL ASLEEP WHILE SITTING AND READING: SLIGHT CHANCE OF DOZING
HOW LIKELY ARE YOU TO NOD OFF OR FALL ASLEEP WHEN YOU ARE A PASSENGER IN A CAR FOR AN HOUR WITHOUT A BREAK: SLIGHT CHANCE OF DOZING
HOW LIKELY ARE YOU TO NOD OFF OR FALL ASLEEP IN A CAR, WHILE STOPPED FOR A FEW MINUTES IN TRAFFIC: WOULD NEVER DOZE
HOW LIKELY ARE YOU TO NOD OFF OR FALL ASLEEP WHILE SITTING AND TALKING TO SOMEONE: WOULD NEVER DOZE
HOW LIKELY ARE YOU TO NOD OFF OR FALL ASLEEP WHILE SITTING INACTIVE IN A PUBLIC PLACE: SLIGHT CHANCE OF DOZING
HOW LIKELY ARE YOU TO NOD OFF OR FALL ASLEEP WHEN YOU ARE A PASSENGER IN A CAR FOR AN HOUR WITHOUT A BREAK: SLIGHT CHANCE OF DOZING
HOW LIKELY ARE YOU TO NOD OFF OR FALL ASLEEP WHILE SITTING AND READING: SLIGHT CHANCE OF DOZING
ESS TOTAL SCORE: 7

## 2024-05-07 RX ORDER — DAPAGLIFLOZIN 5 MG/1
5 TABLET, FILM COATED ORAL EVERY MORNING
Qty: 90 TABLET | Refills: 1 | Status: SHIPPED | OUTPATIENT
Start: 2024-05-07

## 2024-05-08 ENCOUNTER — TELEMEDICINE (OUTPATIENT)
Dept: PULMONOLOGY | Age: 69
End: 2024-05-08
Payer: MEDICARE

## 2024-05-08 DIAGNOSIS — I48.0 PAROXYSMAL ATRIAL FIBRILLATION (HCC): Chronic | ICD-10-CM

## 2024-05-08 DIAGNOSIS — G47.33 OBSTRUCTIVE SLEEP APNEA SYNDROME: Primary | Chronic | ICD-10-CM

## 2024-05-08 DIAGNOSIS — I10 ESSENTIAL HYPERTENSION: Chronic | ICD-10-CM

## 2024-05-08 DIAGNOSIS — E11.9 TYPE 2 DIABETES MELLITUS WITHOUT COMPLICATION, WITHOUT LONG-TERM CURRENT USE OF INSULIN (HCC): Chronic | ICD-10-CM

## 2024-05-08 DIAGNOSIS — E66.09 CLASS 1 OBESITY DUE TO EXCESS CALORIES WITH SERIOUS COMORBIDITY AND BODY MASS INDEX (BMI) OF 30.0 TO 30.9 IN ADULT: ICD-10-CM

## 2024-05-08 PROCEDURE — 1123F ACP DISCUSS/DSCN MKR DOCD: CPT | Performed by: NURSE PRACTITIONER

## 2024-05-08 PROCEDURE — 2022F DILAT RTA XM EVC RTNOPTHY: CPT | Performed by: NURSE PRACTITIONER

## 2024-05-08 PROCEDURE — 3051F HG A1C>EQUAL 7.0%<8.0%: CPT | Performed by: NURSE PRACTITIONER

## 2024-05-08 PROCEDURE — G2211 COMPLEX E/M VISIT ADD ON: HCPCS | Performed by: NURSE PRACTITIONER

## 2024-05-08 PROCEDURE — 99214 OFFICE O/P EST MOD 30 MIN: CPT | Performed by: NURSE PRACTITIONER

## 2024-05-08 PROCEDURE — G8427 DOCREV CUR MEDS BY ELIG CLIN: HCPCS | Performed by: NURSE PRACTITIONER

## 2024-05-08 PROCEDURE — 3017F COLORECTAL CA SCREEN DOC REV: CPT | Performed by: NURSE PRACTITIONER

## 2024-05-08 NOTE — PROGRESS NOTES
Osiel Berrios CNP  Becky Gordon CNP [place]  [address]  [address]  [place]  [address]  P- [phone]   [facility]  [address]  [address]  [place] [address]  F- [phone]     Video Visit- Follow up      Assessment/Plan:      1. Obstructive sleep apnea syndrome  Assessment & Plan:   Chronic-Stable: Reviewed and analyzed results of physiologic download from patient's machine and reviewed with patient.  Supplies and parts as needed for his machine.  These are medically necessary.  Limit caffeine use after 3pm. Based on the analyzed data will continue with current settings. Stable on his machine at current settings, getting benefit from the use, and having minimal side effects.Discussed adjusting moisture settings on his machine for dryness. Will see him  back in 1 year. Encouraged him to contact the office with any questions or concerns.    2. Paroxysmal atrial fibrillation (HCC)  Assessment & Plan:   Chronic- Stable.  Discussed the importance of treating obstructive sleep apnea as part of the management of this disorder.  Cont any meds per PCP and other physicians.    3. Essential hypertension  Assessment & Plan:  Chronic- Stable.  Discussed the importance of treating obstructive sleep apnea as part of the management of this disorder.  Cont any meds per PCP and other physicians.    4. Type 2 diabetes mellitus without complication, without long-term current use of insulin (HCC)  Assessment & Plan:  Chronic- Stable.  Discussed the importance of treating obstructive sleep apnea as part of the management of this disorder.  Cont any meds per PCP and other physicians.    5. Class 1 obesity due to excess calories with serious comorbidity and body mass index (BMI) of 30.0 to 30.9 in adult  Assessment & Plan:  Chronic-not stable:  Discussed importance of treating obstructive sleep apnea and getting sufficient sleep to assist with weight control.  Discussed weight gain and/or weight

## 2024-05-08 NOTE — ASSESSMENT & PLAN NOTE
Chronic-Stable: Reviewed and analyzed results of physiologic download from patient's machine and reviewed with patient.  Supplies and parts as needed for his machine.  These are medically necessary.  Limit caffeine use after 3pm. Based on the analyzed data will continue with current settings. Stable on his machine at current settings, getting benefit from the use, and having minimal side effects.Discussed adjusting moisture settings on his machine for dryness. Will see him  back in 1 year. Encouraged him to contact the office with any questions or concerns.

## 2024-05-08 NOTE — PROGRESS NOTES
Diagnosis: [x] PHILLIP (G47.33) [] CSA (G47.31) [] Apnea (G47.30)   Length of Need: [x] 15 Months [] 99 Months [] Other:   Machine (JOVAN!): [] Respironics Dream Station      Auto [] ResMed AirSense     Auto [] Other:     []  CPAP () [] Bilevel ()   Mode: [] Auto [] Spontaneous    Mode: [] Auto [] Spontaneous              Comfort Settings:      Humidifier: [] Heated ()        [x] Water chamber replacement ()/ 1 per 6 months        Mask:   [x] Nasal () /1 per 3 months [] Full Face () /1 per 3 months   [x] Patient choice -Size and fit mask [] Patient Choice - Size and fit mask   [] Dispense: [] Dispense:   [x] Headgear () / 1 per 3 months [] Headgear () / 1 per 3 months   [] Replacement Nasal Cushion ()/2 per month [] Interface Replacement ()/1 per month   [x] Replacement Nasal Pillows ()/2 per month         Tubing: [x] Heated ()/1 per 3 months    [] Standard ()/1 per 3 months [] Other:           Filters: [x] Non-disposable ()/1 per 6 months     [x] Ultra-Fine, Disposable ()/2 per month        Miscellaneous: [] Chin Strap ()/ 1 per 6 months [] O2 bleed-in:        LPM   [] Oxymetry on CPAP/Bilevel []  Other:         Start Order Date: 05/08/24    MEDICAL JUSTIFICATION:  I, the undersigned, certify that the above prescribed supplies are medically necessary for this patient’s wellbeing.  In my opinion, the supplies are both reasonable and necessary in reference to accepted standards of medicalpractice in treatment of this patient’s condition.    YUE BELTRE NP    NPI: [id number]       Order Signed Date: 05/08/24  [facility]  Pulmonary, Sleep, and Critical Care    Pulmonary, Sleep, and Critical Care  [facility] [address]                          [address] [place] [address] [address]  [place] [address]                                    [place] [address]  Phone: [phone]    Fax:

## 2024-05-08 NOTE — ASSESSMENT & PLAN NOTE
Chronic-not stable:  Discussed importance of treating obstructive sleep apnea and getting sufficient sleep to assist with weight control.  Discussed weight gain and/or weight loss may require adjustments to machine settings. Encouraged him to work on weight loss through diet and exercise.

## 2024-05-22 ENCOUNTER — ANTI-COAG VISIT (OUTPATIENT)
Dept: PHARMACY | Age: 69
End: 2024-05-22
Payer: MEDICARE

## 2024-05-22 DIAGNOSIS — I26.99 OTHER PULMONARY EMBOLISM WITHOUT ACUTE COR PULMONALE, UNSPECIFIED CHRONICITY (HCC): ICD-10-CM

## 2024-05-22 DIAGNOSIS — I48.0 PAROXYSMAL ATRIAL FIBRILLATION (HCC): Primary | Chronic | ICD-10-CM

## 2024-05-22 LAB — INTERNATIONAL NORMALIZATION RATIO, POC: 1.7

## 2024-05-22 PROCEDURE — 85610 PROTHROMBIN TIME: CPT

## 2024-05-22 PROCEDURE — 99211 OFF/OP EST MAY X REQ PHY/QHP: CPT

## 2024-05-22 RX ORDER — WARFARIN SODIUM 5 MG/1
5 TABLET ORAL DAILY
Qty: 90 TABLET | Refills: 3 | Status: SHIPPED | OUTPATIENT
Start: 2024-05-22

## 2024-05-22 NOTE — PROGRESS NOTES
continue 7 7 7 7 5 7 7 47  3/9 2.4 At goal, continue  7 7 7 7 5 7 7 47  2/16 3.5 Above goal, decrease 7 7 7 7 5 7 7 47  1/5 3.2 Above goal, continue 7 7 7 7 7 7 7 49  11/21 2.1 At goal, continue 7 7 7 7 7 7 7 49  11/4 3.6 Above goal, holdx1 7 7 7 7 7 0/7 7 49  9/18 3.06 Per [facility]   5/18 2.56 Per PCP  7 7 7 7 7 7 7 49  4/20 2.23  2/22 2.13  1/4 2.36    Last CBC:  Lab Results   Component Value Date    RBC 4.98 03/02/2024    HGB 14.5 03/02/2024    HCT 43.2 03/02/2024    MCV 86.8 03/02/2024    MCH 29.1 03/02/2024    MPV 9.8 03/02/2024    RDW 15.2 03/02/2024     03/02/2024       Patient History:  Recent hospitalizations/HC visits 1/3/24: colonoscopy, held warfarin x5 days with no bridge (due to cardio office not knowing about procedure)   9/6/23: hand surgery, hold warfarin x4 days with Lovenox bridge per Dr. Newberry    Recent medication changes 3/15-3/19: Was given a z-pack and steroid x 5 days to treat pneumonia   8/17-8/21: azithromycin 250 mg x 5 days  8/7: Penicillin v potassium x 10 days  12/13 Crestor switched to Lipitor   Medications taken regularly that may interact with warfarin or alter INR Fenofibrate 145 mg daily   Divalproex 250 mg TID    Warfarin dose taken as prescribed Reports compliance with medications    Signs/symptoms of bleeding Hx of hematuria 9/2022   Vitamin K intake Normally avoids high vitamin k foods   Eats yue salads daily   Recent vomiting/diarrhea/fever, changes in weight or activity level None reported   Tobacco or alcohol use Patient denies smoking   Patient reports having 2 drinks per week   Upcoming surgeries or procedures 4/12/24- Rotator cuff surgery rescheduled for  - Hold warfarin for 5 days prior to procedure and bridge with Lovenox per instructions from Dr. Newberry. See 2/23 anticoag note   3/15/24: Rotator cuff surgery-cancelled d/t fever and getting pneumonia      Assessment/Plan:  Patient's INR was subtherapeutic (1.7) today. Patient states that he took 6 mg last night

## 2024-06-03 ENCOUNTER — PATIENT MESSAGE (OUTPATIENT)
Dept: INTERNAL MEDICINE CLINIC | Age: 69
End: 2024-06-03

## 2024-06-03 DIAGNOSIS — Z12.5 SCREENING PSA (PROSTATE SPECIFIC ANTIGEN): Primary | ICD-10-CM

## 2024-06-03 DIAGNOSIS — R35.0 FREQUENT URINATION: ICD-10-CM

## 2024-06-03 NOTE — TELEPHONE ENCOUNTER
From: Renato Hills III  To: Dr. Mio Ocampo  Sent: 6/3/2024 1:04 PM EDT  Subject: PSA    Dr. Ocampo,    I have prescriptions for blood work to be completed before my visit on 6/26. I do not see a PSA test on those orders. Could you please include that? I have experienced frequent and urgent urination and I am wondering if I may have a prostate issue.    Tl Hills

## 2024-06-10 ENCOUNTER — ANTI-COAG VISIT (OUTPATIENT)
Dept: PHARMACY | Age: 69
End: 2024-06-10
Payer: MEDICARE

## 2024-06-10 DIAGNOSIS — E11.9 TYPE 2 DIABETES MELLITUS WITHOUT COMPLICATION, WITHOUT LONG-TERM CURRENT USE OF INSULIN (HCC): ICD-10-CM

## 2024-06-10 DIAGNOSIS — I26.99 OTHER PULMONARY EMBOLISM WITHOUT ACUTE COR PULMONALE, UNSPECIFIED CHRONICITY (HCC): ICD-10-CM

## 2024-06-10 DIAGNOSIS — Z12.5 SCREENING PSA (PROSTATE SPECIFIC ANTIGEN): ICD-10-CM

## 2024-06-10 DIAGNOSIS — R35.0 FREQUENT URINATION: ICD-10-CM

## 2024-06-10 DIAGNOSIS — I48.0 PAROXYSMAL ATRIAL FIBRILLATION (HCC): Primary | Chronic | ICD-10-CM

## 2024-06-10 LAB
BACTERIA URNS QL MICRO: ABNORMAL /HPF
BILIRUB UR QL STRIP.AUTO: NEGATIVE
CLARITY UR: ABNORMAL
COLOR UR: YELLOW
CREAT UR-MCNC: 222.2 MG/DL (ref 39–259)
EPI CELLS #/AREA URNS AUTO: 6 /HPF (ref 0–5)
GLUCOSE UR STRIP.AUTO-MCNC: >=1000 MG/DL
HGB UR QL STRIP.AUTO: NEGATIVE
HYALINE CASTS #/AREA URNS AUTO: 8 /LPF (ref 0–8)
HYALINE CASTS: PRESENT
INTERNATIONAL NORMALIZATION RATIO, POC: 2.2
KETONES UR STRIP.AUTO-MCNC: ABNORMAL MG/DL
LEUKOCYTE ESTERASE UR QL STRIP.AUTO: NEGATIVE
MICROALBUMIN UR DL<=1MG/L-MCNC: 29.1 MG/DL
MICROALBUMIN/CREAT UR: 131 MG/G (ref 0–30)
NITRITE UR QL STRIP.AUTO: NEGATIVE
PH UR STRIP.AUTO: 5.5 [PH] (ref 5–8)
PROT UR STRIP.AUTO-MCNC: 100 MG/DL
PSA SERPL DL<=0.01 NG/ML-MCNC: 1.05 NG/ML (ref 0–4)
RBC CLUMPS #/AREA URNS AUTO: 2 /HPF (ref 0–4)
SP GR UR STRIP.AUTO: 1.03 (ref 1–1.03)
UA COMPLETE W REFLEX CULTURE PNL UR: ABNORMAL
UA DIPSTICK W REFLEX MICRO PNL UR: YES
URN SPEC COLLECT METH UR: ABNORMAL
UROBILINOGEN UR STRIP-ACNC: 0.2 E.U./DL
WBC #/AREA URNS AUTO: 2 /HPF (ref 0–5)
WBC CASTS: PRESENT

## 2024-06-10 PROCEDURE — 85610 PROTHROMBIN TIME: CPT | Performed by: PHARMACIST

## 2024-06-10 PROCEDURE — 99211 OFF/OP EST MAY X REQ PHY/QHP: CPT | Performed by: PHARMACIST

## 2024-06-10 NOTE — PROGRESS NOTES
ANTICOAGULATION SERVICE    Renato Hills III is a 69 y.o. male with PMHx significant for afib, pulmonary HTN, HLD, DM2, hx renal cell carcinoma, and hx PE (2002, after ablation) who presents to clinic 6/10/2024 for anticoagulation monitoring and adjustment.    Anticoagulation Indication(s):  Afib, hx of PE (2002)   Referring Physician:   Dr. Ocampo (also follows with Dr. Newberry, cardiology)   Goal INR Range:  2-3  Duration of Anticoagulation Therapy:  Indefinite  Time of day dose taken: PM  Product patient has at home: warfarin 5 mg (peach color) and warfarin 1 mg (pink)     [id number] Score for Atrial Fibrillation Stroke Risk   Risk   Factors  Component Value   C CHF No 0   H HTN Yes 1   A2 Age >= 75 No,  (69 y.o.) 0   D DM Yes 1   S2 Prior Stroke/TIA No 0   V Vascular Disease No 0   A Age 65-74 Yes,  (69 y.o.) 1   Sc Sex male 0    [id number]  Score  3   Score last updated 11/4/22 9:36 AM EDT    INR Summary                           Warfarin regimen (mg)  Date INR   A/P   Sun Mon Tue Wed Thu Fri Sat Mg/wk  6/10 2.2 At goal, continue   7 7 7 7 5 7 7 45  5/22 1.7 Below goal, bolus  7 7 7 10/7 5 7 7 45  5/1 2.4 At goal, continue  7 7 7 7 5 7 7 45  4/24 2.4 At goal, continue  7 7 7 7 5 7 7 45  4/22 1.5 Below goal, bolus  7 10/7 10/7 7 5 7 7 45  4/19 1.4 Below goal, bolus  7 7 7 7 5 10/7 10/7 45  4/3 2.9 At goal, continue  7 7 7 7 5 7 7 45  3/22 1.5 Below goal, bolus x1  7 7 7 7 5 9/7 7 45  2/23 3.0 At goal, continue  7 7 7 7 5 7 7 45  1/22 3.1 Above goal, continue  7 7 7 7 5 7 7 45  1/8 1.5 Below goal, held  7 10/7 7 7 5 7 7 45  11/30 2.9 At goal, continue  7 7 7 7 5 7 7 45  11/8 3.2 Above goal, continue  7 7 7 7 5 7 7 45  9/28 2.1 At goal, continue  7 7 7 7 5 7 7 45  9/13 2.2 At goal, continue  7 7 7 7 5 7 7 45  8/31 1.7 Below goal, increase 7 7 7 7 5 7 7 45  8/17 3.1 Above goal, decrease 7 5 7 7 5 7 7 45  8/3 3.1 Above goal, continue 7 7 7 7 5 7 7 47  6/22 2.4 At goal, continue 7 7 7 7 5 7 7 47  5/11 2.1 At goal,

## 2024-06-11 LAB
EST. AVERAGE GLUCOSE BLD GHB EST-MCNC: 131.2 MG/DL
HBA1C MFR BLD: 6.2 %

## 2024-06-17 NOTE — TELEPHONE ENCOUNTER
Pt is calling for the medication listed below:       metFORMIN (GLUCOPHAGE-XR) 500 MG   extended release tablet     He is on vacation in [place] and did not pack his medication .  He will be in [place] for another week.         [facility] [id number] [facility] [id number] - P [phone] - F [phone]     Please advise pt at: [phone]

## 2024-06-18 RX ORDER — METFORMIN HYDROCHLORIDE 500 MG/1
500 TABLET, EXTENDED RELEASE ORAL
Qty: 7 TABLET | Refills: 0 | Status: SHIPPED | OUTPATIENT
Start: 2024-06-18

## 2024-06-18 RX ORDER — METFORMIN HYDROCHLORIDE 500 MG/1
500 TABLET, EXTENDED RELEASE ORAL DAILY
Qty: 90 TABLET | Refills: 1 | OUTPATIENT
Start: 2024-06-18

## 2024-06-26 ENCOUNTER — OFFICE VISIT (OUTPATIENT)
Dept: INTERNAL MEDICINE CLINIC | Age: 69
End: 2024-06-26

## 2024-06-26 VITALS
BODY MASS INDEX: 30.84 KG/M2 | DIASTOLIC BLOOD PRESSURE: 70 MMHG | WEIGHT: 248 LBS | HEIGHT: 75 IN | SYSTOLIC BLOOD PRESSURE: 128 MMHG

## 2024-06-26 DIAGNOSIS — E11.9 TYPE 2 DIABETES MELLITUS WITHOUT COMPLICATION, WITHOUT LONG-TERM CURRENT USE OF INSULIN (HCC): Chronic | ICD-10-CM

## 2024-06-26 DIAGNOSIS — G43.909 MIGRAINE WITHOUT STATUS MIGRAINOSUS, NOT INTRACTABLE, UNSPECIFIED MIGRAINE TYPE: Chronic | ICD-10-CM

## 2024-06-26 DIAGNOSIS — I10 ESSENTIAL HYPERTENSION: Primary | Chronic | ICD-10-CM

## 2024-06-26 PROBLEM — R58 BLEEDING: Status: RESOLVED | Noted: 2024-04-18 | Resolved: 2024-06-26

## 2024-06-26 RX ORDER — RIMEGEPANT SULFATE 75 MG/75MG
75 TABLET, ORALLY DISINTEGRATING ORAL PRN
Qty: 30 TABLET | Refills: 0 | Status: SHIPPED
Start: 2024-06-26

## 2024-06-26 ASSESSMENT — ENCOUNTER SYMPTOMS: SHORTNESS OF BREATH: 0

## 2024-06-26 NOTE — ASSESSMENT & PLAN NOTE
A1c down to 6.2% 06/24, with known proteinuria 130 06/24 (from 143)  Overall doing well from a clinical standpoint.   - continue [id number] mg, farxiga 5 mg.  Needs to make sure he is staying well-hydrated with treatment which we discussed.  - Repeat HbA1c in 6 months. AIC goal< 7    - BP is within desired goal. Not on ace/arb  - we increased  lipitor to 80 mg 02/23. on tricor 145. Repeat lipids  Lab Results   Component Value Date    MICROALBUR 29.10 (H) 06/10/2024    CREATININE 1.2 02/26/2024   - Microalbuminuria 131 06/24  - uptodate PSV23 vaccine , flu shot.

## 2024-06-26 NOTE — PROGRESS NOTES
performed by Sanchez Flowers MD at [facility] OR     Social History     Socioeconomic History    Marital status:      Spouse name: Not on file    Number of children: Not on file    Years of education: Not on file    Highest education level: Not on file   Occupational History    Not on file   Tobacco Use    Smoking status: Never    Smokeless tobacco: Never   Vaping Use    Vaping Use: Never used   Substance and Sexual Activity    Alcohol use: Yes     Comment: 4 drinks weekly    Drug use: Yes     Types: Marijuana (Weed)    Sexual activity: Yes     Partners: Female   Other Topics Concern    Not on file   Social History Narrative    Not on file     Social Determinants of Health     Financial Resource Strain: Low Risk  (2/26/2024)    Overall Financial Resource Strain (CARDIA)     Difficulty of Paying Living Expenses: Not hard at all   Food Insecurity: No Food Insecurity (2/26/2024)    Hunger Vital Sign     Worried About Running Out of Food in the Last Year: Never true     Ran Out of Food in the Last Year: Never true   Transportation Needs: Unknown (2/26/2024)    PRAPARE - Transportation     Lack of Transportation (Medical): Not on file     Lack of Transportation (Non-Medical): No   Physical Activity: Insufficiently Active (2/14/2023)    Exercise Vital Sign     Days of Exercise per Week: 2 days     Minutes of Exercise per Session: 20 min   Stress: Not on file   Social Connections: Not on file   Intimate Partner Violence: Not on file   Housing Stability: Unknown (2/26/2024)    Housing Stability Vital Sign     Unable to Pay for Housing in the Last Year: Not on file     Number of Places Lived in the Last Year: Not on file     Unstable Housing in the Last Year: No      Family History   Problem Relation Age of Onset    Diabetes Father     Diabetes Mother     Uterine Cancer Mother     Sleep Apnea Brother        ROS  Review of Systems   Respiratory:  Negative for shortness of breath.    Cardiovascular:  Negative for chest

## 2024-06-26 NOTE — ASSESSMENT & PLAN NOTE
-much improved since retired  -had been on depakote 250 mg TID. We started weaning off in August and now on 250 daily only for 3-4 months. Can now stop completely   -was on Imitrex (not taking almost at all lately maybe once a year), given Afib will prefer a different abortive treatment, would prefer to try nurtec (gave samples)   -past tx- topomax

## 2024-07-15 RX ORDER — METFORMIN HYDROCHLORIDE 500 MG/1
500 TABLET, EXTENDED RELEASE ORAL DAILY
Qty: 90 TABLET | Refills: 1 | Status: SHIPPED | OUTPATIENT
Start: 2024-07-15

## 2024-08-06 ENCOUNTER — ANTI-COAG VISIT (OUTPATIENT)
Dept: PHARMACY | Age: 69
End: 2024-08-06
Payer: MEDICARE

## 2024-08-06 DIAGNOSIS — I48.20 CHRONIC ATRIAL FIBRILLATION (HCC): Primary | Chronic | ICD-10-CM

## 2024-08-06 LAB
INTERNATIONAL NORMALIZATION RATIO, POC: 2.1
PROTHROMBIN TIME, POC: NORMAL

## 2024-08-06 PROCEDURE — 85610 PROTHROMBIN TIME: CPT

## 2024-08-06 PROCEDURE — 99211 OFF/OP EST MAY X REQ PHY/QHP: CPT

## 2024-08-06 NOTE — PROGRESS NOTES
ANTICOAGULATION SERVICE    Renato Hills III is a 69 y.o. male with PMHx significant for afib, pulmonary HTN, HLD, DM2, hx renal cell carcinoma, and hx PE (2002, after ablation) who presents to clinic 8/6/2024 for anticoagulation monitoring and adjustment.    Anticoagulation Indication(s):  Afib, hx of PE (2002)   Referring Physician:   Dr. Ocampo (also follows with Dr. Newberry, cardiology)   Goal INR Range:  2-3  Duration of Anticoagulation Therapy:  Indefinite  Time of day dose taken: PM  Product patient has at home: warfarin 5 mg (peach color) and warfarin 1 mg (pink)     [id number] Score for Atrial Fibrillation Stroke Risk   Risk   Factors  Component Value   C CHF No 0   H HTN Yes 1   A2 Age >= 75 No,  (69 y.o.) 0   D DM Yes 1   S2 Prior Stroke/TIA No 0   V Vascular Disease No 0   A Age 65-74 Yes,  (69 y.o.) 1   Sc Sex male 0    [id number]  Score  3   Score last updated 11/4/22 9:36 AM EDT    INR Summary                           Warfarin regimen (mg)  Date INR   A/P   Sun Mon Tue Wed Thu Fri Sat Mg/wk  8/6 2.1 At goal, continue   7 7 7 7 5 7 7 45  6/10 2.2 At goal, continue   7 7 7 7 5 7 7 45  5/22 1.7 Below goal, bolus  7 7 7 10/7 5 7 7 45  5/1 2.4 At goal, continue  7 7 7 7 5 7 7 45  4/24 2.4 At goal, continue  7 7 7 7 5 7 7 45  4/22 1.5 Below goal, bolus  7 10/7 10/7 7 5 7 7 45  4/19 1.4 Below goal, bolus  7 7 7 7 5 10/7 10/7 45  4/3 2.9 At goal, continue  7 7 7 7 5 7 7 45  3/22 1.5 Below goal, bolus x1  7 7 7 7 5 9/7 7 45  2/23 3.0 At goal, continue  7 7 7 7 5 7 7 45  1/22 3.1 Above goal, continue  7 7 7 7 5 7 7 45  1/8 1.5 Below goal, held  7 10/7 7 7 5 7 7 45  11/30 2.9 At goal, continue  7 7 7 7 5 7 7 45  11/8 3.2 Above goal, continue  7 7 7 7 5 7 7 45  9/28 2.1 At goal, continue  7 7 7 7 5 7 7 45  9/13 2.2 At goal, continue  7 7 7 7 5 7 7 45  8/31 1.7 Below goal, increase 7 7 7 7 5 7 7 45  8/17 3.1 Above goal, decrease 7 5 7 7 5 7 7 45  8/3 3.1 Above goal, continue 7 7 7 7 5 7 7 47  6/22 2.4 At goal,

## 2024-08-16 NOTE — PROGRESS NOTES
Behavior: Behavior normal.         Thought Content: Thought content normal.         Judgment: Judgment normal.       Labs:       Lab Results   Component Value Date    WBC 6.9 03/02/2024    HGB 14.5 03/02/2024    HCT 43.2 03/02/2024    MCV 86.8 03/02/2024     03/02/2024     Lab Results   Component Value Date     02/26/2024    K 4.6 02/26/2024     02/26/2024    CO2 24 02/26/2024    BUN 22 (H) 02/26/2024    CREATININE 1.2 02/26/2024    GLUCOSE 133 (H) 02/26/2024    CALCIUM 9.0 02/26/2024    BILITOT 0.3 02/26/2024    ALKPHOS 43 02/26/2024    AST 24 02/26/2024    ALT 41 (H) 02/26/2024    LABGLOM >60 02/26/2024    GFRAA >60 09/19/2022    AGRATIO 2.0 02/26/2024    GLOB 2.3 09/15/2020         Lab Results   Component Value Date    CHOL 192 02/26/2024    CHOL 200 (H) 08/25/2023    CHOL 216 (H) 02/16/2023     Lab Results   Component Value Date    TRIG 257 (H) 02/26/2024    TRIG 250 (H) 08/25/2023    TRIG 265 (H) 02/16/2023     Lab Results   Component Value Date    HDL 34 (L) 02/26/2024    HDL 36 (L) 08/25/2023    HDL 34 (L) 02/16/2023     No components found for: \"LDLCHOLESTEROL\", \"LDLCALC\"  Lab Results   Component Value Date    VLDL 51 02/26/2024    VLDL 50 08/25/2023    VLDL 53 02/16/2023     No results found for: \"CHOLHDLRATIO\"    Lab Results   Component Value Date    INR 2.1 08/06/2024    INR 2.2 06/10/2024    INR 1.7 05/22/2024    PROTIME 31.8 (H) 09/18/2022    PROTIME 30.1 (H) 05/18/2022    PROTIME 26.0 (H) 04/20/2022       The 10-year ASCVD risk score (Viola LOPEZ, et al., 2019) is: 37.7%    Values used to calculate the score:      Age: 69 years      Sex: Male      Is Non- : No      Diabetic: Yes      Tobacco smoker: No      Systolic Blood Pressure: 120 mmHg      Is BP treated: Yes      HDL Cholesterol: 34 mg/dL      Total Cholesterol: 192 mg/dL      Imaging:       Last ECG (if available, Personally interpreted):  NSR  Last Monitor/Holter (if available):    Last Stress (if

## 2024-08-20 ENCOUNTER — OFFICE VISIT (OUTPATIENT)
Dept: CARDIOLOGY CLINIC | Age: 69
End: 2024-08-20
Payer: MEDICARE

## 2024-08-20 VITALS
WEIGHT: 238.6 LBS | SYSTOLIC BLOOD PRESSURE: 120 MMHG | DIASTOLIC BLOOD PRESSURE: 80 MMHG | BODY MASS INDEX: 29.82 KG/M2 | HEART RATE: 62 BPM

## 2024-08-20 DIAGNOSIS — E78.2 MIXED HYPERLIPIDEMIA: Chronic | ICD-10-CM

## 2024-08-20 DIAGNOSIS — I48.0 PAROXYSMAL ATRIAL FIBRILLATION (HCC): Primary | Chronic | ICD-10-CM

## 2024-08-20 DIAGNOSIS — I10 ESSENTIAL HYPERTENSION: Chronic | ICD-10-CM

## 2024-08-20 PROCEDURE — G8427 DOCREV CUR MEDS BY ELIG CLIN: HCPCS | Performed by: INTERNAL MEDICINE

## 2024-08-20 PROCEDURE — G8417 CALC BMI ABV UP PARAM F/U: HCPCS | Performed by: INTERNAL MEDICINE

## 2024-08-20 PROCEDURE — 99214 OFFICE O/P EST MOD 30 MIN: CPT | Performed by: INTERNAL MEDICINE

## 2024-08-20 PROCEDURE — 1036F TOBACCO NON-USER: CPT | Performed by: INTERNAL MEDICINE

## 2024-08-20 PROCEDURE — 1123F ACP DISCUSS/DSCN MKR DOCD: CPT | Performed by: INTERNAL MEDICINE

## 2024-08-20 PROCEDURE — 3074F SYST BP LT 130 MM HG: CPT | Performed by: INTERNAL MEDICINE

## 2024-08-20 PROCEDURE — 3017F COLORECTAL CA SCREEN DOC REV: CPT | Performed by: INTERNAL MEDICINE

## 2024-08-20 PROCEDURE — 3079F DIAST BP 80-89 MM HG: CPT | Performed by: INTERNAL MEDICINE

## 2024-08-20 RX ORDER — ACETAMINOPHEN 500 MG
1000 TABLET ORAL EVERY 6 HOURS PRN
COMMUNITY

## 2024-08-20 ASSESSMENT — ENCOUNTER SYMPTOMS
EYE DISCHARGE: 0
SHORTNESS OF BREATH: 0
BACK PAIN: 0
COUGH: 0
FACIAL SWELLING: 0
WHEEZING: 0
ABDOMINAL PAIN: 0
COLOR CHANGE: 0
BLOOD IN STOOL: 0
VOMITING: 0
ABDOMINAL DISTENTION: 0
CHEST TIGHTNESS: 0

## 2024-08-20 NOTE — ASSESSMENT & PLAN NOTE
Remains in sinus, continue current medications including flecainide and metoprolol.  Warfarin.  Discussed Eliquis, let us know

## 2024-08-23 RX ORDER — ATORVASTATIN CALCIUM 80 MG/1
80 TABLET, FILM COATED ORAL DAILY
Qty: 90 TABLET | Refills: 1 | Status: SHIPPED | OUTPATIENT
Start: 2024-08-23

## 2024-08-23 NOTE — TELEPHONE ENCOUNTER
Last appointment: 6/26/2024  Next appointment: 9/30/2024  Last refill: 2/21/24    Requested Prescriptions     Pending Prescriptions Disp Refills    atorvastatin (LIPITOR) 80 MG tablet [Pharmacy Med Name: ATORVASTATIN 80 MG TABLET] 90 tablet 1     Sig: TAKE 1 TABLET BY MOUTH DAILY

## 2024-09-10 ENCOUNTER — PATIENT MESSAGE (OUTPATIENT)
Dept: INTERNAL MEDICINE CLINIC | Age: 69
End: 2024-09-10

## 2024-09-19 ENCOUNTER — TELEPHONE (OUTPATIENT)
Dept: CARDIOLOGY CLINIC | Age: 69
End: 2024-09-19

## 2024-09-19 NOTE — TELEPHONE ENCOUNTER
Patient needs cardiac clearance for left shoulder repair, general anesthesia with scalene block. Patient needs to be off warfarin for 5 days. Please review. Also need to know if it can be done outpatient or inpatient.

## 2024-09-23 ENCOUNTER — ANTI-COAG VISIT (OUTPATIENT)
Dept: PHARMACY | Age: 69
End: 2024-09-23
Payer: MEDICARE

## 2024-09-23 DIAGNOSIS — I26.99 OTHER PULMONARY EMBOLISM WITHOUT ACUTE COR PULMONALE, UNSPECIFIED CHRONICITY (HCC): ICD-10-CM

## 2024-09-23 DIAGNOSIS — I48.0 PAROXYSMAL ATRIAL FIBRILLATION (HCC): Primary | Chronic | ICD-10-CM

## 2024-09-23 LAB
INTERNATIONAL NORMALIZATION RATIO, POC: 2.3
PROTHROMBIN TIME, POC: 0

## 2024-09-23 PROCEDURE — 85610 PROTHROMBIN TIME: CPT | Performed by: PHARMACIST

## 2024-09-23 PROCEDURE — 99213 OFFICE O/P EST LOW 20 MIN: CPT | Performed by: PHARMACIST

## 2024-09-23 RX ORDER — ENOXAPARIN SODIUM 100 MG/ML
INJECTION SUBCUTANEOUS
Qty: 20 ML | Refills: 0 | Status: SHIPPED | OUTPATIENT
Start: 2024-09-23

## 2024-09-26 DIAGNOSIS — E11.9 TYPE 2 DIABETES MELLITUS WITHOUT COMPLICATION, WITHOUT LONG-TERM CURRENT USE OF INSULIN (HCC): Chronic | ICD-10-CM

## 2024-09-26 LAB
ALBUMIN SERPL-MCNC: 4.2 G/DL (ref 3.4–5)
ALBUMIN/GLOB SERPL: 2 {RATIO} (ref 1.1–2.2)
ALP SERPL-CCNC: 48 U/L (ref 40–129)
ALT SERPL-CCNC: 39 U/L (ref 10–40)
ANION GAP SERPL CALCULATED.3IONS-SCNC: 11 MMOL/L (ref 3–16)
AST SERPL-CCNC: 27 U/L (ref 15–37)
BILIRUB SERPL-MCNC: 0.4 MG/DL (ref 0–1)
BUN SERPL-MCNC: 25 MG/DL (ref 7–20)
CALCIUM SERPL-MCNC: 9.8 MG/DL (ref 8.3–10.6)
CHLORIDE SERPL-SCNC: 105 MMOL/L (ref 99–110)
CHOLEST SERPL-MCNC: 208 MG/DL (ref 0–199)
CO2 SERPL-SCNC: 26 MMOL/L (ref 21–32)
CREAT SERPL-MCNC: 1.4 MG/DL (ref 0.8–1.3)
GFR SERPLBLD CREATININE-BSD FMLA CKD-EPI: 54 ML/MIN/{1.73_M2}
GLUCOSE SERPL-MCNC: 103 MG/DL (ref 70–99)
HDLC SERPL-MCNC: 36 MG/DL (ref 40–60)
LDLC SERPL CALC-MCNC: 124 MG/DL
POTASSIUM SERPL-SCNC: 4.8 MMOL/L (ref 3.5–5.1)
PROT SERPL-MCNC: 6.3 G/DL (ref 6.4–8.2)
SODIUM SERPL-SCNC: 142 MMOL/L (ref 136–145)
TRIGL SERPL-MCNC: 239 MG/DL (ref 0–150)
VLDLC SERPL CALC-MCNC: 48 MG/DL

## 2024-09-27 LAB
EST. AVERAGE GLUCOSE BLD GHB EST-MCNC: 142.7 MG/DL
HBA1C MFR BLD: 6.6 %

## 2024-09-30 ENCOUNTER — OFFICE VISIT (OUTPATIENT)
Dept: INTERNAL MEDICINE CLINIC | Age: 69
End: 2024-09-30

## 2024-09-30 VITALS
HEIGHT: 75 IN | OXYGEN SATURATION: 95 % | DIASTOLIC BLOOD PRESSURE: 80 MMHG | BODY MASS INDEX: 30.06 KG/M2 | WEIGHT: 241.8 LBS | SYSTOLIC BLOOD PRESSURE: 130 MMHG | HEART RATE: 61 BPM | TEMPERATURE: 97.3 F

## 2024-09-30 DIAGNOSIS — Z01.818 PREOP EXAM FOR INTERNAL MEDICINE: ICD-10-CM

## 2024-09-30 DIAGNOSIS — I48.0 PAROXYSMAL ATRIAL FIBRILLATION (HCC): Chronic | ICD-10-CM

## 2024-09-30 DIAGNOSIS — C64.1 RENAL CELL CARCINOMA OF RIGHT KIDNEY (HCC): Chronic | ICD-10-CM

## 2024-09-30 DIAGNOSIS — I10 ESSENTIAL HYPERTENSION: Chronic | ICD-10-CM

## 2024-09-30 DIAGNOSIS — E11.9 TYPE 2 DIABETES MELLITUS WITHOUT COMPLICATION, WITHOUT LONG-TERM CURRENT USE OF INSULIN (HCC): Chronic | ICD-10-CM

## 2024-09-30 DIAGNOSIS — Z01.811 PRE-OP CHEST EXAM: Primary | ICD-10-CM

## 2024-09-30 RX ORDER — DAPAGLIFLOZIN 5 MG/1
5 TABLET, FILM COATED ORAL EVERY MORNING
Qty: 90 TABLET | Refills: 1 | Status: SHIPPED | OUTPATIENT
Start: 2024-09-30

## 2024-09-30 RX ORDER — DAPAGLIFLOZIN 5 MG/1
5 TABLET, FILM COATED ORAL EVERY MORNING
Qty: 90 TABLET | Refills: 1 | OUTPATIENT
Start: 2024-09-30

## 2024-09-30 RX ORDER — AMLODIPINE BESYLATE 5 MG/1
5 TABLET ORAL DAILY
Qty: 90 TABLET | Refills: 1 | Status: SHIPPED | OUTPATIENT
Start: 2024-09-30

## 2024-09-30 RX ORDER — FENOFIBRATE 145 MG/1
TABLET, COATED ORAL
Qty: 90 TABLET | Refills: 1 | Status: SHIPPED | OUTPATIENT
Start: 2024-09-30

## 2024-09-30 RX ORDER — METOPROLOL SUCCINATE 50 MG/1
TABLET, EXTENDED RELEASE ORAL
Qty: 90 TABLET | Refills: 1 | Status: SHIPPED | OUTPATIENT
Start: 2024-09-30

## 2024-09-30 ASSESSMENT — ENCOUNTER SYMPTOMS
DIARRHEA: 0
BLOOD IN STOOL: 0
EYE REDNESS: 0
CONSTIPATION: 0
VOMITING: 0
ABDOMINAL DISTENTION: 0
RECTAL PAIN: 0
ABDOMINAL PAIN: 1
ANAL BLEEDING: 0
SHORTNESS OF BREATH: 0
COLOR CHANGE: 0
COUGH: 0
NAUSEA: 0

## 2024-09-30 NOTE — ASSESSMENT & PLAN NOTE
Here for preoperative eval.  Patient's revised cardiac risk stratification is 0 points.  ASA class III. No current complaints to suggest active cardiac heart disease.  EKG on my evaluation with no significant changes from prior and no acute ischemic changes, computer reads possible anteroseptal infarct, I reached out to Dr. Ray to review EKG as well to see if feels any further cardiac testing is needed prior to surgery.    - Known PHILLIP. Recommend close intra/postop pulmonary monitoring   - Pt was instructed to not take ASA or NSAIDs 7 days prior to surgery.  - hold metformin on surgery morning.  Hold Farxiga for 3 days before surgery  - Continue BB / CCB  as prescribe, no need to hold on surgery day   - awaiting to hear back from Dr. Ray for flecainide preop adjustments  - coumadin bridging per cardiology (hx of 1 PE and afib, no hxof CVA)

## 2024-09-30 NOTE — ASSESSMENT & PLAN NOTE
Well controlled, A1c  6.6% 09/24, with known proteinuria 130 06/24 (from 143)  Overall doing well from a clinical standpoint.   - continue [id number] mg, farxiga 5 mg.  Preop adjustments as   - BP is within desired goal. Not on ace/arb  - we increased  lipitor to 80 mg 02/23. on tricor 145. Repeat lipids remain above goal and will need to adjust treatment, will wait until after surgery  Lab Results   Component Value Date    MICROALBUR 29.10 (H) 06/10/2024    CREATININE 1.4 (H) 09/26/2024   - Microalbuminuria 131 06/24  - uptodate PSV23 vaccine , flu shot.

## 2024-09-30 NOTE — PROGRESS NOTES
weeks, history (>3 months) of MI, CVA, TIA, or CAD/stents.   ASA IV A patient with severe systemic disease that is a constant threat to life Examples include (but not limited to): recent ( < 3 months) MI, CVA, TIA, or CAD/stents, ongoing cardiac ischemia or severe valve dysfunction, severe reduction of ejection fraction, sepsis, DIC, JAYCEE or ESRD not undergoing regularly scheduled dialysis   ASA V A moribund patient who is not expected to survive without the operation Examples include (but not limited to): ruptured abdominal/thoracic aneurysm, massive trauma, intracranial bleed with mass effect, ischemic bowel in the face of significant cardiac pathology or multiple organ/system dysfunction   ASA VI A declared brain-dead patient whose organs are being removed for donor purposes       RCRI Score Risk of major cardiac event*   0- class I 3.9%   1- class II 6%   2- class III 10.1%   >=3- class IV 15%   *Defined as myocardial infarction, pulmonary edema, ventricular fibrillation/primary cardiac arrest, or complete heart block.      Mio Ocampo MD     This dictation was generated by voice recognition computer software.  Although all attempts are made to edit the dictation for accuracy, there may be errors in the transcription that are not intended.

## 2024-09-30 NOTE — ASSESSMENT & PLAN NOTE
-now sees dr Ray  -on metoprolol 50mg, flecainide 100mg bid. Can continue metoprolol on surgery day, flecainide adjustment per cardiology    -on coumadin per coumadin clinic, plan for bridging per cards

## 2024-09-30 NOTE — ASSESSMENT & PLAN NOTE
-S/p partial right nephrectomy 12/2018, pathology T1a clear-cell renal cell carcinoma negative margins  -follows with Dr. Flowers  -today complains again of right lower quadrant pain below surgical scar. CMP and repeat CT abdomen pelvis unremarkable. Recommend to start with speaking with urologist to see if feels can be related to surgery, if not plan to send to GI.

## 2024-10-03 ENCOUNTER — TELEPHONE (OUTPATIENT)
Dept: PHARMACY | Age: 69
End: 2024-10-03

## 2024-10-03 DIAGNOSIS — I48.0 PAROXYSMAL ATRIAL FIBRILLATION (HCC): Primary | ICD-10-CM

## 2024-10-03 DIAGNOSIS — I48.20 CHRONIC ATRIAL FIBRILLATION (HCC): ICD-10-CM

## 2024-10-03 DIAGNOSIS — I26.99 OTHER PULMONARY EMBOLISM WITHOUT ACUTE COR PULMONALE, UNSPECIFIED CHRONICITY (HCC): ICD-10-CM

## 2024-10-03 NOTE — TELEPHONE ENCOUNTER
Dr. Ocampo,    The patient's warfarin therapy is currently being managed by [facility]. The referral on file is going to  soon. Please sign pended referral for patient to continue care with the anticoagulation clinic.     Thanks,   Karolina Hurd, PharmD, BCPS  [facility]  Radha: [phone]  [place]ju[place]ita: [phone]  10/3/2024 2:38 PM

## 2024-10-24 ENCOUNTER — ANTI-COAG VISIT (OUTPATIENT)
Dept: PHARMACY | Age: 69
End: 2024-10-24
Payer: MEDICARE

## 2024-10-24 DIAGNOSIS — I48.0 PAROXYSMAL ATRIAL FIBRILLATION (HCC): Primary | Chronic | ICD-10-CM

## 2024-10-24 DIAGNOSIS — I26.99 OTHER PULMONARY EMBOLISM WITHOUT ACUTE COR PULMONALE, UNSPECIFIED CHRONICITY (HCC): ICD-10-CM

## 2024-10-24 LAB
INTERNATIONAL NORMALIZATION RATIO, POC: 1.3
PROTHROMBIN TIME, POC: 0

## 2024-10-24 PROCEDURE — 99212 OFFICE O/P EST SF 10 MIN: CPT

## 2024-10-24 PROCEDURE — 85610 PROTHROMBIN TIME: CPT

## 2024-10-24 RX ORDER — ENOXAPARIN SODIUM 100 MG/ML
100 INJECTION SUBCUTANEOUS 2 TIMES DAILY
Qty: 10 ML | Refills: 0 | Status: SHIPPED | OUTPATIENT
Start: 2024-10-24

## 2024-10-24 NOTE — PROGRESS NOTES
ANTICOAGULATION SERVICE    Renato Hills III is a 69 y.o. male with PMHx significant for afib, pulmonary HTN, HLD, DM2, hx renal cell carcinoma, and hx PE (2002, after ablation) who presents to clinic 10/24/2024 for anticoagulation monitoring and adjustment.    Anticoagulation Indication(s):  Afib, hx of PE (2002)   Referring Physician:   Dr. Ocampo (also follows with Dr. Newberry, cardiology)   Goal INR Range:  2-3  Duration of Anticoagulation Therapy:  Indefinite  Time of day dose taken: PM  Product patient has at home: warfarin 5 mg (peach color) and warfarin 1 mg (pink)     [id number] Score for Atrial Fibrillation Stroke Risk   Risk   Factors  Component Value   C CHF No 0   H HTN Yes 1   A2 Age >= 75 No,  (69 y.o.) 0   D DM Yes 1   S2 Prior Stroke/TIA No 0   V Vascular Disease No 0   A Age 65-74 Yes,  (69 y.o.) 1   Sc Sex male 0    [id number]  Score  3   Score last updated 11/4/22 9:36 AM EDT    INR Summary                           Warfarin regimen (mg)  Date INR   A/P   Sun Mon Tue Wed Thu Fri Sat Mg/wk  10/24 1.3 Below goal, continue   7 7 7 7 10/5 10/7 7 45  9/23 2.3 At goal, continue   7 7 7 7 5 7 7 45  8/6 2.1 At goal, continue   7 7 7 7 5 7 7 45  6/10 2.2 At goal, continue   7 7 7 7 5 7 7 45  5/22 1.7 Below goal, bolus  7 7 7 10/7 5 7 7 45  5/1 2.4 At goal, continue  7 7 7 7 5 7 7 45  4/24 2.4 At goal, continue  7 7 7 7 5 7 7 45  4/22 1.5 Below goal, bolus  7 10/7 10/7 7 5 7 7 45  4/19 1.4 Below goal, bolus  7 7 7 7 5 10/7 10/7 45  4/3 2.9 At goal, continue  7 7 7 7 5 7 7 45  3/22 1.5 Below goal, bolus x1  7 7 7 7 5 9/7 7 45  2/23 3.0 At goal, continue  7 7 7 7 5 7 7 45  1/22 3.1 Above goal, continue  7 7 7 7 5 7 7 45  1/8 1.5 Below goal, held  7 10/7 7 7 5 7 7 45  11/30 2.9 At goal, continue  7 7 7 7 5 7 7 45  11/8 3.2 Above goal, continue  7 7 7 7 5 7 7 45  9/28 2.1 At goal, continue  7 7 7 7 5 7 7 45  9/13 2.2 At goal, continue  7 7 7 7 5 7 7 45  8/31 1.7 Below goal,

## 2024-10-27 DIAGNOSIS — I26.99 OTHER PULMONARY EMBOLISM WITHOUT ACUTE COR PULMONALE, UNSPECIFIED CHRONICITY (HCC): ICD-10-CM

## 2024-10-27 DIAGNOSIS — I48.0 PAROXYSMAL ATRIAL FIBRILLATION (HCC): Primary | ICD-10-CM

## 2024-10-28 ENCOUNTER — ANTI-COAG VISIT (OUTPATIENT)
Dept: PHARMACY | Age: 69
End: 2024-10-28
Payer: MEDICARE

## 2024-10-28 DIAGNOSIS — I26.99 OTHER PULMONARY EMBOLISM WITHOUT ACUTE COR PULMONALE, UNSPECIFIED CHRONICITY (HCC): ICD-10-CM

## 2024-10-28 DIAGNOSIS — I48.0 PAROXYSMAL ATRIAL FIBRILLATION (HCC): Primary | Chronic | ICD-10-CM

## 2024-10-28 LAB
INTERNATIONAL NORMALIZATION RATIO, POC: 1.7
PROTHROMBIN TIME, POC: 0

## 2024-10-28 PROCEDURE — 99211 OFF/OP EST MAY X REQ PHY/QHP: CPT | Performed by: PHARMACIST

## 2024-10-28 PROCEDURE — 85610 PROTHROMBIN TIME: CPT | Performed by: PHARMACIST

## 2024-10-28 NOTE — PROGRESS NOTES
ANTICOAGULATION SERVICE    Renato Hills III is a 69 y.o. male with PMHx significant for afib, pulmonary HTN, HLD, DM2, hx renal cell carcinoma, and hx PE (2002, after ablation) who presents to clinic 10/28/2024 for anticoagulation monitoring and adjustment.    Anticoagulation Indication(s):  Afib, hx of PE (2002)   Referring Physician:   Dr. Ocampo (also follows with Dr. Ray, cardiology)   Goal INR Range:  2-3  Duration of Anticoagulation Therapy:  Indefinite  Time of day dose taken: PM  Product patient has at home: warfarin 5 mg (peach color) and warfarin 1 mg (pink)     [id number] Score for Atrial Fibrillation Stroke Risk   Risk   Factors  Component Value   C CHF No 0   H HTN Yes 1   A2 Age >= 75 No,  (69 y.o.) 0   D DM Yes 1   S2 Prior Stroke/TIA No 0   V Vascular Disease No 0   A Age 65-74 Yes,  (69 y.o.) 1   Sc Sex male 0    [id number]  Score  3   Score last updated 11/4/22 9:36 AM EDT    INR Summary                           Warfarin regimen (mg)  Date INR   A/P   Sun Mon Tue Wed Thu Fri Sat Mg/wk  10/28 1.7 Below goal, continue   7 7 7 7 5 7 7 45  10/24 1.3 Below goal, continue   7 7 7 7 10/5 10/7 7 45  9/23 2.3 At goal, continue   7 7 7 7 5 7 7 45  8/6 2.1 At goal, continue   7 7 7 7 5 7 7 45  6/10 2.2 At goal, continue   7 7 7 7 5 7 7 45  5/22 1.7 Below goal, bolus  7 7 7 10/7 5 7 7 45  5/1 2.4 At goal, continue  7 7 7 7 5 7 7 45  4/24 2.4 At goal, continue  7 7 7 7 5 7 7 45  4/22 1.5 Below goal, bolus  7 10/7 10/7 7 5 7 7 45  4/19 1.4 Below goal, bolus  7 7 7 7 5 10/7 10/7 45  4/3 2.9 At goal, continue  7 7 7 7 5 7 7 45  3/22 1.5 Below goal, bolus x1  7 7 7 7 5 9/7 7 45  2/23 3.0 At goal, continue  7 7 7 7 5 7 7 45  1/22 3.1 Above goal, continue  7 7 7 7 5 7 7 45  1/8 1.5 Below goal, held  7 10/7 7 7 5 7 7 45  11/30 2.9 At goal, continue  7 7 7 7 5 7 7 45  11/8 3.2 Above goal, continue  7 7 7 7 5 7 7 45  9/28 2.1 At goal, continue  7 7 7 7 5 7 7 45  9/13 2.2 At goal, continue

## 2024-10-30 PROBLEM — Z01.818 PREOP EXAM FOR INTERNAL MEDICINE: Status: RESOLVED | Noted: 2023-08-24 | Resolved: 2024-10-30

## 2024-11-11 ENCOUNTER — PATIENT MESSAGE (OUTPATIENT)
Dept: CARDIOLOGY CLINIC | Age: 69
End: 2024-11-11

## 2024-11-11 RX ORDER — FLECAINIDE ACETATE 100 MG/1
TABLET ORAL
Qty: 180 TABLET | Refills: 1 | Status: SHIPPED | OUTPATIENT
Start: 2024-11-11

## 2024-11-25 ENCOUNTER — ANTI-COAG VISIT (OUTPATIENT)
Dept: PHARMACY | Age: 69
End: 2024-11-25
Payer: MEDICARE

## 2024-11-25 DIAGNOSIS — I48.0 PAROXYSMAL ATRIAL FIBRILLATION (HCC): Primary | Chronic | ICD-10-CM

## 2024-11-25 DIAGNOSIS — I26.99 OTHER PULMONARY EMBOLISM WITHOUT ACUTE COR PULMONALE, UNSPECIFIED CHRONICITY (HCC): ICD-10-CM

## 2024-11-25 LAB
INTERNATIONAL NORMALIZATION RATIO, POC: 2.8
PROTHROMBIN TIME, POC: 0

## 2024-11-25 PROCEDURE — 85610 PROTHROMBIN TIME: CPT | Performed by: PHARMACIST

## 2024-11-25 PROCEDURE — 99211 OFF/OP EST MAY X REQ PHY/QHP: CPT | Performed by: PHARMACIST

## 2024-11-25 NOTE — PROGRESS NOTES
having 2 drinks per week   Upcoming surgeries or procedures none     Assessment/Plan:  Patient's INR was therapeutic today. He just got  back from a trip to visit his daughter in [place] and is going to  for the week to visit his son.     Patient was instructed to continue warfarin 7 mg daily except 5 mg on Thursdays. Repeat INR in 4 weeks.     Note: Patient travels to [place] for weeks at a time frequently to visit his daughter.    Karolina Hurd, PharmD, [facility]  [facility]  Radha: [phone]  [place]: [phone]  11/25/2024 10:15 AM    For Pharmacy Admin Tracking Only  Time Spent (min): 15

## 2024-12-21 SDOH — HEALTH STABILITY: PHYSICAL HEALTH: ON AVERAGE, HOW MANY DAYS PER WEEK DO YOU ENGAGE IN MODERATE TO STRENUOUS EXERCISE (LIKE A BRISK WALK)?: 2 DAYS

## 2024-12-21 ASSESSMENT — PATIENT HEALTH QUESTIONNAIRE - PHQ9
SUM OF ALL RESPONSES TO PHQ QUESTIONS 1-9: 0
SUM OF ALL RESPONSES TO PHQ9 QUESTIONS 1 & 2: 0
SUM OF ALL RESPONSES TO PHQ QUESTIONS 1-9: 0
1. LITTLE INTEREST OR PLEASURE IN DOING THINGS: NOT AT ALL
2. FEELING DOWN, DEPRESSED OR HOPELESS: NOT AT ALL

## 2024-12-21 ASSESSMENT — LIFESTYLE VARIABLES
HOW OFTEN DO YOU HAVE SIX OR MORE DRINKS ON ONE OCCASION: 1
HOW OFTEN DO YOU HAVE A DRINK CONTAINING ALCOHOL: 4
HOW MANY STANDARD DRINKS CONTAINING ALCOHOL DO YOU HAVE ON A TYPICAL DAY: 1
HOW MANY STANDARD DRINKS CONTAINING ALCOHOL DO YOU HAVE ON A TYPICAL DAY: 1 OR 2
HOW OFTEN DO YOU HAVE A DRINK CONTAINING ALCOHOL: 2-3 TIMES A WEEK

## 2024-12-23 ENCOUNTER — ANTI-COAG VISIT (OUTPATIENT)
Dept: PHARMACY | Age: 69
End: 2024-12-23
Payer: MEDICARE

## 2024-12-23 DIAGNOSIS — I48.0 PAROXYSMAL ATRIAL FIBRILLATION (HCC): Primary | Chronic | ICD-10-CM

## 2024-12-23 DIAGNOSIS — I26.99 OTHER PULMONARY EMBOLISM WITHOUT ACUTE COR PULMONALE, UNSPECIFIED CHRONICITY (HCC): ICD-10-CM

## 2024-12-23 LAB
INTERNATIONAL NORMALIZATION RATIO, POC: 1.7
PROTHROMBIN TIME, POC: 0

## 2024-12-23 PROCEDURE — 99212 OFFICE O/P EST SF 10 MIN: CPT | Performed by: PHARMACIST

## 2024-12-23 PROCEDURE — 85610 PROTHROMBIN TIME: CPT | Performed by: PHARMACIST

## 2024-12-23 NOTE — PROGRESS NOTES
alcohol use Patient denies smoking   Patient reports having 2 drinks per week   Upcoming surgeries or procedures none     Assessment/Plan:  Patient's INR was subtherapeutic today. He reports eating more vitamin K foods recently.     Patient was instructed to bolus with 10 mg tonight then continue warfarin 7 mg daily except 5 mg on Thursdays. Repeat INR in 3 weeks.     Note: Patient travels to [place] for weeks at a time frequently to visit his daughter.    Karolina Hurd, PharmD, [facility]  [facility]  Radha: [phone]  [place]: [phone]  12/23/2024 9:14 AM    For Pharmacy Admin Tracking Only    Intervention Detail: Dose Adjustment: 1, reason: Therapy Optimization  Total # of Interventions Recommended: 1  Total # of Interventions Accepted: 1  Time Spent (min): 15

## 2024-12-24 ENCOUNTER — OFFICE VISIT (OUTPATIENT)
Dept: INTERNAL MEDICINE CLINIC | Age: 69
End: 2024-12-24

## 2024-12-24 VITALS
DIASTOLIC BLOOD PRESSURE: 84 MMHG | SYSTOLIC BLOOD PRESSURE: 130 MMHG | HEIGHT: 75 IN | OXYGEN SATURATION: 98 % | BODY MASS INDEX: 30.59 KG/M2 | HEART RATE: 72 BPM | WEIGHT: 246 LBS

## 2024-12-24 DIAGNOSIS — H91.90 HEARING LOSS, UNSPECIFIED HEARING LOSS TYPE, UNSPECIFIED LATERALITY: ICD-10-CM

## 2024-12-24 DIAGNOSIS — I48.0 PAROXYSMAL ATRIAL FIBRILLATION (HCC): Chronic | ICD-10-CM

## 2024-12-24 DIAGNOSIS — Z00.00 MEDICARE ANNUAL WELLNESS VISIT, SUBSEQUENT: Primary | ICD-10-CM

## 2024-12-24 DIAGNOSIS — I10 ESSENTIAL HYPERTENSION: Chronic | ICD-10-CM

## 2024-12-24 DIAGNOSIS — R41.3 MEMORY CHANGE: ICD-10-CM

## 2024-12-24 DIAGNOSIS — Z23 NEED FOR INFLUENZA VACCINATION: ICD-10-CM

## 2024-12-24 DIAGNOSIS — Z23 NEED FOR PNEUMOCOCCAL VACCINATION: ICD-10-CM

## 2024-12-24 DIAGNOSIS — E11.9 TYPE 2 DIABETES MELLITUS WITHOUT COMPLICATION, WITHOUT LONG-TERM CURRENT USE OF INSULIN (HCC): Chronic | ICD-10-CM

## 2024-12-24 NOTE — ASSESSMENT & PLAN NOTE
Well controlled, A1c  6.6% 09/24, with known proteinuria 130 06/24 (from 143)  Overall doing well from a clinical standpoint.   - continue [id number] mg, farxiga 5 mg.  - BP is within desired goal. Not on ace/arb  - we increased  lipitor to 80 mg 02/23. on tricor 145. Repeat lipids remain above goal and will need to adjust treatment, will wait until after surgery  Lab Results   Component Value Date    MICROALBUR 28.00 (H) 02/19/2024    CREATININE 1.4 (H) 09/26/2024   - Microalbuminuria 131 06/24  - uptodate PSV23 vaccine , flu shot.

## 2024-12-24 NOTE — ASSESSMENT & PLAN NOTE
Here for annual wellness visit, overall doing well from a clinical standpoint, other for below.  As for health maintenance:  -colon cancer screen: Last colonoscopy 01/2024, next 01/2029  -prostate cancer screening: PSA 1.07  -lung cancer screen: not indicated  - AAA: not indicated  -BP within goal  -HCV neg 2018  -negative depression screen  -Independent of ADLs and IADLs, though cognitive screen is normal today he does note difficulty coming up with names and words, would like him to come back for MoCA testing with Dr. Naranjo.  Will also check B12 and TSH  -Advised to eat a healthy, well-balanced diet  -Advised to exercise at least 30min/day 5x/week for heart and bone health  -Check skin regularly for new moles or changes in moles. wear sunscreen at least SPF 30 and don't forget to reapply every 3-4 hours or if you are in the water  -Follow up with dentist at least twice/year.  -Follow up with eye doctor at least once/year.  -had a living will

## 2025-01-03 ENCOUNTER — OFFICE VISIT (OUTPATIENT)
Age: 70
End: 2025-01-03

## 2025-01-03 VITALS
HEART RATE: 73 BPM | WEIGHT: 246 LBS | OXYGEN SATURATION: 98 % | TEMPERATURE: 98.1 F | SYSTOLIC BLOOD PRESSURE: 114 MMHG | DIASTOLIC BLOOD PRESSURE: 73 MMHG | HEIGHT: 75 IN | BODY MASS INDEX: 30.59 KG/M2

## 2025-01-03 DIAGNOSIS — J06.9 VIRAL UPPER RESPIRATORY TRACT INFECTION: Primary | ICD-10-CM

## 2025-01-03 DIAGNOSIS — R05.1 ACUTE COUGH: ICD-10-CM

## 2025-01-03 LAB
Lab: NORMAL
PERFORMING INSTRUMENT: NORMAL
QC PASS/FAIL: NORMAL
SARS-COV-2, POC: NORMAL

## 2025-01-03 RX ORDER — DEXTROMETHORPHAN HYDROBROMIDE AND PROMETHAZINE HYDROCHLORIDE 15; 6.25 MG/5ML; MG/5ML
5 SYRUP ORAL
Qty: 120 ML | Refills: 0 | Status: SHIPPED | OUTPATIENT
Start: 2025-01-03

## 2025-01-03 ASSESSMENT — ENCOUNTER SYMPTOMS
SORE THROAT: 0
COUGH: 1
NAUSEA: 0
VOMITING: 0
SHORTNESS OF BREATH: 0
ABDOMINAL PAIN: 0
DIARRHEA: 0
WHEEZING: 0

## 2025-01-03 NOTE — PATIENT INSTRUCTIONS
Keep hydrated, tylenol   (if no contraindications) as needed if pain or fever.. Take medications as prescribed.. follow up with PCP in 7- days if not better   Go to ER  if symptoms worse/feeling worse or has new symptoms or concerns,  if fever/chills, increase shortness of breath, increase congestion or wheezing  if associated with chest pain, nausea/vomiting, dizzy/ light-headed sensation.

## 2025-01-03 NOTE — PROGRESS NOTES
chest pain, diarrhea, dysuria, headaches, nausea, sore throat, vomiting or wheezing.       Vitals:    01/03/25 1048 01/03/25 1100   BP: 114/73    Pulse: 73    Temp: 98.1 °F (36.7 °C)    TempSrc: Oral    SpO2: 90% 98%   Weight: 111.6 kg (246 lb)    Height: 1.905 m (6' 3\")        Review of Systems   Constitutional:  Negative for chills and fever.   HENT:  Positive for congestion. Negative for sore throat.    Respiratory:  Positive for cough. Negative for shortness of breath and wheezing.    Cardiovascular:  Negative for chest pain.   Gastrointestinal:  Negative for abdominal pain, diarrhea, nausea and vomiting.   Genitourinary:  Negative for dysuria.   Musculoskeletal:  Negative for myalgias.   Neurological:  Negative for headaches.       Physical Exam    Physical  Vitals signs: reviewed  Constitutional:  appearance: well nourished ..  does not appear acutely ill  ..no distress   Eyes:                 Pupil: equal-round-reactive to light, no photophobia, EOMI            Cornea: clear            Sclera: clear, non injected, non icteric    Ears: Right canal clear / TM normal           Left ear canal clear / TM normal  Nose/Sinus:  no nasal congestion/no drainage   Mouth:                 Mucous membranes moist               Oropharynx:  clear, no erythema, no exudates, voice normal  Neck:    supple, non tender,               No cervical lymph nodes   Pulmonary/Lungs:  effort normal, no stridor                                 Auscultation: good air movement / breath sounds normal  Cardio-vascular:  normal rate and rhythm                              Heart sounds normal-no murmur- no rub   (a-fib controlled)  Abdomen:    soft .. Non tender,  no guarding, no Right CVA tenderness / No Left CVA tenderness  Muscular skeleton:  motor strength normal / muscle tone normal                                  Extremities/joints: no tenderness, normal movements                                  Lower extremities: no calf

## 2025-01-09 DIAGNOSIS — E11.9 TYPE 2 DIABETES MELLITUS WITHOUT COMPLICATION, WITHOUT LONG-TERM CURRENT USE OF INSULIN (HCC): Primary | Chronic | ICD-10-CM

## 2025-01-10 RX ORDER — METFORMIN HYDROCHLORIDE 500 MG/1
500 TABLET, EXTENDED RELEASE ORAL DAILY
Qty: 90 TABLET | Refills: 1 | Status: SHIPPED | OUTPATIENT
Start: 2025-01-10

## 2025-01-10 NOTE — TELEPHONE ENCOUNTER
Last appointment: 12/24/2024  Next appointment: 2/18/2025    Last refill:   Last ordered: 5 months ago (7/15/2024) by Mio Ocampo MD

## 2025-01-13 ENCOUNTER — ANTI-COAG VISIT (OUTPATIENT)
Dept: PHARMACY | Age: 70
End: 2025-01-13
Payer: MEDICARE

## 2025-01-13 DIAGNOSIS — I48.0 PAROXYSMAL ATRIAL FIBRILLATION (HCC): Primary | Chronic | ICD-10-CM

## 2025-01-13 DIAGNOSIS — I26.99 OTHER PULMONARY EMBOLISM WITHOUT ACUTE COR PULMONALE, UNSPECIFIED CHRONICITY (HCC): ICD-10-CM

## 2025-01-13 LAB
INR BLD: 1.5
PROTIME: NORMAL

## 2025-01-13 PROCEDURE — 85610 PROTHROMBIN TIME: CPT

## 2025-01-13 PROCEDURE — 99212 OFFICE O/P EST SF 10 MIN: CPT

## 2025-01-13 NOTE — PROGRESS NOTES
ANTICOAGULATION SERVICE    Renato Hills III is a 69 y.o. male with PMHx significant for afib, pulmonary HTN, HLD, DM2, hx renal cell carcinoma, and hx PE (2002, after ablation) who presents to clinic 1/13/2025 for anticoagulation monitoring and adjustment.    Anticoagulation Indication(s):  Afib, hx of PE (2002)   Referring Physician:   Dr. Ocampo (also follows with Dr. Ray, cardiology)   Goal INR Range:  2-3  Duration of Anticoagulation Therapy:  Indefinite  Time of day dose taken: PM  Product patient has at home: warfarin 5 mg (peach color) and warfarin 1 mg (pink)     [id number] Score for Atrial Fibrillation Stroke Risk   Risk   Factors  Component Value   C CHF No 0   H HTN Yes 1   A2 Age >= 75 No,  (69 y.o.) 0   D DM Yes 1   S2 Prior Stroke/TIA No 0   V Vascular Disease No 0   A Age 65-74 Yes,  (69 y.o.) 1   Sc Sex male 0    [id number]  Score  3   Score last updated 11/4/22 9:36 AM EDT    INR Summary                           Warfarin regimen (mg)  Date INR   A/P   Sun Mon Tue Wed Thu Fri Sat Mg/wk  1/13 1.5 Below goal,bolu+inc   7 10/7 7 7 7 7 7 49  12/23 1.7 Below goal, bolus   7 10/7 7 7 5 7 7 45  11/25 2.8 At goal, continue   7 7 7 7 5 7 7 45  10/28 1.7 Below goal, continue   7 7 7 7 5 7 7 45  10/24 1.3 Below goal, continue   7 7 7 7 10/5 10/7 7 45  9/23 2.3 At goal, continue   7 7 7 7 5 7 7 45  8/6 2.1 At goal, continue   7 7 7 7 5 7 7 45  6/10 2.2 At goal, continue   7 7 7 7 5 7 7 45  5/22 1.7 Below goal, bolus  7 7 7 10/7 5 7 7 45  5/1 2.4 At goal, continue  7 7 7 7 5 7 7 45  4/24 2.4 At goal, continue  7 7 7 7 5 7 7 45  4/22 1.5 Below goal, bolus  7 10/7 10/7 7 5 7 7 45  4/19 1.4 Below goal, bolus  7 7 7 7 5 10/7 10/7 45  4/3 2.9 At goal, continue  7 7 7 7 5 7 7 45  3/22 1.5 Below goal, bolus x1  7 7 7 7 5 9/7 7 45  2/23 3.0 At goal, continue  7 7 7 7 5 7 7 45  1/22 3.1 Above goal, continue  7 7 7 7 5 7 7 45  1/8 1.5 Below goal, held  7 10/7 7 7 5 7 7 45  11/30 2.9 At goal, continue

## 2025-01-15 ENCOUNTER — PROCEDURE VISIT (OUTPATIENT)
Dept: AUDIOLOGY | Age: 70
End: 2025-01-15
Payer: MEDICARE

## 2025-01-15 DIAGNOSIS — H90.3 SENSORINEURAL HEARING LOSS (SNHL) OF BOTH EARS: Primary | ICD-10-CM

## 2025-01-15 DIAGNOSIS — H93.13 TINNITUS OF BOTH EARS: ICD-10-CM

## 2025-01-15 PROCEDURE — 92567 TYMPANOMETRY: CPT | Performed by: AUDIOLOGIST

## 2025-01-15 PROCEDURE — 92557 COMPREHENSIVE HEARING TEST: CPT | Performed by: AUDIOLOGIST

## 2025-01-15 NOTE — PROGRESS NOTES
Tympanometry: Normal peak pressure and compliance, Type A tympanogram, consistent with normal middle ear function.       Reliability: Good   Transducer: Inserts    See scanned audiogram dated 1/15/2025 for results.      PATIENT EDUCATION:       The following items were discussed with the patient:   - Good Communication Strategies  - Hearing Loss and Hearing Aids  - Tinnitus Management Strategies    - Noise-Induced Hearing Loss and use of Hearing Protection Devices (HPDs)     Educational information was shared in the After Visit Summary.                                                RECOMMENDATIONS:                                                                                                                                                                                                                                                            The following items are recommended based on patient report and results from today's appointment:  - Continue medical follow-up with Mio Ocampo MD.  - Retest hearing as medically indicated and/or sooner if a change in hearing is noted.  - If desired, schedule a Hearing Aid Evaluation (HAE) appointment to discuss hearing aid options.  - Utilize \"Good Communication Strategies\" as discussed to assist in speech understanding with communication partners.  - Maintain a sound enriched environment to assist in the management of tinnitus symptoms.  - Use hearing protection devices (HPDs), such as protective ear muffs and ear plugs, when exposed to dangerous sound levels.        Aakash Cooper  Audiologist       Chart CC'd to: Mio Ocampo MD         Degree of   Hearing Sensitivity dB Range   Within Normal Limits (WNL) 0 - 20   Mild 20 - 40   Moderate 40 - 55   Moderately-Severe 55 - 70   Severe 70 - 90   Profound 90 +

## 2025-01-23 PROBLEM — Z00.00 MEDICARE ANNUAL WELLNESS VISIT, SUBSEQUENT: Status: RESOLVED | Noted: 2024-12-24 | Resolved: 2025-01-23

## 2025-01-23 NOTE — ED TRIAGE NOTES
Pt was diagnosed with UTI on 7/10 and placed on Macrobid. Yesterday PCP switched him to Cipro. Pt has increasing flank pain.
23-Jan-2025 13:51

## 2025-01-27 ENCOUNTER — ANTI-COAG VISIT (OUTPATIENT)
Dept: PHARMACY | Age: 70
End: 2025-01-27
Payer: MEDICARE

## 2025-01-27 DIAGNOSIS — I48.0 PAROXYSMAL ATRIAL FIBRILLATION (HCC): Primary | Chronic | ICD-10-CM

## 2025-01-27 DIAGNOSIS — I26.99 OTHER PULMONARY EMBOLISM WITHOUT ACUTE COR PULMONALE, UNSPECIFIED CHRONICITY (HCC): ICD-10-CM

## 2025-01-27 LAB
INR BLD: 2.2
PROTIME: NORMAL

## 2025-01-27 PROCEDURE — 85610 PROTHROMBIN TIME: CPT | Performed by: PHARMACIST

## 2025-01-27 PROCEDURE — 99211 OFF/OP EST MAY X REQ PHY/QHP: CPT | Performed by: PHARMACIST

## 2025-01-27 NOTE — PROGRESS NOTES
ANTICOAGULATION SERVICE    Renato Hills III is a 70 y.o. 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    Anticoagulation Indication(s):  Afib, hx of PE (2002)   Referring Physician:   Dr. Ocampo (also follows with Dr. Ray, cardiology)   Goal INR Range:  2-3  Duration of Anticoagulation Therapy:  Indefinite  Time of day dose taken: PM  Product patient has at home: warfarin 5 mg (peach color) and warfarin 1 mg (pink)     [id number] Score for Atrial Fibrillation Stroke Risk   Risk   Factors  Component Value   C CHF No 0   H HTN Yes 1   A2 Age >= 75 No,  (70 y.o.) 0   D DM Yes 1   S2 Prior Stroke/TIA No 0   V Vascular Disease No 0   A Age 65-74 Yes,  (70 y.o.) 1   Sc Sex male 0    [id number]  Score  3   Score last updated 11/4/22 9:36 AM EDT    INR Summary                           Warfarin regimen (mg)  Date INR   A/P   Sun Mon Tue Wed Thu Fri Sat Mg/wk  1/27 2.2 At goal, continue  7 7 7 7 7 7 7 49  1/13 1.5 Below goal,bolu+inc   7 10/7 7 7 7 7 7 49  12/23 1.7 Below goal, bolus   7 10/7 7 7 5 7 7 47  11/25 2.8 At goal, continue   7 7 7 7 5 7 7 47  10/28 1.7 Below goal, continue   7 7 7 7 5 7 7 47  10/24 1.3 Below goal, continue   7 7 7 7 10/5 10/7 7 47  9/23 2.3 At goal, continue   7 7 7 7 5 7 7 47  8/6 2.1 At goal, continue   7 7 7 7 5 7 7 47  6/10 2.2 At goal, continue   7 7 7 7 5 7 7 47  5/22 1.7 Below goal, bolus  7 7 7 10/7 5 7 7 47  5/1 2.4 At goal, continue  7 7 7 7 5 7 7 47  4/24 2.4 At goal, continue  7 7 7 7 5 7 7 47  4/22 1.5 Below goal, bolus  7 10/7 10/7 7 5 7 7 47  4/19 1.4 Below goal, bolus  7 7 7 7 5 10/7 10/7 47  4/3 2.9 At goal, continue  7 7 7 7 5 7 7 47  3/22 1.5 Below goal, bolus x1  7 7 7 7 5 9/7 7 47  2/23 3.0 At goal, continue  7 7 7 7 5 7 7 47  1/22 3.1 Above goal, continue  7 7 7 7 5 7 7 47  1/8 1.5 Below goal, held

## 2025-02-10 RX ORDER — ATORVASTATIN CALCIUM 80 MG/1
80 TABLET, FILM COATED ORAL DAILY
Qty: 90 TABLET | Refills: 1 | Status: SHIPPED | OUTPATIENT
Start: 2025-02-10

## 2025-02-24 ENCOUNTER — ANTI-COAG VISIT (OUTPATIENT)
Dept: PHARMACY | Age: 70
End: 2025-02-24
Payer: MEDICARE

## 2025-02-24 DIAGNOSIS — I48.0 PAROXYSMAL ATRIAL FIBRILLATION (HCC): Primary | Chronic | ICD-10-CM

## 2025-02-24 DIAGNOSIS — I26.99 OTHER PULMONARY EMBOLISM WITHOUT ACUTE COR PULMONALE, UNSPECIFIED CHRONICITY (HCC): ICD-10-CM

## 2025-02-24 LAB
INTERNATIONAL NORMALIZATION RATIO, POC: 1.9
PROTHROMBIN TIME, POC: 0

## 2025-02-24 PROCEDURE — 99212 OFFICE O/P EST SF 10 MIN: CPT | Performed by: PHARMACIST

## 2025-02-24 PROCEDURE — 85610 PROTHROMBIN TIME: CPT | Performed by: PHARMACIST

## 2025-02-24 NOTE — PROGRESS NOTES
ANTICOAGULATION SERVICE    Renato Hills III is a 70 y.o. 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    Anticoagulation Indication(s):  Afib, hx of PE (2002)   Referring Physician:   Dr. Ocampo (also follows with Dr. Ray, cardiology)   Goal INR Range:  2-3  Duration of Anticoagulation Therapy:  Indefinite  Time of day dose taken: PM  Product patient has at home: warfarin 5 mg (peach color) and warfarin 1 mg (pink)     [id number] Score for Atrial Fibrillation Stroke Risk   Risk   Factors  Component Value   C CHF No 0   H HTN Yes 1   A2 Age >= 75 No,  (70 y.o.) 0   D DM Yes 1   S2 Prior Stroke/TIA No 0   V Vascular Disease No 0   A Age 65-74 Yes,  (70 y.o.) 1   Sc Sex male 0    [id number]  Score  3   Score last updated 11/4/22 9:36 AM EDT    INR Summary                           Warfarin regimen (mg)  Date INR   A/P   Sun Mon Tue Wed Thu Fri Sat Mg/wk  2/24 1.9 Below goal, increase* 7 7 7 7 7 7 7 49  1/27 2.2 At goal, continue  7 7 7 7 7 7 7 49  1/13 1.5 Below goal,bolu+inc   7 10/7 7 7 7 7 7 49  12/23 1.7 Below goal, bolus   7 10/7 7 7 5 7 7 47  11/25 2.8 At goal, continue   7 7 7 7 5 7 7 47  10/28 1.7 Below goal, continue   7 7 7 7 5 7 7 47  10/24 1.3 Below goal, continue   7 7 7 7 10/5 10/7 7 47  9/23 2.3 At goal, continue   7 7 7 7 5 7 7 47  8/6 2.1 At goal, continue   7 7 7 7 5 7 7 47  6/10 2.2 At goal, continue   7 7 7 7 5 7 7 47  5/22 1.7 Below goal, bolus  7 7 7 10/7 5 7 7 47  5/1 2.4 At goal, continue  7 7 7 7 5 7 7 47  4/24 2.4 At goal, continue  7 7 7 7 5 7 7 47  4/22 1.5 Below goal, bolus  7 10/7 10/7 7 5 7 7 47  4/19 1.4 Below goal, bolus  7 7 7 7 5 10/7 10/7 47  4/3 2.9 At goal, continue  7 7 7 7 5 7 7 47  3/22 1.5 Below goal, bolus x1  7 7 7 7 5 9/7 7 47  2/23 3.0 At goal, continue  7 7 7 7 5 7 7 47  1/22 3.1 Above goal, continue

## 2025-03-04 ENCOUNTER — OFFICE VISIT (OUTPATIENT)
Age: 70
End: 2025-03-04
Payer: MEDICARE

## 2025-03-04 DIAGNOSIS — R47.89 WORD FINDING DIFFICULTY: Primary | ICD-10-CM

## 2025-03-04 PROCEDURE — 1123F ACP DISCUSS/DSCN MKR DOCD: CPT | Performed by: PSYCHOLOGIST

## 2025-03-04 PROCEDURE — 1036F TOBACCO NON-USER: CPT | Performed by: PSYCHOLOGIST

## 2025-03-04 PROCEDURE — 90832 PSYTX W PT 30 MINUTES: CPT | Performed by: PSYCHOLOGIST

## 2025-03-04 ASSESSMENT — PROMIS GLOBAL HEALTH SCALE
SUM OF RESPONSES TO QUESTIONS 3, 6, 7, & 8: 15
IN GENERAL, HOW WOULD YOU RATE YOUR PHYSICAL HEALTH [ON A SCALE OF 1 (POOR) TO 5 (EXCELLENT)]?: GOOD
IN THE PAST 7 DAYS, HOW WOULD YOU RATE YOUR PAIN ON AVERAGE [ON A SCALE FROM 0 (NO PAIN) TO 10 (WORST IMAGINABLE PAIN)]?: 4
IN THE PAST 7 DAYS, HOW WOULD YOU RATE YOUR FATIGUE ON AVERAGE [ON A SCALE FROM 1 (NONE) TO 5 (VERY SEVERE)]?: MILD
IN GENERAL, HOW WOULD YOU RATE YOUR SATISFACTION WITH YOUR SOCIAL ACTIVITIES AND RELATIONSHIPS [ON A SCALE OF 1 (POOR) TO 5 (EXCELLENT)]?: EXCELLENT
IN GENERAL, WOULD YOU SAY YOUR HEALTH IS...[ON A SCALE OF 1 (POOR) TO 5 (EXCELLENT)]: GOOD
TO WHAT EXTENT ARE YOU ABLE TO CARRY OUT YOUR EVERYDAY PHYSICAL ACTIVITIES SUCH AS WALKING, CLIMBING STAIRS, CARRYING GROCERIES, OR MOVING A CHAIR [ON A SCALE OF 1 (NOT AT ALL) TO 5 (COMPLETELY)]?: MOSTLY
IN GENERAL, WOULD YOU SAY YOUR QUALITY OF LIFE IS...[ON A SCALE OF 1 (POOR) TO 5 (EXCELLENT)]: EXCELLENT
IN GENERAL, HOW WOULD YOU RATE YOUR MENTAL HEALTH, INCLUDING YOUR MOOD AND YOUR ABILITY TO THINK [ON A SCALE OF 1 (POOR) TO 5 (EXCELLENT)]?: VERY GOOD
IN THE PAST 7 DAYS, HOW OFTEN HAVE YOU BEEN BOTHERED BY EMOTIONAL PROBLEMS, SUCH AS FEELING ANXIOUS, DEPRESSED, OR IRRITABLE [ON A SCALE FROM 1 (NEVER) TO 5 (ALWAYS)]?: RARELY
IN GENERAL, PLEASE RATE HOW WELL YOU CARRY OUT YOUR USUAL SOCIAL ACTIVITIES (INCLUDES ACTIVITIES AT HOME, AT WORK, AND IN YOUR COMMUNITY, AND RESPONSIBILITIES AS A PARENT, CHILD, SPOUSE, EMPLOYEE, FRIEND, ETC) [ON A SCALE OF 1 (POOR) TO 5 (EXCELLENT)]?: EXCELLENT
SUM OF RESPONSES TO QUESTIONS 2, 4, 5, & 10: 18

## 2025-03-04 NOTE — PROGRESS NOTES
Behavioral Health Consultation   Sheree Naranjo, PhD  Clinical Psychologist  3/4/2025      Time spent with Patient: 35 minutes  11:25-11:50  This is patient's 1st  [facility] appointment.    Reason for Consult:   Chief Complaint   Patient presents with    Memory Loss       Referring Provider: Mio Ocampo MD    Pt provided informed consent for the behavioral health program. Discussed with patient model of service to include the limits of confidentiality (i.e. abuse reporting, suicide intervention, etc.) and short-term intervention focused approach. Pt indicated understanding.    Subjective  LIFE CONTEXT   1. Family  With whom do you live? Spouse Matt and daughter and grand daughter (11)   or partner? Yes Length of relationship? 47  Children?  Yes 2 daughters and a son  Type of relationships with the people you live with? Good  Supportive relationships? Spouse    2. Social  Friends? Yes Quality? Good Frequency of contact? daily  Other connection with community? 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 Work/School  Work/go to school? Retired  How many years in this job? not applicable How are you doing? n/a  How do you support yourself financially? jail    4. Recreation  For fun? Relaxation? Time with family/friends  , out with friends - for breweries How often? daily    5. Self-Care  Exercise on a regular basis for health? Seldom  What type of exercise and how often? physical therapy  for shoulder surgery   Sleep ok? normal with indica Eat ok? Good  Use tobacco (what and how often)?  denies  Use caffeine (what and how often)?   1  cups of coffee per day  Use alcohol (what and how often)?  Beer or wine - 1-2 glasses 3 days per week  Use drugs (what and how often)? Current drug usage.  Type of drug:  Marijuana.  Frequency of use:  Daily.  Duration of drug use:  4 years for sleep  Problems in the past with tobacco, alcohol or drugs?  No  Medications: Take

## 2025-03-10 ENCOUNTER — ANTI-COAG VISIT (OUTPATIENT)
Dept: PHARMACY | Age: 70
End: 2025-03-10
Payer: MEDICARE

## 2025-03-10 DIAGNOSIS — I26.99 OTHER PULMONARY EMBOLISM WITHOUT ACUTE COR PULMONALE, UNSPECIFIED CHRONICITY (HCC): ICD-10-CM

## 2025-03-10 DIAGNOSIS — I48.0 PAROXYSMAL ATRIAL FIBRILLATION (HCC): Primary | Chronic | ICD-10-CM

## 2025-03-10 LAB
INTERNATIONAL NORMALIZATION RATIO, POC: 2.4
PROTHROMBIN TIME, POC: 0

## 2025-03-10 PROCEDURE — 85610 PROTHROMBIN TIME: CPT | Performed by: PHARMACIST

## 2025-03-10 PROCEDURE — 99211 OFF/OP EST MAY X REQ PHY/QHP: CPT | Performed by: PHARMACIST

## 2025-03-10 NOTE — PROGRESS NOTES
ANTICOAGULATION SERVICE    Renato Hills III is a 70 y.o. 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    Anticoagulation Indication(s):  Afib, hx of PE (2002)   Referring Physician:   Dr. Ocampo (also follows with Dr. Ray, cardiology)   Goal INR Range:  2-3  Duration of Anticoagulation Therapy:  Indefinite  Time of day dose taken: PM  Product patient has at home: warfarin 5 mg (peach color) and warfarin 1 mg (pink)     [id number] Score for Atrial Fibrillation Stroke Risk   Risk   Factors  Component Value   C CHF No 0   H HTN Yes 1   A2 Age >= 75 No,  (70 y.o.) 0   D DM Yes 1   S2 Prior Stroke/TIA No 0   V Vascular Disease No 0   A Age 65-74 Yes,  (70 y.o.) 1   Sc Sex male 0    [id number]  Score  3   Score last updated 11/4/22 9:36 AM EDT    INR Summary                           Warfarin regimen (mg)  Date INR   A/P   Sun Mon Tue Wed Thu Fri Sat Mg/wk  3/10 2.4 At goal, continue  7 7 7 7 7 7 7 49  2/24 1.9 Below goal, increase* 7 7 7 7 7 7 7 49  1/27 2.2 At goal, continue  7 7 7 7 7 7 7 49  1/13 1.5 Below goal,bolu+inc   7 10/7 7 7 7 7 7 49  12/23 1.7 Below goal, bolus   7 10/7 7 7 5 7 7 47  11/25 2.8 At goal, continue   7 7 7 7 5 7 7 47  10/28 1.7 Below goal, continue   7 7 7 7 5 7 7 47  10/24 1.3 Below goal, continue   7 7 7 7 10/5 10/7 7 47  9/23 2.3 At goal, continue   7 7 7 7 5 7 7 47  8/6 2.1 At goal, continue   7 7 7 7 5 7 7 47  6/10 2.2 At goal, continue   7 7 7 7 5 7 7 47  5/22 1.7 Below goal, bolus  7 7 7 10/7 5 7 7 47  5/1 2.4 At goal, continue  7 7 7 7 5 7 7 47  4/24 2.4 At goal, continue  7 7 7 7 5 7 7 47  4/22 1.5 Below goal, bolus  7 10/7 10/7 7 5 7 7 47  4/19 1.4 Below goal, bolus  7 7 7 7 5 10/7 10/7 47  4/3 2.9 At goal, continue  7 7 7 7 5 7 7 47  3/22 1.5 Below goal, bolus x1  7 7 7 7 5 9/7 7 47  2/23 3.0 At goal, continue

## 2025-03-31 RX ORDER — FENOFIBRATE 145 MG/1
145 TABLET, COATED ORAL DAILY
Qty: 90 TABLET | Refills: 1 | Status: SHIPPED | OUTPATIENT
Start: 2025-03-31

## 2025-03-31 RX ORDER — AMLODIPINE BESYLATE 5 MG/1
5 TABLET ORAL DAILY
Qty: 90 TABLET | Refills: 1 | Status: SHIPPED | OUTPATIENT
Start: 2025-03-31

## 2025-03-31 RX ORDER — METOPROLOL SUCCINATE 50 MG/1
50 TABLET, EXTENDED RELEASE ORAL DAILY
Qty: 90 TABLET | Refills: 1 | Status: SHIPPED | OUTPATIENT
Start: 2025-03-31

## 2025-04-03 ENCOUNTER — ANTI-COAG VISIT (OUTPATIENT)
Dept: PHARMACY | Age: 70
End: 2025-04-03
Payer: MEDICARE

## 2025-04-03 DIAGNOSIS — I48.0 PAROXYSMAL ATRIAL FIBRILLATION (HCC): Primary | Chronic | ICD-10-CM

## 2025-04-03 DIAGNOSIS — I26.99 OTHER PULMONARY EMBOLISM WITHOUT ACUTE COR PULMONALE, UNSPECIFIED CHRONICITY (HCC): ICD-10-CM

## 2025-04-03 LAB
INTERNATIONAL NORMALIZATION RATIO, POC: 2.4
PROTHROMBIN TIME, POC: 0

## 2025-04-03 PROCEDURE — 99211 OFF/OP EST MAY X REQ PHY/QHP: CPT | Performed by: PHARMACIST

## 2025-04-03 PROCEDURE — 85610 PROTHROMBIN TIME: CPT | Performed by: PHARMACIST

## 2025-04-03 NOTE — PROGRESS NOTES
Signs/symptoms of bleeding Hx of hematuria 9/2022   Vitamin K intake Normally avoids high vitamin k foods   Eats yue salads daily   Recent vomiting/diarrhea/fever, changes in weight or activity level None reported   Tobacco or alcohol use Patient denies smoking   Patient reports having 2 drinks per week   Upcoming surgeries or procedures none     Assessment/Plan:  Patient's INR was therapeutic today. He reports no medication changes and no changes in diet.     Patient was instructed to continue warfarin 7 mg daily. Repeat INR in 4 weeks.     Note: Patient travels to [place] for weeks at a time frequently to visit his daughter.    Karo Lowe, PharmD, [facility]  Medication Management Clinic   [facility] Ph: [phone]  [facility] [place] Ph: [phone]  4/3/2025 12:22 PM      For Pharmacy Admin Tracking Only  Time Spent (min): 15

## 2025-04-09 DIAGNOSIS — E11.9 TYPE 2 DIABETES MELLITUS WITHOUT COMPLICATION, WITHOUT LONG-TERM CURRENT USE OF INSULIN: Chronic | ICD-10-CM

## 2025-04-09 RX ORDER — DAPAGLIFLOZIN 5 MG/1
5 TABLET, FILM COATED ORAL EVERY MORNING
Qty: 90 TABLET | Refills: 1 | Status: SHIPPED | OUTPATIENT
Start: 2025-04-09

## 2025-04-30 ENCOUNTER — ANTI-COAG VISIT (OUTPATIENT)
Dept: PHARMACY | Age: 70
End: 2025-04-30
Payer: MEDICARE

## 2025-04-30 DIAGNOSIS — I48.20 CHRONIC ATRIAL FIBRILLATION (HCC): Primary | Chronic | ICD-10-CM

## 2025-04-30 LAB
INTERNATIONAL NORMALIZATION RATIO, POC: 2.1
PROTHROMBIN TIME, POC: 0

## 2025-04-30 PROCEDURE — 85610 PROTHROMBIN TIME: CPT

## 2025-04-30 PROCEDURE — 99211 OFF/OP EST MAY X REQ PHY/QHP: CPT

## 2025-04-30 NOTE — PROGRESS NOTES
ANTICOAGULATION SERVICE    Renato Hills III is a 70 y.o. 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    Anticoagulation Indication(s):  Afib, hx of PE (2002)   Referring Physician:   Dr. Ocampo (also follows with Dr. Ray, cardiology)   Goal INR Range:  2-3  Duration of Anticoagulation Therapy:  Indefinite  Time of day dose taken: PM  Product patient has at home: warfarin 5 mg (peach color) and warfarin 1 mg (pink)     [id number] Score for Atrial Fibrillation Stroke Risk   Risk   Factors  Component Value   C CHF No 0   H HTN Yes 1   A2 Age >= 75 No,  (70 y.o.) 0   D DM Yes 1   S2 Prior Stroke/TIA No 0   V Vascular Disease No 0   A Age 65-74 Yes,  (70 y.o.) 1   Sc Sex male 0    [id number]  Score  3   Score last updated 11/4/22 9:36 AM EDT    INR Summary                           Warfarin regimen (mg)  Date INR   A/P   Sun Mon Tue Wed Thu Fri Sat Mg/wk  4/30 2.1 At goal, continue  7 7 7 7 7 7 7 49  4/3 2.4 At goal, continue  7 7 7 7 7 7 7 49  3/10 2.4 At goal, continue  7 7 7 7 7 7 7 49  2/24 1.9 Below goal, increase* 7 7 7 7 7 7 7 49  1/27 2.2 At goal, continue  7 7 7 7 7 7 7 49  1/13 1.5 Below goal,bolu+inc   7 10/7 7 7 7 7 7 49  12/23 1.7 Below goal, bolus   7 10/7 7 7 5 7 7 47  11/25 2.8 At goal, continue   7 7 7 7 5 7 7 47  10/28 1.7 Below goal, continue   7 7 7 7 5 7 7 47  10/24 1.3 Below goal, continue   7 7 7 7 10/5 10/7 7 47  9/23 2.3 At goal, continue   7 7 7 7 5 7 7 47  8/6 2.1 At goal, continue   7 7 7 7 5 7 7 47  6/10 2.2 At goal, continue   7 7 7 7 5 7 7 47  5/22 1.7 Below goal, bolus  7 7 7 10/7 5 7 7 47  5/1 2.4 At goal, continue  7 7 7 7 5 7 7 47  4/24 2.4 At goal, continue  7 7 7 7 5 7 7 47  4/22 1.5 Below goal, bolus  7 10/7 10/7 7 5 7 7 47  4/19 1.4 Below goal, bolus  7 7 7 7 5 10/7 10/7 47  4/3 2.9 At goal, continue

## 2025-05-01 RX ORDER — WARFARIN SODIUM 1 MG/1
1 TABLET ORAL 2 TIMES DAILY
Qty: 180 TABLET | Refills: 1 | Status: SHIPPED | OUTPATIENT
Start: 2025-05-01

## 2025-05-01 RX ORDER — FLECAINIDE ACETATE 100 MG/1
100 TABLET ORAL 2 TIMES DAILY
Qty: 180 TABLET | Refills: 1 | Status: SHIPPED | OUTPATIENT
Start: 2025-05-01

## 2025-05-10 ASSESSMENT — SLEEP AND FATIGUE QUESTIONNAIRES
HOW LIKELY ARE YOU TO NOD OFF OR FALL ASLEEP WHILE SITTING AND TALKING TO SOMEONE: WOULD NEVER DOZE
HOW LIKELY ARE YOU TO NOD OFF OR FALL ASLEEP WHILE SITTING AND READING: SLIGHT CHANCE OF DOZING
HOW LIKELY ARE YOU TO NOD OFF OR FALL ASLEEP WHILE SITTING QUIETLY AFTER LUNCH WITHOUT ALCOHOL: WOULD NEVER DOZE
ESS TOTAL SCORE: 4
HOW LIKELY ARE YOU TO NOD OFF OR FALL ASLEEP WHEN YOU ARE A PASSENGER IN A CAR FOR AN HOUR WITHOUT A BREAK: WOULD NEVER DOZE
HOW LIKELY ARE YOU TO NOD OFF OR FALL ASLEEP IN A CAR, WHILE STOPPED FOR A FEW MINUTES IN TRAFFIC: WOULD NEVER DOZE
HOW LIKELY ARE YOU TO NOD OFF OR FALL ASLEEP WHILE SITTING INACTIVE IN A PUBLIC PLACE: WOULD NEVER DOZE
HOW LIKELY ARE YOU TO NOD OFF OR FALL ASLEEP WHILE LYING DOWN TO REST IN THE AFTERNOON WHEN CIRCUMSTANCES PERMIT: MODERATE CHANCE OF DOZING
HOW LIKELY ARE YOU TO NOD OFF OR FALL ASLEEP WHILE SITTING QUIETLY AFTER LUNCH WITHOUT ALCOHOL: WOULD NEVER DOZE
HOW LIKELY ARE YOU TO NOD OFF OR FALL ASLEEP WHILE SITTING AND TALKING TO SOMEONE: WOULD NEVER DOZE
HOW LIKELY ARE YOU TO NOD OFF OR FALL ASLEEP WHILE SITTING INACTIVE IN A PUBLIC PLACE: WOULD NEVER DOZE
HOW LIKELY ARE YOU TO NOD OFF OR FALL ASLEEP WHILE WATCHING TV: SLIGHT CHANCE OF DOZING
HOW LIKELY ARE YOU TO NOD OFF OR FALL ASLEEP WHILE LYING DOWN TO REST IN THE AFTERNOON WHEN CIRCUMSTANCES PERMIT: MODERATE CHANCE OF DOZING
HOW LIKELY ARE YOU TO NOD OFF OR FALL ASLEEP WHILE WATCHING TV: SLIGHT CHANCE OF DOZING
HOW LIKELY ARE YOU TO NOD OFF OR FALL ASLEEP IN A CAR, WHILE STOPPED FOR A FEW MINUTES IN TRAFFIC: WOULD NEVER DOZE
HOW LIKELY ARE YOU TO NOD OFF OR FALL ASLEEP WHILE SITTING AND READING: SLIGHT CHANCE OF DOZING
HOW LIKELY ARE YOU TO NOD OFF OR FALL ASLEEP WHEN YOU ARE A PASSENGER IN A CAR FOR AN HOUR WITHOUT A BREAK: WOULD NEVER DOZE

## 2025-05-13 ENCOUNTER — TELEMEDICINE (OUTPATIENT)
Dept: PULMONOLOGY | Age: 70
End: 2025-05-13
Payer: MEDICARE

## 2025-05-13 DIAGNOSIS — I10 ESSENTIAL HYPERTENSION: Chronic | ICD-10-CM

## 2025-05-13 DIAGNOSIS — I27.20 PULMONARY HTN (HCC): ICD-10-CM

## 2025-05-13 DIAGNOSIS — G47.33 OBSTRUCTIVE SLEEP APNEA SYNDROME: Primary | Chronic | ICD-10-CM

## 2025-05-13 DIAGNOSIS — I48.0 PAROXYSMAL ATRIAL FIBRILLATION (HCC): Chronic | ICD-10-CM

## 2025-05-13 DIAGNOSIS — E11.9 TYPE 2 DIABETES MELLITUS WITHOUT COMPLICATION, WITHOUT LONG-TERM CURRENT USE OF INSULIN (HCC): Chronic | ICD-10-CM

## 2025-05-13 DIAGNOSIS — E66.09 CLASS 1 OBESITY DUE TO EXCESS CALORIES WITH SERIOUS COMORBIDITY AND BODY MASS INDEX (BMI) OF 30.0 TO 30.9 IN ADULT: ICD-10-CM

## 2025-05-13 DIAGNOSIS — E66.811 CLASS 1 OBESITY DUE TO EXCESS CALORIES WITH SERIOUS COMORBIDITY AND BODY MASS INDEX (BMI) OF 30.0 TO 30.9 IN ADULT: ICD-10-CM

## 2025-05-13 PROCEDURE — G2211 COMPLEX E/M VISIT ADD ON: HCPCS | Performed by: NURSE PRACTITIONER

## 2025-05-13 PROCEDURE — 1160F RVW MEDS BY RX/DR IN RCRD: CPT | Performed by: NURSE PRACTITIONER

## 2025-05-13 PROCEDURE — 3046F HEMOGLOBIN A1C LEVEL >9.0%: CPT | Performed by: NURSE PRACTITIONER

## 2025-05-13 PROCEDURE — 2022F DILAT RTA XM EVC RTNOPTHY: CPT | Performed by: NURSE PRACTITIONER

## 2025-05-13 PROCEDURE — 1159F MED LIST DOCD IN RCRD: CPT | Performed by: NURSE PRACTITIONER

## 2025-05-13 PROCEDURE — 1123F ACP DISCUSS/DSCN MKR DOCD: CPT | Performed by: NURSE PRACTITIONER

## 2025-05-13 PROCEDURE — 99214 OFFICE O/P EST MOD 30 MIN: CPT | Performed by: NURSE PRACTITIONER

## 2025-05-13 PROCEDURE — 3017F COLORECTAL CA SCREEN DOC REV: CPT | Performed by: NURSE PRACTITIONER

## 2025-05-13 PROCEDURE — G8427 DOCREV CUR MEDS BY ELIG CLIN: HCPCS | Performed by: NURSE PRACTITIONER

## 2025-05-13 NOTE — PROGRESS NOTES
[facility]  [address]  [address]  [place] [address]  P- [phone]  F- [phone]   Video Visit- Follow up      Assessment/Plan:      1. Obstructive sleep apnea syndrome  Assessment & Plan:  Chronic-Stable: Reviewed and analyzed results of physiologic download from patient's machine and reviewed with patient.  Supplies and parts as needed for his machine.  These are medically necessary.  Limit caffeine use after 3pm. Based on the analyzed data will continue with current settings. Stable on his machine at current settings, getting benefit from the use, and having minimal side effects. Encouraged him to use his machine as much as possible. Discussed adjusting the moisture settings on his machine for dryness and/or consider a chin strap with his nasal mask or switching to a FFM. Discussed seeing him back sooner to monitor compliance, he declines at this time and would like to schedule for 1 year. Encouraged him to contact the office with any questions or concerns.     Encouraged consistent use of his machine each night, all night.  Discussed the importance of treating PHILLIP from a physiological standpoint.  Instructed not to drive unless had 4 hrs of effective therapy for his PHILLIP the night before.  No driving when sleepy.  Did review the risks of under or untreated PHILLIP including, but not limited to, higher risks of motor vehicle accidents, stroke, heart attacks, and death.  He understands and accepts all these risks.      2. Paroxysmal atrial fibrillation (HCC)  Assessment & Plan:  Chronic- Stable.  Discussed the importance of treating obstructive sleep apnea as part of the management of this disorder.  Cont any meds per PCP and other physicians.    3. Essential hypertension  Assessment & Plan:  Chronic- Stable.  Discussed the importance of treating obstructive sleep apnea as part of the management of this disorder.  Cont any meds per PCP and other

## 2025-05-13 NOTE — ASSESSMENT & PLAN NOTE
Chronic-Stable: Reviewed and analyzed results of physiologic download from patient's machine and reviewed with patient.  Supplies and parts as needed for his machine.  These are medically necessary.  Limit caffeine use after 3pm. Based on the analyzed data will continue with current settings. Stable on his machine at current settings, getting benefit from the use, and having minimal side effects. Encouraged him to use his machine as much as possible. Discussed adjusting the moisture settings on his machine for dryness and/or consider a chin strap with his nasal mask or switching to a FFM. Discussed seeing him back sooner to monitor compliance, he declines at this time and would like to schedule for 1 year. Encouraged him to contact the office with any questions or concerns.     Encouraged consistent use of his machine each night, all night.  Discussed the importance of treating PHILLIP from a physiological standpoint.  Instructed not to drive unless had 4 hrs of effective therapy for his PHILLIP the night before.  No driving when sleepy.  Did review the risks of under or untreated PHILLIP including, but not limited to, higher risks of motor vehicle accidents, stroke, heart attacks, and death.  He understands and accepts all these risks.

## 2025-05-13 NOTE — PROGRESS NOTES
Diagnosis: [x] PHILLIP (G47.33) [] CSA (G47.31) [] Apnea (G47.30)   Length of Need: [x] 15 Months [] 99 Months [] Other:   Machine (JOVAN!): [] Respironics Dream Station      Auto [] ResMed AirSense     Auto [] Other:     []  CPAP () [] Bilevel ()   Mode: [] Auto [] Spontaneous    Mode: [] Auto [] Spontaneous              Comfort Settings:      Humidifier: [] Heated ()        [x] Water chamber replacement ()/ 1 per 6 months        Mask:   [x] Nasal () /1 per 3 months [] Full Face () /1 per 3 months   [x] Patient choice -Size and fit mask [] Patient Choice - Size and fit mask   [] Dispense: [] Dispense:   [x] Headgear () / 1 per 3 months [] Headgear () / 1 per 3 months   [] Replacement Nasal Cushion ()/2 per month [] Interface Replacement ()/1 per month   [x] Replacement Nasal Pillows ()/2 per month         Tubing: [x] Heated ()/1 per 3 months    [] Standard ()/1 per 3 months [] Other:           Filters: [x] Non-disposable ()/1 per 6 months     [x] Ultra-Fine, Disposable ()/2 per month        Miscellaneous: [x] Chin Strap ()/ 1 per 6 months [] O2 bleed-in:        LPM   [] Oxymetry on CPAP/Bilevel []  Other:         Start Order Date: 05/13/25    MEDICAL JUSTIFICATION:  I, the undersigned, certify that the above prescribed supplies are medically necessary for this patient’s wellbeing.  In my opinion, the supplies are both reasonable and necessary in reference to accepted standards of medicalpractice in treatment of this patient’s condition.    YUE BELTRE NP    NPI: [id number]       Order Signed Date: 05/13/25  [facility]  Pulmonary, Sleep, and Critical Care    Pulmonary, Sleep, and Critical Care  [facility] [address]                          [address] [place] [address] [address]  [place] [address]                                    [place] [address]  Phone: [phone]    Fax:

## 2025-05-15 DIAGNOSIS — I48.0 PAROXYSMAL ATRIAL FIBRILLATION (HCC): Chronic | ICD-10-CM

## 2025-05-15 DIAGNOSIS — I26.99 OTHER PULMONARY EMBOLISM WITHOUT ACUTE COR PULMONALE, UNSPECIFIED CHRONICITY (HCC): ICD-10-CM

## 2025-05-15 RX ORDER — WARFARIN SODIUM 5 MG/1
5 TABLET ORAL DAILY
Qty: 90 TABLET | Refills: 1 | Status: SHIPPED | OUTPATIENT
Start: 2025-05-15

## 2025-05-15 NOTE — TELEPHONE ENCOUNTER
Spoke with pt to clarify warfarin tablet strengths.   He prefers to continue using 5 mg + 1 mg + 1 mg.    Latanya Campbell, PharmD, BCACP  Medication Management Clinic   [facility] Ph: [phone]  [facility] [place] Ph: [phone]  5/15/2025 2:11 PM    For Pharmacy Admin Tracking Only    Intervention Detail: Refill(s) Provided  Total # of Interventions Recommended: 1  Total # of Interventions Accepted: 1  Time Spent (min): 5

## 2025-05-28 ENCOUNTER — ANTI-COAG VISIT (OUTPATIENT)
Dept: PHARMACY | Age: 70
End: 2025-05-28
Payer: MEDICARE

## 2025-05-28 DIAGNOSIS — I26.99 OTHER PULMONARY EMBOLISM WITHOUT ACUTE COR PULMONALE, UNSPECIFIED CHRONICITY (HCC): ICD-10-CM

## 2025-05-28 DIAGNOSIS — I48.0 PAROXYSMAL ATRIAL FIBRILLATION (HCC): Primary | Chronic | ICD-10-CM

## 2025-05-28 LAB
INTERNATIONAL NORMALIZATION RATIO, POC: 1.7
PROTHROMBIN TIME, POC: 0

## 2025-05-28 PROCEDURE — 85610 PROTHROMBIN TIME: CPT | Performed by: INTERNAL MEDICINE

## 2025-05-28 PROCEDURE — 99211 OFF/OP EST MAY X REQ PHY/QHP: CPT | Performed by: INTERNAL MEDICINE

## 2025-05-28 NOTE — PROGRESS NOTES
7 7 7 7 5 10/7 10/7 47  4/3 2.9 At goal, continue  7 7 7 7 5 7 7 47  3/22 1.5 Below goal, bolus x1  7 7 7 7 5 9/7 7 47  2/23 3.0 At goal, continue  7 7 7 7 5 7 7 47  1/22 3.1 Above goal, continue  7 7 7 7 5 7 7 47  1/8 1.5 Below goal, held  7 10/7 7 7 5 7 7 47  11/30 2.9 At goal, continue  7 7 7 7 5 7 7 47  11/8 3.2 Above goal, continue  7 7 7 7 5 7 7 47  9/28 2.1 At goal, continue  7 7 7 7 5 7 7 47  9/13 2.2 At goal, continue  7 7 7 7 5 7 7 47  8/31 1.7 Below goal, increase 7 7 7 7 5 7 7 47  8/17 3.1 Above goal, decrease 7 5 7 7 5 7 7 45  8/3 3.1 Above goal, continue 7 7 7 7 5 7 7 47  6/22 2.4 At goal, continue 7 7 7 7 5 7 7 47  5/11 2.1 At goal, continue 7 7 7 7 5 7 7 47  4/6 2.2 At goal, continue 7 7 7 7 5 7 7 47  3/9 2.4 At goal, continue  7 7 7 7 5 7 7 47  2/16 3.5 Above goal, decrease 7 7 7 7 5 7 7 47  1/5 3.2 Above goal, continue 7 7 7 7 7 7 7 49  11/21 2.1 At goal, continue 7 7 7 7 7 7 7 49  11/4 3.6 Above goal, holdx1 7 7 7 7 7 0/7 7 49  9/18 3.06 Per [facility]   5/18 2.56 Per PCP  7 7 7 7 7 7 7 49  4/20 2.23  2/22 2.13  1/4 2.36    Last CBC:  Lab Results   Component Value Date    RBC 5.29 10/16/2024    HGB 15.7 10/16/2024    HCT 46.4 10/16/2024    MCV 87.7 10/16/2024    MCH 29.6 10/16/2024    MPV 9.9 10/16/2024    RDW 15.4 10/16/2024     10/16/2024       Patient History:  Recent hospitalizations/HC visits 10/24/24: rotator cuff surgery (L), hold x5 days with lovenox bridge   4/12/24: rotator cuff surgery (R)  1/3/24: colonoscopy, held warfarin x5 days with no bridge (due to cardio office not knowing about procedure)   9/6/23: hand surgery, hold warfarin x4 days with Lovenox bridge per Dr. Newberry    Recent medication changes 3/15-3/19: Was given a z-pack and steroid x 5 days to treat pneumonia   8/17-8/21: azithromycin 250 mg x 5 days  8/7: Penicillin v potassium x 10 days  12/13 Crestor switched to Lipitor   Medications taken regularly that may interact with warfarin or alter INR Fenofibrate 145 mg

## 2025-06-05 ENCOUNTER — OFFICE VISIT (OUTPATIENT)
Dept: INTERNAL MEDICINE CLINIC | Age: 70
End: 2025-06-05

## 2025-06-05 VITALS
SYSTOLIC BLOOD PRESSURE: 132 MMHG | DIASTOLIC BLOOD PRESSURE: 80 MMHG | HEART RATE: 63 BPM | TEMPERATURE: 97.1 F | BODY MASS INDEX: 30.02 KG/M2 | OXYGEN SATURATION: 98 % | WEIGHT: 240.2 LBS

## 2025-06-05 DIAGNOSIS — R41.3 MEMORY CHANGE: ICD-10-CM

## 2025-06-05 DIAGNOSIS — E11.9 TYPE 2 DIABETES MELLITUS WITHOUT COMPLICATION, WITHOUT LONG-TERM CURRENT USE OF INSULIN (HCC): Chronic | ICD-10-CM

## 2025-06-05 DIAGNOSIS — I10 ESSENTIAL HYPERTENSION: Chronic | ICD-10-CM

## 2025-06-05 DIAGNOSIS — I48.0 PAROXYSMAL ATRIAL FIBRILLATION (HCC): Chronic | ICD-10-CM

## 2025-06-05 DIAGNOSIS — E11.9 TYPE 2 DIABETES MELLITUS WITHOUT COMPLICATION, WITHOUT LONG-TERM CURRENT USE OF INSULIN (HCC): Primary | Chronic | ICD-10-CM

## 2025-06-05 DIAGNOSIS — C64.1 RENAL CELL CARCINOMA OF RIGHT KIDNEY (HCC): ICD-10-CM

## 2025-06-05 LAB
ALBUMIN SERPL-MCNC: 4.5 G/DL (ref 3.4–5)
ALBUMIN/GLOB SERPL: 1.9 {RATIO} (ref 1.1–2.2)
ALP SERPL-CCNC: 52 U/L (ref 40–129)
ALT SERPL-CCNC: 47 U/L (ref 10–40)
ANION GAP SERPL CALCULATED.3IONS-SCNC: 13 MMOL/L (ref 3–16)
AST SERPL-CCNC: 31 U/L (ref 15–37)
BILIRUB SERPL-MCNC: 0.5 MG/DL (ref 0–1)
BUN SERPL-MCNC: 23 MG/DL (ref 7–20)
CALCIUM SERPL-MCNC: 10 MG/DL (ref 8.3–10.6)
CHLORIDE SERPL-SCNC: 104 MMOL/L (ref 99–110)
CO2 SERPL-SCNC: 22 MMOL/L (ref 21–32)
CREAT SERPL-MCNC: 1.4 MG/DL (ref 0.8–1.3)
CREAT UR-MCNC: 257 MG/DL (ref 39–259)
EST. AVERAGE GLUCOSE BLD GHB EST-MCNC: 139.9 MG/DL
FOLATE SERPL-MCNC: 13.5 NG/ML (ref 4.78–24.2)
GFR SERPLBLD CREATININE-BSD FMLA CKD-EPI: 54 ML/MIN/{1.73_M2}
GLUCOSE SERPL-MCNC: 113 MG/DL (ref 70–99)
HBA1C MFR BLD: 6.5 %
MICROALBUMIN UR DL<=1MG/L-MCNC: 16.3 MG/DL
MICROALBUMIN/CREAT UR: 63.4 MG/G (ref 0–30)
POTASSIUM SERPL-SCNC: 4.8 MMOL/L (ref 3.5–5.1)
PROT SERPL-MCNC: 6.9 G/DL (ref 6.4–8.2)
SODIUM SERPL-SCNC: 139 MMOL/L (ref 136–145)
TSH SERPL DL<=0.005 MIU/L-ACNC: 1.39 UIU/ML (ref 0.27–4.2)
VIT B12 SERPL-MCNC: 333 PG/ML (ref 211–911)

## 2025-06-05 RX ORDER — ACETAZOLAMIDE 250 MG/1
250 TABLET ORAL 2 TIMES DAILY
Status: CANCELLED | OUTPATIENT
Start: 2025-06-05

## 2025-06-05 ASSESSMENT — ENCOUNTER SYMPTOMS: SHORTNESS OF BREATH: 0

## 2025-06-05 NOTE — PROGRESS NOTES
[facility]  Follow up visit   2025    Renato Hills III (:  1955) is a 70 y.o. male, here for evaluation of the following medical concerns:    Chief Complaint   Patient presents with    Diabetes    Hypertension        ASSESSMENT/ PLAN:  Type 2 diabetes mellitus without complication, without long-term current use of insulin (HCC)  Well controlled, A1c  6.6% , with known proteinuria 130  (from 143)  Overall doing well from a clinical standpoint.   - continue [id number] mg, farxiga 5 mg.  - BP is within desired goal. Not on ace/arb  - Repeat A1c now  - we increased  lipitor to 80 mg . on tricor 145. Repeat lipids remain above goal   - Microalbuminuria 131 , repeat now   - uptodate PSV23 vaccine , flu shot.    Renal cell carcinoma of right kidney (HCC)  -S/p partial right nephrectomy 2018, pathology T1a clear-cell renal cell carcinoma negative margins  -follows with Dr. Flowers  -We have done CT abdomen pelvis last year due to abdominal discomfort with no significant findings, symptoms stable, had a colonoscopy  around the time symptoms started which was unremarkable as well aside for 2 polyps    Essential hypertension  Well controlled  -continue metoprolol 50mg , amlodipine 5 mg     Atrial fibrillation (HCC)  -now sees dr Ray  -on metoprolol 50mg, flecainide 100mg bid.   -on coumadin per coumadin clinic    Patient will be traveling to [facility]-from my research online appears [facility] elevation<2800m and therefore given no prior history of high-altitude sickness and planned trip no indication for prophylactic medication.   Discussed gradual or staged ascent is the surest and safest methods of preventing or ameliorating ANGELIA . This is best accomplished by spending one night at an intermediate altitude.    Orders Placed This Encounter   Procedures    Albumin/Creatinine Ratio, Urine    Hemoglobin A1C    TSH reflex to FT4,FT3 ([place] Only)    Vitamin B12

## 2025-06-05 NOTE — TELEPHONE ENCOUNTER
Pt states he is not going on a trail, will be flying into [place] but not doing a trail.     Aware Diamox will be sent on Monday.

## 2025-06-05 NOTE — ASSESSMENT & PLAN NOTE
-S/p partial right nephrectomy 12/2018, pathology T1a clear-cell renal cell carcinoma negative margins  -follows with Dr. Flowers  -We have done CT abdomen pelvis last year due to abdominal discomfort with no significant findings, symptoms stable, had a colonoscopy 1/24 around the time symptoms started which was unremarkable as well aside for 2 polyps

## 2025-06-05 NOTE — ASSESSMENT & PLAN NOTE
Well controlled, A1c  6.6% 09/24, with known proteinuria 130 06/24 (from 143)  Overall doing well from a clinical standpoint.   - continue [id number] mg, farxiga 5 mg.  - BP is within desired goal. Not on ace/arb  - Repeat A1c now  - we increased  lipitor to 80 mg 02/23. on tricor 145. Repeat lipids remain above goal   - Microalbuminuria 131 06/24, repeat now   - uptodate PSV23 vaccine , flu shot.

## 2025-06-11 ENCOUNTER — ANTI-COAG VISIT (OUTPATIENT)
Dept: PHARMACY | Age: 70
End: 2025-06-11
Payer: MEDICARE

## 2025-06-11 DIAGNOSIS — I26.99 OTHER PULMONARY EMBOLISM WITHOUT ACUTE COR PULMONALE, UNSPECIFIED CHRONICITY (HCC): ICD-10-CM

## 2025-06-11 DIAGNOSIS — I48.0 PAROXYSMAL ATRIAL FIBRILLATION (HCC): Primary | Chronic | ICD-10-CM

## 2025-06-11 LAB
INTERNATIONAL NORMALIZATION RATIO, POC: 2.4
PROTHROMBIN TIME, POC: 0

## 2025-06-11 PROCEDURE — 85610 PROTHROMBIN TIME: CPT | Performed by: PHARMACIST

## 2025-06-11 PROCEDURE — 99211 OFF/OP EST MAY X REQ PHY/QHP: CPT | Performed by: PHARMACIST

## 2025-06-11 NOTE — PROGRESS NOTES
interact with warfarin or alter INR Fenofibrate 145 mg daily   Divalproex 250 mg TID    Warfarin dose taken as prescribed Reports compliance with medications    Signs/symptoms of bleeding Hx of hematuria 9/2022   Vitamin K intake Normally avoids high vitamin k foods   Eats yue salads daily   Recent vomiting/diarrhea/fever, changes in weight or activity level None reported   Tobacco or alcohol use Patient denies smoking   Patient reports having 2 drinks per week   Upcoming surgeries or procedures none     Assessment/Plan:  Patient's INR 2.4 was therapeutic today. He reports no medication changes and no changes in diet.     Patient was instructed to continue warfarin 7 mg daily, except 8mg Mon/Wed/Fri. Repeat INR in 5 weeks due to planned vacation    Note: Patient travels to [place] for weeks at a time frequently to visit his daughter.    Karo Lowe, PharmD, [facility]  Medication Management Clinic   [facility] [place] Ph: [phone]  [facility] [place] Ph: [phone]  6/11/2025 10:05 AM        For Pharmacy Admin Tracking Only    Intervention Detail: Dose Adjustment: 1, reason: Therapy Optimization  Total # of Interventions Recommended: 1  Total # of Interventions Accepted: 1  Time Spent (min): 15

## 2025-07-06 DIAGNOSIS — E11.9 TYPE 2 DIABETES MELLITUS WITHOUT COMPLICATION, WITHOUT LONG-TERM CURRENT USE OF INSULIN (HCC): Chronic | ICD-10-CM

## 2025-07-07 RX ORDER — METFORMIN HYDROCHLORIDE 500 MG/1
500 TABLET, EXTENDED RELEASE ORAL DAILY
Qty: 90 TABLET | Refills: 1 | Status: SHIPPED | OUTPATIENT
Start: 2025-07-07

## 2025-07-07 NOTE — TELEPHONE ENCOUNTER
Last appointment: 6/5/2025  Next appointment: Visit date not found        Last refill: 1/10/2025) by Mio Ocampo MD

## 2025-07-08 DIAGNOSIS — E11.9 TYPE 2 DIABETES MELLITUS WITHOUT COMPLICATION, WITHOUT LONG-TERM CURRENT USE OF INSULIN (HCC): Chronic | ICD-10-CM

## 2025-07-08 RX ORDER — METFORMIN HYDROCHLORIDE 500 MG/1
500 TABLET, EXTENDED RELEASE ORAL DAILY
Qty: 90 TABLET | Refills: 1 | OUTPATIENT
Start: 2025-07-08

## 2025-07-16 ENCOUNTER — ANTI-COAG VISIT (OUTPATIENT)
Dept: PHARMACY | Age: 70
End: 2025-07-16
Payer: MEDICARE

## 2025-07-16 DIAGNOSIS — I26.99 OTHER PULMONARY EMBOLISM WITHOUT ACUTE COR PULMONALE, UNSPECIFIED CHRONICITY (HCC): ICD-10-CM

## 2025-07-16 DIAGNOSIS — I48.0 PAROXYSMAL ATRIAL FIBRILLATION (HCC): Primary | Chronic | ICD-10-CM

## 2025-07-16 LAB
INTERNATIONAL NORMALIZATION RATIO, POC: 2.7
PROTHROMBIN TIME, POC: 0

## 2025-07-16 PROCEDURE — 85610 PROTHROMBIN TIME: CPT | Performed by: PHYSICIAN ASSISTANT

## 2025-07-16 PROCEDURE — 99211 OFF/OP EST MAY X REQ PHY/QHP: CPT | Performed by: PHYSICIAN ASSISTANT

## 2025-07-16 NOTE — PROGRESS NOTES
that may interact with warfarin or alter INR Fenofibrate 145 mg daily   Divalproex 250 mg TID    Warfarin dose taken as prescribed Reports compliance with medications    Signs/symptoms of bleeding Hx of hematuria 9/2022   Vitamin K intake Normally avoids high vitamin k foods   Eats yue salads daily   Recent vomiting/diarrhea/fever, changes in weight or activity level None reported   Tobacco or alcohol use Patient denies smoking   Patient reports having 2 drinks per week   Upcoming surgeries or procedures none     Assessment/Plan:  Patient's INR 2.7 was therapeutic today. He reports no medication changes. States that he has had less vit k acutely d/t travelling but plans to return to normal.    Patient was instructed to continue warfarin 7 mg daily, except 8mg Mon/Wed/Fri. Repeat INR in 4 weeks.    Note: Patient travels to [place] for weeks at a time frequently to visit his daughter.    Shantell Hunter Pharm.D.   Medication Management Clinic   [facility] [place] Ph: [phone]  [facility] [place] Ph: [phone]  7/16/2025 10:23 AM      For Pharmacy Admin Tracking Only    Total # of Interventions Recommended: 0  Total # of Interventions Accepted: 0  Time Spent (min): 15

## 2025-08-04 ENCOUNTER — TELEMEDICINE (OUTPATIENT)
Dept: FAMILY MEDICINE CLINIC | Age: 70
End: 2025-08-04

## 2025-08-04 DIAGNOSIS — U07.1 COVID: Primary | ICD-10-CM

## 2025-08-04 RX ORDER — CODEINE PHOSPHATE AND GUAIFENESIN 10; 100 MG/5ML; MG/5ML
5 SOLUTION ORAL 3 TIMES DAILY PRN
Qty: 200 ML | Refills: 0 | Status: SHIPPED | OUTPATIENT
Start: 2025-08-04 | End: 2025-08-07

## 2025-08-04 ASSESSMENT — PATIENT HEALTH QUESTIONNAIRE - PHQ9
SUM OF ALL RESPONSES TO PHQ QUESTIONS 1-9: 0
2. FEELING DOWN, DEPRESSED OR HOPELESS: NOT AT ALL
SUM OF ALL RESPONSES TO PHQ QUESTIONS 1-9: 0
1. LITTLE INTEREST OR PLEASURE IN DOING THINGS: NOT AT ALL
SUM OF ALL RESPONSES TO PHQ QUESTIONS 1-9: 0
SUM OF ALL RESPONSES TO PHQ QUESTIONS 1-9: 0

## 2025-08-04 ASSESSMENT — ENCOUNTER SYMPTOMS
EYE REDNESS: 0
SINUS PRESSURE: 0
SHORTNESS OF BREATH: 0
RHINORRHEA: 0
DIARRHEA: 0
COLOR CHANGE: 0
NAUSEA: 0
VOMITING: 0
COUGH: 1
SORE THROAT: 0
WHEEZING: 0
EYE ITCHING: 0
CHEST TIGHTNESS: 0
BACK PAIN: 0
BLOOD IN STOOL: 0
CONSTIPATION: 0
ABDOMINAL PAIN: 0

## 2025-08-11 RX ORDER — ATORVASTATIN CALCIUM 80 MG/1
80 TABLET, FILM COATED ORAL DAILY
Qty: 90 TABLET | Refills: 1 | Status: SHIPPED | OUTPATIENT
Start: 2025-08-11

## 2025-08-13 ENCOUNTER — TELEPHONE (OUTPATIENT)
Dept: PHARMACY | Age: 70
End: 2025-08-13

## 2025-08-19 ENCOUNTER — OFFICE VISIT (OUTPATIENT)
Dept: CARDIOLOGY CLINIC | Age: 70
End: 2025-08-19
Payer: MEDICARE

## 2025-08-19 VITALS
BODY MASS INDEX: 30 KG/M2 | OXYGEN SATURATION: 94 % | DIASTOLIC BLOOD PRESSURE: 78 MMHG | SYSTOLIC BLOOD PRESSURE: 120 MMHG | HEART RATE: 54 BPM | WEIGHT: 240 LBS

## 2025-08-19 DIAGNOSIS — I48.0 PAROXYSMAL ATRIAL FIBRILLATION (HCC): Primary | Chronic | ICD-10-CM

## 2025-08-19 DIAGNOSIS — I10 ESSENTIAL HYPERTENSION: Chronic | ICD-10-CM

## 2025-08-19 DIAGNOSIS — I26.99 OTHER PULMONARY EMBOLISM WITHOUT ACUTE COR PULMONALE, UNSPECIFIED CHRONICITY (HCC): ICD-10-CM

## 2025-08-19 PROCEDURE — 1036F TOBACCO NON-USER: CPT | Performed by: INTERNAL MEDICINE

## 2025-08-19 PROCEDURE — G8427 DOCREV CUR MEDS BY ELIG CLIN: HCPCS | Performed by: INTERNAL MEDICINE

## 2025-08-19 PROCEDURE — 1159F MED LIST DOCD IN RCRD: CPT | Performed by: INTERNAL MEDICINE

## 2025-08-19 PROCEDURE — 3078F DIAST BP <80 MM HG: CPT | Performed by: INTERNAL MEDICINE

## 2025-08-19 PROCEDURE — G8417 CALC BMI ABV UP PARAM F/U: HCPCS | Performed by: INTERNAL MEDICINE

## 2025-08-19 PROCEDURE — 3074F SYST BP LT 130 MM HG: CPT | Performed by: INTERNAL MEDICINE

## 2025-08-19 PROCEDURE — 99214 OFFICE O/P EST MOD 30 MIN: CPT | Performed by: INTERNAL MEDICINE

## 2025-08-19 PROCEDURE — 3017F COLORECTAL CA SCREEN DOC REV: CPT | Performed by: INTERNAL MEDICINE

## 2025-08-19 PROCEDURE — 1123F ACP DISCUSS/DSCN MKR DOCD: CPT | Performed by: INTERNAL MEDICINE

## 2025-08-25 ENCOUNTER — ANTI-COAG VISIT (OUTPATIENT)
Dept: PHARMACY | Age: 70
End: 2025-08-25
Payer: MEDICARE

## 2025-08-25 DIAGNOSIS — I48.20 CHRONIC ATRIAL FIBRILLATION (HCC): Primary | Chronic | ICD-10-CM

## 2025-08-25 LAB
INTERNATIONAL NORMALIZATION RATIO, POC: 3.8
PROTHROMBIN TIME, POC: NORMAL

## 2025-08-25 PROCEDURE — 85610 PROTHROMBIN TIME: CPT

## 2025-08-25 PROCEDURE — 99212 OFFICE O/P EST SF 10 MIN: CPT

## (undated) DEVICE — SCANLAN® SURG-I-LOOP® SILICONE LOOPS - BLUE MAXI, 2.5X1.02 MM, 16"/40 CM LONG (2/PKG): Brand: SCANLAN® SURG-I-LOOP® SILICONE LOOPS

## (undated) DEVICE — BLADE OPHTH 180DEG CUT SURF BLU STR SHRP DBL BVL GRINDLESS

## (undated) DEVICE — COVER,MAYO STAND,XL,STERILE: Brand: MEDLINE

## (undated) DEVICE — CAUTERY ES L0.5IN FN TIP HI TEMP ACCU-TEMP

## (undated) DEVICE — SUTURE VCRL SZ 3-0 L27IN ABSRB UD L26MM SH 1/2 CIR J416H

## (undated) DEVICE — SUTURE ETHLN SZ 4-0 L18IN NONABSORBABLE BLK L19MM PS-2 3/8 1667H

## (undated) DEVICE — SUTURE ETHBND EXCEL SZ 2-0 L36IN NONABSORBABLE GRN SH-2 X559H

## (undated) DEVICE — SURE SET-DOUBLE BASIN-LF: Brand: MEDLINE INDUSTRIES, INC.

## (undated) DEVICE — DEVICE WND CLSR SUT V-20 TAPR PNT ABSRB LEN 18IN VLT SZ 2-0

## (undated) DEVICE — WRAP COMPR W2INXL5YD TAN SELF ADH COBAN

## (undated) DEVICE — SPLINT PLSTR OF PARIS W4XL15IN EXTRA FAST SET GYPS S

## (undated) DEVICE — APPLICATOR MEDICATED 26 CC SOLUTION HI LT ORNG CHLORAPREP

## (undated) DEVICE — SUTURE FIBERWIRE SZ 2 W/ TAPERED NEEDLE BLUE L38IN NONABSORB BLU L26.5MM 1/2 CIRCLE AR7200

## (undated) DEVICE — STANDARD HYPODERMIC NEEDLE,POLYPROPYLENE HUB: Brand: MONOJECT

## (undated) DEVICE — STAPLER SKIN H3.9MM WIRE DIA0.58MM CRWN 6.9MM 35 STPL ROT

## (undated) DEVICE — SUTURE NONABSORBABLE MONOFILAMENT 5-0 C-1 1X24 IN PROLENE 8725H

## (undated) DEVICE — SHEET, T, LAPAROTOMY, STERILE: Brand: MEDLINE

## (undated) DEVICE — MEDI-VAC NON-CONDUCTIVE SUCTION TUBING: Brand: CARDINAL HEALTH

## (undated) DEVICE — SPONGE GZ W4XL8IN COT WVN 12 PLY

## (undated) DEVICE — SUTURE ETHLN SZ 5-0 L18IN NONABSORBABLE BLK L19MM PS-2 3/8 1666G

## (undated) DEVICE — UNDERGLOVE SURG SZ 8 BLU LTX FREE SYN POLYISOPRENE POLYMER

## (undated) DEVICE — ELECTRODE BLDE L6.5IN CAUT EXT DISP

## (undated) DEVICE — TOTAL TRAY, 16FR 10ML SIL FOLEY, URN: Brand: MEDLINE

## (undated) DEVICE — PODIATRY PK

## (undated) DEVICE — PENCIL ES L3M ROCK SWCH S STL HEX LOK BLDE ELECTRD HOLSTER

## (undated) DEVICE — SURGICAL SET UP - SURE SET: Brand: MEDLINE INDUSTRIES, INC.

## (undated) DEVICE — STRIP,CLOSURE,WOUND,MEDI-STRIP,1/2X4: Brand: MEDLINE

## (undated) DEVICE — GOWN,SIRUS,POLYRNF,SETINSLV,XL,20/CS: Brand: MEDLINE

## (undated) DEVICE — DRAPE 70X60IN SPLIT IMPERV ADHES STRIP

## (undated) DEVICE — PADDING CAST W4INXL4YD HIGHLY ABSRB THAN COT EZ APPL

## (undated) DEVICE — PADDING CAST CRIMPED FINISH STD 4 YDX3 IN COTTON WBRL II

## (undated) DEVICE — GLOVE SURG SZ 7 L12IN FNGR THK79MIL GRN LTX FREE

## (undated) DEVICE — 1010 S-DRAPE TOWEL DRAPE 10/BX: Brand: STERI-DRAPE™

## (undated) DEVICE — SST TWIST DRILL, STANDARD, 2.8MM DIA. X 127MM: Brand: MICROAIRE®

## (undated) DEVICE — Z INACTIVE USE 2641839 CLIP INT M L POLYMER LOK LIG HEM O LOK

## (undated) DEVICE — SURGIFOAM SPNG SZ 100

## (undated) DEVICE — SYRINGE IRRIG 60ML SFT PLIABLE BLB EZ TO GRP 1 HND USE W/

## (undated) DEVICE — INTENDED FOR TISSUE SEPARATION, AND OTHER PROCEDURES THAT REQUIRE A SHARP SURGICAL BLADE TO PUNCTURE OR CUT.: Brand: BARD-PARKER ® CARBON RIB-BACK BLADES

## (undated) DEVICE — TOWEL,OR,DSP,ST,BLUE,DLX,8/PK,10PK/CS: Brand: MEDLINE

## (undated) DEVICE — GARMENT,MEDLINE,DVT,INT,CALF,MED, GEN2: Brand: MEDLINE

## (undated) DEVICE — GLOVE ORTHO 7 1/2   MSG9475

## (undated) DEVICE — SYRINGE MED 10ML LUERLOCK TIP W/O SFTY DISP

## (undated) DEVICE — SUTURE PERMAHAND SZ 2-0 L30IN NONABSORBABLE BLK SILK W/O A305H

## (undated) DEVICE — BANDAGE COBAN 4 IN COMPR W4INXL5YD FOAM COHESIVE QUIK STK SELF ADH SFT

## (undated) DEVICE — SUTURE ABSORBABLE BRAIDED 2-0 CT-1 27 IN UD VICRYL J259H

## (undated) DEVICE — COVER,TABLE,HEAVY DUTY,77"X90",STRL: Brand: MEDLINE

## (undated) DEVICE — GOWN,SIRUS,POLYRNF,SETINSLV,L,20/CS: Brand: MEDLINE

## (undated) DEVICE — PLATE ES AD W 9FT CRD 2

## (undated) DEVICE — SUTURE PERMAHAND SZ 0 L30IN NONABSORBABLE BLK SILK BRAID A306H

## (undated) DEVICE — CATHETER IV 18 GAX125 IN WNG INTROCAN SAFETY

## (undated) DEVICE — GOWN,SIRUS,FABRNF,XL,20/CS: Brand: MEDLINE

## (undated) DEVICE — 3M™ WARMING BLANKET, UPPER BODY, 10 PER CASE, 42268: Brand: BAIR HUGGER™

## (undated) DEVICE — PROTECTOR UNIV FOAM EXTREMITY DEVON

## (undated) DEVICE — JEWISH HOSPITAL TURNOVER KIT: Brand: MEDLINE INDUSTRIES, INC.

## (undated) DEVICE — GOWN,SIRUS,POLYRNF,BRTHSLV,XLN/XL,20/CS: Brand: MEDLINE

## (undated) DEVICE — TURNOVER KIT RM INF CTRL TECH

## (undated) DEVICE — STOCKINETTE,DOUBLE PLY,6X48,STERILE: Brand: MEDLINE

## (undated) DEVICE — ELECTRODE PT RET AD L9FT HI MOIST COND ADH HYDRGEL CORDED

## (undated) DEVICE — GLOVE SURG SZ 8 L12IN FNGR THK79MIL GRN LTX FREE

## (undated) DEVICE — SUTURE PDS II SZ 5-0 L30IN ABSRB VLT RB-2 L13MM 1/2 CIR Z148H

## (undated) DEVICE — DUAL LUMEN STOMACH TUBE: Brand: SALEM SUMP

## (undated) DEVICE — GLOVE ORANGE PI 7   MSG9070

## (undated) DEVICE — TUBING SUCT 10FR MAL ALUM SHFT FN CAP VENT UNIV CONN W/ OBT

## (undated) DEVICE — ELECTRODE NDL L2.8IN COAT LO PWR SET EDGE

## (undated) DEVICE — ZIMMER® STERILE DISPOSABLE TOURNIQUET CUFF WITH PLC, DUAL PORT, SINGLE BLADDER, 18 IN. (46 CM)

## (undated) DEVICE — GAUZE,SPONGE,4"X4",16PLY,XRAY,STRL,LF: Brand: MEDLINE

## (undated) DEVICE — BANDAGE COMPR W1INXL5YD BGE E ADH TENSOPLAST

## (undated) DEVICE — SUTURE ABSRB L6IN L37MM 0 GS-21 GRN 1/2 CIR TAPR PNT NDL VLOCL0306

## (undated) DEVICE — SUTURE VCRL SZ 0 L27IN ABSRB UD L36MM CT-1 1/2 CIR J260H

## (undated) DEVICE — SLING ARM L L17 1/2IN D8.5IN COT POLY DLX W/ SHLDR PD

## (undated) DEVICE — SUTURE PDS II SZ 4-0 L27IN ABSRB VLT L17MM RB-1 1/2 CIR Z304H

## (undated) DEVICE — SUTURE CHROMIC GUT SZ 2-0 L27IN ABSRB BRN L26MM SH 1/2 CIR G123H

## (undated) DEVICE — SPONGE,DISSECTOR,K,XRAY,9/16"X1/4",STRL: Brand: MEDLINE

## (undated) DEVICE — 3M™ IOBAN™ 2 ANTIMICROBIAL INCISE DRAPE 6640EZ: Brand: IOBAN™ 2

## (undated) DEVICE — TRAY,IRRIGATION,BULBSYRINGE,60ML,CSR,PVP: Brand: MEDLINE

## (undated) DEVICE — SHEET BD X PROTECT-A-BED

## (undated) DEVICE — COVER PIN SM YEL ARTC FIX CORETRAK SIDEKCK

## (undated) DEVICE — SUTURE NONABSORBABLE MONOFILAMENT 4-0 PS-2 18 IN BLK ETHILON 1667G

## (undated) DEVICE — COVER LT HNDL BLU PLAS

## (undated) DEVICE — PACK,BASIC: Brand: MEDLINE

## (undated) DEVICE — SUTURE VCRL SZ 4-0 L27IN ABSRB UD L26MM SH 1/2 CIR J415H

## (undated) DEVICE — SOLUTION IV 1000ML 0.9% SOD CHL

## (undated) DEVICE — SPONGE,LAP,18"X18",DLX,XR,ST,5/PK,40/PK: Brand: MEDLINE

## (undated) DEVICE — CABLE BPLR L12FT FLYING LD DISPOSABLE

## (undated) DEVICE — CHLORAPREP 26ML ORANGE

## (undated) DEVICE — SYRINGE MED 30ML STD CLR PLAS LUERLOCK TIP N CTRL DISP

## (undated) DEVICE — SUTURE PERMA-HAND SZ 2-0 L30IN NONABSORBABLE BLK L26MM SH K833H

## (undated) DEVICE — INTENDED TO AID IN THE PASSING OF SUTURES THROUGH BONE AND SOFT TISSUE DURING ORTHOPEDIC SURGERY: Brand: HOFFEE SUTURE RETRIEVER

## (undated) DEVICE — SUTURE PROL SZ 6-0 L18IN NONABSORBABLE BLU L13MM C-1 3/8 8718H

## (undated) DEVICE — Z INACTIVE USE 2641837 CLIP LIG M BLU TI HRT SHP WIRE HORZ 600 PER BX

## (undated) DEVICE — BANDAGE COMPR M W4INXL10YD WHT BGE VELC E MTRX HK AND LOOP

## (undated) DEVICE — 3M™ STERI-STRIP™ REINFORCED ADHESIVE SKIN CLOSURES, R1547, 1/2 IN X 4 IN (12 MM X 100 MM), 6 STRIPS/ENVELOPE: Brand: 3M™ STERI-STRIP™

## (undated) DEVICE — DRAPE SURGICAL HAND PROX AURORA